# Patient Record
Sex: FEMALE | Race: WHITE | Employment: OTHER | URBAN - METROPOLITAN AREA
[De-identification: names, ages, dates, MRNs, and addresses within clinical notes are randomized per-mention and may not be internally consistent; named-entity substitution may affect disease eponyms.]

---

## 2021-02-01 ENCOUNTER — HOSPITAL ENCOUNTER (EMERGENCY)
Age: 71
Discharge: HOME | End: 2021-02-01
Attending: EMERGENCY MEDICINE
Payer: MEDICARE

## 2021-02-01 VITALS
RESPIRATION RATE: 17 BRPM | DIASTOLIC BLOOD PRESSURE: 82 MMHG | OXYGEN SATURATION: 98 % | HEIGHT: 60 IN | BODY MASS INDEX: 22.78 KG/M2 | SYSTOLIC BLOOD PRESSURE: 127 MMHG | HEART RATE: 97 BPM | WEIGHT: 116 LBS | TEMPERATURE: 95.3 F

## 2021-02-01 DIAGNOSIS — B96.89 BACTERIAL CONJUNCTIVITIS OF BOTH EYES: Primary | ICD-10-CM

## 2021-02-01 DIAGNOSIS — H10.9 BACTERIAL CONJUNCTIVITIS OF BOTH EYES: Primary | ICD-10-CM

## 2021-02-01 DIAGNOSIS — H61.22 IMPACTED CERUMEN OF LEFT EAR: ICD-10-CM

## 2021-02-01 PROCEDURE — 99284 EMERGENCY DEPT VISIT MOD MDM: CPT

## 2021-02-01 RX ORDER — ATORVASTATIN CALCIUM 20 MG/1
20 TABLET, FILM COATED ORAL DAILY
COMMUNITY
End: 2021-05-19 | Stop reason: SDUPTHER

## 2021-02-01 RX ORDER — LISINOPRIL 20 MG/1
20 TABLET ORAL DAILY
COMMUNITY
End: 2021-05-19 | Stop reason: SDUPTHER

## 2021-02-01 RX ORDER — DICYCLOMINE HYDROCHLORIDE 10 MG/1
10 CAPSULE ORAL
COMMUNITY
End: 2021-05-19 | Stop reason: SDUPTHER

## 2021-02-01 RX ORDER — ASPIRIN 81 MG/1
81 TABLET ORAL DAILY
COMMUNITY

## 2021-02-01 RX ORDER — ERYTHROMYCIN 5 MG/G
1 OINTMENT OPHTHALMIC EVERY 6 HOURS
Qty: 1 TUBE | Refills: 0 | Status: SHIPPED | OUTPATIENT
Start: 2021-02-01 | End: 2021-02-08

## 2021-02-01 RX ORDER — CHLORTHALIDONE 50 MG/1
50 TABLET ORAL DAILY
COMMUNITY
End: 2021-05-19 | Stop reason: SDUPTHER

## 2021-02-01 RX ORDER — PANTOPRAZOLE SODIUM 20 MG/1
40 TABLET, DELAYED RELEASE ORAL DAILY
COMMUNITY
End: 2021-05-19 | Stop reason: SDUPTHER

## 2021-02-01 NOTE — ED PROVIDER NOTES
Emergency Department Encounter    Patient: Abimael Headley  MRN: [id number]  : 1950  Date of Evaluation: 2021  ED Provider:  Kiran Bran    Triage Chief Complaint:   Conjunctivitis (TYSON EYES ) and Otalgia (left ear )      Yocha Dehe:  Abimael Headley is a 69 y.o. female that presents to the emergency department for evaluation of bilateral eye redness. Patient notes that she woke up this morning and her eyes were matted shut. Patient has noticed some yellow-green discharge over the past couple days. She does not wear contacts. Denies any foreign body sensation. Denies any eye trauma. No pain with extraocular movements. No change of vision, double vision, blurry vision. No flashes or floaters. Eyes have not been itchy. In addition, patient notes that her left ear feels like it exploded in water. She notes that she is having a hard time hearing from the left ear. She tried to put a Q-tip to clean the ear wax, has had pain since. No pus or purulent drainage. No tinnitus, buzzing, ringing in ears. No facial pain or symptoms. No rhinorrhea. Patient is diabetic, says her blood glucose has been controlled in the low 100s. No additional precipitating, modifying, alleviating features. ROS - see HPI, below listed is current ROS at time of my eval:  A complete 14 point review of systems was performed and is as dictated above, otherwise negative. Past Medical History:   Diagnosis Date    Diabetes mellitus ([address]     Hyperlipidemia     Hypertension        Past Surgical History:   Procedure Laterality Date    APPENDECTOMY      CHOLECYSTECTOMY      EYE SURGERY      FRACTURE SURGERY      HYSTERECTOMY      JOINT REPLACEMENT      TONSILLECTOMY         History reviewed. No pertinent family history.     Social History     Socioeconomic History    Marital status: Single     Spouse name: Not on file    Number of children: Not on file    Years of education: Not on file    Highest education level: Not on file   Occupational History    Not on file   Social Needs    Financial resource strain: Not on file    Food insecurity     Worry: Not on file     Inability: Not on file    Transportation needs     Medical: Not on file     Non-medical: Not on file   Tobacco Use    Smoking status: Former Smoker     Quit date: 2020     Years since quittin.9   Substance and Sexual Activity    Alcohol use: Not Currently    Drug use: Not Currently    Sexual activity: Not on file   Lifestyle    Physical activity     Days per week: Not on file     Minutes per session: Not on file    Stress: Not on file   Relationships    Social connections     Talks on phone: Not on file     Gets together: Not on file     Attends Mandaen service: Not on file     Active member of club or organization: Not on file     Attends meetings of clubs or organizations: Not on file     Relationship status: Not on file    Intimate partner violence     Fear of current or ex partner: Not on file     Emotionally abused: Not on file     Physically abused: Not on file     Forced sexual activity: Not on file   Other Topics Concern    Not on file   Social History Narrative    Not on file       No current facility-administered medications for this encounter. Current Outpatient Medications   Medication Sig Dispense Refill    METFORMIN HCL PO Take by mouth      aspirin 81 MG EC tablet Take 81 mg by mouth daily      erythromycin (ROMYCIN) 5 MG/GM ophthalmic ointment Place 1 cm into both eyes every 6 hours for 7 days Please apply 1cm ribbon of the ointment to both eyes.  1 Tube 0    atorvastatin (LIPITOR) 20 MG tablet Take 20 mg by mouth daily      lisinopril (PRINIVIL;ZESTRIL) 20 MG tablet Take 20 mg by mouth daily      chlorthalidone (HYGROTEN) 50 MG tablet Take 50 mg by mouth daily      dicyclomine (BENTYL) 10 MG capsule Take 10 mg by mouth      pantoprazole (PROTONIX) 20 MG tablet Take 40 mg by mouth daily         Allergies evaluated in the emergency department by myself. A thorough history and physical exam were performed, prior medical records were reviewed. Upon arrival to the emergency department, patient's vital signs were noted and patient is hemodynamically stable. Differential diagnoses and treatment plan were discussed. Symptoms are consistent with acute bacterial conjunctivitis bilateral eyes. No signs of periorbital, preseptal, orbital cellulitis. No pain with extraocular movements. No proptosis. No signs of herpes zoster or herpes ophthalmicus. We recommend erythromycin topical ointment. In addition, patient has left ear cerumen impaction. Disimpaction is performed by myself at bedside. Patient tolerates procedure well without any complications. Notes improvement in symptoms. She is instructed to follow-up with primary care provider. Instructed to return to the emergency room immediately with any new, worsening, concerning symptoms. Additional verbal printed discharge instructions were provided. Patient verbalizes understanding and is agreeable with discharge plan. Patient discharged in stable, non-toxic condition. Clinical Impressions:  1. Bacterial conjunctivitis of both eyes    2. Impacted cerumen of left ear        Disposition referral:  [place] [address]  [facility]  [phone]  Go to   Immediately with any new, worsening, concerning symptoms. [facility]  [address] [facility]  [phone]  In 3 days  Please follow-up with primary care physician for reevaluation and to establish care. Disposition medications:  New Prescriptions    ERYTHROMYCIN (ROMYCIN) 5 MG/GM OPHTHALMIC OINTMENT    Place 1 cm into both eyes every 6 hours for 7 days Please apply 1cm ribbon of the ointment to both eyes.        ED Provider Disposition:  DISPOSITION Decision To Discharge 02/01/2021 10:35:35 AM      Comment: Please note this report has been produced using speech recognition software and may contain errors related to that system including errors in grammar, punctuation, and spelling, as well as words and phrases that may be inappropriate. Efforts were made to edit the dictations.        Cornelia Altamirano,   02/01/21 1046

## 2021-02-01 NOTE — ED NOTES
Reports elliott eye drainage and discomfort states they were madded shut this morning   Pt also c/o left ear pain for couple days states she has been using some ear drops with no improvement      Erna Chang RN  02/01/21 0119

## 2021-02-01 NOTE — ED NOTES
Discharge instructions given with prescription verbalized understanding pt is ambulatory out       Elena York, RN  02/01/21 7862

## 2021-05-19 ENCOUNTER — OFFICE VISIT (OUTPATIENT)
Dept: FAMILY MEDICINE CLINIC | Age: 71
End: 2021-05-19
Payer: MEDICARE

## 2021-05-19 VITALS
SYSTOLIC BLOOD PRESSURE: 110 MMHG | HEIGHT: 60 IN | TEMPERATURE: 97.3 F | BODY MASS INDEX: 24.94 KG/M2 | WEIGHT: 127 LBS | OXYGEN SATURATION: 98 % | DIASTOLIC BLOOD PRESSURE: 78 MMHG | HEART RATE: 66 BPM

## 2021-05-19 DIAGNOSIS — K21.9 GASTROESOPHAGEAL REFLUX DISEASE, UNSPECIFIED WHETHER ESOPHAGITIS PRESENT: ICD-10-CM

## 2021-05-19 DIAGNOSIS — H10.32 ACUTE CONJUNCTIVITIS OF LEFT EYE, UNSPECIFIED ACUTE CONJUNCTIVITIS TYPE: ICD-10-CM

## 2021-05-19 DIAGNOSIS — Z87.898 HISTORY OF SOLITARY PULMONARY NODULE: ICD-10-CM

## 2021-05-19 DIAGNOSIS — I10 ESSENTIAL HYPERTENSION: ICD-10-CM

## 2021-05-19 DIAGNOSIS — G47.00 INSOMNIA, UNSPECIFIED TYPE: ICD-10-CM

## 2021-05-19 DIAGNOSIS — E11.9 TYPE 2 DIABETES MELLITUS WITHOUT COMPLICATION, WITHOUT LONG-TERM CURRENT USE OF INSULIN (HCC): Primary | ICD-10-CM

## 2021-05-19 DIAGNOSIS — E78.2 MIXED HYPERLIPIDEMIA: ICD-10-CM

## 2021-05-19 PROCEDURE — G8427 DOCREV CUR MEDS BY ELIG CLIN: HCPCS | Performed by: NURSE PRACTITIONER

## 2021-05-19 PROCEDURE — 99203 OFFICE O/P NEW LOW 30 MIN: CPT | Performed by: NURSE PRACTITIONER

## 2021-05-19 PROCEDURE — 1036F TOBACCO NON-USER: CPT | Performed by: NURSE PRACTITIONER

## 2021-05-19 PROCEDURE — 4040F PNEUMOC VAC/ADMIN/RCVD: CPT | Performed by: NURSE PRACTITIONER

## 2021-05-19 PROCEDURE — 3017F COLORECTAL CA SCREEN DOC REV: CPT | Performed by: NURSE PRACTITIONER

## 2021-05-19 PROCEDURE — G8420 CALC BMI NORM PARAMETERS: HCPCS | Performed by: NURSE PRACTITIONER

## 2021-05-19 PROCEDURE — 2022F DILAT RTA XM EVC RTNOPTHY: CPT | Performed by: NURSE PRACTITIONER

## 2021-05-19 PROCEDURE — 3046F HEMOGLOBIN A1C LEVEL >9.0%: CPT | Performed by: NURSE PRACTITIONER

## 2021-05-19 PROCEDURE — 1090F PRES/ABSN URINE INCON ASSESS: CPT | Performed by: NURSE PRACTITIONER

## 2021-05-19 PROCEDURE — 1123F ACP DISCUSS/DSCN MKR DOCD: CPT | Performed by: NURSE PRACTITIONER

## 2021-05-19 PROCEDURE — G8400 PT W/DXA NO RESULTS DOC: HCPCS | Performed by: NURSE PRACTITIONER

## 2021-05-19 RX ORDER — ATORVASTATIN CALCIUM 20 MG/1
20 TABLET, FILM COATED ORAL DAILY
Qty: 30 TABLET | Refills: 0 | Status: SHIPPED | OUTPATIENT
Start: 2021-05-19 | End: 2021-07-14 | Stop reason: SDUPTHER

## 2021-05-19 RX ORDER — BUSPIRONE HYDROCHLORIDE 10 MG/1
10 TABLET ORAL NIGHTLY
Qty: 30 TABLET | Refills: 0 | Status: SHIPPED | OUTPATIENT
Start: 2021-05-19 | End: 2021-06-18

## 2021-05-19 RX ORDER — DICYCLOMINE HYDROCHLORIDE 10 MG/1
10 CAPSULE ORAL 3 TIMES DAILY PRN
Qty: 120 CAPSULE | Refills: 0 | Status: SHIPPED | OUTPATIENT
Start: 2021-05-19 | End: 2021-08-18 | Stop reason: SDUPTHER

## 2021-05-19 RX ORDER — CHLORTHALIDONE 50 MG/1
50 TABLET ORAL DAILY
Qty: 30 TABLET | Refills: 0 | Status: ON HOLD | OUTPATIENT
Start: 2021-05-19 | End: 2021-07-01

## 2021-05-19 RX ORDER — PANTOPRAZOLE SODIUM 40 MG/1
40 TABLET, DELAYED RELEASE ORAL DAILY
Qty: 30 TABLET | Refills: 0 | Status: SHIPPED | OUTPATIENT
Start: 2021-05-19 | End: 2021-07-14 | Stop reason: SDUPTHER

## 2021-05-19 RX ORDER — LISINOPRIL 20 MG/1
20 TABLET ORAL DAILY
Qty: 30 TABLET | Refills: 0 | Status: ON HOLD | OUTPATIENT
Start: 2021-05-19 | End: 2021-07-02 | Stop reason: SDUPTHER

## 2021-05-19 RX ORDER — POLYMYXIN B SULFATE AND TRIMETHOPRIM 1; 10000 MG/ML; [USP'U]/ML
1 SOLUTION OPHTHALMIC 4 TIMES DAILY
Qty: 10 ML | Refills: 0 | Status: SHIPPED | OUTPATIENT
Start: 2021-05-19 | End: 2021-05-29

## 2021-05-19 ASSESSMENT — ENCOUNTER SYMPTOMS
RHINORRHEA: 0
SHORTNESS OF BREATH: 0
EYE REDNESS: 1
SINUS PRESSURE: 0
NAUSEA: 0
DIARRHEA: 0
COUGH: 0
WHEEZING: 0
SINUS PAIN: 0
SORE THROAT: 0
VOMITING: 0
CHEST TIGHTNESS: 0
EYE ITCHING: 1

## 2021-05-19 NOTE — PROGRESS NOTES
Abimael Headley   66 y.o.  female  [id number]      Chief Complaint   Patient presents with    Medication Refill     Pt states that she also needs to see pulmonologist, can't get intpo new PCP until July, was living in [place] nad they found a small nodule on one of her lungs years ago. Pt states she was a long time smoker, but quit back in Dec. 2020, sometimes is still SOB     Eye Pain     Pt states that she wore mascara for the first time in a long time 3-4 days ago, forgot to take it off, and since then eye has been red and painful, also has drainage. Subjective:  66 y. o.female is here for a follow up. She has the following chronic/acute medical problems: There is no problem list on file for this patient. Patient is here today for medication refills. Patient states she moved back from [place] in November 2020. Patient states she did see a PCP in [place] [address] once but wanted to establish care with a PCP in [facility]. Patient has an appointment with PCP but not till July 14. Patient states it has been a while since she has had any blood work. Patient states she was put on Buspar at bedtime for sleep. Patient states this medication is effective. Patient states she was told she had a lung nodule while leaving in [place]. Patient states she saw a pulmonologist in [place] who kept an eye on this. Patient would like to be referred to a pulmonologist in [facility]. Patient states she wore mascara for the first time in a long while. Patient states then her left eye has be itchy and red. Patient states she wakes up in the morning and her eye is matted shut. Patient states this has gone on for the last 3 to 4 days. Review of Systems   Constitutional: Negative for appetite change, chills, fatigue and fever. HENT: Negative for congestion, ear pain, postnasal drip, rhinorrhea, sinus pressure, sinus pain, sneezing and sore throat. Eyes: Positive for redness and itching.    Respiratory: Negative for cough, chest tightness, shortness of breath and wheezing. Cardiovascular: Negative for chest pain and palpitations. Gastrointestinal: Negative for diarrhea, nausea and vomiting. Endocrine: Negative for polydipsia, polyphagia and polyuria. Skin: Negative for rash. Neurological: Negative for dizziness, light-headedness and headaches. Current Outpatient Medications   Medication Sig Dispense Refill    metFORMIN (GLUCOPHAGE) 500 MG tablet Take 1 tablet by mouth 2 times daily (with meals) 60 tablet 0    lisinopril (PRINIVIL;ZESTRIL) 20 MG tablet Take 1 tablet by mouth daily 30 tablet 0    atorvastatin (LIPITOR) 20 MG tablet Take 1 tablet by mouth daily 30 tablet 0    dicyclomine (BENTYL) 10 MG capsule Take 1 capsule by mouth 3 times daily as needed (stomach) 120 capsule 0    chlorthalidone (HYGROTEN) 50 MG tablet Take 1 tablet by mouth daily 30 tablet 0    pantoprazole (PROTONIX) 40 MG tablet Take 1 tablet by mouth daily 30 tablet 0    busPIRone (BUSPAR) 10 MG tablet Take 1 tablet by mouth nightly 30 tablet 0    trimethoprim-polymyxin b (POLYTRIM) 35662-3.1 UNIT/ML-% ophthalmic solution Place 1 drop into the left eye 4 times daily for 10 days 10 mL 0    aspirin 81 MG EC tablet Take 81 mg by mouth daily       No current facility-administered medications for this visit. Past medical, family,surgical history reviewed today. Objective:  /78   Pulse 66   Temp 97.3 °F (36.3 °C)   Ht 5' (1.524 m)   Wt 127 lb (57.6 kg)   SpO2 98%   Breastfeeding No   BMI 24.80 kg/m²   BP Readings from Last 3 Encounters:   05/19/21 110/78   02/01/21 127/82     Wt Readings from Last 3 Encounters:   05/19/21 127 lb (57.6 kg)   02/01/21 116 lb (52.6 kg)         Physical Exam  Constitutional:       Appearance: Normal appearance. HENT:      Head: Normocephalic. Ears:      Comments: Sclera of left eye is red. Cardiovascular:      Rate and Rhythm: Normal rate and regular rhythm.       Heart sounds: Normal heart sounds. Pulmonary:      Effort: Pulmonary effort is normal.      Breath sounds: Normal breath sounds. Musculoskeletal:      Cervical back: Neck supple. Skin:     General: Skin is warm and dry. Neurological:      Mental Status: She is alert and oriented to person, place, and time. Psychiatric:         Mood and Affect: Mood normal.         Behavior: Behavior normal.         No results found for: WBC, HGB, HCT, MCV, PLT  No results found for: NA, K, CL, CO2, BUN, CREATININE, GLUCOSE, CALCIUM, PROT, LABALBU, BILITOT, ALKPHOS, AST, ALT, LABGLOM, GFRAA, AGRATIO, GLOB  No results found for: CHOL  No results found for: TRIG  No results found for: HDL  No results found for: LDLCALC, LDLCHOLESTEROL  No results found for: LABA1C  No results found for: TSHFT4, TSH, TSHHS      ASSESSMENT/PLAN:      1. Type 2 diabetes mellitus without complication, without long-term current use of insulin (HCC)  Refilled medication.   - metFORMIN (GLUCOPHAGE) 500 MG tablet; Take 1 tablet by mouth 2 times daily (with meals)  Dispense: 60 tablet; Refill: 0  - CBC WITH AUTO DIFFERENTIAL; Future  - COMPREHENSIVE METABOLIC PANEL; Future  - HEMOGLOBIN A1C; Future    2. Gastroesophageal reflux disease, unspecified whether esophagitis present  Refilled medication. - dicyclomine (BENTYL) 10 MG capsule; Take 1 capsule by mouth 3 times daily as needed (stomach)  Dispense: 120 capsule; Refill: 0  - pantoprazole (PROTONIX) 40 MG tablet; Take 1 tablet by mouth daily  Dispense: 30 tablet; Refill: 0    3. Insomnia, unspecified type  Refilled medication.   - busPIRone (BUSPAR) 10 MG tablet; Take 1 tablet by mouth nightly  Dispense: 30 tablet; Refill: 0    4. Essential hypertension  Refilled medications. - lisinopril (PRINIVIL;ZESTRIL) 20 MG tablet; Take 1 tablet by mouth daily  Dispense: 30 tablet; Refill: 0  - chlorthalidone (HYGROTEN) 50 MG tablet; Take 1 tablet by mouth daily  Dispense: 30 tablet;  Refill: 0  - CBC WITH AUTO

## 2021-06-15 ENCOUNTER — HOSPITAL ENCOUNTER (OUTPATIENT)
Age: 71
Discharge: HOME | End: 2021-06-15
Payer: MEDICARE

## 2021-06-15 ENCOUNTER — INITIAL CONSULT (OUTPATIENT)
Dept: PULMONOLOGY | Age: 71
End: 2021-06-15
Payer: MEDICARE

## 2021-06-15 ENCOUNTER — HOSPITAL ENCOUNTER (OUTPATIENT)
Dept: GENERAL RADIOLOGY | Age: 71
Discharge: HOME | End: 2021-06-15
Payer: MEDICARE

## 2021-06-15 VITALS
BODY MASS INDEX: 24.74 KG/M2 | DIASTOLIC BLOOD PRESSURE: 80 MMHG | SYSTOLIC BLOOD PRESSURE: 140 MMHG | WEIGHT: 126 LBS | HEIGHT: 60 IN | OXYGEN SATURATION: 98 % | HEART RATE: 76 BPM

## 2021-06-15 DIAGNOSIS — E78.2 MIXED HYPERLIPIDEMIA: ICD-10-CM

## 2021-06-15 DIAGNOSIS — R06.02 SHORTNESS OF BREATH: ICD-10-CM

## 2021-06-15 DIAGNOSIS — I10 ESSENTIAL HYPERTENSION: ICD-10-CM

## 2021-06-15 DIAGNOSIS — R91.1 SOLITARY LUNG NODULE: ICD-10-CM

## 2021-06-15 DIAGNOSIS — G47.19 EXCESSIVE DAYTIME SLEEPINESS: ICD-10-CM

## 2021-06-15 DIAGNOSIS — R06.83 SNORING: ICD-10-CM

## 2021-06-15 DIAGNOSIS — J44.9 COPD, MILD (HCC): Primary | ICD-10-CM

## 2021-06-15 DIAGNOSIS — J44.9 COPD, MILD (HCC): ICD-10-CM

## 2021-06-15 DIAGNOSIS — Z87.891 FORMER SMOKER: ICD-10-CM

## 2021-06-15 DIAGNOSIS — E11.9 TYPE 2 DIABETES MELLITUS WITHOUT COMPLICATION, WITHOUT LONG-TERM CURRENT USE OF INSULIN (HCC): ICD-10-CM

## 2021-06-15 LAB
ALBUMIN SERPL-MCNC: 4.3 GM/DL (ref 3.4–5)
ALP BLD-CCNC: 79 IU/L (ref 40–128)
ALT SERPL-CCNC: 13 U/L (ref 10–40)
ANION GAP SERPL CALCULATED.3IONS-SCNC: 10 MMOL/L (ref 4–16)
AST SERPL-CCNC: 12 IU/L (ref 15–37)
BASOPHILS ABSOLUTE: 0.1 K/CU MM
BASOPHILS RELATIVE PERCENT: 1 % (ref 0–1)
BILIRUB SERPL-MCNC: 0.2 MG/DL (ref 0–1)
BUN BLDV-MCNC: 17 MG/DL (ref 6–23)
CALCIUM SERPL-MCNC: 9.4 MG/DL (ref 8.3–10.6)
CHLORIDE BLD-SCNC: 105 MMOL/L (ref 99–110)
CHOLESTEROL: 143 MG/DL
CO2: 28 MMOL/L (ref 21–32)
CREAT SERPL-MCNC: 0.8 MG/DL (ref 0.6–1.1)
DIFFERENTIAL TYPE: ABNORMAL
EOSINOPHILS ABSOLUTE: 0.1 K/CU MM
EOSINOPHILS RELATIVE PERCENT: 1.2 % (ref 0–3)
ESTIMATED AVERAGE GLUCOSE: 120 MG/DL
EXPIRATORY TIME-POST: NORMAL
EXPIRATORY TIME: NORMAL
FEF 25-75% %CHNG: NORMAL
FEF 25-75% %PRED-POST: NORMAL
FEF 25-75% %PRED-PRE: NORMAL
FEF 25-75% PRED: NORMAL
FEF 25-75%-POST: NORMAL
FEF 25-75%-PRE: NORMAL
FEV1 %PRED-POST: 73.9 %
FEV1 %PRED-PRE: 71.6 %
FEV1 PRED: 1.84 L
FEV1-POST: 1.36 L
FEV1-PRE: 1.32 L
FEV1/FVC %PRED-POST: 88.6 %
FEV1/FVC %PRED-PRE: 100.1 %
FEV1/FVC PRED: 75.5 %
FEV1/FVC-POST: 66.9 %
FEV1/FVC-PRE: 75.6 %
FVC %PRED-POST: 82.9 L
FVC %PRED-PRE: 71 %
FVC PRED: 2.45 L
FVC-POST: 2.03 L
FVC-PRE: 1.74 L
GFR AFRICAN AMERICAN: >60 ML/MIN/1.73M2
GFR NON-AFRICAN AMERICAN: >60 ML/MIN/1.73M2
GLUCOSE BLD-MCNC: 102 MG/DL (ref 70–99)
HBA1C MFR BLD: 5.8 % (ref 4.2–6.3)
HCT VFR BLD CALC: 40.6 % (ref 37–47)
HDLC SERPL-MCNC: 67 MG/DL
HEMOGLOBIN: 12.1 GM/DL (ref 12.5–16)
IMMATURE NEUTROPHIL %: 0.6 % (ref 0–0.43)
LDL CHOLESTEROL DIRECT: 64 MG/DL
LYMPHOCYTES ABSOLUTE: 1.3 K/CU MM
LYMPHOCYTES RELATIVE PERCENT: 19 % (ref 24–44)
MCH RBC QN AUTO: 30 PG (ref 27–31)
MCHC RBC AUTO-ENTMCNC: 29.8 % (ref 32–36)
MCV RBC AUTO: 100.5 FL (ref 78–100)
MONOCYTES ABSOLUTE: 0.6 K/CU MM
MONOCYTES RELATIVE PERCENT: 8.5 % (ref 0–4)
NUCLEATED RBC %: 0 %
PDW BLD-RTO: 13.5 % (ref 11.7–14.9)
PEF %PRED-POST: NORMAL
PEF %PRED-PRE: NORMAL
PEF PRED: NORMAL
PEF%CHNG: NORMAL
PEF-POST: NORMAL
PEF-PRE: NORMAL
PLATELET # BLD: 355 K/CU MM (ref 140–440)
PMV BLD AUTO: 9.9 FL (ref 7.5–11.1)
POTASSIUM SERPL-SCNC: 4.2 MMOL/L (ref 3.5–5.1)
RBC # BLD: 4.04 M/CU MM (ref 4.2–5.4)
SEGMENTED NEUTROPHILS ABSOLUTE COUNT: 4.7 K/CU MM
SEGMENTED NEUTROPHILS RELATIVE PERCENT: 69.7 % (ref 36–66)
SODIUM BLD-SCNC: 143 MMOL/L (ref 135–145)
TOTAL IMMATURE NEUTOROPHIL: 0.04 K/CU MM
TOTAL NUCLEATED RBC: 0 K/CU MM
TOTAL PROTEIN: 7.1 GM/DL (ref 6.4–8.2)
TRIGL SERPL-MCNC: 79 MG/DL
WBC # BLD: 6.8 K/CU MM (ref 4–10.5)

## 2021-06-15 PROCEDURE — 99204 OFFICE O/P NEW MOD 45 MIN: CPT | Performed by: INTERNAL MEDICINE

## 2021-06-15 PROCEDURE — G8420 CALC BMI NORM PARAMETERS: HCPCS | Performed by: INTERNAL MEDICINE

## 2021-06-15 PROCEDURE — G8926 SPIRO NO PERF OR DOC: HCPCS | Performed by: INTERNAL MEDICINE

## 2021-06-15 PROCEDURE — G8400 PT W/DXA NO RESULTS DOC: HCPCS | Performed by: INTERNAL MEDICINE

## 2021-06-15 PROCEDURE — 1123F ACP DISCUSS/DSCN MKR DOCD: CPT | Performed by: INTERNAL MEDICINE

## 2021-06-15 PROCEDURE — 1036F TOBACCO NON-USER: CPT | Performed by: INTERNAL MEDICINE

## 2021-06-15 PROCEDURE — 4040F PNEUMOC VAC/ADMIN/RCVD: CPT | Performed by: INTERNAL MEDICINE

## 2021-06-15 PROCEDURE — 36415 COLL VENOUS BLD VENIPUNCTURE: CPT

## 2021-06-15 PROCEDURE — 3017F COLORECTAL CA SCREEN DOC REV: CPT | Performed by: INTERNAL MEDICINE

## 2021-06-15 PROCEDURE — 83036 HEMOGLOBIN GLYCOSYLATED A1C: CPT

## 2021-06-15 PROCEDURE — 83721 ASSAY OF BLOOD LIPOPROTEIN: CPT

## 2021-06-15 PROCEDURE — 3023F SPIROM DOC REV: CPT | Performed by: INTERNAL MEDICINE

## 2021-06-15 PROCEDURE — G8427 DOCREV CUR MEDS BY ELIG CLIN: HCPCS | Performed by: INTERNAL MEDICINE

## 2021-06-15 PROCEDURE — 80053 COMPREHEN METABOLIC PANEL: CPT

## 2021-06-15 PROCEDURE — 85025 COMPLETE CBC W/AUTO DIFF WBC: CPT

## 2021-06-15 PROCEDURE — 1090F PRES/ABSN URINE INCON ASSESS: CPT | Performed by: INTERNAL MEDICINE

## 2021-06-15 PROCEDURE — 71046 X-RAY EXAM CHEST 2 VIEWS: CPT

## 2021-06-15 PROCEDURE — 80061 LIPID PANEL: CPT

## 2021-06-15 ASSESSMENT — PULMONARY FUNCTION TESTS
FEV1_POST: 1.36
FVC_PERCENT_PREDICTED_PRE: 71.0
FVC_PREDICTED: 2.45
FVC_POST: 2.03
FEV1_PREDICTED: 1.84
FEV1/FVC_PRE: 75.6
FEV1_PERCENT_PREDICTED_PRE: 71.6
FEV1/FVC_PERCENT_PREDICTED_PRE: 100.1
FVC_PRE: 1.74
FEV1/FVC_PERCENT_PREDICTED_POST: 88.6
FEV1_PERCENT_PREDICTED_POST: 73.9
FVC_PERCENT_PREDICTED_POST: 82.9
FEV1/FVC_PREDICTED: 75.5
FEV1/FVC_POST: 66.9
FEV1_PRE: 1.32

## 2021-06-15 NOTE — PROGRESS NOTES
medications for this visit. Allergies   Allergen Reactions    Other Hives    Levaquin [Levofloxacin] Rash       Social History:    Social History     Socioeconomic History    Marital status: Single     Spouse name: None    Number of children: None    Years of education: None    Highest education level: None   Occupational History    None   Tobacco Use    Smoking status: Former Smoker     Quit date: 2020     Years since quittin.3    Smokeless tobacco: Never Used   Substance and Sexual Activity    Alcohol use: Not Currently    Drug use: Not Currently    Sexual activity: None   Other Topics Concern    None   Social History Narrative    None     Social Determinants of Health     Financial Resource Strain:     Difficulty of Paying Living Expenses:    Food Insecurity:     Worried About Running Out of Food in the Last Year:     Ran Out of Food in the Last Year:    Transportation Needs:     Lack of Transportation (Medical):  Lack of Transportation (Non-Medical):    Physical Activity:     Days of Exercise per Week:     Minutes of Exercise per Session:    Stress:     Feeling of Stress :    Social Connections:     Frequency of Communication with Friends and Family:     Frequency of Social Gatherings with Friends and Family:     Attends Latter day Services:     Active Member of Clubs or Organizations:     Attends Club or Organization Meetings:     Marital Status:    Intimate Partner Violence:     Fear of Current or Ex-Partner:     Emotionally Abused:     Physically Abused:     Sexually Abused:        Family History:    No family history on file. REVIEW OF SYSTEMS:    CONSTITUTIONAL:  negative for fevers, chills, diaphoresis,  appetite change, night sweats and unexpected weight change.    HEENT:  negative for hearing loss,  sinus pressure, nasal congestion, epistaxis   RESPIRATORY:  See HPI  CARDIOVASCULAR:  negative for chest pain, palpitations, exertional chest bronchodilator she had a significant response    Assessment:     1. Solitary lung nodule    2. Shortness of breath    3. Snoring    4. Excessive daytime sleepiness    5. Former smoker        Plan:   I will obtain a chest x-ray and will consider a CT lung screening evaluation in the near future. It would be helpful to obtain the results of her previous CT chest for comparison. I am going to start her on Anoro Ellipta 1 inhalation daily and allow her to use her albuterol MDI or albuterol solution per nebulizer as needed. Because of her history of snoring and excessive daytime sleepiness I will obtain apnea screening. I will continue to follow her    We have discussed the need to maintain yearly flu immunization, pneumococcal vaccination. We have discussed Coronavirus precaution including handwashing practice, wiping items touched in public such as gas pumps, door handles, shopping carts, etc. Self monitoring for infection - fever, chills, cough, SOB. Should they develop symptoms they should call office for further instructions. Return for Recheck for COPD, Recheck for Shortness of Breath, recheck for lung nodule. This dictation was performed with a verbal recognition program and it was checked for errors. It is possible that there are still dictated errors within this office note. Any errors should be brought immediately to my attention for correction. All efforts were made to ensure that this office note is accurate.

## 2021-06-16 DIAGNOSIS — R91.1 SOLITARY LUNG NODULE: ICD-10-CM

## 2021-06-16 DIAGNOSIS — J44.9 COPD, MILD (HCC): ICD-10-CM

## 2021-06-16 DIAGNOSIS — R06.02 SHORTNESS OF BREATH: ICD-10-CM

## 2021-06-16 DIAGNOSIS — Z87.891 FORMER SMOKER: ICD-10-CM

## 2021-06-17 NOTE — RESULT ENCOUNTER NOTE
I spoke to Mrs. Jones over the phone and told her that the chest x-ray does show several calcified pulmonary nodules and that I would probably will proceed with a CT lung screening when I see her back in July.

## 2021-07-01 ENCOUNTER — HOSPITAL ENCOUNTER (INPATIENT)
Age: 71
LOS: 1 days | Discharge: HOME | DRG: 313 | End: 2021-07-02
Attending: EMERGENCY MEDICINE | Admitting: INTERNAL MEDICINE
Payer: MEDICARE

## 2021-07-01 ENCOUNTER — APPOINTMENT (OUTPATIENT)
Dept: GENERAL RADIOLOGY | Age: 71
DRG: 313 | End: 2021-07-01
Payer: MEDICARE

## 2021-07-01 DIAGNOSIS — I10 ESSENTIAL HYPERTENSION: ICD-10-CM

## 2021-07-01 DIAGNOSIS — R07.9 CHEST PAIN, UNSPECIFIED TYPE: Primary | ICD-10-CM

## 2021-07-01 LAB
ALBUMIN SERPL-MCNC: 3.7 GM/DL (ref 3.4–5)
ALP BLD-CCNC: 88 IU/L (ref 40–129)
ALT SERPL-CCNC: 13 U/L (ref 10–40)
ANION GAP SERPL CALCULATED.3IONS-SCNC: 11 MMOL/L (ref 4–16)
AST SERPL-CCNC: 11 IU/L (ref 15–37)
BASOPHILS ABSOLUTE: 0 K/CU MM
BASOPHILS RELATIVE PERCENT: 0.6 % (ref 0–1)
BILIRUB SERPL-MCNC: 0.4 MG/DL (ref 0–1)
BUN BLDV-MCNC: 12 MG/DL (ref 6–23)
CALCIUM SERPL-MCNC: 9.1 MG/DL (ref 8.3–10.6)
CHLORIDE BLD-SCNC: 104 MMOL/L (ref 99–110)
CO2: 23 MMOL/L (ref 21–32)
CREAT SERPL-MCNC: 0.7 MG/DL (ref 0.6–1.1)
DIFFERENTIAL TYPE: ABNORMAL
EOSINOPHILS ABSOLUTE: 0.1 K/CU MM
EOSINOPHILS RELATIVE PERCENT: 1.7 % (ref 0–3)
GFR AFRICAN AMERICAN: >60 ML/MIN/1.73M2
GFR NON-AFRICAN AMERICAN: >60 ML/MIN/1.73M2
GLUCOSE BLD-MCNC: 207 MG/DL (ref 70–99)
GLUCOSE BLD-MCNC: 99 MG/DL (ref 70–99)
HCT VFR BLD CALC: 40.5 % (ref 37–47)
HEMOGLOBIN: 12.9 GM/DL (ref 12.5–16)
IMMATURE NEUTROPHIL %: 0.6 % (ref 0–0.43)
LIPASE: 23 IU/L (ref 13–60)
LYMPHOCYTES ABSOLUTE: 0.9 K/CU MM
LYMPHOCYTES RELATIVE PERCENT: 13 % (ref 24–44)
MCH RBC QN AUTO: 30 PG (ref 27–31)
MCHC RBC AUTO-ENTMCNC: 31.9 % (ref 32–36)
MCV RBC AUTO: 94.2 FL (ref 78–100)
MONOCYTES ABSOLUTE: 0.7 K/CU MM
MONOCYTES RELATIVE PERCENT: 9.8 % (ref 0–4)
PDW BLD-RTO: 12.8 % (ref 11.7–14.9)
PLATELET # BLD: 245 K/CU MM (ref 140–440)
PMV BLD AUTO: 10.1 FL (ref 7.5–11.1)
POTASSIUM SERPL-SCNC: 4.1 MMOL/L (ref 3.5–5.1)
PRO-BNP: 226.6 PG/ML
RBC # BLD: 4.3 M/CU MM (ref 4.2–5.4)
SEGMENTED NEUTROPHILS ABSOLUTE COUNT: 4.9 K/CU MM
SEGMENTED NEUTROPHILS RELATIVE PERCENT: 74.3 % (ref 36–66)
SODIUM BLD-SCNC: 138 MMOL/L (ref 135–145)
TOTAL IMMATURE NEUTOROPHIL: 0.04 K/CU MM
TOTAL PROTEIN: 6.7 GM/DL (ref 6.4–8.2)
TROPONIN T: <0.01 NG/ML
WBC # BLD: 6.6 K/CU MM (ref 4–10.5)

## 2021-07-01 PROCEDURE — 84484 ASSAY OF TROPONIN QUANT: CPT

## 2021-07-01 PROCEDURE — 2140000000 HC CCU INTERMEDIATE R&B

## 2021-07-01 PROCEDURE — 93005 ELECTROCARDIOGRAM TRACING: CPT | Performed by: EMERGENCY MEDICINE

## 2021-07-01 PROCEDURE — 2580000003 HC RX 258: Performed by: NURSE PRACTITIONER

## 2021-07-01 PROCEDURE — 85025 COMPLETE CBC W/AUTO DIFF WBC: CPT

## 2021-07-01 PROCEDURE — 36415 COLL VENOUS BLD VENIPUNCTURE: CPT

## 2021-07-01 PROCEDURE — 82962 GLUCOSE BLOOD TEST: CPT

## 2021-07-01 PROCEDURE — 71045 X-RAY EXAM CHEST 1 VIEW: CPT

## 2021-07-01 PROCEDURE — 83690 ASSAY OF LIPASE: CPT

## 2021-07-01 PROCEDURE — 94761 N-INVAS EAR/PLS OXIMETRY MLT: CPT

## 2021-07-01 PROCEDURE — 99284 EMERGENCY DEPT VISIT MOD MDM: CPT

## 2021-07-01 PROCEDURE — 80053 COMPREHEN METABOLIC PANEL: CPT

## 2021-07-01 PROCEDURE — 6370000000 HC RX 637 (ALT 250 FOR IP): Performed by: EMERGENCY MEDICINE

## 2021-07-01 PROCEDURE — 6370000000 HC RX 637 (ALT 250 FOR IP): Performed by: NURSE PRACTITIONER

## 2021-07-01 PROCEDURE — 83880 ASSAY OF NATRIURETIC PEPTIDE: CPT

## 2021-07-01 RX ORDER — SODIUM CHLORIDE 0.9 % (FLUSH) 0.9 %
5-40 SYRINGE (ML) INJECTION EVERY 12 HOURS SCHEDULED
Status: DISCONTINUED | OUTPATIENT
Start: 2021-07-01 | End: 2021-07-02 | Stop reason: HOSPADM

## 2021-07-01 RX ORDER — SODIUM CHLORIDE 0.9 % (FLUSH) 0.9 %
5-40 SYRINGE (ML) INJECTION PRN
Status: DISCONTINUED | OUTPATIENT
Start: 2021-07-01 | End: 2021-07-02 | Stop reason: HOSPADM

## 2021-07-01 RX ORDER — POLYETHYLENE GLYCOL 3350 17 G/17G
17 POWDER, FOR SOLUTION ORAL DAILY PRN
Status: DISCONTINUED | OUTPATIENT
Start: 2021-07-01 | End: 2021-07-02 | Stop reason: HOSPADM

## 2021-07-01 RX ORDER — ONDANSETRON 2 MG/ML
4 INJECTION INTRAMUSCULAR; INTRAVENOUS EVERY 6 HOURS PRN
Status: DISCONTINUED | OUTPATIENT
Start: 2021-07-01 | End: 2021-07-02 | Stop reason: HOSPADM

## 2021-07-01 RX ORDER — ACETAMINOPHEN 325 MG/1
650 TABLET ORAL EVERY 6 HOURS PRN
Status: DISCONTINUED | OUTPATIENT
Start: 2021-07-01 | End: 2021-07-02 | Stop reason: HOSPADM

## 2021-07-01 RX ORDER — ASPIRIN 81 MG/1
324 TABLET, CHEWABLE ORAL ONCE
Status: COMPLETED | OUTPATIENT
Start: 2021-07-01 | End: 2021-07-01

## 2021-07-01 RX ORDER — ASPIRIN 81 MG/1
81 TABLET ORAL DAILY
Status: DISCONTINUED | OUTPATIENT
Start: 2021-07-02 | End: 2021-07-02 | Stop reason: HOSPADM

## 2021-07-01 RX ORDER — ATORVASTATIN CALCIUM 20 MG/1
20 TABLET, FILM COATED ORAL DAILY
Status: DISCONTINUED | OUTPATIENT
Start: 2021-07-02 | End: 2021-07-02 | Stop reason: HOSPADM

## 2021-07-01 RX ORDER — SODIUM CHLORIDE 9 MG/ML
25 INJECTION, SOLUTION INTRAVENOUS PRN
Status: DISCONTINUED | OUTPATIENT
Start: 2021-07-01 | End: 2021-07-02 | Stop reason: HOSPADM

## 2021-07-01 RX ORDER — ONDANSETRON 4 MG/1
4 TABLET, ORALLY DISINTEGRATING ORAL EVERY 8 HOURS PRN
Status: DISCONTINUED | OUTPATIENT
Start: 2021-07-01 | End: 2021-07-02 | Stop reason: HOSPADM

## 2021-07-01 RX ORDER — DEXTROSE MONOHYDRATE 25 G/50ML
12.5 INJECTION, SOLUTION INTRAVENOUS PRN
Status: DISCONTINUED | OUTPATIENT
Start: 2021-07-01 | End: 2021-07-02 | Stop reason: HOSPADM

## 2021-07-01 RX ORDER — LISINOPRIL 20 MG/1
20 TABLET ORAL DAILY
Status: DISCONTINUED | OUTPATIENT
Start: 2021-07-02 | End: 2021-07-02

## 2021-07-01 RX ORDER — NICOTINE POLACRILEX 4 MG
15 LOZENGE BUCCAL PRN
Status: DISCONTINUED | OUTPATIENT
Start: 2021-07-01 | End: 2021-07-02 | Stop reason: HOSPADM

## 2021-07-01 RX ORDER — DEXTROSE MONOHYDRATE 50 MG/ML
100 INJECTION, SOLUTION INTRAVENOUS PRN
Status: DISCONTINUED | OUTPATIENT
Start: 2021-07-01 | End: 2021-07-02 | Stop reason: HOSPADM

## 2021-07-01 RX ORDER — DICYCLOMINE HYDROCHLORIDE 10 MG/1
10 CAPSULE ORAL 3 TIMES DAILY PRN
Status: DISCONTINUED | OUTPATIENT
Start: 2021-07-01 | End: 2021-07-02 | Stop reason: HOSPADM

## 2021-07-01 RX ORDER — ACETAMINOPHEN 650 MG/1
650 SUPPOSITORY RECTAL EVERY 6 HOURS PRN
Status: DISCONTINUED | OUTPATIENT
Start: 2021-07-01 | End: 2021-07-02 | Stop reason: HOSPADM

## 2021-07-01 RX ORDER — PANTOPRAZOLE SODIUM 40 MG/1
40 TABLET, DELAYED RELEASE ORAL DAILY
Status: DISCONTINUED | OUTPATIENT
Start: 2021-07-02 | End: 2021-07-02 | Stop reason: HOSPADM

## 2021-07-01 RX ADMIN — ASPIRIN 324 MG: 81 TABLET, CHEWABLE ORAL at 14:09

## 2021-07-01 RX ADMIN — METOPROLOL TARTRATE 25 MG: 25 TABLET, FILM COATED ORAL at 23:15

## 2021-07-01 RX ADMIN — Medication 10 ML: at 23:15

## 2021-07-01 ASSESSMENT — PAIN DESCRIPTION - DESCRIPTORS
DESCRIPTORS: HEAVINESS
DESCRIPTORS: ACHING

## 2021-07-01 ASSESSMENT — PAIN SCALES - GENERAL
PAINLEVEL_OUTOF10: 8
PAINLEVEL_OUTOF10: 5
PAINLEVEL_OUTOF10: 0
PAINLEVEL_OUTOF10: 0

## 2021-07-01 ASSESSMENT — PAIN DESCRIPTION - FREQUENCY: FREQUENCY: CONTINUOUS

## 2021-07-01 ASSESSMENT — PAIN DESCRIPTION - ORIENTATION: ORIENTATION: MID

## 2021-07-01 ASSESSMENT — PAIN DESCRIPTION - LOCATION
LOCATION: CHEST
LOCATION: STERNUM

## 2021-07-01 ASSESSMENT — PAIN DESCRIPTION - PAIN TYPE: TYPE: ACUTE PAIN

## 2021-07-01 NOTE — ED NOTES
Report was called to Sonja Bull at [address] [place] [address].                      Nathalie Ellington RN  07/01/21 1950

## 2021-07-01 NOTE — Clinical Note
Patient Class: Inpatient [101]   REQUIRED: Diagnosis: Chest pain [496701]   Estimated Length of Stay: Estimated stay of more than 2 midnights   Admitting Provider: Rigoberto Uriostegui [[id number]]   Preferred Department: [facility]

## 2021-07-01 NOTE — ED PROVIDER NOTES
History     Socioeconomic History    Marital status: Single     Spouse name: Not on file    Number of children: Not on file    Years of education: Not on file    Highest education level: Not on file   Occupational History    Not on file   Tobacco Use    Smoking status: Former Smoker     Quit date: 2020     Years since quittin.4    Smokeless tobacco: Never Used   Substance and Sexual Activity    Alcohol use: Not Currently    Drug use: Not Currently    Sexual activity: Not on file   Other Topics Concern    Not on file   Social History Narrative    Not on file     Social Determinants of Health     Financial Resource Strain:     Difficulty of Paying Living Expenses:    Food Insecurity:     Worried About [address] [employer] in the Last Year:     [address] [place] in the Last Year:    Transportation Needs:     Lack of Transportation (Medical):  Lack of Transportation (Non-Medical):    Physical Activity:     Days of Exercise per Week:     Minutes of Exercise per Session:    Stress:     Feeling of Stress :    Social Connections:     Frequency of Communication with Friends and Family:     Frequency of Social Gatherings with Friends and Family:     Attends Mandaen Services:     Active Member of Clubs or Organizations:     Attends Club or Organization Meetings:     Marital Status:    Intimate Partner Violence:     Fear of Current or Ex-Partner:     Emotionally Abused:     Physically Abused:     Sexually Abused:        SURGICAL HISTORY  Past Surgical History:   Procedure Laterality Date    APPENDECTOMY      CHOLECYSTECTOMY     [facility]  No current facility-administered medications on file prior to encounter.      Current Outpatient Medications on File Prior to Encounter   Medication Sig Dispense Refill    metFORMIN (GLUCOPHAGE) 500 MG tablet Take 1 tablet by mouth 2 times daily (with meals) 60 tablet 0    lisinopril (PRINIVIL;ZESTRIL) 20 MG tablet Take 1 tablet by mouth daily 30 tablet 0    atorvastatin (LIPITOR) 20 MG tablet Take 1 tablet by mouth daily 30 tablet 0    dicyclomine (BENTYL) 10 MG capsule Take 1 capsule by mouth 3 times daily as needed (stomach) 120 capsule 0    chlorthalidone (HYGROTEN) 50 MG tablet Take 1 tablet by mouth daily 30 tablet 0    pantoprazole (PROTONIX) 40 MG tablet Take 1 tablet by mouth daily 30 tablet 0    aspirin 81 MG EC tablet Take 81 mg by mouth daily           ALLERGIES  Allergies   Allergen Reactions    Other Hives    Levaquin [Levofloxacin] Rash       PHYSICAL EXAM  VITAL SIGNS: BP (!) 181/84   Pulse 76   Temp 98.2 °F (36.8 °C) (Oral)   Resp 20   Ht 5' (1.524 m)   Wt 130 lb (59 kg)   SpO2 100%   BMI 25.39 kg/m²   Constitutional: Well developed, Well nourished, resting in bed, elderly  HENT: Normocephalic, Atraumatic, Bilateral external ears normal, Oropharynx moist, No oral exudates, Nose normal.   Eyes: PERRL, EOMI, Conjunctiva normal, No discharge. Neck: Normal range of motion, Supple, No stridor. Cardiovascular: Normal heart rate, Normal rhythm, No murmurs, No rubs, No gallops. Thorax & Lungs: Normal breath sounds, No respiratory distress, No wheezing, No chest tenderness. Abdomen: Bowel sounds normal, Soft, No tenderness, no guarding, no rebound, No masses, No pulsatile masses. Skin: Warm, Dry, No erythema, No rash. Extremities: Intact distal pulses, No edema, No tenderness, No cyanosis, No clubbing. Musculoskeletal: Good gross range of motion in all major joints. No major deformities noted. Neurologic: Alert & oriented x 3, Normal gross motor function, Normal gross sensory function, No focal deficits noted.    Psychiatric: Affect normal    EKG  EKG Interpretation    Interpreted by emergency department physician from July 1 at 1318    Rhythm: normal sinus   Rate: normal  Axis: normal  Ectopy: none  Conduction: normal  ST Segments: no acute change  T Waves: no acute change  Q Waves: none    Clinical Impression: Normal sinus rhythm with a rate of 72 and a QTC of 418 with no STEMI or ectopy    Matthieu Adan MD      RADIOLOGY/PROCEDURES/LABS  Last Imaging results   XR CHEST PORTABLE   Final Result   1. No acute radiographic abnormality in the chest.             Imaging reviewed by myself    Labs Reviewed   CBC WITH AUTO DIFFERENTIAL - Abnormal; Notable for the following components:       Result Value    MCHC 31.9 (*)     Segs Relative 74.3 (*)     Lymphocytes % 13.0 (*)     Monocytes % 9.8 (*)     Immature Neutrophil % 0.6 (*)     All other components within normal limits   COMPREHENSIVE METABOLIC PANEL W/ REFLEX TO MG FOR LOW K - Abnormal; Notable for the following components:    AST 11 (*)     All other components within normal limits   TROPONIN   BRAIN NATRIURETIC PEPTIDE   LIPASE   TROPONIN       Medications   aspirin chewable tablet 324 mg (324 mg Oral Given 7/1/21 1409)       COURSE & MEDICAL DECISION MAKING  Pertinent Labs & Imaging studies reviewed. (See chart for details)    65-year-old female presents with chest pain. She has heart score moderate risk, based on her age and risk factors, coupled with the nature of her chest pain and lack of relief with GI interventions, I do believe she would benefit from hospitalization and observation. I have low clinical suspicion for pneumonia, aortic dissection, PE. Her laboratory study here is reassuring, there is no STEMI on EKG. She was discussed with Dr. Yakelin Dior who is agreeable to accept care at [facility]. She will be transferred to [facility] for evaluation and further monitoring. Patient is agreeable with plan of care. CRITICAL CARE NOTE:  There was a high probability of clinically significant life-threatening deterioration of the patient's condition requiring my urgent intervention due to chest pain.   Cardiac work-up was performed to address this. Total critical care time is 7 minutes. This includes vital sign monitoring, pulse oximetry monitoring, telemetry monitoring, clinical response to the IV medications, reviewing the nursing notes, consultation time, dictation/documentation time, and interpretation of the lab work. This time excludes time spent performing procedures and separately billable procedures and family discussion time. FINAL IMPRESSION  Problem List Items Addressed This Visit     Chest pain - Primary      1.    2.   3.    Patient gave me permission to discuss medical history, care, and plan with those present in the room.   Electronically signed by: Merly Morrissey MD, 7/1/2021  MD Merly Rowland MD  07/01/21 6137

## 2021-07-02 ENCOUNTER — APPOINTMENT (OUTPATIENT)
Dept: CT IMAGING | Age: 71
DRG: 313 | End: 2021-07-02
Payer: MEDICARE

## 2021-07-02 ENCOUNTER — APPOINTMENT (OUTPATIENT)
Dept: NUCLEAR MEDICINE | Age: 71
DRG: 313 | End: 2021-07-02
Payer: MEDICARE

## 2021-07-02 VITALS
OXYGEN SATURATION: 99 % | HEART RATE: 75 BPM | DIASTOLIC BLOOD PRESSURE: 77 MMHG | WEIGHT: 130 LBS | SYSTOLIC BLOOD PRESSURE: 144 MMHG | TEMPERATURE: 98.3 F | HEIGHT: 60 IN | BODY MASS INDEX: 25.52 KG/M2 | RESPIRATION RATE: 24 BRPM

## 2021-07-02 LAB
ANION GAP SERPL CALCULATED.3IONS-SCNC: 12 MMOL/L (ref 4–16)
BUN BLDV-MCNC: 18 MG/DL (ref 6–23)
CALCIUM SERPL-MCNC: 9 MG/DL (ref 8.3–10.6)
CHLORIDE BLD-SCNC: 104 MMOL/L (ref 99–110)
CO2: 24 MMOL/L (ref 21–32)
CREAT SERPL-MCNC: 0.8 MG/DL (ref 0.6–1.1)
EKG ATRIAL RATE: 72 BPM
EKG ATRIAL RATE: 73 BPM
EKG DIAGNOSIS: NORMAL
EKG DIAGNOSIS: NORMAL
EKG P AXIS: -1 DEGREES
EKG P AXIS: 65 DEGREES
EKG P-R INTERVAL: 168 MS
EKG P-R INTERVAL: 180 MS
EKG Q-T INTERVAL: 382 MS
EKG Q-T INTERVAL: 404 MS
EKG QRS DURATION: 70 MS
EKG QRS DURATION: 70 MS
EKG QTC CALCULATION (BAZETT): 418 MS
EKG QTC CALCULATION (BAZETT): 445 MS
EKG R AXIS: 41 DEGREES
EKG R AXIS: 74 DEGREES
EKG T AXIS: 43 DEGREES
EKG T AXIS: 72 DEGREES
EKG VENTRICULAR RATE: 72 BPM
EKG VENTRICULAR RATE: 73 BPM
GFR AFRICAN AMERICAN: >60 ML/MIN/1.73M2
GFR NON-AFRICAN AMERICAN: >60 ML/MIN/1.73M2
GLUCOSE BLD-MCNC: 127 MG/DL (ref 70–99)
GLUCOSE BLD-MCNC: 136 MG/DL (ref 70–99)
HCT VFR BLD CALC: 38.6 % (ref 37–47)
HEMOGLOBIN: 12.6 GM/DL (ref 12.5–16)
LV EF: 55 %
LV EF: 80 %
LVEF MODALITY: NORMAL
LVEF MODALITY: NORMAL
MCH RBC QN AUTO: 31.2 PG (ref 27–31)
MCHC RBC AUTO-ENTMCNC: 32.6 % (ref 32–36)
MCV RBC AUTO: 95.5 FL (ref 78–100)
PDW BLD-RTO: 12.5 % (ref 11.7–14.9)
PLATELET # BLD: 219 K/CU MM (ref 140–440)
PMV BLD AUTO: 9.8 FL (ref 7.5–11.1)
POTASSIUM SERPL-SCNC: 4.2 MMOL/L (ref 3.5–5.1)
RBC # BLD: 4.04 M/CU MM (ref 4.2–5.4)
SODIUM BLD-SCNC: 140 MMOL/L (ref 135–145)
TROPONIN T: <0.01 NG/ML
WBC # BLD: 6.3 K/CU MM (ref 4–10.5)

## 2021-07-02 PROCEDURE — 93005 ELECTROCARDIOGRAM TRACING: CPT | Performed by: NURSE PRACTITIONER

## 2021-07-02 PROCEDURE — 36415 COLL VENOUS BLD VENIPUNCTURE: CPT

## 2021-07-02 PROCEDURE — 6360000002 HC RX W HCPCS: Performed by: NURSE PRACTITIONER

## 2021-07-02 PROCEDURE — 82962 GLUCOSE BLOOD TEST: CPT

## 2021-07-02 PROCEDURE — 99222 1ST HOSP IP/OBS MODERATE 55: CPT | Performed by: INTERNAL MEDICINE

## 2021-07-02 PROCEDURE — 2580000003 HC RX 258: Performed by: NURSE PRACTITIONER

## 2021-07-02 PROCEDURE — 80048 BASIC METABOLIC PNL TOTAL CA: CPT

## 2021-07-02 PROCEDURE — 71260 CT THORAX DX C+: CPT

## 2021-07-02 PROCEDURE — 78452 HT MUSCLE IMAGE SPECT MULT: CPT

## 2021-07-02 PROCEDURE — 6360000002 HC RX W HCPCS: Performed by: INTERNAL MEDICINE

## 2021-07-02 PROCEDURE — 93017 CV STRESS TEST TRACING ONLY: CPT

## 2021-07-02 PROCEDURE — A9500 TC99M SESTAMIBI: HCPCS | Performed by: INTERNAL MEDICINE

## 2021-07-02 PROCEDURE — 6360000004 HC RX CONTRAST MEDICATION: Performed by: INTERNAL MEDICINE

## 2021-07-02 PROCEDURE — 84484 ASSAY OF TROPONIN QUANT: CPT

## 2021-07-02 PROCEDURE — 93010 ELECTROCARDIOGRAM REPORT: CPT | Performed by: INTERNAL MEDICINE

## 2021-07-02 PROCEDURE — 6370000000 HC RX 637 (ALT 250 FOR IP): Performed by: NURSE PRACTITIONER

## 2021-07-02 PROCEDURE — 3430000000 HC RX DIAGNOSTIC RADIOPHARMACEUTICAL: Performed by: INTERNAL MEDICINE

## 2021-07-02 PROCEDURE — 93306 TTE W/DOPPLER COMPLETE: CPT

## 2021-07-02 PROCEDURE — 85027 COMPLETE CBC AUTOMATED: CPT

## 2021-07-02 PROCEDURE — 94761 N-INVAS EAR/PLS OXIMETRY MLT: CPT

## 2021-07-02 PROCEDURE — 6370000000 HC RX 637 (ALT 250 FOR IP): Performed by: INTERNAL MEDICINE

## 2021-07-02 RX ORDER — LISINOPRIL 40 MG/1
40 TABLET ORAL DAILY
Qty: 30 TABLET | Refills: 0 | Status: SHIPPED | OUTPATIENT
Start: 2021-07-02 | End: 2021-09-22

## 2021-07-02 RX ORDER — LISINOPRIL 40 MG/1
40 TABLET ORAL DAILY
Status: DISCONTINUED | OUTPATIENT
Start: 2021-07-02 | End: 2021-07-02 | Stop reason: HOSPADM

## 2021-07-02 RX ORDER — AMINOPHYLLINE DIHYDRATE 25 MG/ML
75 INJECTION, SOLUTION INTRAVENOUS ONCE
Status: COMPLETED | OUTPATIENT
Start: 2021-07-02 | End: 2021-07-02

## 2021-07-02 RX ADMIN — LISINOPRIL 40 MG: 40 TABLET ORAL at 12:18

## 2021-07-02 RX ADMIN — Medication 10 ML: at 12:19

## 2021-07-02 RX ADMIN — KIT FOR THE PREPARATION OF TECHNETIUM TC99M SESTAMIBI 30 MILLICURIE: 1 INJECTION, POWDER, LYOPHILIZED, FOR SOLUTION PARENTERAL at 10:15

## 2021-07-02 RX ADMIN — AMINOPHYLLINE 75 MG: 25 INJECTION, SOLUTION INTRAVENOUS at 10:19

## 2021-07-02 RX ADMIN — PANTOPRAZOLE SODIUM 40 MG: 40 TABLET, DELAYED RELEASE ORAL at 05:52

## 2021-07-02 RX ADMIN — KIT FOR THE PREPARATION OF TECHNETIUM TC99M SESTAMIBI 10 MILLICURIE: 1 INJECTION, POWDER, LYOPHILIZED, FOR SOLUTION PARENTERAL at 09:05

## 2021-07-02 RX ADMIN — ATORVASTATIN CALCIUM 20 MG: 20 TABLET, FILM COATED ORAL at 12:18

## 2021-07-02 RX ADMIN — METOPROLOL TARTRATE 25 MG: 25 TABLET, FILM COATED ORAL at 12:18

## 2021-07-02 RX ADMIN — REGADENOSON 0.4 MG: 0.08 INJECTION, SOLUTION INTRAVENOUS at 10:14

## 2021-07-02 RX ADMIN — ENOXAPARIN SODIUM 40 MG: 40 INJECTION SUBCUTANEOUS at 12:19

## 2021-07-02 RX ADMIN — IOPAMIDOL 80 ML: 755 INJECTION, SOLUTION INTRAVENOUS at 08:26

## 2021-07-02 RX ADMIN — ASPIRIN 81 MG: 81 TABLET, COATED ORAL at 12:18

## 2021-07-02 ASSESSMENT — PAIN SCALES - GENERAL
PAINLEVEL_OUTOF10: 0
PAINLEVEL_OUTOF10: 0

## 2021-07-02 NOTE — DISCHARGE SUMMARY
Hospitalist  Discharge Summary     Patient ID:  Werner Whitney  [id number]  14 y.o.  1950    Admit date: 7/1/2021    Discharge date and time:  7/2/2021  3:16 PM    Discharge Diagnoses:   1. Atypical chest pain  2. Lung nodules  3. COPD  4. HTN  5. Former smoker      Consults:  IP CONSULT TO Mitzi Escobedo Course:   66year old woman with history of COPD, DM, HTN and former tobacco use. She presented to the ER with 1 day history of pleuritic retrosternal pain, that was reproducible. EKG was unremarkable, and serial troponins were also unremarkable. She was seen by cardiology and had unremarkable NM stress test and echo. She is stable and is discharged to follow up with PCP    CTA chest with no PE, but revealed pulmonary nodules. Repeat CT chest is recommended. She is to follow up with her Pulmonologist for further management. Blood pressure was uncontrolled, ranging from 181/84- 143/70. Lisinopril was increased to 40mg daily.        Discharge Medications     Medication List      CHANGE how you take these medications    lisinopril 40 MG tablet  Commonly known as: PRINIVIL;ZESTRIL  Take 1 tablet by mouth daily  What changed:   · medication strength  · how much to take        CONTINUE taking these medications    aspirin 81 MG EC tablet     atorvastatin 20 MG tablet  Commonly known as: LIPITOR  Take 1 tablet by mouth daily     dicyclomine 10 MG capsule  Commonly known as: BENTYL  Take 1 capsule by mouth 3 times daily as needed (stomach)     metFORMIN 500 MG tablet  Commonly known as: GLUCOPHAGE  Take 1 tablet by mouth 2 times daily (with meals)     pantoprazole 40 MG tablet  Commonly known as: PROTONIX  Take 1 tablet by mouth daily           Where to Get Your Medications      You can get these medications from any pharmacy    Bring a paper prescription for each of these medications  · lisinopril 40 MG tablet           Discharge Exam:  General Appearance: alert and oriented to person, place and time  Skin: warm and dry, no rash or erythema  Head: normocephalic and atraumatic  Eyes: pupils equal, round, and reactive to light,   Neck: neck supple and non tender   Pulmonary/Chest: clear to auscultation bilaterally  Cardiovascular: normal rate, normal S1 and S2,   Abdomen: soft, non-tender, non-distended, normal bowel sounds,   Extremities: no edema  Neurologic: Non focal     Vitals:    Vitals:    07/01/21 2315 07/02/21 0315 07/02/21 0359 07/02/21 1200   BP: (!) 157/66 (!) 152/79  (!) 144/77   Pulse: 83 66 63 75   Resp: 24 19  24   Temp: 98.2 °F (36.8 °C) 98.6 °F (37 °C)  98.3 °F (36.8 °C)   TempSrc: Oral Oral  Oral   SpO2: 97% 99%     Weight:       Height:           I/O last 3 completed shifts: In: 10 [I.V.:10]  Out: -   No intake/output data recorded. LABS:  Recent Labs     07/01/21  1450 07/02/21  0702    140   K 4.1 4.2    104   CO2 23 24   BUN 12 18   CREATININE 0.7 0.8   GLUCOSE 99 136*   CALCIUM 9.1 9.0       Recent Labs     07/01/21  1450 07/02/21  0702   WBC 6.6 6.3   RBC 4.30 4.04*   HGB 12.9 12.6   HCT 40.5 38.6   MCV 94.2 95.5   MCH 30.0 31.2*   MCHC 31.9* 32.6   RDW 12.8 12.5    219   MPV 10.1 9.8       Imaging:    NM MYOCARDIAL SPECT REST EXERCISE OR RX   Final Result      CT CHEST W CONTRAST   Final Result   Several tiny nodules as discussed, noncalcified nodules measuring 4 mm and   less. If the patient has had prior CT imaging of the chest.  The chest x-ray   from [facility] describes prior imaging in the [facility]. Please contact   our core team/RCC if there is a known prior chest CT, we will attempt to   obtain that outside CT for comparison. If that cannot be obtained a   follow-up chest CT in 12 months would be recommended given the patient's   history of smoking, reference below.       Fleischner Society guidelines for follow-up and management of incidentally   detected pulmonary nodules:      Multiple Solid Nodules:      Nodule size less than 6 mm   In a low-risk patient, no routine follow-up. In a high-risk patient, optional CT at 12 months. Nodule size equals 6-8 mm   In a low-risk patient, CT at 3-6 months, then consider CT at 18-24 months. In a high-risk patient, CT at 3-6 months, then CT at 18-24 months. Nodule size greater than 8 mm   In a low-risk patient, CT at 3-6 months, then consider CT at 18-24 months. In a high-risk patient, CT at 3-6 months, then CT at 18-24 months. - Low risk patients include individuals with minimal or absent history of   smoking and other known risk factors. - High risk patients include individuals with a history or smoking or known   risk factors. Radiology 2017 http://pubs. rsna.org/doi/full/10.1148/radiol. 9303075774         XR CHEST PORTABLE   Final Result   1. No acute radiographic abnormality in the chest.               Follow up:  David George MD  [facility], [address] [address]  [address] [place] [address] [phone]      Please call for appointment fo follow up on lung nodules seen on your CT chest    Misha Andre MD  [address] [facility] [address], APRN - [facility] [address]            Patient Instructions:    Activity level: as tolerated   Diet: regular diet        Dispo: home     Condition at discharge stable    35 minutes was spent in preparing discharge papers, discussing discharge with patient, medication review, etc.    Signed:  Electronically signed by Sherlie Cockayne, MD on 7/2/2021 at 3:16 PM

## 2021-07-02 NOTE — H&P
HISTORY AND PHYSICAL  (Hospitalist, Internal Medicine)  IDENTIFYING INFORMATION   PATIENT:  Becky Rose  MRN:  [id number]  ADMIT DATE: 7/1/2021  TIME OF EVALUATION: 7/1/2021 10:06 PM    CHIEF COMPLAINT   Chest pain that woke her up from sleep. HISTORY OF PRESENT ILLNESS   Becky Rose is a 66 y.o. female with a past medical history of COPD, DM 2, former smoker, hyperlipidemia, hypertension and pulmonary nodules on chest x-ray from 6/1 5. She presents as a direct admit from [facility] ER. The patient presented to the ER with complaints of chest pain that started between 3-4am waking her up from sleep. She reports the pain is located below her left to mid epigastric region. She denies radiation to arms or jaw. She described the pain as achy, worse with deep breathing with slight relief from lying on the left side. She rated it at 9/10 at onset but at time of this assessment she rates the pain at 5/10. The patient states that the pain did not improve with Pepcid and Tums that she took. The patient states \"I was not anxious\". She also denies any strenuous activity. She quit smoking in December 2020 after a 50-pack-year history. She completed her COVID-19 vaccine series. CBC and BMP were unremarkable. Initial troponin negative. EKG without ischemic changes. She received aspirin and transferred to [address] for further management. At time of this assessment, patient lying in bed, on room air maintaining her O2 sats. She endorses her chest pain at 5/10. She denies fever, chills, shortness of breath at this time, nausea, vomiting, diarrhea and constipation. She states that she is supposed to get a CT chest this month with her pulmonologist Dr. Gilda Blankenship. PMH listed below: Reviewed and included in HPI.     PAST MEDICAL, SURGICAL, FAMILY, and SOCIAL HISTORY     Past Medical History:   Diagnosis Date    COPD, mild ([address] 6/15/2021    Diabetes mellitus ([address]     Excessive daytime sleepiness 6/15/2021    Former smoker 6/15/2021    Hyperlipidemia     Hypertension     Shortness of breath 6/15/2021    Snoring 6/15/2021     Past Surgical History:   Procedure Laterality Date    APPENDECTOMY      CHOLECYSTECTOMY      EYE SURGERY      FRACTURE SURGERY      HYSTERECTOMY      JOINT REPLACEMENT      TONSILLECTOMY       No family history on file. Family Hx of HTN  Family Hx as reviewed above, otherwise non-contributory  Social History     Socioeconomic History    Marital status: Single     Spouse name: Not on file    Number of children: Not on file    Years of education: Not on file    Highest education level: Not on file   Occupational History    Not on file   Tobacco Use    Smoking status: Former Smoker     Quit date: 2020     Years since quittin.4    Smokeless tobacco: Never Used   Substance and Sexual Activity    Alcohol use: Not Currently    Drug use: Not Currently    Sexual activity: Not on file   Other Topics Concern    Not on file   Social History Narrative    Not on file     Social Determinants of Health     Financial Resource Strain:     Difficulty of Paying Living Expenses:    Food Insecurity:     Worried About [address] in the Last Year:     [address] in the Last Year:    Transportation Needs:     Lack of Transportation (Medical):      Lack of Transportation (Non-Medical):    Physical Activity:     Days of Exercise per Week:     Minutes of Exercise per Session:    Stress:     Feeling of Stress :    Social Connections:     Frequency of Communication with Friends and Family:     Frequency of Social Gatherings with Friends and Family:     Attends [employer] Services:     Active Member of Clubs or Organizations:     Attends Club or Organization Meetings:     Marital Status:    Intimate Partner Violence:     Fear of Current or Ex-Partner:     Emotionally Abused:     Physically Abused:     Sexually Abused:        MEDICATIONS   Medications Prior to Admission reviewed with the patient.   Medications Prior to Admission: metFORMIN (GLUCOPHAGE) 500 MG tablet, Take 1 tablet by mouth 2 times daily (with meals)  lisinopril (PRINIVIL;ZESTRIL) 20 MG tablet, Take 1 tablet by mouth daily  atorvastatin (LIPITOR) 20 MG tablet, Take 1 tablet by mouth daily  dicyclomine (BENTYL) 10 MG capsule, Take 1 capsule by mouth 3 times daily as needed (stomach)  pantoprazole (PROTONIX) 40 MG tablet, Take 1 tablet by mouth daily  aspirin 81 MG EC tablet, Take 81 mg by mouth daily  [DISCONTINUED] chlorthalidone (HYGROTEN) 50 MG tablet, Take 1 tablet by mouth daily    Current Medications  Current Facility-Administered Medications   Medication Dose Route Frequency Provider Last Rate Last Admin    [START ON 7/2/2021] aspirin EC tablet 81 mg  81 mg Oral Daily DICK Ricketts CNP        [START ON 7/2/2021] atorvastatin (LIPITOR) tablet 20 mg  20 mg Oral Daily DICK Ricketts CNP        dicyclomine (BENTYL) capsule 10 mg  10 mg Oral TID PRN DICK Concepcion CNP        [START ON 7/2/2021] lisinopril (PRINIVIL;ZESTRIL) tablet 20 mg  20 mg Oral Daily DICK Ricketts CNP        [START ON 7/2/2021] pantoprazole (PROTONIX) tablet 40 mg  40 mg Oral Daily DICK Ricketts CNP        [START ON 7/2/2021] insulin lispro (HUMALOG) injection vial 0-12 Units  0-12 Units Subcutaneous TID WC DICK Ricketts CNP        insulin lispro (HUMALOG) injection vial 0-6 Units  0-6 Units Subcutaneous Nightly DICK Ricketts CNP        sodium chloride flush 0.9 % injection 5-40 mL  5-40 mL Intravenous 2 times per day DICK Ricketts CNP        sodium chloride flush 0.9 % injection 5-40 mL  5-40 mL Intravenous PRN DICK Ricketts CNP        0.9 % sodium chloride infusion  25 mL Intravenous PRN DICK Ricketts CNP        ondansetron (ZOFRAN-ODT) disintegrating tablet 4 mg  4 mg Oral Q8H PRN Ramona Olmos APRN - CNP        Or    ondansetron (ZOFRAN) injection 4 mg  4 mg Intravenous Q6H PRN Ramona Dapilah, APRN - CNP        acetaminophen (TYLENOL) tablet 650 mg  650 mg Oral Q6H PRN Ramona Dapilah, APRN - CNP        Or    acetaminophen (TYLENOL) suppository 650 mg  650 mg Rectal Q6H PRN Ramona Dapilah, APRN - CNP        polyethylene glycol (GLYCOLAX) packet 17 g  17 g Oral Daily PRN Ramona Dapilah, APRN - CNP        metoprolol tartrate (LOPRESSOR) tablet 25 mg  25 mg Oral BID Ramona Dapilah, APRN - CNP        [START ON 7/2/2021] enoxaparin (LOVENOX) injection 40 mg  40 mg Subcutaneous Daily Ramona Dapilah, APRN - CNP        glucose (GLUTOSE) 40 % oral gel 15 g  15 g Oral PRN Ramona Dapilah, APRN - CNP        dextrose 50 % IV solution  12.5 g Intravenous PRN Ramona Dapilah, APRN - CNP        glucagon (rDNA) injection 1 mg  1 mg Intramuscular PRN Ramona Dapilah, APRN - CNP        dextrose 5 % solution  100 mL/hr Intravenous PRN Ramona Dapilah, APRN - CNP             Allergies  Allergies   Allergen Reactions    Other Hives    Levaquin [Levofloxacin] Rash       REVIEW OF SYSTEMS   Within above limitations. 14 point review of systems reviewed. Pertinent positive or negative as per HPI or otherwise negative per 14 point systems review. PHYSICAL EXAM     Wt Readings from Last 3 Encounters:   07/01/21 130 lb (59 kg)   06/15/21 126 lb (57.2 kg)   05/19/21 127 lb (57.6 kg)       Blood pressure (!) 157/71, pulse 74, temperature 98.7 °F (37.1 °C), temperature source Oral, resp. rate 23, height 5' (1.524 m), weight 130 lb (59 kg), SpO2 97 %, not currently breastfeeding. General - AAO x 3, able to give adequate history. Psych - Appropriate affect/speech. No agitation  Eyes - Eye lids intact. No scleral icterus  ENT - Lips wnl. External ear clear/dry/intact. No thyromegaly on inspection  Neuro - No gross peripheral or central neuro deficits on inspection  Heart - Sinus. RRR. S1 and S2 present. No added HS/murmurs appreciated.  No elevated JVD appreciated  Lung - Adequate air entry b/l, No crackles/wheezes appreciated  GI - Soft. No guarding/rigidity. No hepatosplenomegaly/ascites. BS+   - No CVA/suprapubic tenderness or palpable bladder distension  Skin - Intact. No rash/petechiae/ecchymosis. Warm extremities  MSK - Joints with normal ROM. No joint swellings      LABS AND IMAGING   CBC  [unfilled]    Last 3 Hemoglobin  Lab Results   Component Value Date    HGB 12.9 07/01/2021    HGB 12.1 06/15/2021     Last 3 WBC/ANC  Lab Results   Component Value Date    WBC 6.6 07/01/2021    WBC 6.8 06/15/2021     No components found for: GRNLOCTYABS  Last 3 Platelets  No results found for: PLATELET  Chemistry  [unfilled]  [unfilled]  No results found for: LDH  Coagulation Studies  No results found for: PTT, INR  Liver Function Studies  Lab Results   Component Value Date    ALT 13 07/01/2021    AST 11 07/01/2021    ALKPHOS 88 07/01/2021       Recent Imaging    EXAMINATION:   ONE XRAY VIEW OF THE CHEST       7/1/2021 1:37 pm       COMPARISON:   [facility], 2021       HISTORY:   ORDERING SYSTEM PROVIDED HISTORY: CP   TECHNOLOGIST PROVIDED HISTORY:   Reason for exam:->CP   Reason for Exam: CP   Acuity: Acute   Type of Exam: Initial   Additional signs and symptoms: chest pain since this AM   Relevant Medical/Surgical History: Hx COPD, HTN       FINDINGS:   The cardiomediastinal silhouette is within normal range. Lungs are clear. There is no focal pulmonary consolidation, pleural effusion, pneumothorax, or   evidence of airspace pulmonary edema.  There are calcified lymph nodes in the   right hilum.           Impression   1. No acute radiographic abnormality in the chest.             Relevant labs and imaging reviewed    ASSESSMENT AND PLAN    is a 76-year-old female with known history of pulmonary nodule, quit smoking 7 months ago who presents with the following;    Acute chest pain/ACS rule out. No known history of CAD. Former smoker.   Initial troponin negative. EKG without ischemic changes. CXR without acute process.  -Admit to cardiac stepdown with telemetry.  -Serial troponin  -Home aspirin and statin resumed. Add BB.  -Consult cardiology  -Keep n.p.o. post midnight for cardiology evaluation.  -Pending echocardiogram.    Type 2 diabetes without complications, noninsulin-dependent. A1c of 5.8 on 6/15/2021. Hold oral Metformin. Start SSI. Hypoglycemic protocol. Carb controlled diet when out of NPO. Hypertension-lisinopril  Hyperlipidemia-Lipitor  GERD-Protonix  Solitary pulmonary nodule-reports she is scheduled for CT chest on the 15th of this month. COPD-not in acute exacerbation. Follows up with Dr. Fredrick Angelucci. Does not use O2.      -DVT Prophylaxis: lovenox    -GI Prophylaxis: protonix    CODE STATUS: Discussed. Full code. Case d/w ED physician and Attending Dr. Dilia Osorio.     Richard Amos, CNP  7/1/2021 at 10:06 PM

## 2021-07-02 NOTE — PROGRESS NOTES
Patient  Discharge education complete. All questions answered. IV dc'ed with cannula intact. Patient discharged to home via wheelchair off of the unit. VSS. No complaints or concerns.

## 2021-07-02 NOTE — H&P
CARDIOLOGY CONSULT NOTE    Reason for consultation: Chest pain     Referring physician: Boo Monroe MD      Primary care physician: Wendy Springer, APRN - CNP        Dear Boo Monroe MD   Thanks for the consult.     History of present illness:Vanessa is a 66 y. o.year old who  presents with  chest pain for last few days, happening daily, intermittent for 15 to 20 mins and aggravated with activity substernal also,reproducible with palpation, radiated to shoulder, 6/10, tender to touch,associated with shortness of breath, + sweating, nausea, did not get NTG in ED. No fever, no chills, no nausea no vomiting. Blood pressure, cholesterol, blood glucose and weight are well controlled.     Chief Complaint   Patient presents with    Chest Pain     reports of waking up with chest pain between 3-4 this morning     Shortness of Breath       Past medical history:    has a past medical history of COPD, mild ([address] Diabetes mellitus ([address] Excessive daytime sleepiness, Former smoker, Hyperlipidemia, Hypertension, Shortness of breath, and Snoring. Past surgical history:   has a past surgical history that includes Cholecystectomy; Tonsillectomy; eye surgery; joint replacement; fracture surgery; Appendectomy; and Hysterectomy. Social History:   reports that she quit smoking about 16 months ago. She has never used smokeless tobacco. She reports previous alcohol use. She reports previous drug use.   Family history:   no family history of CAD, STROKE of DM    aspirin EC tablet 81 mg, Daily  atorvastatin (LIPITOR) tablet 20 mg, Daily  dicyclomine (BENTYL) capsule 10 mg, TID PRN  lisinopril (PRINIVIL;ZESTRIL) tablet 20 mg, Daily  pantoprazole (PROTONIX) tablet 40 mg, Daily  insulin lispro (HUMALOG) injection vial 0-12 Units, TID WC  insulin lispro (HUMALOG) injection vial 0-6 Units, Nightly  sodium chloride flush 0.9 % injection 5-40 mL, 2 times per day  sodium chloride flush 0.9 % injection 5-40 mL, PRN  0.9 % sodium chloride infusion, PRN  ondansetron (ZOFRAN-ODT) disintegrating tablet 4 mg, Q8H PRN   Or  ondansetron (ZOFRAN) injection 4 mg, Q6H PRN  acetaminophen (TYLENOL) tablet 650 mg, Q6H PRN   Or  acetaminophen (TYLENOL) suppository 650 mg, Q6H PRN  polyethylene glycol (GLYCOLAX) packet 17 g, Daily PRN  metoprolol tartrate (LOPRESSOR) tablet 25 mg, BID  enoxaparin (LOVENOX) injection 40 mg, Daily  glucose (GLUTOSE) 40 % oral gel 15 g, PRN  dextrose 50 % IV solution, PRN  glucagon (rDNA) injection 1 mg, PRN  dextrose 5 % solution, PRN      Current Facility-Administered Medications   Medication Dose Route Frequency Provider Last Rate Last Admin    aspirin EC tablet 81 mg  81 mg Oral Daily DICK Ricketts CNP        atorvastatin (LIPITOR) tablet 20 mg  20 mg Oral Daily DICK Ricketts CNP        dicyclomine (BENTYL) capsule 10 mg  10 mg Oral TID PRN DICK Ricketts CNP        lisinopril (PRINIVIL;ZESTRIL) tablet 20 mg  20 mg Oral Daily DICK Ricketts CNP        pantoprazole (PROTONIX) tablet 40 mg  40 mg Oral Daily DICK Ricketts - CNP   40 mg at 07/02/21 0552    insulin lispro (HUMALOG) injection vial 0-12 Units  0-12 Units Subcutaneous TID WC DICK Ricketts CNP        insulin lispro (HUMALOG) injection vial 0-6 Units  0-6 Units Subcutaneous Nightly DICK Ricketts CNP   2 Units at 07/01/21 2317    sodium chloride flush 0.9 % injection 5-40 mL  5-40 mL Intravenous 2 times per day DICK Ricketts - CNP   10 mL at 07/01/21 2315    sodium chloride flush 0.9 % injection 5-40 mL  5-40 mL Intravenous PRN DICK Ricketts CNP        0.9 % sodium chloride infusion  25 mL Intravenous PRN DICK Ricketts CNP        ondansetron (ZOFRAN-ODT) disintegrating tablet 4 mg  4 mg Oral Q8H PRN Ramona Dapilah, APRN - CNP        Or    ondansetron (ZOFRAN) injection 4 mg  4 mg Intravenous Q6H PRN Ramona Olmos APRN - CNP        acetaminophen (TYLENOL) tablet 650 mg  650 mg Oral Q6H PRN Ramona Dapilah, APRN - CNP        Or    acetaminophen (TYLENOL) suppository 650 mg  650 mg Rectal Q6H PRN Ramona Kristinalah, APRN - CNP        polyethylene glycol (GLYCOLAX) packet 17 g  17 g Oral Daily PRN Ramona Dapilah, APRN - CNP        metoprolol tartrate (LOPRESSOR) tablet 25 mg  25 mg Oral BID Ramona Kristinalah, APRN - CNP   25 mg at 07/01/21 2315    enoxaparin (LOVENOX) injection 40 mg  40 mg Subcutaneous Daily Ramona Tikih, APRN - CNP        glucose (GLUTOSE) 40 % oral gel 15 g  15 g Oral PRN Ramona Dapilah, APRN - CNP        dextrose 50 % IV solution  12.5 g Intravenous PRN Ramona Kristinalah, APRN - CNP        glucagon (rDNA) injection 1 mg  1 mg Intramuscular PRN Ramona Dapilah, APRN - CNP        dextrose 5 % solution  100 mL/hr Intravenous PRN Ramona Fairbanksh, APRN - CNP              Review of Systems:    · Constitutional: No Fever or Weight Loss    · Eyes: No Decreased Vision  · ENT: No Headaches, Hearing Loss or Vertigo  · Cardiovascular: + chest pain, dyspnea on exertion, palpitations or loss of consciousness  · Respiratory: No cough or wheezing    · Gastrointestinal: No abdominal pain, appetite loss, blood in stools, constipation, diarrhea or heartburn  · Genitourinary: No dysuria, trouble voiding, or hematuria  · Musculoskeletal: No gait disturbance, weakness or joint complaints  · Integumentary: No rash or pruritis  · Neurological: No TIA or stroke symptoms  · Psychiatric: No anxiety or depression  · Endocrine: No malaise, fatigue or temperature intolerance  · Hematologic/Lymphatic: No bleeding problems, blood clots or swollen lymph nodes  · Allergic/Immunologic: No nasal congestion or hives  All systems negative except as marked.      ·    ·    ·      Physical Examination:    Vitals:    07/02/21 0359   BP: 152/79   Pulse: 63   Resp: 19   Temp: 98.6   SpO2:       Wt Readings from Last 3 Encounters:   07/01/21 130 lb (59 kg)   06/15/21 126 lb (57.2 kg)   05/19/21 127 lb (57.6 kg)     Body mass index is 25.39 kg/m².        General Appearance: No distress, conversant     Constitutional: Well developed, Well nourished, No acute distress, Non-toxic appearance.    HENT:  Normocephalic, Atraumatic, Bilateral external ears normal, Oropharynx moist, No oral exudates, Nose normal. Neck- Normal range of motion, No tenderness, Supple, No stridor,no apical-carotid delay, no carotid bruit  Eyes: PERRL, EOMI, Conjunctiva normal, No discharge.    Respiratory:  Normal breath sounds, No respiratory distress, No wheezing, No chest tenderness. ,no use of accessory muscles, diaphragm movement is normal  Cardiovascular: (PMI) apex non displaced,no lifts no thrills, no s3,no s4, Normal heart rate, Normal rhythm, No murmurs, No rubs, No gallops. Carotid arteries pulse and amplitude are normal no bruit, no abdominal bruit noted ( normal abdominal aorta ausculation), femoral arteries pulse and amplitude are normal no bruit, pedal pulses are normal.  GI: Bowel sounds normal, Soft, No tenderness, No masses, No pulsatile masses, no hepatosplenomegally, no bruits  : External genitalia appear normal, No masses or lesions. No discharge. No CVA tenderness.    Musculoskeletal: Intact distal pulses, No edema, No tenderness, No cyanosis, No clubbing. Good range of motion in all major joints. No tenderness to palpation or major deformities noted. Back- No tenderness. Integument: Warm, Dry, No erythema, No rash.    Skin: no rash, no ulcers  Lymphatic: No lymphadenopathy noted.    Neurologic: Alert & oriented x 3, Normal motor function, Normal sensory function, No focal deficits noted.    Psychiatric: Affect normal, Judgment normal, Mood normal.   Lab Review        Recent Labs       09/28/16  0010    WBC  12.3*    HGB  14.4    HCT  43.8    PLT  316            Recent Labs       09/28/16  0010    NA  136    K  3.9    CL  95*    CO2  23    BUN  10    CREATININE  0.8           Recent Labs       09/28/16  0010    AST  12*    ALT  10    BILITOT  0.4    ALKPHOS  124       No results for input(s): TROPONINI in the last 72 hours. No results found for: BNP  No results found for: INR, PROTIME        EKG:nsr     Chest Xray:nad     ECHO:pending  Labs, echo, meds reviewed  Assessment:      Recommendations:     1. Chest pain:  stress test and echo ordered  2. DM: stable,continue insilin  3. X SMOKER AND COPD  4. HTN\" notstable, increase lisinopril to 40mg daily  5. Dyslipidemia: check lipids, continue statins  6.  Health maintenance: exerise and diet  All labs, medications and tests reviewed, continue all other medications of all above medical condition listed as is.          Aster Sharma MD,   Aster Sharma MD, 7/2/2021 8:24 AM

## 2021-07-03 ENCOUNTER — HOSPITAL ENCOUNTER (EMERGENCY)
Age: 71
Discharge: HOME | End: 2021-07-03
Attending: EMERGENCY MEDICINE
Payer: MEDICARE

## 2021-07-03 VITALS
TEMPERATURE: 98.2 F | SYSTOLIC BLOOD PRESSURE: 163 MMHG | RESPIRATION RATE: 18 BRPM | HEART RATE: 74 BPM | OXYGEN SATURATION: 95 % | DIASTOLIC BLOOD PRESSURE: 75 MMHG | WEIGHT: 130 LBS | BODY MASS INDEX: 25.39 KG/M2

## 2021-07-03 DIAGNOSIS — R21 RASH: Primary | ICD-10-CM

## 2021-07-03 PROCEDURE — 99282 EMERGENCY DEPT VISIT SF MDM: CPT

## 2021-07-04 NOTE — ED PROVIDER NOTES
CHIEF COMPLAINT    Chief Complaint   Patient presents with    Rash     HPI  Morris Stallworth is a 66 y.o. female who presents to the ED with complaints of rash to bilateral lower extremities. Patient was just discharged in the hospital yesterday for hospitalization for chest pain. She had cardiac work-up including stress test and echocardiogram which were reassuring. She also had CT of the chest was demonstrated bilateral pulmonary nodules. She had no medication changes and states that yesterday evening after returning home she felt very tired and had myalgias and arthralgias. She went to bed and states that she continued to have some aching in the joints and muscles today and while outside today her daughter noticed a rash to the bilateral lower legs and feet. Patient states the rash is nonpainful and nonpruritic. However given the new appearance of the rash she had reported to the emergency department for further evaluation. Symptoms rated as mild in severity without any alleviating or aggravating factors. Denies fevers, chills, nausea, vomiting, numbness, tingling, exposure to new lotions, detergents, clothing, or pets. REVIEW OF SYSTEMS  Constitutional: No fever, chills or recent illness. Eye: No visual changes  HENT: No earache or sore throat. Resp: No SOB or productive cough. Cardio: No chest pain or palpitations. GI: No abdominal pain, nausea, vomiting, constipation or diarrhea. No melena. : No dysuria, urgency or frequency. Endocrine: No heat intolerance, no cold intolerance, no polydipsia   Lymphatics: No adenopathy  Musculoskeletal: Complains of arthralgias and myalgias  Neuro: No headaches. Psych: No homicidal or suicidal thoughts  Skin: Complains of rash  ? ?   PAST MEDICAL HISTORY  Past Medical History:   Diagnosis Date    COPD, mild ([address] 6/15/2021    Diabetes mellitus ([address]     Excessive daytime sleepiness 6/15/2021    Former smoker 6/15/2021    Hyperlipidemia     Hypertension     Shortness of breath 6/15/2021    Snoring 6/15/2021     FAMILY HISTORY  History reviewed. No pertinent family history. SOCIAL HISTORY  Social History     Socioeconomic History    Marital status: Single     Spouse name: None    Number of children: None    Years of education: None    Highest education level: None   Occupational History    None   Tobacco Use    Smoking status: Former Smoker     Quit date: 2020     Years since quittin.4    Smokeless tobacco: Never Used   Substance and Sexual Activity    Alcohol use: Not Currently    Drug use: Not Currently    Sexual activity: None   Other Topics Concern    None   Social History Narrative    None     Social Determinants of Health     Financial Resource Strain:     Difficulty of Paying Living Expenses:    Food Insecurity:     Worried About Running Out of Food in the Last Year:     Ran Out of Food in the Last Year:    Transportation Needs:     Lack of Transportation (Medical):      Lack of Transportation (Non-Medical):    Physical Activity:     Days of Exercise per Week:     Minutes of Exercise per Session:    Stress:     Feeling of Stress :    Social Connections:     Frequency of Communication with Friends and Family:     Frequency of Social Gatherings with Friends and Family:     Attends Scientologist Services:     Active Member of Clubs or Organizations:     Attends Club or Organization Meetings:     Marital Status:    Intimate Partner Violence:     Fear of Current or Ex-Partner:     Emotionally Abused:     Physically Abused:     Sexually Abused:        SURGICAL HISTORY  Past Surgical History:   Procedure Laterality Date    APPENDECTOMY      CHOLECYSTECTOMY       [address]  Discharge Medication List as of 7/3/2021  8:53 PM      CONTINUE these medications which have NOT CHANGED    Details   lisinopril (PRINIVIL;ZESTRIL) 40 MG tablet Take 1 tablet by mouth daily, Disp-30 tablet, R-0Print      metFORMIN (GLUCOPHAGE) 500 MG tablet Take 1 tablet by mouth 2 times daily (with meals), Disp-60 tablet, R-0Normal      atorvastatin (LIPITOR) 20 MG tablet Take 1 tablet by mouth daily, Disp-30 tablet, R-0Normal      dicyclomine (BENTYL) 10 MG capsule Take 1 capsule by mouth 3 times daily as needed (stomach), Disp-120 capsule, R-0Normal      pantoprazole (PROTONIX) 40 MG tablet Take 1 tablet by mouth daily, Disp-30 tablet, R-0Normal      aspirin 81 MG EC tablet Take 81 mg by mouth dailyHistorical Med           ALLERGIES  Allergies   Allergen Reactions    Other Hives    Levaquin [Levofloxacin] Rash       Nursing notes reviewed by myself for past medical history, family history, social history, surgical history, current medications, and allergies. PHYSICAL EXAM  VITAL SIGNS: Triage VS:    ED Triage Vitals   Enc Vitals Group      BP       Pulse       Resp       Temp       Temp src       SpO2       Weight       Height       Head Circumference       Peak Flow       Pain Score       Pain Loc       Pain Edu? Excl. in [address]? Constitutional: Well developed, Well nourished, nontoxic appearing  HENT: Normocephalic, Atraumatic, Bilateral external ears normal, Oropharynx moist, No oral exudates, Nose normal.   Eyes: PERRL, EOMI, Conjunctiva normal, No discharge. No scleral icterus. Neck: Normal range of motion, No tenderness, Supple. Lymphatic: No lymphadenopathy noted. Cardiovascular: Normal heart rate, Normal rhythm, No murmurs, gallops or rubs. Thorax & Lungs: Normal breath sounds, No respiratory distress, No wheezing. Abdomen: Soft, No tenderness, No masses, No pulsatile masses, No distention, Normal bowel sounds  Skin: Warm, Dry  Back: No tenderness, No CVA tenderness.    Extremities: Symmetrical trace edema to bilateral lower extremities, petechial rash noted to the bilateral lower legs and dorsal aspect of the feet, Nikolsky sign negative, no tenderness, No cyanosis, Normal perfusion, No clubbing. Musculoskeletal: Good range of motion in all major joints as observed. No major deformities noted. Neurologic: Alert & oriented x 3, Normal motor function, Normal sensory function, CN II-XII grossly intact as tested, No focal deficits noted. Psychiatric: Affect normal, Judgment normal, Mood normal.       RADIOLOGY  Labs Reviewed - No data to display  I personally reviewed the images. The radiologist's interpretation reveals:  Last Imaging results   No orders to display       MEDS GIVEN IN ED:  Medications - No data to display  [facility]  14-year-old female presents emergency department complaints of rash to bilateral lower legs and feet that appeared today. Just discharged yesterday following hospitalization for chest pain with negative cardiac work-up. Initial vital signs are reassuring. On exam she does have petechial lesions to bilateral lower legs and dorsal aspect of the feet. She has no other rashes. She has no fever or shortness of breath/cough. She does have arthralgias myalgias that started yesterday. The rash is nonpruritic in nature and Nikolsky sign negative. She had CBC performed yesterday which was without any evidence of thrombocytopenia and her renal function was reassuring as well. At this time I suspect she may be experiencing early onset viral exanthem or may be having intolerance to contrast that she received with respect to the aching and rash. At this time given reassuring evaluation otherwise I feel patient is appropriate for discharge home. Instructed to follow-up with primary care provider as scheduled this month. Return precautions provided. Appropriate PPE utilized as indicated for entire patient encounter? Time of Disposition: See timeline  ? Discharge Medication List as of 7/3/2021  8:53 PM        FINAL IMPRESSION  1.  Rash        Electronically signed by: Chandana Adan DO, 7/3/2021         Chandana Adan,   07/03/21 2059       Chandana Adan DO  07/03/21 2123

## 2021-07-14 ENCOUNTER — OFFICE VISIT (OUTPATIENT)
Dept: FAMILY MEDICINE CLINIC | Age: 71
End: 2021-07-14
Payer: MEDICARE

## 2021-07-14 VITALS
HEIGHT: 60 IN | SYSTOLIC BLOOD PRESSURE: 124 MMHG | OXYGEN SATURATION: 91 % | BODY MASS INDEX: 26.11 KG/M2 | DIASTOLIC BLOOD PRESSURE: 78 MMHG | WEIGHT: 133 LBS | HEART RATE: 67 BPM | RESPIRATION RATE: 16 BRPM

## 2021-07-14 DIAGNOSIS — R91.1 SOLITARY LUNG NODULE: ICD-10-CM

## 2021-07-14 DIAGNOSIS — F41.9 ANXIETY: ICD-10-CM

## 2021-07-14 DIAGNOSIS — J44.9 COPD, MILD (HCC): ICD-10-CM

## 2021-07-14 DIAGNOSIS — Z78.0 POST-MENOPAUSAL: ICD-10-CM

## 2021-07-14 DIAGNOSIS — K21.9 GASTROESOPHAGEAL REFLUX DISEASE, UNSPECIFIED WHETHER ESOPHAGITIS PRESENT: ICD-10-CM

## 2021-07-14 DIAGNOSIS — Z12.31 ENCOUNTER FOR SCREENING MAMMOGRAM FOR BREAST CANCER: ICD-10-CM

## 2021-07-14 DIAGNOSIS — E78.2 MIXED HYPERLIPIDEMIA: ICD-10-CM

## 2021-07-14 DIAGNOSIS — Z76.89 ENCOUNTER TO ESTABLISH CARE: Primary | ICD-10-CM

## 2021-07-14 DIAGNOSIS — Z86.73 HISTORY OF CVA (CEREBROVASCULAR ACCIDENT): ICD-10-CM

## 2021-07-14 DIAGNOSIS — Z12.11 COLON CANCER SCREENING: ICD-10-CM

## 2021-07-14 PROCEDURE — 99214 OFFICE O/P EST MOD 30 MIN: CPT | Performed by: STUDENT IN AN ORGANIZED HEALTH CARE EDUCATION/TRAINING PROGRAM

## 2021-07-14 RX ORDER — PANTOPRAZOLE SODIUM 40 MG/1
40 TABLET, DELAYED RELEASE ORAL DAILY
Qty: 30 TABLET | Refills: 2 | Status: SHIPPED | OUTPATIENT
Start: 2021-07-14 | End: 2021-08-18 | Stop reason: SDUPTHER

## 2021-07-14 RX ORDER — ATORVASTATIN CALCIUM 20 MG/1
20 TABLET, FILM COATED ORAL DAILY
Qty: 30 TABLET | Refills: 5 | Status: SHIPPED | OUTPATIENT
Start: 2021-07-14 | End: 2022-03-01 | Stop reason: SDUPTHER

## 2021-07-14 RX ORDER — BUSPIRONE HYDROCHLORIDE 10 MG/1
10 TABLET ORAL NIGHTLY
Qty: 30 TABLET | Refills: 0 | Status: SHIPPED | OUTPATIENT
Start: 2021-07-14 | End: 2021-08-09

## 2021-07-14 SDOH — ECONOMIC STABILITY: FOOD INSECURITY: WITHIN THE PAST 12 MONTHS, YOU WORRIED THAT YOUR FOOD WOULD RUN OUT BEFORE YOU GOT MONEY TO BUY MORE.: NEVER TRUE

## 2021-07-14 SDOH — ECONOMIC STABILITY: TRANSPORTATION INSECURITY
IN THE PAST 12 MONTHS, HAS THE LACK OF TRANSPORTATION KEPT YOU FROM MEDICAL APPOINTMENTS OR FROM GETTING MEDICATIONS?: NO

## 2021-07-14 SDOH — ECONOMIC STABILITY: FOOD INSECURITY: WITHIN THE PAST 12 MONTHS, THE FOOD YOU BOUGHT JUST DIDN'T LAST AND YOU DIDN'T HAVE MONEY TO GET MORE.: NEVER TRUE

## 2021-07-14 SDOH — ECONOMIC STABILITY: TRANSPORTATION INSECURITY
IN THE PAST 12 MONTHS, HAS LACK OF TRANSPORTATION KEPT YOU FROM MEETINGS, WORK, OR FROM GETTING THINGS NEEDED FOR DAILY LIVING?: NO

## 2021-07-14 ASSESSMENT — PATIENT HEALTH QUESTIONNAIRE - PHQ9
2. FEELING DOWN, DEPRESSED OR HOPELESS: 0
SUM OF ALL RESPONSES TO PHQ QUESTIONS 1-9: 0
SUM OF ALL RESPONSES TO PHQ9 QUESTIONS 1 & 2: 0
SUM OF ALL RESPONSES TO PHQ QUESTIONS 1-9: 0
1. LITTLE INTEREST OR PLEASURE IN DOING THINGS: 0
SUM OF ALL RESPONSES TO PHQ QUESTIONS 1-9: 0

## 2021-07-14 ASSESSMENT — ENCOUNTER SYMPTOMS
SHORTNESS OF BREATH: 0
NAUSEA: 0
SORE THROAT: 0
ABDOMINAL PAIN: 0
WHEEZING: 0

## 2021-07-14 ASSESSMENT — SOCIAL DETERMINANTS OF HEALTH (SDOH): HOW HARD IS IT FOR YOU TO PAY FOR THE VERY BASICS LIKE FOOD, HOUSING, MEDICAL CARE, AND HEATING?: NOT HARD AT ALL

## 2021-07-14 NOTE — PROGRESS NOTES
7/18/2021    [place]    Chief Complaint   Patient presents with   [facility] New Patient     Presenting to establish PCP. Previous PCP Dr. Almita Sevilla, Previous pulmonary Dr. Pan Landin. HPI  History was obtained from patient. Rachel Hendricks is a 66 y.o. female with a PMHx DM2, COPD, HTN as listed below who presents today to establish care. From [place]. No acute complaints complaint with medications    DM managed with diet and Metformin 500mg BID. Pt does not check blood sugars at home- her glucometer has been broken. Last A1C in June 2021 5.8. Pt quit smoking December 2020 after falling and breaking her right hip. She lives with her daughter. Had one fall at home after returning home while still using her walker and holding her small dog. Pt uses a quad cane most of the time including in her home. Recent bone density over 15 years ago per patient    Pt follows with Dr. Chele Herman for COPD. Still gets SOB intermittently. CT showed nodules on lungs- monitoring. Has follow up on 7/27. Pt has nebulizer at home and takes Albuterol BID. Dr. Chele Herman started her on Anoro- just a sample, she states that when she was using it that she didn't need the Albuterol. Patient able to ambulate well   No falls since fall. Follows Dr. Gisella Chen with cardiology- pt went to urgent care in [place] with chest pain- had cardiac workup and was cleared. She has not been back for follow up. Pt would like to have a gastroenterologist here- she had one in [place] and has had regular colonoscopies- she believes that she is due for one and has seen Dr. Jana Briseno in the past and would like referral to see him again. She has had polyps removed in the past.      Pt was taking Buspar 10mg every night for sleep. Without it she has difficulty falling asleep and staying asleep. When taking she can sleep throughout the night. Prior labs reviewed excellent control of cholesterol    Prior PCP Dr. Almita Sevilla    1.  Encounter to establish None     Social Determinants of Health     Financial Resource Strain: Low Risk     Difficulty of Paying Living Expenses: Not hard at all   Food Insecurity: No Food Insecurity    Worried About Running Out of Food in the Last Year: Never true    Dima of Food in the Last Year: Never true   Transportation Needs: No Transportation Needs    Lack of Transportation (Medical): No    Lack of Transportation (Non-Medical):  No   Physical Activity:     Days of Exercise per Week:     Minutes of Exercise per Session:    Stress:     Feeling of Stress :    Social Connections:     Frequency of Communication with Friends and Family:     Frequency of Social Gatherings with Friends and Family:     Attends Zoroastrian Services:     Active Member of Clubs or Organizations:     Attends Club or Organization Meetings:     Marital Status:    Intimate Partner Violence:     Fear of Current or Ex-Partner:     Emotionally Abused:     Physically Abused:     Sexually Abused:         SURGICAL HISTORY  Past Surgical History:   Procedure Laterality Date    APPENDECTOMY      CHOLECYSTECTOMY      EYE SURGERY      FRACTURE SURGERY Left     Left hand    HYSTERECTOMY      JOINT REPLACEMENT Right     12/04/2020-Right Hip    TONSILLECTOMY                   CURRENT MEDICATIONS  Current Outpatient Medications   Medication Sig Dispense Refill    pantoprazole (PROTONIX) 40 MG tablet Take 1 tablet by mouth daily 30 tablet 2    atorvastatin (LIPITOR) 20 MG tablet Take 1 tablet by mouth daily 30 tablet 5    umeclidinium-vilanterol (ANORO ELLIPTA) 62.5-25 MCG/INH AEPB inhaler Inhale 1 puff into the lungs daily 30 puff 2    busPIRone (BUSPAR) 10 MG tablet Take 1 tablet by mouth nightly 30 tablet 0    lisinopril (PRINIVIL;ZESTRIL) 40 MG tablet Take 1 tablet by mouth daily 30 tablet 0    metFORMIN (GLUCOPHAGE) 500 MG tablet Take 1 tablet by mouth 2 times daily (with meals) 60 tablet 0    dicyclomine (BENTYL) 10 MG capsule Take 1 capsule by mouth 3 times daily as needed (stomach) 120 capsule 0    aspirin 81 MG EC tablet Take 81 mg by mouth daily       No current facility-administered medications for this visit. ALLERGIES  Allergies   Allergen Reactions    Other Hives    Levaquin [Levofloxacin] Rash       PHYSICAL EXAM    /78   Pulse 67   Resp 16   Ht 5' (1.524 m)   Wt 133 lb (60.3 kg)   SpO2 91%   BMI 25.97 kg/m²     Physical Exam  Constitutional:       Appearance: Normal appearance. HENT:      Head: Normocephalic and atraumatic. Mouth/Throat:      Mouth: Mucous membranes are moist.   Eyes:      Extraocular Movements: Extraocular movements intact. Pupils: Pupils are equal, round, and reactive to light. Neck:      Comments: No thyromegaly  Cardiovascular:      Rate and Rhythm: Normal rate and regular rhythm. Pulses: Normal pulses. Heart sounds: No murmur heard. No friction rub. No gallop. Pulmonary:      Effort: Pulmonary effort is normal.      Breath sounds: Normal breath sounds. Abdominal:      General: Abdomen is flat. Bowel sounds are normal. There is no distension. Palpations: Abdomen is soft. Tenderness: There is no abdominal tenderness. There is no right CVA tenderness or left CVA tenderness. Musculoskeletal:      Cervical back: Neck supple. Skin:     General: Skin is warm and dry. Neurological:      General: No focal deficit present. Mental Status: She is alert. Psychiatric:         Mood and Affect: Mood normal.         Behavior: Behavior normal.         ASSESSMENT & PLAN    1. Encounter to establish care  -need to obtain prior records PCP Dr. Garfield [facility]  -prior labs reviewed    2. Gastroesophageal reflux disease, unspecified whether esophagitis present  -stable  - pantoprazole (PROTONIX) 40 MG tablet; Take 1 tablet by mouth daily  Dispense: 30 tablet; Refill: 5    3.  Mixed hyperlipidemia  -well controlled on current regimen 6/15/21 labs reviewed  - atorvastatin (LIPITOR) 20 MG tablet; Take 1 tablet by mouth daily  Dispense: 30 tablet; Refill: 5    4. COPD, mild (HCC)  -stable    5. Solitary lung nodule  -following with Pulmonology Dr. Emilie Chakraborty  -as seen on CT 7/2/21. try to obtain prior imaging in [place] o/w will need to repeat in 12 months    6. History of CVA (cerebrovascular accident)  -continue 80asa no residual effects      Return in about 3 months (around 10/14/2021).          Electronically signed by Manual Read, DO on 7/18/2021

## 2021-07-18 PROBLEM — F41.9 ANXIETY: Status: ACTIVE | Noted: 2021-07-18

## 2021-07-27 ENCOUNTER — OFFICE VISIT (OUTPATIENT)
Dept: PULMONOLOGY | Age: 71
End: 2021-07-27
Payer: MEDICARE

## 2021-07-27 VITALS
HEART RATE: 91 BPM | DIASTOLIC BLOOD PRESSURE: 82 MMHG | BODY MASS INDEX: 26.7 KG/M2 | HEIGHT: 60 IN | WEIGHT: 136 LBS | OXYGEN SATURATION: 98 % | SYSTOLIC BLOOD PRESSURE: 136 MMHG

## 2021-07-27 DIAGNOSIS — J44.9 COPD, MILD (HCC): Primary | ICD-10-CM

## 2021-07-27 DIAGNOSIS — R91.8 MULTIPLE LUNG NODULES ON CT: ICD-10-CM

## 2021-07-27 DIAGNOSIS — R06.02 SHORTNESS OF BREATH: ICD-10-CM

## 2021-07-27 DIAGNOSIS — Z87.891 FORMER SMOKER: ICD-10-CM

## 2021-07-27 PROBLEM — R91.1 SOLITARY LUNG NODULE: Status: RESOLVED | Noted: 2021-06-15 | Resolved: 2021-07-27

## 2021-07-27 PROCEDURE — G8400 PT W/DXA NO RESULTS DOC: HCPCS | Performed by: INTERNAL MEDICINE

## 2021-07-27 PROCEDURE — G8926 SPIRO NO PERF OR DOC: HCPCS | Performed by: INTERNAL MEDICINE

## 2021-07-27 PROCEDURE — 1036F TOBACCO NON-USER: CPT | Performed by: INTERNAL MEDICINE

## 2021-07-27 PROCEDURE — 3017F COLORECTAL CA SCREEN DOC REV: CPT | Performed by: INTERNAL MEDICINE

## 2021-07-27 PROCEDURE — 1090F PRES/ABSN URINE INCON ASSESS: CPT | Performed by: INTERNAL MEDICINE

## 2021-07-27 PROCEDURE — 1123F ACP DISCUSS/DSCN MKR DOCD: CPT | Performed by: INTERNAL MEDICINE

## 2021-07-27 PROCEDURE — 99213 OFFICE O/P EST LOW 20 MIN: CPT | Performed by: INTERNAL MEDICINE

## 2021-07-27 PROCEDURE — 4040F PNEUMOC VAC/ADMIN/RCVD: CPT | Performed by: INTERNAL MEDICINE

## 2021-07-27 PROCEDURE — 1111F DSCHRG MED/CURRENT MED MERGE: CPT | Performed by: INTERNAL MEDICINE

## 2021-07-27 PROCEDURE — G8427 DOCREV CUR MEDS BY ELIG CLIN: HCPCS | Performed by: INTERNAL MEDICINE

## 2021-07-27 PROCEDURE — 3023F SPIROM DOC REV: CPT | Performed by: INTERNAL MEDICINE

## 2021-07-27 PROCEDURE — G8417 CALC BMI ABV UP PARAM F/U: HCPCS | Performed by: INTERNAL MEDICINE

## 2021-07-27 RX ORDER — ALBUTEROL SULFATE 2.5 MG/3ML
2.5 SOLUTION RESPIRATORY (INHALATION) EVERY 6 HOURS PRN
COMMUNITY
End: 2022-05-03 | Stop reason: SDUPTHER

## 2021-07-27 NOTE — PROGRESS NOTES
SUBJECTIVE:  Chief Complaint: Mild COPD, dyspnea on exertion, mild obstructive sleep apnea, multiple lung nodules on CT, former smoker  Boo Borrego states that she has had no major asthmatic or bronchitic exacerbation and has not required antibiotics or oral steroids recently. However she did go to the emergency room with chest pain earlier this month and had a CT chest.  She continues on Anoro Ellipta 1 inhalation daily and has an albuterol inhaler which she typically uses 2-3 times a day when she has dyspnea on exertion. She does expectorate a moderate amount of sputum daily and sometimes it slightly yellow discolored but she denies any hemoptysis and has had no markedly purulent sputum expectoration. She states that she quit smoking slightly less than a year ago but does have a 50-pack-year history. She is not aware of a family history of lung cancer. Apnea screening demonstrated mild JESSE with an AHI of 7.2 events per hour. However she is not complaining of excessive daytime sleepiness and I decided not to proceed to formal sleep testing or CPAP therapy at this time. She states that she was never diagnosed with asthma as a child, adolescent or adult. ROS:  Constitution:  HEENT: Negative for ear, throat pain  Cardiovascular: Negative for chest pain, syncope, edema  Pulmonary: See HPI  Musculoskeletal: Negative for DVT, myalgias, arthralgias    OBJECTIVE:  /82   Pulse 91   Ht 5' (1.524 m)   Wt 136 lb (61.7 kg)   SpO2 98%   BMI 26.56 kg/m²      Physical Exam:  Constitutional:  She appears well developed and well-nourished. Not in any respiratory distress at rest  Neck:  Supple, No palpable lymphadenopathy, No JVD  Cardiovascular:  S1, S2 Normal, Regular rhythm, no murmurs or gallops, No pericardial  rubs. Pulmonary: Breath sounds are fairly clear although minimally diminished bilaterally.   No wheezing or rhonchi are noted  Abdomen: Not examined  Extremities: no edema, No DVT  Neurologic: Oriented x3, No focal deficits    Radiology: CT chest with contrast 7/2/2021 showed several tiny lung nodules measuring up to 4 mm which are noncalcified along with a 7 and 6 mm calcified granuloma. Emphysema was noted but no pneumonia or pulmonary edema and no pneumothorax  PFT: Office spirometry on 6/15/2021 demonstrated a minimal obstructive defect with a significant response to bronchodilators      Echocardiogram: 7/2/2021  Left ventricular systolic function is normal.   Ejection fraction is visually estimated at 55%. Mild left ventricular hypertrophy. Grade I diastolic dysfunction. No evidence of any pericardial effusion. ASSESSMENT:    1. COPD, mild ([address]    2. Multiple lung nodules on CT    3. Shortness of breath    4. Former smoker          PLAN:   I will make no change in her current bronchodilator therapy. If she is requiring her rescue inhaler more than 3-4 times a day I may consider switching her to a LABA/ICS but for the time being I am going to continue Anoro Ellipta 1 inhalation daily. I may consider repeating her apnea screening next year. I would recommend repeating her CT chest in the next 6 to 12 months to follow-up on the multiple noncalcified tiny pulmonary nodules. I will continue to follow her    We have discussed the need to maintain yearly flu immunization, pneumococcal vaccination. We have discussed Coronavirus precaution including handwashing practice, wiping items touched in public such as gas pumps, door handles, shopping carts, etc. Self monitoring for infection - fever, chills, cough, SOB. Should they develop symptoms they should call office for further instructions. Return in about 6 months (around 1/27/2022) for Recheck for COPD, Recheck for Shortness of Breath, recheck for lung nodule. This dictation was performed with a verbal recognition program and it was checked for errors. It is possible that there are still dictated errors within this office note.   Any errors should be brought immediately to my attention for correction. All efforts were made to ensure that this office note is accurate.

## 2021-07-29 NOTE — PROGRESS NOTES
Physician Progress Note      Bette Bhat  [facility] #:                  [id number]  :                       1950  ADMIT DATE:       2021 1:08 PM  Edel Car DATE:        2021 4:55 PM  RESPONDING  PROVIDER #:        Pierre Monzon          QUERY TEXT:    Pt admitted with chest pain. Pt noted to have pulmonary nodules and GERD. If   possible, please document in progress notes and discharge summary if you are   evaluating and/or treating any of the following: The medical record reflects the following:  Risk Factors: HTN, HLD, DM, COPD, former smoker  Clinical Indicators:  Stress Test: Negative for ischemia.  CHEST CT:   Several tiny nodules as discussed, noncalcified nodules measuring 4 mm and   less.  Discharge Summary: \". ..pleuritic retrosternal pain, that was   reproducible. \" H&P: GERD diagnosis.  H&P: Respiratory assessment: lungs   CTA, no cough, no crackles, no wheezing. Treatment: CT, Cardiac Stress Test, ECHO, follow up outpatient with   pulmonology. Protonix PO for GERD on her home med list.    Thank you,  Paul Zelaya, RN  [phone]  Options provided:  -- Chest pain due to GERD  -- Chest pain due to pleurisy  -- Chest pain due to lung nodules  -- Other - I will add my own diagnosis  -- Disagree - Not applicable / Not valid  -- Disagree - Clinically unable to determine / Unknown  -- Refer to Clinical Documentation Reviewer    PROVIDER RESPONSE TEXT:    Provider is clinically unable to determine a response to this query.     Query created by: Priscilla Irwin on 2021 11:57 AM      Electronically signed by:  Pierre Monzon 2021 4:44 PM

## 2021-08-07 DIAGNOSIS — F41.9 ANXIETY: ICD-10-CM

## 2021-08-09 RX ORDER — BUSPIRONE HYDROCHLORIDE 10 MG/1
TABLET ORAL
Qty: 30 TABLET | Refills: 0 | Status: SHIPPED | OUTPATIENT
Start: 2021-08-09 | End: 2021-10-12

## 2021-08-13 ENCOUNTER — TELEPHONE (OUTPATIENT)
Dept: FAMILY MEDICINE CLINIC | Age: 71
End: 2021-08-13

## 2021-08-18 ENCOUNTER — OFFICE VISIT (OUTPATIENT)
Dept: GASTROENTEROLOGY | Age: 71
End: 2021-08-18
Payer: MEDICARE

## 2021-08-18 VITALS
WEIGHT: 136.8 LBS | TEMPERATURE: 97.7 F | HEART RATE: 88 BPM | OXYGEN SATURATION: 94 % | SYSTOLIC BLOOD PRESSURE: 108 MMHG | BODY MASS INDEX: 26.86 KG/M2 | DIASTOLIC BLOOD PRESSURE: 54 MMHG | HEIGHT: 60 IN

## 2021-08-18 DIAGNOSIS — Z87.19 HISTORY OF BARRETT'S ESOPHAGUS: ICD-10-CM

## 2021-08-18 DIAGNOSIS — Z01.818 PREOP TESTING: ICD-10-CM

## 2021-08-18 DIAGNOSIS — Z86.010 HISTORY OF COLON POLYPS: Primary | ICD-10-CM

## 2021-08-18 DIAGNOSIS — K21.9 GASTROESOPHAGEAL REFLUX DISEASE, UNSPECIFIED WHETHER ESOPHAGITIS PRESENT: ICD-10-CM

## 2021-08-18 DIAGNOSIS — Z83.71 FAMILY HISTORY OF COLONIC POLYPS: ICD-10-CM

## 2021-08-18 PROCEDURE — 1090F PRES/ABSN URINE INCON ASSESS: CPT | Performed by: NURSE PRACTITIONER

## 2021-08-18 PROCEDURE — 3017F COLORECTAL CA SCREEN DOC REV: CPT | Performed by: NURSE PRACTITIONER

## 2021-08-18 PROCEDURE — G8400 PT W/DXA NO RESULTS DOC: HCPCS | Performed by: NURSE PRACTITIONER

## 2021-08-18 PROCEDURE — 1123F ACP DISCUSS/DSCN MKR DOCD: CPT | Performed by: NURSE PRACTITIONER

## 2021-08-18 PROCEDURE — G8427 DOCREV CUR MEDS BY ELIG CLIN: HCPCS | Performed by: NURSE PRACTITIONER

## 2021-08-18 PROCEDURE — 4040F PNEUMOC VAC/ADMIN/RCVD: CPT | Performed by: NURSE PRACTITIONER

## 2021-08-18 PROCEDURE — 99204 OFFICE O/P NEW MOD 45 MIN: CPT | Performed by: NURSE PRACTITIONER

## 2021-08-18 PROCEDURE — 1036F TOBACCO NON-USER: CPT | Performed by: NURSE PRACTITIONER

## 2021-08-18 PROCEDURE — G8417 CALC BMI ABV UP PARAM F/U: HCPCS | Performed by: NURSE PRACTITIONER

## 2021-08-18 RX ORDER — PANTOPRAZOLE SODIUM 40 MG/1
40 TABLET, DELAYED RELEASE ORAL DAILY
Qty: 30 TABLET | Refills: 3 | Status: SHIPPED | OUTPATIENT
Start: 2021-08-18 | End: 2022-02-17 | Stop reason: SDUPTHER

## 2021-08-18 RX ORDER — SIMETHICONE 80 MG
80 TABLET,CHEWABLE ORAL ONCE
Qty: 3 TABLET | Refills: 0 | Status: SHIPPED | OUTPATIENT
Start: 2021-08-18 | End: 2022-05-12

## 2021-08-18 RX ORDER — POLYETHYLENE GLYCOL 3350, SODIUM SULFATE, SODIUM CHLORIDE, POTASSIUM CHLORIDE, ASCORBIC ACID, SODIUM ASCORBATE 140-9-5.2G
1 KIT ORAL ONCE
Qty: 1 EACH | Refills: 0 | Status: SHIPPED | OUTPATIENT
Start: 2021-08-18 | End: 2021-08-18

## 2021-08-18 RX ORDER — DICYCLOMINE HYDROCHLORIDE 10 MG/1
10 CAPSULE ORAL 3 TIMES DAILY PRN
Qty: 120 CAPSULE | Refills: 0 | Status: SHIPPED | OUTPATIENT
Start: 2021-08-18 | End: 2021-10-12 | Stop reason: SDUPTHER

## 2021-08-18 ASSESSMENT — ENCOUNTER SYMPTOMS
COLOR CHANGE: 0
EYE PAIN: 0
PHOTOPHOBIA: 0
BLOOD IN STOOL: 0
WHEEZING: 0
CONSTIPATION: 0
BACK PAIN: 0
DIARRHEA: 0
SHORTNESS OF BREATH: 1
COUGH: 1
VOMITING: 0
ABDOMINAL PAIN: 0
NAUSEA: 0

## 2021-08-18 NOTE — PATIENT INSTRUCTIONS
Patient Education        Colonoscopy: Before Your Procedure  What is a colonoscopy? A colonoscopy is a test that lets a doctor look inside your colon. The doctor uses a thin, lighted tube called a colonoscope to look for problems. These include small growths called polyps, cancer, or bleeding. During the test, the doctor can take samples of tissue that can be checked for cancer or other problems. This is called a biopsy. The doctor can also take out polyps. Before the test, you will need to stop eating solid foods. You also will be given instructions on how to clean out your colon. This helps your doctor be able to see inside your colon during the test.  How do you prepare for the procedure? Procedures can be stressful. This information will help you understand what you can expect. And it will help you safely prepare for your procedure. Preparing for the procedure    · Be sure you have someone to take you home. Anesthesia and pain medicine will make it unsafe for you to drive or get home on your own. · Understand exactly what procedure is planned, along with the risks, benefits, and other options.     · Tell your doctor ALL the medicines, vitamins, supplements, and herbal remedies you take. Some may increase the risk of problems during your procedure. Your doctor will tell you if you should stop taking any of them before the procedure and how soon to do it.     · If you take aspirin or some other blood thinner, ask your doctor if you should stop taking it before your procedure. Make sure that you understand exactly what your doctor wants you to do. These medicines increase the risk of bleeding.     · Make sure your doctor and the hospital have a copy of your advance directive. If you don't have one, you may want to prepare one. It lets others know your health care wishes. It's a good thing to have before any type of surgery or procedure.    Before the procedure    · Follow your doctor's directions about when to stop eating solid foods and drink only clear liquids. You can drink water, clear juices, clear broths, flavored ice pops, and gelatin (such as Jell-O). Do not eat or drink anything red or purple. This includes grape juice and grape-flavored ice pops. It also includes fruit punch and cherry gelatin.     · Drink the \"colon prep\" liquid as your doctor tells you. You will want to stay home, because the liquid will make you go to the bathroom a lot. Your stools will be loose and watery. It's very important to drink all of the liquid. If you have problems drinking it, call your doctor.     · Do not eat any solid foods after you drink the colon prep.     · Stop drinking clear liquids for a few hours before the test. Your doctor will tell you how many hours this will be. What happens on the day of the procedure? · Follow the instructions exactly about when to stop eating and drinking. If you don't, your procedure may be canceled. If your doctor told you to take your medicines on the day of the procedure, take them with only a sip of water.     · Take a bath or shower before you come in for your procedure. Do not apply lotions, perfumes, deodorants, or nail polish.     · Take off all jewelry and piercings. And take out contact lenses, if you wear them. At the doctor's office or hospital   · Bring a picture ID.     · You will be kept comfortable and safe by your anesthesia provider. The anesthesia may make you sleep.     · You will lie on your back or your side with your knees drawn up toward your belly. The doctor will gently put a gloved finger into your anus. Then the doctor puts the scope in and moves it into your colon. The scope goes in easily because it is lubricated.     · The doctor may also use small tools to take tissue samples for a biopsy or to remove polyps. This does not hurt.     · The test usually takes 30 to 45 minutes. But it may take longer. It depends on what is found and what is done. When should you call your doctor? · You have questions or concerns.     · You don't understand how to prepare for your procedure.     · You are having trouble with the bowel prep.     · You become ill before the procedure (such as fever, flu, or a cold).     · You need to reschedule or have changed your mind about having the procedure. Where can you learn more? Go to [email or web address] and sign in to your [employer] account. Enter C315 in the [facility] box to learn more about \"Colonoscopy: Before Your Procedure. \"     If you do not have an account, please click on the \"Sign Up Now\" link. Current as of: December 17, 2020               Content Version: 12.9  © 3918-6044 Healthwise, Incorporated. Care instructions adapted under license by [facility] ([facility]). If you have questions about a medical condition or this instruction, always ask your healthcare professional. [place] [address] any warranty or liability for your use of this information.

## 2021-08-18 NOTE — PROGRESS NOTES
Gabriela Wright 66 y.o. female was seen by GA Oropeza on 08/18/21     Wt Readings from Last 3 Encounters:   08/18/21 136 lb 12.8 oz (62.1 kg)   07/27/21 136 lb (61.7 kg)   07/14/21 133 lb (60.3 kg)       HPI  Gabriela Wright is a pleasant 66 y.o.  female who presents today for acid reflux and history of colon polyps. She has a past medical history of Whiting's esophagus, COPD, mild, diabetes mellitus, functional dyspepsia, GERD, excessive daytime sleepiness, former smoker, hyperlipidemia, hypertension, multiple lung nodules on CT, shortness of breath, and snoring. Her last E[place] was done in [facility] [place]. Marcelene Dills on 7- showing stomach biopsies mild chronic gastritis with focal intestinal metaplasia that was negative for H. Pylori infection and dysplasia. Her esophageal biopsies showed chronic inflammation that was negative for Whiting's esophagus. Her last colonoscopy was done in [place] [facility] [place]. Marcelene Dills on 11-1-2016 showed non-bleeding internal hemorrhoids, diverticulosis in sigmoid/descending colon and two 10 mm rectal polyps that were removed. Her appetite is good with early satiety. Her weight is stable. Her gastric emptying study done on 5-2-2018 showed mild delay in gastric emptying. No nausea or vomiting. No abdominal pain, bloating or distention. Her heartburn and acid reflux has been ongoing for over fifteen years. Her heartburn and acid reflux has worsened in the last week. Her acid reflux is fairly controlled with Protonix. She has been taking additional Tums and it has been worse in the evening. Nocturnal awakenings with acid reflux a few times a week. She did endorse having some problem with swallowing her pills. No dysphagia or pain with swallowing. She has excess belching and flatulence. She denies changes in her bowel pattern. Her typical bowel pattern is daily with soft brown formed stools. No diarrhea or constipation. No blood in her stools or melena.   Her mother (with meals) 60 tablet 0    dicyclomine (BENTYL) 10 MG capsule Take 1 capsule by mouth 3 times daily as needed (stomach) 120 capsule 0    aspirin 81 MG EC tablet Take 81 mg by mouth daily       No current facility-administered medications for this visit. Past medical history:   She has a past medical history of COPD, mild ([address] Diabetes mellitus ([address] Excessive daytime sleepiness, Former smoker, Hyperlipidemia, Hypertension, Multiple lung nodules on CT, Shortness of breath, and Snoring. Past surgical history:  She has a past surgical history that includes Cholecystectomy; Tonsillectomy; eye surgery; joint replacement (Right); fracture surgery (Left); Appendectomy; and Hysterectomy. Social History:  She reports that she quit smoking about 18 months ago. She has a 45.00 pack-year smoking history. She has never used smokeless tobacco. She reports previous alcohol use. She reports previous drug use. Family history:  Her family history includes Colon Cancer in her mother; Heart Attack in her father. Objective    Vitals:    08/18/21 1027   BP: (!) 108/54   Pulse: 88   Temp: 97.7 °F (36.5 °C)   SpO2: 94%        Physical exam    Physical Exam  Vitals reviewed. Constitutional:       General: She is not in acute distress. Appearance: Normal appearance. She is well-developed. She is not ill-appearing, toxic-appearing or diaphoretic. HENT:      Head: Normocephalic and atraumatic. Nose: Nose normal.      Mouth/Throat:      Mouth: Mucous membranes are moist.   Eyes:      Conjunctiva/sclera: Conjunctivae normal.      Pupils: Pupils are equal, round, and reactive to light. Neck:      Thyroid: No thyromegaly. Vascular: No JVD. Trachea: No tracheal deviation. Cardiovascular:      Rate and Rhythm: Normal rate and regular rhythm. Pulses: Normal pulses. Heart sounds: Normal heart sounds. No murmur heard. No friction rub. No gallop.     Pulmonary:      Effort: Pulmonary effort is normal. No respiratory distress. Breath sounds: No stridor. No wheezing, rhonchi or rales. Comments: Clear to diminished in bases  Chest:      Chest wall: No tenderness. Abdominal:      General: Bowel sounds are normal. There is no distension. Palpations: Abdomen is soft. There is no mass. Tenderness: There is no abdominal tenderness. There is no guarding or rebound. Hernia: No hernia is present. Musculoskeletal:         General: Normal range of motion. Cervical back: Normal range of motion and neck supple. Lymphadenopathy:      Cervical: No cervical adenopathy. Skin:     General: Skin is warm and dry. Neurological:      Mental Status: She is alert and oriented to person, place, and time.    Psychiatric:         Mood and Affect: Mood normal.         Admission on 07/01/2021, Discharged on 07/02/2021   Component Date Value Ref Range Status    WBC 07/01/2021 6.6  4.0 - 10.5 K/CU MM Final    RBC 07/01/2021 4.30  4.2 - 5.4 M/CU MM Final    Hemoglobin 07/01/2021 12.9  12.5 - 16.0 GM/DL Final    Hematocrit 07/01/2021 40.5  37 - 47 % Final    MCV 07/01/2021 94.2  78 - 100 FL Final    MCH 07/01/2021 30.0  27 - 31 PG Final    MCHC 07/01/2021 31.9* 32.0 - 36.0 % Final    RDW 07/01/2021 12.8  11.7 - 14.9 % Final    Platelets 51/63/8161 245  140 - 440 K/CU MM Final    MPV 07/01/2021 10.1  7.5 - 11.1 FL Final    Differential Type 07/01/2021 AUTOMATED DIFFERENTIAL   Final    Segs Relative 07/01/2021 74.3* 36 - 66 % Final    Lymphocytes % 07/01/2021 13.0* 24 - 44 % Final    Monocytes % 07/01/2021 9.8* 0 - 4 % Final    Eosinophils % 07/01/2021 1.7  0 - 3 % Final    Basophils % 07/01/2021 0.6  0 - 1 % Final    Segs Absolute 07/01/2021 4.9  K/CU MM Final    Lymphocytes Absolute 07/01/2021 0.9  K/CU MM Final    Monocytes Absolute 07/01/2021 0.7  K/CU MM Final    Eosinophils Absolute 07/01/2021 0.1  K/CU MM Final    Basophils Absolute 07/01/2021 0.0  K/CU MM Final    Immature Neutrophil % 07/01/2021 0.6* 0 - 0.43 % Final    Total Immature Neutrophil 07/01/2021 0.04  K/CU MM Final    Sodium 07/01/2021 138  135 - 145 MMOL/L Final    Potassium 07/01/2021 4.1  3.5 - 5.1 MMOL/L Final    Chloride 07/01/2021 104  99 - 110 mMol/L Final    CO2 07/01/2021 23  21 - 32 MMOL/L Final    BUN 07/01/2021 12  6 - 23 MG/DL Final    CREATININE 07/01/2021 0.7  0.6 - 1.1 MG/DL Final    Glucose 07/01/2021 99  70 - 99 MG/DL Final    Calcium 07/01/2021 9.1  8.3 - 10.6 MG/DL Final    Albumin 07/01/2021 3.7  3.4 - 5.0 GM/DL Final    Total Protein 07/01/2021 6.7  6.4 - 8.2 GM/DL Final    Total Bilirubin 07/01/2021 0.4  0.0 - 1.0 MG/DL Final    ALT 07/01/2021 13  10 - 40 U/L Final    AST 07/01/2021 11* 15 - 37 IU/L Final    Alkaline Phosphatase 07/01/2021 88  40 - 129 IU/L Final    GFR Non- 07/01/2021 >60  >60 mL/min/1.73m2 Final    GFR  07/01/2021 >60  >60 mL/min/1.73m2 Final    Anion Gap 07/01/2021 11  4 - 16 Final    Troponin T 07/01/2021 <0.010  <0.01 NG/ML Final    Comment:         Patients with high levels of Biotin oral intake  (ie >5 mg/day) may have falsely decreased Troponin  levels. Samples collected within 8 hours of Biotin  intake may require addtional information for diagnosis.  Pro-BNP 07/01/2021 226.6  <300 PG/ML Final    Comment:         WE HAVE CONVERTED FROM BNP TO NT-proBNP          Our reference range to \"RULE OUT\" acute CHF is <300pg/ml. To \"RULE IN\" acute CHF please use the following reference ranges derived from the PRIDE   study.   <50yrs       >450pg/ml  50-75yrs     >900pg/ml  >75yrs       >1800pg/ml      Lipase 07/01/2021 23  13 - 60 IU/L Final    Ventricular Rate 07/01/2021 72  BPM Final    Atrial Rate 07/01/2021 72  BPM Final    P-R Interval 07/01/2021 168  ms Final    QRS Duration 07/01/2021 70  ms Final    Q-T Interval 07/01/2021 382  ms Final    QTc Calculation (Bazett) 07/01/2021 418  ms Final    P Axis 07/01/2021 65  degrees Final    R Axis 07/01/2021 41  degrees Final    T Axis 07/01/2021 43  degrees Final    Diagnosis 07/01/2021    Final                    Value:Normal sinus rhythm  Normal ECG  No previous ECGs available  Confirmed by Margaret Dowell MD, [facility] [place] ([id number]) on 7/2/2021 6:05:47 PM      Troponin T 07/01/2021 <0.010  <0.01 NG/ML Final    Comment:         Patients with high levels of Biotin oral intake  (ie >5 mg/day) may have falsely decreased Troponin  levels. Samples collected within 8 hours of Biotin  intake may require addtional information for diagnosis.  Troponin T 07/01/2021 <0.010  <0.01 NG/ML Final    Comment:         Patients with high levels of Biotin oral intake  (ie >5 mg/day) may have falsely decreased Troponin  levels. Samples collected within 8 hours of Biotin  intake may require addtional information for diagnosis.  Troponin T 07/02/2021 <0.010  <0.01 NG/ML Final    Comment:         Patients with high levels of Biotin oral intake  (ie >5 mg/day) may have falsely decreased Troponin  levels. Samples collected within 8 hours of Biotin  intake may require addtional information for diagnosis.       WBC 07/02/2021 6.3  4.0 - 10.5 K/CU MM Final    RBC 07/02/2021 4.04* 4.2 - 5.4 M/CU MM Final    Hemoglobin 07/02/2021 12.6  12.5 - 16.0 GM/DL Final    Hematocrit 07/02/2021 38.6  37 - 47 % Final    MCV 07/02/2021 95.5  78 - 100 FL Final    MCH 07/02/2021 31.2* 27 - 31 PG Final    MCHC 07/02/2021 32.6  32.0 - 36.0 % Final    RDW 07/02/2021 12.5  11.7 - 14.9 % Final    Platelets 30/44/5910 219  140 - 440 K/CU MM Final    MPV 07/02/2021 9.8  7.5 - 11.1 FL Final    Sodium 07/02/2021 140  135 - 145 MMOL/L Final    Potassium 07/02/2021 4.2  3.5 - 5.1 MMOL/L Final    Chloride 07/02/2021 104  99 - 110 mMol/L Final    CO2 07/02/2021 24  21 - 32 MMOL/L Final    Anion Gap 07/02/2021 12  4 - 16 Final    BUN 07/02/2021 18  6 - 23 MG/DL Final    CREATININE 07/02/2021 0.8  0.6 - 1.1 MG/DL Final    Glucose 07/02/2021 136* 70 - 99 MG/DL Final    Calcium 07/02/2021 9.0  8.3 - 10.6 MG/DL Final    GFR Non- 07/02/2021 >60  >60 mL/min/1.73m2 Final    GFR  07/02/2021 >60  >60 mL/min/1.73m2 Final    Left Ventricular Ejection Fraction 07/02/2021 55   Final-Edited    LVEF MODALITY 07/02/2021 ECHO   Final-Edited    POC Glucose 07/01/2021 207* 70 - 99 MG/DL Final    Ventricular Rate 07/02/2021 73  BPM Final    Atrial Rate 07/02/2021 73  BPM Final    P-R Interval 07/02/2021 180  ms Final    QRS Duration 07/02/2021 70  ms Final    Q-T Interval 07/02/2021 404  ms Final    QTc Calculation (Bazett) 07/02/2021 445  ms Final    P Axis 07/02/2021 -1  degrees Final    R Axis 07/02/2021 74  degrees Final    T Axis 07/02/2021 72  degrees Final    Diagnosis 07/02/2021    Final                    Value:Normal sinus rhythm  Low voltage QRS  Borderline ECG  When compared with ECG of 01-JUL-2021 13:18,  No significant change was found  Confirmed by Koreen Sandifer MD, [facility] ([id number]) on 7/2/2021 6:09:50 PM      POC Glucose 07/02/2021 127* 70 - 99 MG/DL Final    Left Ventricular Ejection Fraction 07/02/2021 80   Final-Edited    LVEF MODALITY 07/02/2021 Nuclear   Final-Edited       Assessment and Plan:  1. Will plan for an EGD/colonoscopy with MAC anesthesia. The patient was informed of the risks and benefits of the procedures. 2.  History of Whiting's esophagus per chart review from [place]; her last EGD showed no evidence of Whiting's esophagus. Recommend taking Protonix daily and will order EGD for Whiting's surveillance. 3.  GERD that is long term/worsening without dysphagia or odynophagia. Will order EGD to rule out Whiting's esophagus. The patient was encouraged to continue taking Protonix daily. The patient was encouraged to continue with anti-reflux measures and avoid foods that worsen(cut back on caffiene and avoid eating late at night).     4.  History of colon polyps, history of colitis and family history of colon polyps will order colonoscopy for colorectal cancer surveillance. She denies changes in her bowel pattern or blood in her stool. 5.   Further recommendations for follow-up will be determined after the EGD/colonoscopy have been completed.

## 2021-08-25 DIAGNOSIS — I10 ESSENTIAL HYPERTENSION: ICD-10-CM

## 2021-08-25 RX ORDER — CHLORTHALIDONE 50 MG/1
TABLET ORAL
Qty: 30 TABLET | Refills: 0 | Status: SHIPPED | OUTPATIENT
Start: 2021-08-25 | End: 2022-01-03

## 2021-08-30 DIAGNOSIS — E11.9 TYPE 2 DIABETES MELLITUS WITHOUT COMPLICATION, WITHOUT LONG-TERM CURRENT USE OF INSULIN (HCC): ICD-10-CM

## 2021-09-08 ENCOUNTER — HOSPITAL ENCOUNTER (OUTPATIENT)
Age: 71
Setting detail: SPECIMEN
Discharge: HOME | End: 2021-09-08
Payer: MEDICARE

## 2021-09-08 ENCOUNTER — NURSE ONLY (OUTPATIENT)
Dept: SURGERY | Age: 71
End: 2021-09-08

## 2021-09-08 DIAGNOSIS — Z01.818 PRE-OP TESTING: Primary | ICD-10-CM

## 2021-09-08 PROCEDURE — U0003 INFECTIOUS AGENT DETECTION BY NUCLEIC ACID (DNA OR RNA); SEVERE ACUTE RESPIRATORY SYNDROME CORONAVIRUS 2 (SARS-COV-2) (CORONAVIRUS DISEASE [COVID-19]), AMPLIFIED PROBE TECHNIQUE, MAKING USE OF HIGH THROUGHPUT TECHNOLOGIES AS DESCRIBED BY CMS-2020-01-R: HCPCS

## 2021-09-08 PROCEDURE — U0005 INFEC AGEN DETEC AMPLI PROBE: HCPCS

## 2021-09-10 LAB
SARS-COV-2: NOT DETECTED
SOURCE: NORMAL

## 2021-09-10 NOTE — PROGRESS NOTES
.Surgery    you will be called      with times               1. Do not eat or drink anything after midnight - unless instructed by your doctor prior to surgery. This includes                   no water, chewing gum or mints. 2. Follow your directions as prescribed by the doctor for your procedure and medications. 3. Check with your Doctor regarding stopping Plavix, Coumadin, Lovenox,Effient,Pradaxa,Xarelto, Fragmin or other blood thinners and                   follow their instructions. 4. Do not smoke, and do not drink any alcoholic beverages 24 hours prior to surgery. This includes NA Beer. 5. You may brush your teeth and gargle the morning of surgery. DO NOT SWALLOW WATER   6. You MUST make arrangements for a responsible adult to take you home after your surgery and be able to check on you every couple                   hours for the day. You will not be allowed to leave alone or drive yourself home. It is strongly suggested someone stay with you the first 24                   hrs. Your surgery will be cancelled if you do not have a ride home. 7. Please wear simple, loose fitting clothing to the hospital.  [place] not bring valuables (money, credit cards, checkbooks, etc.) Do not wear any                   makeup (including no eye makeup) or nail polish on your fingers or toes. 8. DO NOT wear any jewelry or piercings on day of surgery. All body piercing jewelry must be removed. 9. If you have dentures, they will be removed before going to the OR; we will provide you a container. If you wear contact lenses or glasses,                  they will be removed; please bring a case for them. 10. If you  have a Living Will and Durable Power of  for Healthcare, please bring in a copy. 11. Please bring picture ID,  insurance card, paperwork from the doctors office    (H & P, Consent, & card for implantable devices).            12. Take a shower the night before

## 2021-09-13 ENCOUNTER — ANESTHESIA EVENT (OUTPATIENT)
Dept: ENDOSCOPY | Age: 71
End: 2021-09-13
Payer: MEDICARE

## 2021-09-13 NOTE — PROGRESS NOTES
Surgery 09/15/21 @ 1030 arrive @ 0830               1. Do not eat or drink anything after midnight - unless instructed by your doctor prior to surgery. This includes                   no water, chewing gum or mints. 2. Follow your directions as prescribed by the doctor for your procedure and medications. Take Metformin, and Protonix morning of with a sip. 3. Check with your Doctor regarding stopping Plavix, Coumadin, Lovenox,Effient,Pradaxa,Xarelto, Fragmin or other blood thinners and                   follow their instructions. 4. Do not smoke, and do not drink any alcoholic beverages 24 hours prior to surgery. This includes NA Beer. 5. You may brush your teeth and gargle the morning of surgery. DO NOT SWALLOW WATER   6. You MUST make arrangements for a responsible adult to take you home after your surgery and be able to check on you every couple                   hours for the day. You will not be allowed to leave alone or drive yourself home. It is strongly suggested someone stay with you the first 24                   hrs. Your surgery will be cancelled if you do not have a ride home. 7. Please wear simple, loose fitting clothing to the hospital.  [place]  not bring valuables (money, credit cards, checkbooks, etc.) Do not wear any                   makeup (including no eye makeup) or nail polish on your fingers or toes. 8. DO NOT wear any jewelry or piercings on day of surgery. All body piercing jewelry must be removed. 9. If you have dentures, they will be removed before going to the OR; we will provide you a container. If you wear contact lenses or glasses,                  they will be removed; please bring a case for them. 10. If you  have a Living Will and Durable Power of  for Healthcare, please bring in a copy. 11. Please bring picture ID,  insurance card, paperwork from the doctors office    (H & P, Consent, & card for implantable devices). 12. Take a shower the night before or morning of your procedure, do not apply any lotion, oil or powder. 13. Wear a mask covering your nose & mouth when entering the hospital. Have your covid-19 test performed within 2-7 days of your                  Surgery. Covid test done 09/08/21 - Negative. Quarantine yourself after the test until after your surgery.

## 2021-09-13 NOTE — ANESTHESIA PRE PROCEDURE
Department of Anesthesiology  Preprocedure Note       Name:  Ced Razo   Age:  66 y.o.  :  1950                                          MRN:  [id number]         Date:  2021      Surgeon: Kelly Salvador):  Rene Mcnally MD    Procedure: Procedure(s):  COLONOSCOPY DIAGNOSTIC  EGD ESOPHAGOGASTRODUODENOSCOPY    Medications prior to admission:   Prior to Admission medications    Medication Sig Start Date End Date Taking? Authorizing Provider   metFORMIN (GLUCOPHAGE) 500 MG tablet Take 1 tablet by mouth 2 times daily (with meals) 21  Yes Yinka Diaz,    chlorthalidone (HYGROTEN) 50 MG tablet TAKE 1 TABLET BY MOUTH EVERY DAY 21  Yes Yinka Diaz DO   pantoprazole (PROTONIX) 40 MG tablet Take 1 tablet by mouth daily 21 Yes DICK Benavides CNP   dicyclomine (BENTYL) 10 MG capsule Take 1 capsule by mouth 3 times daily as needed (stomach) 21  Yes DICK Benavides CNP   albuterol (PROVENTIL) (2.5 MG/3ML) 0.083% nebulizer solution Take 2.5 mg by nebulization every 6 hours as needed for Wheezing   Yes Historical Provider, MD   atorvastatin (LIPITOR) 20 MG tablet Take 1 tablet by mouth daily 21  Yes Yinka Diaz DO   lisinopril (PRINIVIL;ZESTRIL) 40 MG tablet Take 1 tablet by mouth daily 21  Yes Yeni Burr MD   aspirin 81 MG EC tablet Take 81 mg by mouth daily   Yes Historical Provider, MD   simethicone (MYLICON) 80 MG chewable tablet Take 1 tablet by mouth once for 1 dose 21  DICK Benavides CNP   busPIRone (BUSPAR) 10 MG tablet TAKE 1 TABLET BY MOUTH EVERY DAY AT NIGHT 21   Yinka Diaz DO       Current medications:    No current facility-administered medications for this encounter.      Current Outpatient Medications   Medication Sig Dispense Refill    metFORMIN (GLUCOPHAGE) 500 MG tablet Take 1 tablet by mouth 2 times daily (with meals) 60 tablet 1    chlorthalidone (HYGROTEN) 50 MG tablet TAKE 1 TABLET BY MOUTH EVERY DAY 30 tablet 0    pantoprazole (PROTONIX) 40 MG tablet Take 1 tablet by mouth daily 30 tablet 3    dicyclomine (BENTYL) 10 MG capsule Take 1 capsule by mouth 3 times daily as needed (stomach) 120 capsule 0    albuterol (PROVENTIL) (2.5 MG/3ML) 0.083% nebulizer solution Take 2.5 mg by nebulization every 6 hours as needed for Wheezing      atorvastatin (LIPITOR) 20 MG tablet Take 1 tablet by mouth daily 30 tablet 5    lisinopril (PRINIVIL;ZESTRIL) 40 MG tablet Take 1 tablet by mouth daily 30 tablet 0    aspirin 81 MG EC tablet Take 81 mg by mouth daily      simethicone (MYLICON) 80 MG chewable tablet Take 1 tablet by mouth once for 1 dose 3 tablet 0    busPIRone (BUSPAR) 10 MG tablet TAKE 1 TABLET BY MOUTH EVERY DAY AT NIGHT 30 tablet 0       Allergies:     Allergies   Allergen Reactions    Other Hives    Levaquin [Levofloxacin] Rash       Problem List:    Patient Active Problem List   Diagnosis Code    Shortness of breath R06.02    Snoring R06.83    Excessive daytime sleepiness G47.19    Former smoker Z87.891    COPD, mild ([address] J44.9    Chest pain R07.9    History of CVA (cerebrovascular accident) Z80.78    Mixed hyperlipidemia E78.2    Gastroesophageal reflux disease K21.9    Anxiety F41.9    Multiple lung nodules on CT R91.8       Past Medical History:        Diagnosis Date    Cerebral artery occlusion with cerebral infarction ([address]     COPD, mild ([address] 6/15/2021    Diabetes mellitus ([address]     Excessive daytime sleepiness 6/15/2021    Former smoker 6/15/2021    Hyperlipidemia     Hypertension     Multiple lung nodules on CT 7/27/2021    Shortness of breath 6/15/2021    Snoring 6/15/2021       Past Surgical History:        Procedure Laterality Date    APPENDECTOMY      CHOLECYSTECTOMY      EYE SURGERY      FRACTURE SURGERY Left     Left hand    HYSTERECTOMY      JOINT REPLACEMENT Right     12/04/2020-Right Hip    TONSILLECTOMY         Social History: Social History     Tobacco Use    Smoking status: Former Smoker     Packs/day: 1.00     Years: 45.00     Pack years: 45.00     Quit date: 2020     Years since quittin.6    Smokeless tobacco: Never Used   Substance Use Topics    Alcohol use: Not Currently                                Counseling given: Not Answered      Vital Signs (Current):   Vitals:    21 1104   Weight: 130 lb (59 kg)   Height: 5' (1.524 m)                                              BP Readings from Last 3 Encounters:   21 (!) 108/54   21 136/82   21 124/78       NPO Status:                                                                                 BMI:   Wt Readings from Last 3 Encounters:   21 136 lb 12.8 oz (62.1 kg)   21 136 lb (61.7 kg)   21 133 lb (60.3 kg)     Body mass index is 25.39 kg/m². CBC:   Lab Results   Component Value Date    WBC 6.3 2021    RBC 4.04 2021    HGB 12.6 2021    HCT 38.6 2021    MCV 95.5 2021    RDW 12.5 2021     2021       CMP:   Lab Results   Component Value Date     2021    K 4.2 2021     2021    CO2 24 2021    BUN 18 2021    CREATININE 0.8 2021    GFRAA >60 2021    LABGLOM >60 2021    GLUCOSE 136 2021    PROT 6.7 2021    CALCIUM 9.0 2021    BILITOT 0.4 2021    ALKPHOS 88 2021    AST 11 2021    ALT 13 2021       POC Tests: No results for input(s): POCGLU, POCNA, POCK, POCCL, POCBUN, POCHEMO, POCHCT in the last 72 hours.     Coags: No results found for: PROTIME, INR, APTT    HCG (If Applicable): No results found for: PREGTESTUR, PREGSERUM, HCG, HCGQUANT     ABGs: No results found for: PHART, PO2ART, LVZ1TDR, WRZ4IKM, BEART, P5HDZYZF     Type & Screen (If Applicable):  No results found for: LABABO, LABRH    Drug/Infectious Status (If Applicable):  No results found for: HIV, HEPCAB    COVID-19 Screening (If Applicable):   Lab Results   Component Value Date    COVID19 NOT DETECTED 2021           Anesthesia Evaluation  Patient summary reviewed  Airway: Mallampati: II  TM distance: >3 FB   Neck ROM: full  Mouth opening: > = 3 FB Dental:    (+) edentulous      Pulmonary:normal exam    (+) COPD:                            ROS comment: Former Smoker - 39 pack years  Quit Smokin20    Multiple lung nodules on CT   Cardiovascular:    (+) hypertension:, hyperlipidemia         Beta Blocker:  Not on Beta Blocker      ROS comment: Echo 2021:  Summary   Left ventricular systolic function is normal.   Ejection fraction is visually estimated at 55%. Mild left ventricular hypertrophy. Grade I diastolic dysfunction. No evidence of any pericardial effusion. Stress test 2021:  Summary   Normal LV function. NORMAL EDV   There is normal isotope uptake following exercise and at rest. There is no   evidence of exercise induced ischemia.   This is a normal study.      Recommendation   Recommendation: Routine follow-up. Neuro/Psych:   (+) CVA:,             GI/Hepatic/Renal:   (+) GERD:,           Endo/Other:    (+) DiabetesType II DM, , .                 Abdominal:             Vascular: negative vascular ROS. Other Findings:           Anesthesia Plan      MAC     ASA 3       Induction: intravenous. MIPS: Postoperative opioids intended. Anesthetic plan and risks discussed with patient. Plan discussed with CRNA. Attending anesthesiologist reviewed and agrees with Pre Eval content            DICK Chang - CRNA   2021     Pre Anesthesia Assessment complete.  Chart reviewed on 2021

## 2021-09-14 NOTE — H&P
Original H &P in soft chart. I have examined the patient immediately before the procedure and there is no change in the previous history and physical exam, which has been reviewed. There is no history of sleep apnea, snoring, or stridor. There has been no  previous adverse experience with sedation/anesthesia. There is no increased risk for aspiration of gastric contents. The patient has been instructed that all resuscitative measures (during the operative and immediate perioperative period) will be instituted in the unlikely event that they will be needed. The patient has no pertinent past surgical or family history other than listed in the original H&P. The patient was counseled about the risks of sanket Covid-19 during their perioperative period and any recovery window from their procedure. The patient was made aware that sanket Covid-19  may worsen their prognosis for recovering from their procedure  and lend to a higher morbidity and/or mortality risk. All material risks, benefits, and reasonable alternatives including postponing the procedure were discussed. The patient does wish to proceed with the procedure at this time.     ASA Class: 3  AIRWAY Class: 1

## 2021-09-15 ENCOUNTER — HOSPITAL ENCOUNTER (OUTPATIENT)
Age: 71
Setting detail: OUTPATIENT SURGERY
Discharge: HOME | End: 2021-09-15
Attending: SPECIALIST | Admitting: SPECIALIST
Payer: MEDICARE

## 2021-09-15 ENCOUNTER — ANESTHESIA (OUTPATIENT)
Dept: ENDOSCOPY | Age: 71
End: 2021-09-15
Payer: MEDICARE

## 2021-09-15 VITALS
HEIGHT: 60 IN | BODY MASS INDEX: 25.52 KG/M2 | RESPIRATION RATE: 16 BRPM | OXYGEN SATURATION: 100 % | SYSTOLIC BLOOD PRESSURE: 127 MMHG | HEART RATE: 81 BPM | WEIGHT: 130 LBS | DIASTOLIC BLOOD PRESSURE: 91 MMHG | TEMPERATURE: 96.8 F

## 2021-09-15 VITALS
SYSTOLIC BLOOD PRESSURE: 89 MMHG | RESPIRATION RATE: 15 BRPM | TEMPERATURE: 98.6 F | DIASTOLIC BLOOD PRESSURE: 50 MMHG | OXYGEN SATURATION: 94 %

## 2021-09-15 DIAGNOSIS — Z86.010 HX OF COLONIC POLYPS: ICD-10-CM

## 2021-09-15 DIAGNOSIS — K21.9 GASTROESOPHAGEAL REFLUX DISEASE, UNSPECIFIED WHETHER ESOPHAGITIS PRESENT: ICD-10-CM

## 2021-09-15 LAB
ANION GAP SERPL CALCULATED.3IONS-SCNC: 15 MMOL/L (ref 4–16)
BASOPHILS ABSOLUTE: 0.1 K/CU MM
BASOPHILS RELATIVE PERCENT: 1 % (ref 0–1)
BUN BLDV-MCNC: 14 MG/DL (ref 6–23)
CALCIUM SERPL-MCNC: 9.5 MG/DL (ref 8.3–10.6)
CHLORIDE BLD-SCNC: 98 MMOL/L (ref 99–110)
CO2: 21 MMOL/L (ref 21–32)
CREAT SERPL-MCNC: 0.7 MG/DL (ref 0.6–1.1)
DIFFERENTIAL TYPE: ABNORMAL
EKG ATRIAL RATE: 86 BPM
EKG DIAGNOSIS: NORMAL
EKG P AXIS: 2 DEGREES
EKG P-R INTERVAL: 174 MS
EKG Q-T INTERVAL: 352 MS
EKG QRS DURATION: 78 MS
EKG QTC CALCULATION (BAZETT): 421 MS
EKG R AXIS: 43 DEGREES
EKG T AXIS: 60 DEGREES
EKG VENTRICULAR RATE: 86 BPM
EOSINOPHILS ABSOLUTE: 0.1 K/CU MM
EOSINOPHILS RELATIVE PERCENT: 1.9 % (ref 0–3)
GFR AFRICAN AMERICAN: >60 ML/MIN/1.73M2
GFR NON-AFRICAN AMERICAN: >60 ML/MIN/1.73M2
GLUCOSE BLD-MCNC: 123 MG/DL (ref 70–99)
GLUCOSE BLD-MCNC: 128 MG/DL (ref 70–99)
HCT VFR BLD CALC: 37.7 % (ref 37–47)
HEMOGLOBIN: 11.9 GM/DL (ref 12.5–16)
IMMATURE NEUTROPHIL %: 0.5 % (ref 0–0.43)
LYMPHOCYTES ABSOLUTE: 0.4 K/CU MM
LYMPHOCYTES RELATIVE PERCENT: 7.1 % (ref 24–44)
MCH RBC QN AUTO: 28.5 PG (ref 27–31)
MCHC RBC AUTO-ENTMCNC: 31.6 % (ref 32–36)
MCV RBC AUTO: 90.2 FL (ref 78–100)
MONOCYTES ABSOLUTE: 0.5 K/CU MM
MONOCYTES RELATIVE PERCENT: 8.8 % (ref 0–4)
NUCLEATED RBC %: 0 %
PDW BLD-RTO: 13.5 % (ref 11.7–14.9)
PLATELET # BLD: 359 K/CU MM (ref 140–440)
PMV BLD AUTO: 8.9 FL (ref 7.5–11.1)
POTASSIUM SERPL-SCNC: 4.6 MMOL/L (ref 3.5–5.1)
RBC # BLD: 4.18 M/CU MM (ref 4.2–5.4)
SEGMENTED NEUTROPHILS ABSOLUTE COUNT: 4.7 K/CU MM
SEGMENTED NEUTROPHILS RELATIVE PERCENT: 80.7 % (ref 36–66)
SODIUM BLD-SCNC: 134 MMOL/L (ref 135–145)
TOTAL IMMATURE NEUTOROPHIL: 0.03 K/CU MM
TOTAL NUCLEATED RBC: 0 K/CU MM
WBC # BLD: 5.8 K/CU MM (ref 4–10.5)

## 2021-09-15 PROCEDURE — 80048 BASIC METABOLIC PNL TOTAL CA: CPT

## 2021-09-15 PROCEDURE — 3609010600 HC COLONOSCOPY POLYPECTOMY SNARE/COLD BIOPSY: Performed by: SPECIALIST

## 2021-09-15 PROCEDURE — 93005 ELECTROCARDIOGRAM TRACING: CPT | Performed by: ANESTHESIOLOGY

## 2021-09-15 PROCEDURE — 7100000010 HC PHASE II RECOVERY - FIRST 15 MIN: Performed by: SPECIALIST

## 2021-09-15 PROCEDURE — 2500000003 HC RX 250 WO HCPCS: Performed by: NURSE ANESTHETIST, CERTIFIED REGISTERED

## 2021-09-15 PROCEDURE — 93010 ELECTROCARDIOGRAM REPORT: CPT | Performed by: INTERNAL MEDICINE

## 2021-09-15 PROCEDURE — 2580000003 HC RX 258: Performed by: NURSE PRACTITIONER

## 2021-09-15 PROCEDURE — 2709999900 HC NON-CHARGEABLE SUPPLY: Performed by: SPECIALIST

## 2021-09-15 PROCEDURE — 3700000000 HC ANESTHESIA ATTENDED CARE: Performed by: SPECIALIST

## 2021-09-15 PROCEDURE — 43235 EGD DIAGNOSTIC BRUSH WASH: CPT | Performed by: SPECIALIST

## 2021-09-15 PROCEDURE — 3609017100 HC EGD: Performed by: SPECIALIST

## 2021-09-15 PROCEDURE — 3700000001 HC ADD 15 MINUTES (ANESTHESIA): Performed by: SPECIALIST

## 2021-09-15 PROCEDURE — 85025 COMPLETE CBC W/AUTO DIFF WBC: CPT

## 2021-09-15 PROCEDURE — 45385 COLONOSCOPY W/LESION REMOVAL: CPT | Performed by: SPECIALIST

## 2021-09-15 PROCEDURE — 6360000002 HC RX W HCPCS: Performed by: NURSE ANESTHETIST, CERTIFIED REGISTERED

## 2021-09-15 PROCEDURE — 82962 GLUCOSE BLOOD TEST: CPT

## 2021-09-15 PROCEDURE — 88305 TISSUE EXAM BY PATHOLOGIST: CPT

## 2021-09-15 PROCEDURE — 7100000011 HC PHASE II RECOVERY - ADDTL 15 MIN: Performed by: SPECIALIST

## 2021-09-15 RX ORDER — SODIUM CHLORIDE 9 MG/ML
25 INJECTION, SOLUTION INTRAVENOUS PRN
Status: DISCONTINUED | OUTPATIENT
Start: 2021-09-15 | End: 2021-09-15 | Stop reason: HOSPADM

## 2021-09-15 RX ORDER — SODIUM CHLORIDE 0.9 % (FLUSH) 0.9 %
5-40 SYRINGE (ML) INJECTION EVERY 12 HOURS SCHEDULED
Status: DISCONTINUED | OUTPATIENT
Start: 2021-09-15 | End: 2021-09-15 | Stop reason: HOSPADM

## 2021-09-15 RX ORDER — SODIUM CHLORIDE 0.9 % (FLUSH) 0.9 %
5-40 SYRINGE (ML) INJECTION PRN
Status: DISCONTINUED | OUTPATIENT
Start: 2021-09-15 | End: 2021-09-15 | Stop reason: HOSPADM

## 2021-09-15 RX ORDER — SODIUM CHLORIDE, SODIUM LACTATE, POTASSIUM CHLORIDE, CALCIUM CHLORIDE 600; 310; 30; 20 MG/100ML; MG/100ML; MG/100ML; MG/100ML
INJECTION, SOLUTION INTRAVENOUS CONTINUOUS
Status: DISCONTINUED | OUTPATIENT
Start: 2021-09-15 | End: 2021-09-15 | Stop reason: HOSPADM

## 2021-09-15 RX ORDER — PROPOFOL 10 MG/ML
INJECTION, EMULSION INTRAVENOUS PRN
Status: DISCONTINUED | OUTPATIENT
Start: 2021-09-15 | End: 2021-09-15 | Stop reason: SDUPTHER

## 2021-09-15 RX ORDER — LIDOCAINE HYDROCHLORIDE 20 MG/ML
INJECTION, SOLUTION EPIDURAL; INFILTRATION; INTRACAUDAL; PERINEURAL PRN
Status: DISCONTINUED | OUTPATIENT
Start: 2021-09-15 | End: 2021-09-15 | Stop reason: SDUPTHER

## 2021-09-15 RX ADMIN — SODIUM CHLORIDE, POTASSIUM CHLORIDE, SODIUM LACTATE AND CALCIUM CHLORIDE: 600; 310; 30; 20 INJECTION, SOLUTION INTRAVENOUS at 09:24

## 2021-09-15 RX ADMIN — LIDOCAINE HYDROCHLORIDE 100 MG: 20 INJECTION, SOLUTION EPIDURAL; INFILTRATION; INTRACAUDAL; PERINEURAL at 11:35

## 2021-09-15 RX ADMIN — PROPOFOL 60 MG: 10 INJECTION, EMULSION INTRAVENOUS at 11:35

## 2021-09-15 RX ADMIN — PROPOFOL 80 MG: 10 INJECTION, EMULSION INTRAVENOUS at 12:03

## 2021-09-15 RX ADMIN — PROPOFOL 240 MG: 10 INJECTION, EMULSION INTRAVENOUS at 11:38

## 2021-09-15 ASSESSMENT — PAIN SCALES - GENERAL
PAINLEVEL_OUTOF10: 0
PAINLEVEL_OUTOF10: 0

## 2021-09-15 ASSESSMENT — PAIN - FUNCTIONAL ASSESSMENT: PAIN_FUNCTIONAL_ASSESSMENT: 0-10

## 2021-09-15 NOTE — PROGRESS NOTES
[facility]. Cousin at bedside, call light in reach, no pain or needs voiced. 1300-IV and monitor removed from patient. Patient dressed. Dr. Autumn Fishman at bedside to talk to patient. Discharge instructions reviewed with patient and cousin. 1315-Patient discharged home with cousin via car. Patient stable at time of discharge.

## 2021-09-15 NOTE — PROGRESS NOTES
Patient is awake, alert and oriented. Preop questions reviewed and verified. H&P and consent completed. Handoff report received from Krista CALDERÓN (TOBI).

## 2021-09-15 NOTE — ANESTHESIA POSTPROCEDURE EVALUATION
Department of Anesthesiology  Postprocedure Note    Patient: Morris Stallworth  MRN: [id number]  YOB: 1950  Date of evaluation: 9/15/2021  Time:  12:23 PM     Procedure Summary     Date: 09/15/21 Room / Location: [address] [place] [address]    Anesthesia Start: 1127 Anesthesia Stop: 1166    Procedures:       COLONOSCOPY POLYPECTOMY SNARE/COLD BIOPSY (N/A )      EGD DIAGNOSTIC ONLY (N/A ) Diagnosis:       Gastroesophageal reflux disease, unspecified whether esophagitis present      Hx of colonic polyps      (Gastroesophageal reflux disease, unspecified whether esophagitis present [K21.9], Hx of colonic polyps [Z86.010])    Surgeons: Jean-Pierre Stewart MD Responsible Provider: Dennie Stains, MD    Anesthesia Type: MAC ASA Status: 3          Anesthesia Type: MAC    Florecita Phase I:  9    Florecita Phase II:  10    Last vitals: Reviewed and per EMR flowsheets.        Anesthesia Post Evaluation    Patient location during evaluation: bedside  Patient participation: complete - patient participated  Level of consciousness: awake and alert  Pain score: 0  Airway patency: patent  Nausea & Vomiting: no nausea and no vomiting  Complications: no  Cardiovascular status: hemodynamically stable  Respiratory status: room air and spontaneous ventilation  Hydration status: stable

## 2021-09-15 NOTE — BRIEF OP NOTE
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

## 2021-09-21 DIAGNOSIS — I10 ESSENTIAL HYPERTENSION: ICD-10-CM

## 2021-09-22 ENCOUNTER — TELEPHONE (OUTPATIENT)
Dept: GASTROENTEROLOGY | Age: 71
End: 2021-09-22

## 2021-09-22 RX ORDER — LISINOPRIL 40 MG/1
TABLET ORAL
Qty: 30 TABLET | Refills: 0 | Status: SHIPPED | OUTPATIENT
Start: 2021-09-22 | End: 2022-08-03 | Stop reason: SDUPTHER

## 2021-10-05 ENCOUNTER — HOSPITAL ENCOUNTER (OUTPATIENT)
Dept: WOMENS IMAGING | Age: 71
Discharge: HOME | End: 2021-10-05
Payer: MEDICARE

## 2021-10-05 DIAGNOSIS — Z78.0 POST-MENOPAUSAL: ICD-10-CM

## 2021-10-05 DIAGNOSIS — Z12.31 ENCOUNTER FOR SCREENING MAMMOGRAM FOR BREAST CANCER: ICD-10-CM

## 2021-10-05 PROCEDURE — 77080 DXA BONE DENSITY AXIAL: CPT

## 2021-10-05 PROCEDURE — 77067 SCR MAMMO BI INCL CAD: CPT

## 2021-10-12 DIAGNOSIS — K21.9 GASTROESOPHAGEAL REFLUX DISEASE, UNSPECIFIED WHETHER ESOPHAGITIS PRESENT: ICD-10-CM

## 2021-10-12 DIAGNOSIS — F41.9 ANXIETY: ICD-10-CM

## 2021-10-12 RX ORDER — DICYCLOMINE HYDROCHLORIDE 10 MG/1
10 CAPSULE ORAL 3 TIMES DAILY PRN
Qty: 120 CAPSULE | Refills: 0 | Status: SHIPPED | OUTPATIENT
Start: 2021-10-12 | End: 2022-01-11 | Stop reason: SDUPTHER

## 2021-10-12 RX ORDER — BUSPIRONE HYDROCHLORIDE 10 MG/1
TABLET ORAL
Qty: 30 TABLET | Refills: 0 | Status: SHIPPED | OUTPATIENT
Start: 2021-10-12 | End: 2021-10-14 | Stop reason: SDUPTHER

## 2021-10-14 ENCOUNTER — OFFICE VISIT (OUTPATIENT)
Dept: FAMILY MEDICINE CLINIC | Age: 71
End: 2021-10-14
Payer: MEDICARE

## 2021-10-14 VITALS
SYSTOLIC BLOOD PRESSURE: 119 MMHG | RESPIRATION RATE: 18 BRPM | BODY MASS INDEX: 26.31 KG/M2 | DIASTOLIC BLOOD PRESSURE: 79 MMHG | HEIGHT: 60 IN | OXYGEN SATURATION: 96 % | WEIGHT: 134 LBS | HEART RATE: 72 BPM

## 2021-10-14 DIAGNOSIS — M81.6 LOCALIZED OSTEOPOROSIS, UNSPECIFIED PATHOLOGICAL FRACTURE PRESENCE: Primary | ICD-10-CM

## 2021-10-14 DIAGNOSIS — Z23 NEED FOR PROPHYLACTIC VACCINATION AGAINST STREPTOCOCCUS PNEUMONIAE (PNEUMOCOCCUS) AND INFLUENZA: ICD-10-CM

## 2021-10-14 DIAGNOSIS — F41.9 ANXIETY: ICD-10-CM

## 2021-10-14 DIAGNOSIS — E78.2 MIXED HYPERLIPIDEMIA: ICD-10-CM

## 2021-10-14 DIAGNOSIS — Z91.81 AT HIGH RISK FOR FALLS: ICD-10-CM

## 2021-10-14 DIAGNOSIS — K21.9 GASTROESOPHAGEAL REFLUX DISEASE, UNSPECIFIED WHETHER ESOPHAGITIS PRESENT: ICD-10-CM

## 2021-10-14 DIAGNOSIS — J44.9 COPD, MILD (HCC): ICD-10-CM

## 2021-10-14 DIAGNOSIS — G47.19 EXCESSIVE DAYTIME SLEEPINESS: ICD-10-CM

## 2021-10-14 PROCEDURE — 99214 OFFICE O/P EST MOD 30 MIN: CPT | Performed by: STUDENT IN AN ORGANIZED HEALTH CARE EDUCATION/TRAINING PROGRAM

## 2021-10-14 PROCEDURE — G0009 ADMIN PNEUMOCOCCAL VACCINE: HCPCS | Performed by: STUDENT IN AN ORGANIZED HEALTH CARE EDUCATION/TRAINING PROGRAM

## 2021-10-14 PROCEDURE — 90732 PPSV23 VACC 2 YRS+ SUBQ/IM: CPT | Performed by: STUDENT IN AN ORGANIZED HEALTH CARE EDUCATION/TRAINING PROGRAM

## 2021-10-14 RX ORDER — ALENDRONATE SODIUM 70 MG/1
70 TABLET ORAL
Qty: 4 TABLET | Refills: 3 | Status: SHIPPED | OUTPATIENT
Start: 2021-10-14 | End: 2022-03-01 | Stop reason: SDUPTHER

## 2021-10-14 RX ORDER — BUSPIRONE HYDROCHLORIDE 10 MG/1
TABLET ORAL
Qty: 30 TABLET | Refills: 0 | Status: SHIPPED | OUTPATIENT
Start: 2021-10-14 | End: 2021-11-12 | Stop reason: SDUPTHER

## 2021-10-14 ASSESSMENT — ENCOUNTER SYMPTOMS
NAUSEA: 0
ABDOMINAL PAIN: 0
SORE THROAT: 0
WHEEZING: 0
SHORTNESS OF BREATH: 0

## 2021-10-14 NOTE — PROGRESS NOTES
10/26/2021    [facility]    Chief Complaint   Patient presents with    3 Month Follow-Up     Presenting for three month follow up visit.  Other     Not ready for flu shot.  Immunizations     Pneumo 23 given left arm       HPI  History was obtained from patient. Carolyn Cook is a 66 y.o. female with a PMHx as listed below who presents today for 3-month follow-up. No acute complaints. Compliant with medications. Recent colonoscopy sessile polyps plan to repeat in 5 years    COPD fair control on anoro Rx. By Pulmonology. Follows with Dr. Eli Uriostegui. Recall patient s/p right hip repair s/p fall no falls since last appt. Osteoporosis seen on recent DEXA. Patient has not been on medication for this in the past    Blood pressure excellent today    Reflux stable  Anxiety stable    1. Localized osteoporosis, unspecified pathological fracture presence    2. Anxiety    3. Mixed hyperlipidemia    4. Gastroesophageal reflux disease, unspecified whether esophagitis present    5. Excessive daytime sleepiness    6. COPD, mild ([address]    7. Need for prophylactic vaccination against Streptococcus pneumoniae (pneumococcus) and influenza    8. At high risk for falls             REVIEW OF SYMPTOMS    Review of Systems   Constitutional: Negative for chills and fatigue. HENT: Negative for congestion and sore throat. Respiratory: Negative for shortness of breath and wheezing. Cardiovascular: Negative for chest pain and palpitations. Gastrointestinal: Negative for abdominal pain and nausea. Genitourinary: Negative for frequency and urgency. Neurological: Negative for light-headedness.        PAST MEDICAL HISTORY  Past Medical History:   Diagnosis Date    Cerebral artery occlusion with cerebral infarction ([address]     COPD, mild ([address] 6/15/2021    Diabetes mellitus ([address]     Excessive daytime sleepiness 6/15/2021    Former smoker 6/15/2021    Hyperlipidemia     Hypertension     Multiple lung nodules on CT 2021    Shortness of breath 6/15/2021    Snoring 6/15/2021       FAMILY HISTORY  Family History   Problem Relation Age of Onset    Colon Cancer Mother     Heart Attack Father     Ovarian Cancer Daughter        SOCIAL HISTORY  Social History     Socioeconomic History    Marital status: Single     Spouse name: None    Number of children: None    Years of education: None    Highest education level: None   Occupational History    None   Tobacco Use    Smoking status: Former Smoker     Packs/day: 1.00     Years: 45.00     Pack years: 45.00     Quit date: 2020     Years since quittin.7    Smokeless tobacco: Never Used   Vaping Use    Vaping Use: Former    Substances: Flavoring    Devices: Disposable, Pre-filled pod   Substance and Sexual Activity    Alcohol use: Not Currently    Drug use: Not Currently    Sexual activity: None   Other Topics Concern    None   Social History Narrative    None     Social Determinants of Health     Financial Resource Strain: Low Risk     Difficulty of Paying Living Expenses: Not hard at all   Food Insecurity: No Food Insecurity    Worried About [address] in the Last Year: Never true    [facility] [email or web address] in the Last Year: Never true   Transportation Needs: No Transportation Needs    Lack of Transportation (Medical): No    Lack of Transportation (Non-Medical):  No   Physical Activity:     Days of Exercise per Week:     Minutes of Exercise per Session:    Stress:     Feeling of Stress :    Social Connections:     Frequency of Communication with Friends and Family:     Frequency of Social Gatherings with Friends and Family:     Attends [employer] Services:     Active Member of Clubs or Organizations:     Attends Club or Organization Meetings:     Marital Status:    Intimate Partner Violence:     Fear of Current or Ex-Partner:     Emotionally Abused:     Physically Abused:     Sexually Abused:         SURGICAL HISTORY  Past Surgical History:   Procedure Laterality Date    APPENDECTOMY      CHOLECYSTECTOMY      COLONOSCOPY N/A 9/15/2021    COLONOSCOPY POLYPECTOMY SNARE/COLD BIOPSY performed by Gisele Carlin MD at [address] Left     Left hand    HYSTERECTOMY      JOINT REPLACEMENT Right     12/04/2020-Right Hip    OVARY REMOVAL      age 34    TONSILLECTOMY      UPPER GASTROINTESTINAL ENDOSCOPY N/A 9/15/2021    EGD DIAGNOSTIC ONLY performed by [facility] at [facility]  Current Outpatient Medications   Medication Sig Dispense Refill    busPIRone (BUSPAR) 10 MG tablet TAKE 1 TABLET BY MOUTH EVERY DAY AT NIGHT 30 tablet 0    alendronate (FOSAMAX) 70 MG tablet Take 1 tablet by mouth every 7 days 4 tablet 3    umeclidinium-vilanterol (ANORO ELLIPTA) 62.5-25 MCG/INH AEPB inhaler Inhale 1 puff into the lungs daily 30 each 2    dicyclomine (BENTYL) 10 MG capsule Take 1 capsule by mouth 3 times daily as needed (stomach) 120 capsule 0    lisinopril (PRINIVIL;ZESTRIL) 40 MG tablet TAKE 1 TABLET BY MOUTH EVERY DAY 30 tablet 0    metFORMIN (GLUCOPHAGE) 500 MG tablet Take 1 tablet by mouth 2 times daily (with meals) 60 tablet 1    chlorthalidone (HYGROTEN) 50 MG tablet TAKE 1 TABLET BY MOUTH EVERY DAY 30 tablet 0    pantoprazole (PROTONIX) 40 MG tablet Take 1 tablet by mouth daily 30 tablet 3    simethicone (MYLICON) 80 MG chewable tablet Take 1 tablet by mouth once for 1 dose 3 tablet 0    albuterol (PROVENTIL) (2.5 MG/3ML) 0.083% nebulizer solution Take 2.5 mg by nebulization every 6 hours as needed for Wheezing      atorvastatin (LIPITOR) 20 MG tablet Take 1 tablet by mouth daily 30 tablet 5    aspirin 81 MG EC tablet Take 81 mg by mouth daily       No current facility-administered medications for this visit.        ALLERGIES  Allergies   Allergen Reactions    Other Hives    Levaquin [Levofloxacin] Rash       PHYSICAL EXAM    /79   Pulse 72   Resp 18   Ht 5' (1.524 m)   Wt 134 lb (60.8 kg)   SpO2 96%   BMI 26.17 kg/m²     Physical Exam  Constitutional:       Appearance: Normal appearance. HENT:      Head: Normocephalic and atraumatic. Eyes:      Extraocular Movements: Extraocular movements intact. Pupils: Pupils are equal, round, and reactive to light. Cardiovascular:      Rate and Rhythm: Normal rate and regular rhythm. Pulses: Normal pulses. Heart sounds: No murmur heard. No friction rub. No gallop. Pulmonary:      Effort: Pulmonary effort is normal.      Breath sounds: Normal breath sounds. Musculoskeletal:      Cervical back: Neck supple. Skin:     General: Skin is warm and dry. Neurological:      General: No focal deficit present. Mental Status: She is alert. Psychiatric:         Mood and Affect: Mood normal.         Behavior: Behavior normal.         ASSESSMENT & PLAN    1. Localized osteoporosis, unspecified pathological fracture presence  -Osteoporosis seen on recent DEXA adverse effects discussed including acid reflux we will start a trial of Fosamax patient has not been on medications for this in the past  - alendronate (FOSAMAX) 70 MG tablet; Take 1 tablet by mouth every 7 days  Dispense: 4 tablet; Refill: 3    2. Anxiety  -Anxiety stable on current regimen  - busPIRone (BUSPAR) 10 MG tablet; TAKE 1 TABLET BY MOUTH EVERY DAY AT NIGHT  Dispense: 30 tablet; Refill: 0    3. Mixed hyperlipidemia  -6/15/2021 lab panel reviewed excellent control    4. Gastroesophageal reflux disease, unspecified whether esophagitis present  -stable    5. Excessive daytime sleepiness  -continue to monitor    6. COPD, mild ([address]  -Stable on current regimen  -following with Dr. Una Le  - umeclidinium-vilanterol (ANORO ELLIPTA) 62.5-25 MCG/INH AEPB inhaler; Inhale 1 puff into the lungs daily  Dispense: 30 each; Refill: 2    7.  Need for prophylactic vaccination against Streptococcus pneumoniae (pneumococcus) and influenza    - Pneumococcal polysaccharide vaccine 23-valent greater than or equal to 1yo subcutaneous/IM    8. At high risk for falls    On the basis of positive falls risk screening, assessment and plan is as follows: in-office gait and balance testing performed using The Timed Up and Go Test was negative for increased falls risk- no further intervention is currently indicated. Return in about 4 months (around 2/14/2022).          Electronically signed by Maria C Marina DO on 10/26/2021

## 2021-11-12 DIAGNOSIS — E11.9 TYPE 2 DIABETES MELLITUS WITHOUT COMPLICATION, WITHOUT LONG-TERM CURRENT USE OF INSULIN (HCC): ICD-10-CM

## 2021-11-12 DIAGNOSIS — F41.9 ANXIETY: ICD-10-CM

## 2021-11-12 RX ORDER — BUSPIRONE HYDROCHLORIDE 10 MG/1
TABLET ORAL
Qty: 30 TABLET | Refills: 0 | Status: SHIPPED | OUTPATIENT
Start: 2021-11-12 | End: 2022-01-10

## 2021-11-16 ENCOUNTER — TELEPHONE (OUTPATIENT)
Dept: FAMILY MEDICINE CLINIC | Age: 71
End: 2021-11-16

## 2021-11-16 NOTE — TELEPHONE ENCOUNTER
----- Message from Cinthya Carson sent at 11/15/2021 11:51 AM EST -----  Subject: Message to Provider    QUESTIONS  Information for Provider? Maritza from [facility] is calling to   check on a fax they send over 11/9/21. She wants to know if anyone has   received it. They need it signed and filled out and include chart notes of   the patient she states. They would like it faxed back to [phone]. Please call [facility] if you have received fax.   ---------------------------------------------------------------------------  --------------  [address]  What is the best way for the office to contact you? Do not leave any   message, patient will call back for answer  Preferred Call Back Phone Number? [phone]  ---------------------------------------------------------------------------  --------------  SCRIPT ANSWERS  Relationship to Patient? Third Party  Representative Name?  Davide

## 2021-12-15 ENCOUNTER — OFFICE VISIT (OUTPATIENT)
Dept: PULMONOLOGY | Age: 71
End: 2021-12-15
Payer: MEDICARE

## 2021-12-15 VITALS
WEIGHT: 143 LBS | HEIGHT: 60 IN | SYSTOLIC BLOOD PRESSURE: 132 MMHG | OXYGEN SATURATION: 97 % | BODY MASS INDEX: 28.07 KG/M2 | HEART RATE: 83 BPM | DIASTOLIC BLOOD PRESSURE: 82 MMHG

## 2021-12-15 DIAGNOSIS — J44.9 COPD, MILD (HCC): Primary | ICD-10-CM

## 2021-12-15 DIAGNOSIS — Z87.891 FORMER SMOKER: ICD-10-CM

## 2021-12-15 DIAGNOSIS — R91.8 MULTIPLE LUNG NODULES ON CT: ICD-10-CM

## 2021-12-15 DIAGNOSIS — J44.1 ACUTE EXACERBATION OF CHRONIC OBSTRUCTIVE PULMONARY DISEASE (COPD) (HCC): ICD-10-CM

## 2021-12-15 DIAGNOSIS — R06.02 SHORTNESS OF BREATH: ICD-10-CM

## 2021-12-15 DIAGNOSIS — G47.33 MILD OBSTRUCTIVE SLEEP APNEA: ICD-10-CM

## 2021-12-15 PROCEDURE — 3023F SPIROM DOC REV: CPT | Performed by: INTERNAL MEDICINE

## 2021-12-15 PROCEDURE — G8417 CALC BMI ABV UP PARAM F/U: HCPCS | Performed by: INTERNAL MEDICINE

## 2021-12-15 PROCEDURE — G8926 SPIRO NO PERF OR DOC: HCPCS | Performed by: INTERNAL MEDICINE

## 2021-12-15 PROCEDURE — 3017F COLORECTAL CA SCREEN DOC REV: CPT | Performed by: INTERNAL MEDICINE

## 2021-12-15 PROCEDURE — 4040F PNEUMOC VAC/ADMIN/RCVD: CPT | Performed by: INTERNAL MEDICINE

## 2021-12-15 PROCEDURE — G8399 PT W/DXA RESULTS DOCUMENT: HCPCS | Performed by: INTERNAL MEDICINE

## 2021-12-15 PROCEDURE — 1036F TOBACCO NON-USER: CPT | Performed by: INTERNAL MEDICINE

## 2021-12-15 PROCEDURE — G8427 DOCREV CUR MEDS BY ELIG CLIN: HCPCS | Performed by: INTERNAL MEDICINE

## 2021-12-15 PROCEDURE — G8484 FLU IMMUNIZE NO ADMIN: HCPCS | Performed by: INTERNAL MEDICINE

## 2021-12-15 PROCEDURE — 1123F ACP DISCUSS/DSCN MKR DOCD: CPT | Performed by: INTERNAL MEDICINE

## 2021-12-15 PROCEDURE — 99213 OFFICE O/P EST LOW 20 MIN: CPT | Performed by: INTERNAL MEDICINE

## 2021-12-15 PROCEDURE — 1090F PRES/ABSN URINE INCON ASSESS: CPT | Performed by: INTERNAL MEDICINE

## 2021-12-15 RX ORDER — ALBUTEROL SULFATE 90 UG/1
2 AEROSOL, METERED RESPIRATORY (INHALATION) EVERY 4 HOURS PRN
Qty: 1 EACH | Refills: 11 | Status: SHIPPED | OUTPATIENT
Start: 2021-12-15 | End: 2022-10-17 | Stop reason: SDUPTHER

## 2021-12-15 RX ORDER — UMECLIDINIUM BROMIDE AND VILANTEROL TRIFENATATE 62.5; 25 UG/1; UG/1
1 POWDER RESPIRATORY (INHALATION) DAILY
Qty: 1 EACH | Refills: 11 | Status: SHIPPED | OUTPATIENT
Start: 2021-12-15 | End: 2022-08-16 | Stop reason: SDUPTHER

## 2021-12-15 RX ORDER — MONTELUKAST SODIUM 10 MG/1
10 TABLET ORAL
COMMUNITY
End: 2022-05-03 | Stop reason: CLARIF

## 2021-12-15 RX ORDER — DOXYCYCLINE HYCLATE 100 MG
100 TABLET ORAL 2 TIMES DAILY
Qty: 14 TABLET | Refills: 0 | Status: SHIPPED | OUTPATIENT
Start: 2021-12-15 | End: 2021-12-22

## 2021-12-15 RX ORDER — PREDNISONE 1 MG/1
TABLET ORAL
Qty: 38 TABLET | Refills: 0 | Status: SHIPPED | OUTPATIENT
Start: 2021-12-15 | End: 2022-03-01

## 2021-12-15 NOTE — PROGRESS NOTES
SUBJECTIVE:  Chief Complaint: Acute exacerbation COPD, mild COPD, dyspnea exertion, mild obstructive sleep apnea untreated, multiple lung nodules on CT, former smoker  Anu Martin states that over the past week she has noted increasing chest congestion with cough and mild sputum expectoration. She feels a tightness in her chest with wheezing. She states that she ran out of Anoro several days ago and has been using albuterol per nebulizer. She also mentions that she no longer has an albuterol MDI to use. She denies fever or chills and has had no hemoptysis or chest pain. She has had all 3 Pfizer COVID-19 vaccinations including her booster vaccination and has received her influenza vaccine. It is been exactly 1 year since she quit smoking. She does carry history of mild JESSE but because she was asymptomatic it was decided not to proceed to formal sleep testing or CPAP therapy      ROS:  Constitution:  HEENT: Negative for ear, throat pain  Cardiovascular: Negative for chest pain, syncope, edema  Pulmonary: See HPI  Musculoskeletal: Negative for DVT, myalgias, arthralgias    OBJECTIVE:  /82   Pulse 83   Ht 5' (1.524 m)   Wt 143 lb (64.9 kg)   SpO2 97%   BMI 27.93 kg/m²      Physical Exam:  Constitutional:  She appears well developed and well-nourished. Neck:  Supple, No palpable lymphadenopathy, No JVD  Cardiovascular:  S1, S2 Normal, Regular rhythm, no murmurs or gallops, No pericardial  rubs. Pulmonary: Breath sounds are mildly diminished bilateral with a few scattered wheezes and basilar rhonchi  Abdomen: Not examined  Extremities: no edema, No DVT  Neurologic: Oriented x3, No focal deficits    Radiology: CT chest with contrast 7/2/2021 showed several tiny nodules measuring up to 4 mm old granulomatous disease and no evidence of pneumonia or pulmonary edema.   Pulmonary emphysema is noted  PFT: Office spirometry on 6/15/2021 demonstrated a minimal obstructive defect with a significant response to bronchodilators          ASSESSMENT:    1. COPD, mild ([address]    2. Acute exacerbation of chronic obstructive pulmonary disease (COPD) ([address]    3. Multiple lung nodules on CT    4. Shortness of breath    5. Mild obstructive sleep apnea    6. Former smoker          PLAN:   I am going to start her on doxycycline 100 mg twice a day for 7 days and a oral prednisone taper for her mild bronchitic exacerbation of COPD. I did give her a sample of Anoro and renewed both her Anoro Ellipta and her albuterol MDI. I have requested we repeat her CT chest to follow-up on the multiple pulmonary nodules. I will continue to follow her    We have discussed the need to maintain yearly flu immunization, pneumococcal vaccination. We have discussed Coronavirus precaution including handwashing practice, wiping items touched in public such as gas pumps, door handles, shopping carts, etc. Self monitoring for infection - fever, chills, cough, SOB. Should they develop symptoms they should call office for further instructions. Return in about 3 months (around 3/15/2022) for Recheck for COPD, Recheck for Shortness of Breath, recheck for lung nodule. This dictation was performed with a verbal recognition program and it was checked for errors. It is possible that there are still dictated errors within this office note. Any errors should be brought immediately to my attention for correction. All efforts were made to ensure that this office note is accurate.

## 2021-12-30 ENCOUNTER — TELEPHONE (OUTPATIENT)
Dept: FAMILY MEDICINE CLINIC | Age: 71
End: 2021-12-30

## 2021-12-30 NOTE — TELEPHONE ENCOUNTER
Patient has not requested. We are receiving multiple requests that are for all kinds of supplies. I believe this is fraud unless patient says otherwise.

## 2021-12-30 NOTE — TELEPHONE ENCOUNTER
Staff from [facility] called to check the status of the order faxed to this office for patient to get a Free Style [place]

## 2021-12-31 ENCOUNTER — HOSPITAL ENCOUNTER (OUTPATIENT)
Dept: CT IMAGING | Age: 71
Discharge: HOME | End: 2021-12-31
Payer: MEDICARE

## 2021-12-31 DIAGNOSIS — Z87.891 FORMER SMOKER: ICD-10-CM

## 2021-12-31 DIAGNOSIS — R91.8 MULTIPLE LUNG NODULES ON CT: ICD-10-CM

## 2021-12-31 DIAGNOSIS — R06.02 SHORTNESS OF BREATH: ICD-10-CM

## 2021-12-31 PROCEDURE — 71250 CT THORAX DX C-: CPT

## 2022-01-03 NOTE — TELEPHONE ENCOUNTER
I called patient at home and she does want the Free Style [place] thru [facility]

## 2022-01-03 NOTE — RESULT ENCOUNTER NOTE
I spoke to Mrs. Jones over the phone and let her know that her CT chest shows no change in the multiple small nodules none of which are greater than 3 mm.   We will continue to watch this by CT

## 2022-01-05 ENCOUNTER — TELEPHONE (OUTPATIENT)
Dept: FAMILY MEDICINE CLINIC | Age: 72
End: 2022-01-05

## 2022-01-05 NOTE — TELEPHONE ENCOUNTER
----- Message from Elijah Manrique sent at 1/5/2022  3:09 PM EST -----  Subject: Message to Provider    QUESTIONS  Information for Provider? Francisco Allen from [facility]. med supply wanted to know if   received pt prescription, Freestyle ara glucose monitor system. Francisco Allen   can be reached @ [phone]. Francisco Dayoconrado wants to know if got the correct fax   number # [phone].  ---------------------------------------------------------------------------  --------------  Hansa PAREDES  What is the best way for the office to contact you? OK to leave message on   voicemail  Preferred Call Back Phone Number? [phone]  ---------------------------------------------------------------------------  --------------  SCRIPT ANSWERS  Relationship to Patient? Third Party  Representative Name?  Julián/med supply

## 2022-01-06 ENCOUNTER — VIRTUAL VISIT (OUTPATIENT)
Dept: FAMILY MEDICINE CLINIC | Age: 72
End: 2022-01-06
Payer: MEDICARE

## 2022-01-06 DIAGNOSIS — Z00.00 ROUTINE GENERAL MEDICAL EXAMINATION AT A HEALTH CARE FACILITY: Primary | ICD-10-CM

## 2022-01-06 PROCEDURE — 3017F COLORECTAL CA SCREEN DOC REV: CPT | Performed by: STUDENT IN AN ORGANIZED HEALTH CARE EDUCATION/TRAINING PROGRAM

## 2022-01-06 PROCEDURE — 4040F PNEUMOC VAC/ADMIN/RCVD: CPT | Performed by: STUDENT IN AN ORGANIZED HEALTH CARE EDUCATION/TRAINING PROGRAM

## 2022-01-06 PROCEDURE — 1123F ACP DISCUSS/DSCN MKR DOCD: CPT | Performed by: STUDENT IN AN ORGANIZED HEALTH CARE EDUCATION/TRAINING PROGRAM

## 2022-01-06 PROCEDURE — G0438 PPPS, INITIAL VISIT: HCPCS | Performed by: STUDENT IN AN ORGANIZED HEALTH CARE EDUCATION/TRAINING PROGRAM

## 2022-01-06 ASSESSMENT — LIFESTYLE VARIABLES: HOW OFTEN DO YOU HAVE A DRINK CONTAINING ALCOHOL: 0

## 2022-01-06 ASSESSMENT — PATIENT HEALTH QUESTIONNAIRE - PHQ9
1. LITTLE INTEREST OR PLEASURE IN DOING THINGS: 0
2. FEELING DOWN, DEPRESSED OR HOPELESS: 0
SUM OF ALL RESPONSES TO PHQ9 QUESTIONS 1 & 2: 0
SUM OF ALL RESPONSES TO PHQ QUESTIONS 1-9: 0

## 2022-01-06 NOTE — PROGRESS NOTES
Medicare Annual Wellness Visit  Name: Tiffanie Richmond Date: 2022   MRN: [id number] Sex: Female   Age: 66 y.o. Ethnicity: Unavailable / Unknown   : 1950 Race: White (non-)      Angelica Antonio is here for Medicare AWV    Screenings for behavioral, psychosocial and functional/safety risks, and cognitive dysfunction are all negative except as indicated below. These results, as well as other patient data from the [address] form, are documented in Flowsheets linked to this Encounter. Allergies   Allergen Reactions    Other Hives    Levaquin [Levofloxacin] Rash       Prior to Visit Medications    Medication Sig Taking?  Authorizing Provider   chlorthalidone (HYGROTEN) 50 MG tablet Take 1 tablet by mouth daily Yes Yinka Diaz DO   albuterol sulfate  (90 Base) MCG/ACT inhaler Inhale 2 puffs into the lungs every 4 hours as needed for Wheezing Yes Sherrill Mcarthur MD   predniSONE (DELTASONE) 5 MG tablet Take 6 PO now, 5 PO qam for 2 days, 3 PO qam for 3 days, 2 PO qam for 4 days, 1 PO qam for 5 days, then STOP Yes Sherrill Mcarthur MD   umeclidinium-vilanterol (ANORO ELLIPTA) 62.5-25 MCG/INH AEPB inhaler Inhale 1 puff into the lungs daily Yes Sherrill Mcarthur MD   metFORMIN (GLUCOPHAGE) 500 MG tablet Take 1 tablet by mouth 2 times daily (with meals) Yes Yinka Diaz DO   busPIRone (BUSPAR) 10 MG tablet TAKE 1 TABLET BY MOUTH EVERY DAY AT NIGHT Yes Yinka Diaz DO   dicyclomine (BENTYL) 10 MG capsule Take 1 capsule by mouth 3 times daily as needed (stomach) Yes DICK Robles CNP   lisinopril (PRINIVIL;ZESTRIL) 40 MG tablet TAKE 1 TABLET BY MOUTH EVERY DAY Yes Yinka Diaz DO   albuterol (PROVENTIL) (2.5 MG/3ML) 0.083% nebulizer solution Take 2.5 mg by nebulization every 6 hours as needed for Wheezing Yes Historical Provider, MD   atorvastatin (LIPITOR) 20 MG tablet Take 1 tablet by mouth daily Yes Yinka Diaz DO   aspirin 81 MG EC tablet Take 81 mg by mouth daily Yes Historical Provider, MD   montelukast (SINGULAIR) 10 MG tablet Take 10 mg by mouth  Patient not taking: Reported on 12/15/2021  Historical Provider, MD   alendronate (FOSAMAX) 70 MG tablet Take 1 tablet by mouth every 7 days  Patient not taking: Reported on 1/6/2022  Yinka Diaz DO   umeclidinium-vilanterol (ANORO ELLIPTA) 62.5-25 MCG/INH AEPB inhaler Inhale 1 puff into the lungs daily  Patient not taking: Reported on 12/15/2021  Yinka Diaz DO   pantoprazole (PROTONIX) 40 MG tablet Take 1 tablet by mouth daily  DICK Lucia CNP   simethicone (MYLICON) 80 MG chewable tablet Take 1 tablet by mouth once for 1 dose  DICK Lucia CNP       Past Medical History:   Diagnosis Date    Acute exacerbation of chronic obstructive pulmonary disease (COPD) ([facility] 12/15/2021    Cerebral artery occlusion with cerebral infarction ([facility]     COPD, mild ([address] 6/15/2021    Diabetes mellitus ([address]     Excessive daytime sleepiness 6/15/2021    Former smoker 6/15/2021    Hyperlipidemia     Hypertension     Mild obstructive sleep apnea 12/15/2021    Multiple lung nodules on CT 7/27/2021    Shortness of breath 6/15/2021    Snoring 6/15/2021       Past Surgical History:   Procedure Laterality Date    APPENDECTOMY      CHOLECYSTECTOMY      COLONOSCOPY N/A 9/15/2021    COLONOSCOPY POLYPECTOMY SNARE/COLD BIOPSY performed by Shireen Shaffer MD at [address] Left     Left hand    HYSTERECTOMY      JOINT REPLACEMENT Right     12/04/2020-Right Hip    OVARY REMOVAL      age 34    TONSILLECTOMY      UPPER GASTROINTESTINAL ENDOSCOPY N/A 9/15/2021    EGD DIAGNOSTIC ONLY performed by Shireen Shaffer MD at [facility] ENDOSCOPY       Family History   Problem Relation Age of Onset    Colon Cancer Mother     Heart Attack Father     Other Father         suicide    Ovarian Cancer Daughter     Other Sister         suicide    Heart Attack Brother     Pacemaker Brother     No Known Problems Brother     No Known Problems Brother     No Known Problems Brother        CareTeam (Including outside providers/suppliers regularly involved in providing care):   Patient Care Team:  Kadie Suarez DO as PCP - General (Family Medicine)  Kad[facility] Su[facility]    Wt Readings from Last 3 Encounters:   12/15/21 143 lb (64.9 kg)   10/14/21 134 lb (60.8 kg)   09/15/21 130 lb (59 kg)     There were no vitals filed for this visit. There is no height or weight on file to calculate BMI. Based upon direct observation of the patient, evaluation of cognition reveals recent and remote memory intact. Patient's complete Health Risk Assessment and screening values have been reviewed and are found in Flowsheets. The following problems were reviewed today and where indicated follow up appointments were made and/or referrals ordered. Positive Risk Factor Screenings with Interventions:          General Health and ACP:  General  In general, how would you say your health is?: Good  In the past 7 days, have you experienced any of the following?  New or Increased Pain, New or Increased Fatigue, Loneliness, Social Isolation, Stress or Anger?: None of These  Do you get the social and emotional support that you need?: Yes  Do you have a Living Will?: (!) No  Advance Directives     Power of [address] Will ACP-Advance Directive ACP-Power of     Not on File Not on File Not on File Not on File      General Health Risk Interventions:  · No Living Will: Advance Care Planning addressed with patient today    Health Habits/Nutrition:  Health Habits/Nutrition  Do you exercise for at least 20 minutes 2-3 times per week?: (!) No  Have you lost any weight without trying in the past 3 months?: No  Do you eat only one meal per day?: No  Have you seen the dentist within the past year?: (!) No     Health Habits/Nutrition Interventions:  · Inadequate physical activity:  patient is not ready to increase his/her physical activity level at this time  · Dental exam overdue:  patient encouraged to make appointment with his/her dentist    Hearing/Vision:  No exam data present  Hearing/Vision  Do you or your family notice any trouble with your hearing that hasn't been managed with hearing aids?: No  Do you have difficulty driving, watching TV, or doing any of your daily activities because of your eyesight?: No  Have you had an eye exam within the past year?: (!) No  Hearing/Vision Interventions:  · Vision concerns:  patient encouraged to make appointment with his/her eye specialist      Personalized Preventive Plan   Current Health Maintenance Status  Immunization History   Administered Date(s) Administered    COVID-19, Ochoa Peter, PF, 30mcg/0.3mL 02/05/2021, 03/01/2021, 10/06/2021    Pneumococcal Polysaccharide ([id number]) 10/14/2021        Health Maintenance   Topic Date Due    Hepatitis C screen  Never done    DTaP/Tdap/Td vaccine (1 - Tdap) Never done    Shingles Vaccine (1 of 2) Never done   [employer] Visit (AWV)  Never done    A1C test (Diabetic or Prediabetic)  06/15/2022    Lipid screen  06/15/2022    Depression Screen  07/14/2022    Potassium monitoring  09/15/2022    Creatinine monitoring  09/15/2022    Low dose CT lung screening  12/31/2022    Breast cancer screen  10/05/2023    Colon cancer screen colonoscopy  09/15/2031    DEXA (modify frequency per FRAX score)  Completed    Flu vaccine  Completed    Pneumococcal 65+ years Vaccine  Completed    COVID-19 Vaccine  Completed    Hepatitis A vaccine  Aged Out    Hib vaccine  Aged Out    Meningococcal (ACWY) vaccine  Aged Out     Recommendations for [employer] Due: see orders and patient instructions/AVS. Patient declines getting vaccinations that are due. Unable to obtain 3 vital signs due to patient not having equipment to take blood pressure/temperature.     Recommended screening schedule for the next 5-10 years is provided to the patient in written form: see Patient Instructions/AVS.    IMolina LPN, 0/4/6318, performed the documented evaluation under the direct supervision of the attending physician. Khadra Quarles, was evaluated through a synchronous (real-time) audio encounter. The patient (or guardian if applicable) is aware that this is a billable service. Verbal consent to proceed has been obtained within the past 12 months. The visit was conducted pursuant to the emergency declaration under the [address] [place] [address], [address] [place] [address] authority and the [employer] Act. Patient identification was verified, and a caregiver was present when appropriate. The patient was located in the state of [place], where the provider was credentialed to provide care. Total time spent for this encounter: Not billed by time    --Molina Dowell LPN on 1/3/7556 at 1:77 PM    An electronic signature was used to authenticate this note. This encounter was performed under Morales ortiz DOs, direct supervision, 1/6/2022.

## 2022-01-06 NOTE — PATIENT INSTRUCTIONS
Personalized Preventive Plan for Reym[facility] Payment - 1/6/2022  Medicare offers a range of preventive health benefits. Some of the tests and screenings are paid in full while other may be subject to a deductible, co-insurance, and/or copay. Some of these benefits include a comprehensive review of your medical history including lifestyle, illnesses that may run in your family, and various assessments and screenings as appropriate. After reviewing your medical record and screening and assessments performed today your provider may have ordered immunizations, labs, imaging, and/or referrals for you. A list of these orders (if applicable) as well as your Preventive Care list are included within your After Visit Summary for your review. Other Preventive Recommendations:    · A preventive eye exam performed by an eye specialist is recommended every 1-2 years to screen for glaucoma; cataracts, macular degeneration, and other eye disorders. · A preventive dental visit is recommended every 6 months. · Try to get at least 150 minutes of exercise per week or 10,000 steps per day on a pedometer . · Order or download the FREE \"Exercise & Physical Activity: Your Everyday Guide\" from [employer]. Call 7-639.233.1982 or search [employer] online. · You need 7732-8913 mg of calcium and 8803-3618 IU of vitamin D per day. It is possible to meet your calcium requirement with diet alone, but a vitamin D supplement is usually necessary to meet this goal.  · When exposed to the sun, use a sunscreen that protects against both UVA and UVB radiation with an SPF of 30 or greater. Reapply every 2 to 3 hours or after sweating, drying off with a towel, or swimming. · Always wear a seat belt when traveling in a car. Always wear a helmet when riding a bicycle or motorcycle.

## 2022-01-08 DIAGNOSIS — F41.9 ANXIETY: ICD-10-CM

## 2022-01-10 RX ORDER — BUSPIRONE HYDROCHLORIDE 10 MG/1
TABLET ORAL
Qty: 30 TABLET | Refills: 0 | Status: SHIPPED | OUTPATIENT
Start: 2022-01-10 | End: 2022-02-17 | Stop reason: SDUPTHER

## 2022-01-11 DIAGNOSIS — K21.9 GASTROESOPHAGEAL REFLUX DISEASE, UNSPECIFIED WHETHER ESOPHAGITIS PRESENT: ICD-10-CM

## 2022-01-11 RX ORDER — DICYCLOMINE HYDROCHLORIDE 10 MG/1
10 CAPSULE ORAL 3 TIMES DAILY PRN
Qty: 120 CAPSULE | Refills: 0 | Status: SHIPPED | OUTPATIENT
Start: 2022-01-11 | End: 2022-03-07 | Stop reason: SDUPTHER

## 2022-01-12 ENCOUNTER — TELEPHONE (OUTPATIENT)
Dept: GASTROENTEROLOGY | Age: 72
End: 2022-01-12

## 2022-01-19 NOTE — TELEPHONE ENCOUNTER
Called patient and left voicemail. Bladimir not covered by insurance unless on insulin. If patient would like to pay out of pocket we can send order if she likes. Requested she call back if she is still interested.

## 2022-01-20 ENCOUNTER — TELEPHONE (OUTPATIENT)
Dept: FAMILY MEDICINE CLINIC | Age: 72
End: 2022-01-20

## 2022-01-20 NOTE — TELEPHONE ENCOUNTER
Called patient voicemail is full. I need to know how many times she tests her glucose ( blood sugar ) daily. Attempting to fill out form for Joon. When patient calls back if she wants to send order she needs to know insurance does not pay for Dupree unless patient is insulin dependent. She is not currently on insulin. However we can send RX but she will be paying out of pocket.

## 2022-01-24 NOTE — TELEPHONE ENCOUNTER
[facility] [address] [place] [address] [address], [facility]    1/20/22 2:22 PM  Note  Called patient voicemail is full. I need to know how many times she tests her glucose ( blood sugar ) daily. Attempting to fill out form for Dupree. When patient calls back if she wants to send order she needs to know insurance does not pay for Dupree unless patient is insulin dependent. She is not currently on insulin. However we can send RX but she will be paying out of pocket.

## 2022-01-28 NOTE — TELEPHONE ENCOUNTER
Called patient at home informed her of all the information from [facility]. Patient stated not insulin dependent. So do not order.   I asked patient how many times a day does she test her blood sugar and patient stated none, because she has no meter or supplies

## 2022-02-17 DIAGNOSIS — K21.9 GASTROESOPHAGEAL REFLUX DISEASE, UNSPECIFIED WHETHER ESOPHAGITIS PRESENT: ICD-10-CM

## 2022-02-17 DIAGNOSIS — F41.9 ANXIETY: ICD-10-CM

## 2022-02-17 RX ORDER — PANTOPRAZOLE SODIUM 40 MG/1
40 TABLET, DELAYED RELEASE ORAL DAILY
Qty: 30 TABLET | Refills: 5 | Status: SHIPPED | OUTPATIENT
Start: 2022-02-17 | End: 2022-05-13 | Stop reason: SDUPTHER

## 2022-02-17 RX ORDER — BUSPIRONE HYDROCHLORIDE 10 MG/1
TABLET ORAL
Qty: 30 TABLET | Refills: 5 | Status: SHIPPED | OUTPATIENT
Start: 2022-02-17 | End: 2022-05-03 | Stop reason: CLARIF

## 2022-02-17 NOTE — TELEPHONE ENCOUNTER
----- Message from Lisandra Bustamante sent at 2/17/2022  9:23 AM EST -----  Subject: Refill Request    QUESTIONS  Name of Medication? pantoprazole (PROTONIX) 40 MG tablet  Patient-reported dosage and instructions? 1 x a day  How many days do you have left? 3  Preferred Pharmacy? [id number]  Pharmacy phone number (if available)? [phone]  ---------------------------------------------------------------------------  --------------,  Name of Medication? busPIRone (BUSPAR) 10 MG tablet  Patient-reported dosage and instructions? 1 x a day  How many days do you have left? 0  Preferred Pharmacy? [id number]  Pharmacy phone number (if available)? [phone]  ---------------------------------------------------------------------------  --------------  CALL BACK INFO  What is the best way for the office to contact you? OK to leave message on   voicemail  Preferred Call Back Phone Number?  [phone]

## 2022-02-28 RX ORDER — PREDNISONE 1 MG/1
TABLET ORAL
Qty: 38 TABLET | Refills: 0 | OUTPATIENT
Start: 2022-02-28

## 2022-03-01 ENCOUNTER — OFFICE VISIT (OUTPATIENT)
Dept: FAMILY MEDICINE CLINIC | Age: 72
End: 2022-03-01
Payer: MEDICARE

## 2022-03-01 VITALS
OXYGEN SATURATION: 98 % | BODY MASS INDEX: 28.07 KG/M2 | SYSTOLIC BLOOD PRESSURE: 134 MMHG | WEIGHT: 143 LBS | HEART RATE: 67 BPM | HEIGHT: 60 IN | DIASTOLIC BLOOD PRESSURE: 69 MMHG | RESPIRATION RATE: 18 BRPM

## 2022-03-01 DIAGNOSIS — E78.2 MIXED HYPERLIPIDEMIA: ICD-10-CM

## 2022-03-01 DIAGNOSIS — F43.21 GRIEF: Primary | ICD-10-CM

## 2022-03-01 DIAGNOSIS — J44.9 COPD, MILD (HCC): ICD-10-CM

## 2022-03-01 DIAGNOSIS — E11.9 TYPE 2 DIABETES MELLITUS WITHOUT COMPLICATION, WITHOUT LONG-TERM CURRENT USE OF INSULIN (HCC): ICD-10-CM

## 2022-03-01 DIAGNOSIS — M81.6 LOCALIZED OSTEOPOROSIS, UNSPECIFIED PATHOLOGICAL FRACTURE PRESENCE: ICD-10-CM

## 2022-03-01 PROCEDURE — 1036F TOBACCO NON-USER: CPT | Performed by: STUDENT IN AN ORGANIZED HEALTH CARE EDUCATION/TRAINING PROGRAM

## 2022-03-01 PROCEDURE — 99214 OFFICE O/P EST MOD 30 MIN: CPT | Performed by: STUDENT IN AN ORGANIZED HEALTH CARE EDUCATION/TRAINING PROGRAM

## 2022-03-01 PROCEDURE — 3023F SPIROM DOC REV: CPT | Performed by: STUDENT IN AN ORGANIZED HEALTH CARE EDUCATION/TRAINING PROGRAM

## 2022-03-01 PROCEDURE — 2022F DILAT RTA XM EVC RTNOPTHY: CPT | Performed by: STUDENT IN AN ORGANIZED HEALTH CARE EDUCATION/TRAINING PROGRAM

## 2022-03-01 PROCEDURE — G8484 FLU IMMUNIZE NO ADMIN: HCPCS | Performed by: STUDENT IN AN ORGANIZED HEALTH CARE EDUCATION/TRAINING PROGRAM

## 2022-03-01 PROCEDURE — 4040F PNEUMOC VAC/ADMIN/RCVD: CPT | Performed by: STUDENT IN AN ORGANIZED HEALTH CARE EDUCATION/TRAINING PROGRAM

## 2022-03-01 PROCEDURE — 3046F HEMOGLOBIN A1C LEVEL >9.0%: CPT | Performed by: STUDENT IN AN ORGANIZED HEALTH CARE EDUCATION/TRAINING PROGRAM

## 2022-03-01 PROCEDURE — 1123F ACP DISCUSS/DSCN MKR DOCD: CPT | Performed by: STUDENT IN AN ORGANIZED HEALTH CARE EDUCATION/TRAINING PROGRAM

## 2022-03-01 PROCEDURE — 1090F PRES/ABSN URINE INCON ASSESS: CPT | Performed by: STUDENT IN AN ORGANIZED HEALTH CARE EDUCATION/TRAINING PROGRAM

## 2022-03-01 PROCEDURE — G8417 CALC BMI ABV UP PARAM F/U: HCPCS | Performed by: STUDENT IN AN ORGANIZED HEALTH CARE EDUCATION/TRAINING PROGRAM

## 2022-03-01 PROCEDURE — 3017F COLORECTAL CA SCREEN DOC REV: CPT | Performed by: STUDENT IN AN ORGANIZED HEALTH CARE EDUCATION/TRAINING PROGRAM

## 2022-03-01 PROCEDURE — G8399 PT W/DXA RESULTS DOCUMENT: HCPCS | Performed by: STUDENT IN AN ORGANIZED HEALTH CARE EDUCATION/TRAINING PROGRAM

## 2022-03-01 PROCEDURE — G8427 DOCREV CUR MEDS BY ELIG CLIN: HCPCS | Performed by: STUDENT IN AN ORGANIZED HEALTH CARE EDUCATION/TRAINING PROGRAM

## 2022-03-01 RX ORDER — ATORVASTATIN CALCIUM 20 MG/1
20 TABLET, FILM COATED ORAL DAILY
Qty: 90 TABLET | Refills: 1 | Status: SHIPPED | OUTPATIENT
Start: 2022-03-01 | End: 2022-10-17 | Stop reason: SDUPTHER

## 2022-03-01 RX ORDER — ALENDRONATE SODIUM 70 MG/1
70 TABLET ORAL
Qty: 12 TABLET | Refills: 1 | Status: SHIPPED | OUTPATIENT
Start: 2022-03-01 | End: 2022-10-17 | Stop reason: SDUPTHER

## 2022-03-01 ASSESSMENT — ENCOUNTER SYMPTOMS
ABDOMINAL PAIN: 0
SORE THROAT: 0
NAUSEA: 0
SHORTNESS OF BREATH: 0
WHEEZING: 0

## 2022-03-01 NOTE — PROGRESS NOTES
3/1/2022    Gunjan Knight    Chief Complaint   Patient presents with    Discuss Medications     She has been having trouble sleeping due to loss of fiance       HPI  History was obtained from patient. Ramakrishna Grande is a 66 y.o. female with a PMHx as listed below who presents today for follow up on chronic condtions. No acute compliants. Compliant with medications. Notes sarah berry passed away. support group daughter in town. Not too many close friends. She feels she is dealing with the loss ok with help of daughter. Anxiety stable  COPD stable    Recent CT showing nodules are stable, lower risk. Following with Pulmonology Dr. Merly Maddox    1. Grief    2. Type 2 diabetes mellitus without complication, without long-term current use of insulin ([address]    3. Localized osteoporosis, unspecified pathological fracture presence    4. Mixed hyperlipidemia    5. Acute exacerbation of chronic obstructive pulmonary disease (COPD) ([address]             REVIEW OF SYMPTOMS    Review of Systems   Constitutional: Negative for chills and fatigue. HENT: Negative for congestion and sore throat. Respiratory: Negative for shortness of breath and wheezing. Cardiovascular: Negative for chest pain and palpitations. Gastrointestinal: Negative for abdominal pain and nausea. Genitourinary: Negative for frequency and urgency. Neurological: Negative for light-headedness.        PAST MEDICAL HISTORY  Past Medical History:   Diagnosis Date    Acute exacerbation of chronic obstructive pulmonary disease (COPD) ([address] 12/15/2021    Cerebral artery occlusion with cerebral infarction ([address]     COPD, mild ([address] 6/15/2021    Diabetes mellitus ([address]     Excessive daytime sleepiness 6/15/2021    Former smoker 6/15/2021    Hyperlipidemia     Hypertension     Mild obstructive sleep apnea 12/15/2021    Multiple lung nodules on CT 7/27/2021    Shortness of breath 6/15/2021    Snoring 6/15/2021       FAMILY HISTORY  Family History Problem Relation Age of Onset    Colon Cancer Mother     Heart Attack Father     Other Father         suicide    Ovarian Cancer Daughter     Other Sister         suicide    Heart Attack Brother     Pacemaker Brother     No Known Problems Brother     No Known Problems Brother     No Known Problems Brother        SOCIAL HISTORY  Social History     Socioeconomic History    Marital status: Single     Spouse name: None    Number of children: None    Years of education: None    Highest education level: None   Occupational History    None   Tobacco Use    Smoking status: Former Smoker     Packs/day: 1.00     Years: 51.00     Pack years: 51.00     Start date:      Quit date: 2020     Years since quittin.2    Smokeless tobacco: Never Used   Vaping Use    Vaping Use: Former    Substances: Flavoring    Devices: Disposable, Pre-filled pod   Substance and Sexual Activity    Alcohol use: Not Currently    Drug use: Not Currently    Sexual activity: None   Other Topics Concern    None   Social History Narrative    None     Social Determinants of Health     Financial Resource Strain: Low Risk     Difficulty of Paying Living Expenses: Not hard at all   Food Insecurity: No Food Insecurity    Worried About [address] [employer] in the Last Year: Never true    [address] [facility] [address] in the Last Year: Never true   Transportation Needs: No Transportation Needs    Lack of Transportation (Medical): No    Lack of Transportation (Non-Medical):  No   Physical Activity:     Days of Exercise per Week: Not on file    Minutes of Exercise per Session: Not on file   Stress:     Feeling of Stress : Not on file   Social Connections:     Frequency of Communication with Friends and Family: Not on file    Frequency of Social Gatherings with Friends and Family: Not on file    Attends Spiritism Services: Not on file    Active Member of Clubs or Organizations: Not on file    Attends Club or Organization Meetings: Not on file    Marital Status: Not on file   Intimate Partner Violence:     Fear of Current or Ex-Partner: Not on file    Emotionally Abused: Not on file    Physically Abused: Not on file    Sexually Abused: Not on file   Housing Stability:     Unable to Pay for Housing in the Last Year: Not on file    Number of [place] in the Last Year: Not on file    Unstable Housing in the Last Year: Not on file        SURGICAL HISTORY  Past Surgical History:   Procedure Laterality Date    APPENDECTOMY      CHOLECYSTECTOMY      COLONOSCOPY N/A 9/15/2021    COLONOSCOPY POLYPECTOMY SNARE/COLD BIOPSY performed by Ml Valentin MD at [address] [place] [address] Left     Left hand    HYSTERECTOMY      JOINT REPLACEMENT Right     12/04/2020-Right Hip    OVARY REMOVAL      age 34   [facility] ENDOSCOPY N/A 9/15/2021    EGD DIAGNOSTIC ONLY performed by Ml [facility] at [facility]  Current Outpatient Medications   Medication Sig Dispense Refill    metFORMIN (GLUCOPHAGE) 500 MG tablet Take 1 tablet by mouth 2 times daily (with meals) 180 tablet 1    alendronate (FOSAMAX) 70 MG tablet Take 1 tablet by mouth every 7 days 12 tablet 1    atorvastatin (LIPITOR) 20 MG tablet Take 1 tablet by mouth daily 90 tablet 1    busPIRone (BUSPAR) 10 MG tablet TAKE 1 TABLET BY MOUTH EVERY DAY AT NIGHT 30 tablet 5    pantoprazole (PROTONIX) 40 MG tablet Take 1 tablet by mouth daily 30 tablet 5    dicyclomine (BENTYL) 10 MG capsule Take 1 capsule by mouth 3 times daily as needed (stomach) 120 capsule 0    chlorthalidone (HYGROTEN) 50 MG tablet Take 1 tablet by mouth daily 90 tablet 1    albuterol sulfate  (90 Base) MCG/ACT inhaler Inhale 2 puffs into the lungs every 4 hours as needed for Wheezing 1 each 11    umeclidinium-vilanterol (ANORO ELLIPTA) 62.5-25 MCG/INH AEPB inhaler Inhale 1 puff into the lungs daily 1 each 11    lisinopril (PRINIVIL;ZESTRIL) 40 MG tablet TAKE 1 TABLET BY MOUTH EVERY DAY 30 tablet 0    simethicone (MYLICON) 80 MG chewable tablet Take 1 tablet by mouth once for 1 dose 3 tablet 0    albuterol (PROVENTIL) (2.5 MG/3ML) 0.083% nebulizer solution Take 2.5 mg by nebulization every 6 hours as needed for Wheezing      aspirin 81 MG EC tablet Take 81 mg by mouth daily      montelukast (SINGULAIR) 10 MG tablet Take 10 mg by mouth (Patient not taking: Reported on 12/15/2021)       No current facility-administered medications for this visit. ALLERGIES  Allergies   Allergen Reactions    Other Hives    Levaquin [Levofloxacin] Rash       PHYSICAL EXAM    /69   Pulse 67   Resp 18   Ht 5' (1.524 m)   Wt 143 lb (64.9 kg)   SpO2 98%   BMI 27.93 kg/m²     Physical Exam  Constitutional:       Appearance: Normal appearance. HENT:      Head: Normocephalic and atraumatic. Eyes:      Extraocular Movements: Extraocular movements intact. Pupils: Pupils are equal, round, and reactive to light. Cardiovascular:      Rate and Rhythm: Normal rate and regular rhythm. Pulses: Normal pulses. Heart sounds: No murmur heard. No friction rub. No gallop. Pulmonary:      Effort: Pulmonary effort is normal.      Breath sounds: Normal breath sounds. Musculoskeletal:      Cervical back: Neck supple. Comments: Gait assissted with cane   Skin:     General: Skin is warm and dry. Neurological:      General: No focal deficit present. Mental Status: She is alert. Psychiatric:         Mood and Affect: Mood normal.         Behavior: Behavior normal.           ASSESSMENT & PLAN    1. Grief  -overall handling well,   Encouraged involvement in community/Rast[employer]  -support with daughter    2. Type 2 diabetes mellitus without complication, without long-term current use of insulin (HCC)  -stable discussed to obtain labs  - metFORMIN (GLUCOPHAGE) 500 MG tablet;  Take 1 tablet by mouth 2 times daily (with meals)  Dispense: 180 tablet; Refill: 1  - CBC with Auto Differential; Future  - Hemoglobin A1C; Future  - Lipid, Fasting; Future    3. Localized osteoporosis, unspecified pathological fracture presence  -compliant with fosamax started 10/2021  - alendronate (FOSAMAX) 70 MG tablet; Take 1 tablet by mouth every 7 days  Dispense: 12 tablet; Refill: 1    4. Mixed hyperlipidemia  -continue current regimen f/u lab work  - atorvastatin (LIPITOR) 20 MG tablet; Take 1 tablet by mouth daily  Dispense: 90 tablet; Refill: 1  - Comprehensive Metabolic Panel; Future    5. MILD COPD  -stable following with Dr. Ruben Madrigal    Overall doing well with grief      Return in about 4 months (around 7/1/2022).          Electronically signed by Jaden Boudreaux DO on 3/1/2022

## 2022-03-04 ENCOUNTER — TELEPHONE (OUTPATIENT)
Dept: GASTROENTEROLOGY | Age: 72
End: 2022-03-04

## 2022-03-04 DIAGNOSIS — K21.9 GASTROESOPHAGEAL REFLUX DISEASE, UNSPECIFIED WHETHER ESOPHAGITIS PRESENT: ICD-10-CM

## 2022-03-07 RX ORDER — DICYCLOMINE HYDROCHLORIDE 10 MG/1
10 CAPSULE ORAL 3 TIMES DAILY PRN
Qty: 120 CAPSULE | Refills: 2 | Status: SHIPPED | OUTPATIENT
Start: 2022-03-07 | End: 2022-04-12 | Stop reason: SDUPTHER

## 2022-03-17 ENCOUNTER — OFFICE VISIT (OUTPATIENT)
Dept: PULMONOLOGY | Age: 72
End: 2022-03-17
Payer: MEDICARE

## 2022-03-17 VITALS
BODY MASS INDEX: 28.07 KG/M2 | DIASTOLIC BLOOD PRESSURE: 68 MMHG | HEART RATE: 106 BPM | OXYGEN SATURATION: 97 % | WEIGHT: 143 LBS | SYSTOLIC BLOOD PRESSURE: 120 MMHG | HEIGHT: 60 IN

## 2022-03-17 DIAGNOSIS — R06.02 SHORTNESS OF BREATH: ICD-10-CM

## 2022-03-17 DIAGNOSIS — Z87.891 FORMER SMOKER: ICD-10-CM

## 2022-03-17 DIAGNOSIS — G47.33 MILD OBSTRUCTIVE SLEEP APNEA: ICD-10-CM

## 2022-03-17 DIAGNOSIS — R91.8 MULTIPLE LUNG NODULES ON CT: ICD-10-CM

## 2022-03-17 DIAGNOSIS — J44.9 COPD, MILD (HCC): Primary | ICD-10-CM

## 2022-03-17 PROCEDURE — 4040F PNEUMOC VAC/ADMIN/RCVD: CPT | Performed by: INTERNAL MEDICINE

## 2022-03-17 PROCEDURE — 1036F TOBACCO NON-USER: CPT | Performed by: INTERNAL MEDICINE

## 2022-03-17 PROCEDURE — G8399 PT W/DXA RESULTS DOCUMENT: HCPCS | Performed by: INTERNAL MEDICINE

## 2022-03-17 PROCEDURE — 3017F COLORECTAL CA SCREEN DOC REV: CPT | Performed by: INTERNAL MEDICINE

## 2022-03-17 PROCEDURE — 3023F SPIROM DOC REV: CPT | Performed by: INTERNAL MEDICINE

## 2022-03-17 PROCEDURE — G8484 FLU IMMUNIZE NO ADMIN: HCPCS | Performed by: INTERNAL MEDICINE

## 2022-03-17 PROCEDURE — 1123F ACP DISCUSS/DSCN MKR DOCD: CPT | Performed by: INTERNAL MEDICINE

## 2022-03-17 PROCEDURE — 99213 OFFICE O/P EST LOW 20 MIN: CPT | Performed by: INTERNAL MEDICINE

## 2022-03-17 PROCEDURE — 1090F PRES/ABSN URINE INCON ASSESS: CPT | Performed by: INTERNAL MEDICINE

## 2022-03-17 PROCEDURE — G8417 CALC BMI ABV UP PARAM F/U: HCPCS | Performed by: INTERNAL MEDICINE

## 2022-03-17 PROCEDURE — G8427 DOCREV CUR MEDS BY ELIG CLIN: HCPCS | Performed by: INTERNAL MEDICINE

## 2022-03-17 NOTE — PROGRESS NOTES
SUBJECTIVE:  Chief Complaint: Mild COPD, former smoker, dyspnea on exertion, multiple lung nodules on CT  Since I last saw Dhara Whipple she did have a COVID-19 viral infection diagnosed in January but did not require hospitalization or any major intervention. She states that her symptoms were more cold-like and did not have any worsening shortness of breath or chest discomfort. She does continue on Anoro Ellipta 1 inhalation daily and her albuterol rescue inhaler as needed. Prior to her COVID she did have all 3 Pfizer COVID-19 vaccinations. She states he has been over a year since she quit smoking. She does carry history of mild obstructive sleep apnea but has not required CPAP therapy. ROS:  Constitution:  HEENT: Negative for ear, throat pain  Cardiovascular: Negative for chest pain, syncope, edema  Pulmonary: See HPI  Musculoskeletal: Negative for DVT, myalgias, arthralgias    OBJECTIVE:  /68   Pulse 106   Ht 5' (1.524 m)   Wt 143 lb (64.9 kg)   SpO2 97%   BMI 27.93 kg/m²      Physical Exam:  Constitutional:  She appears well developed and well-nourished. In no respiratory distress at rest  Neck:  Supple, No palpable lymphadenopathy, No JVD  Cardiovascular:  S1, S2 Normal, Regular rhythm, no murmurs or gallops, No pericardial  rubs. Pulmonary: Breath sounds are mildly diminished but otherwise clear throughout all areas without wheezing or rhonchi  Abdomen: Not examined  Extremities: no edema, No DVT  Neurologic: Oriented x3, No focal deficits    Radiology: CT chest 12/31/2021 showed no evidence of acute cardiopulmonary disease with mild centrilobular emphysema.   Several tiny noncalcified pulmonary nodules with no significant change since prior CT on 7/2/2021  PFT: Office spirometry on 6/15/2021 demonstrated a minimal obstructive defect with a significant and asthmatic-like response to bronchodilators      Echocardiogram: 7/2/2021  Left ventricular systolic function is normal.   Ejection fraction is visually estimated at 55%. Mild left ventricular hypertrophy. Grade I diastolic dysfunction. No evidence of any pericardial effusion  No mention of any evidence of pulmonary hypertension  ASSESSMENT:    1. COPD, mild ([address]    2. Mild obstructive sleep apnea    3. Multiple lung nodules on CT    4. Shortness of breath    5. Former smoker          PLAN:   I am planning on repeating her pulmonary function tests this summer. I will make no change in her current bronchodilator therapy. I would recommend repeating her CT chest in 1 year to follow the multiple pulmonary nodules because of her long smoking history. I will continue to follow her    We have discussed the need to maintain yearly flu immunization, pneumococcal vaccination. We have discussed Coronavirus precaution including handwashing practice, wiping items touched in public such as gas pumps, door handles, shopping carts, etc. Self monitoring for infection - fever, chills, cough, SOB. Should they develop symptoms they should call office for further instructions. Return in about 6 months (around 9/17/2022) for Recheck for COPD, recheck for lung nodule. This dictation was performed with a verbal recognition program and it was checked for errors. It is possible that there are still dictated errors within this office note. Any errors should be brought immediately to my attention for correction. All efforts were made to ensure that this office note is accurate.

## 2022-04-12 DIAGNOSIS — K21.9 GASTROESOPHAGEAL REFLUX DISEASE, UNSPECIFIED WHETHER ESOPHAGITIS PRESENT: ICD-10-CM

## 2022-04-12 RX ORDER — DICYCLOMINE HYDROCHLORIDE 10 MG/1
10 CAPSULE ORAL 3 TIMES DAILY PRN
Qty: 120 CAPSULE | Refills: 2 | Status: SHIPPED | OUTPATIENT
Start: 2022-04-12

## 2022-04-22 ENCOUNTER — HOSPITAL ENCOUNTER (OUTPATIENT)
Age: 72
Discharge: HOME | End: 2022-04-22
Payer: MEDICARE

## 2022-04-22 DIAGNOSIS — E78.2 MIXED HYPERLIPIDEMIA: ICD-10-CM

## 2022-04-22 DIAGNOSIS — E11.9 TYPE 2 DIABETES MELLITUS WITHOUT COMPLICATION, WITHOUT LONG-TERM CURRENT USE OF INSULIN (HCC): ICD-10-CM

## 2022-04-22 LAB
ALBUMIN SERPL-MCNC: 4.6 GM/DL (ref 3.4–5)
ALP BLD-CCNC: 64 IU/L (ref 40–129)
ALT SERPL-CCNC: 60 U/L (ref 10–40)
ANION GAP SERPL CALCULATED.3IONS-SCNC: 15 MMOL/L (ref 4–16)
AST SERPL-CCNC: 47 IU/L (ref 15–37)
BASOPHILS ABSOLUTE: 0.1 K/CU MM
BASOPHILS RELATIVE PERCENT: 0.8 % (ref 0–1)
BILIRUB SERPL-MCNC: 0.4 MG/DL (ref 0–1)
BUN BLDV-MCNC: 30 MG/DL (ref 6–23)
CALCIUM SERPL-MCNC: 9.3 MG/DL (ref 8.3–10.6)
CHLORIDE BLD-SCNC: 89 MMOL/L (ref 99–110)
CO2: 25 MMOL/L (ref 21–32)
CREAT SERPL-MCNC: 1.2 MG/DL (ref 0.6–1.1)
DIFFERENTIAL TYPE: ABNORMAL
EOSINOPHILS ABSOLUTE: 0.1 K/CU MM
EOSINOPHILS RELATIVE PERCENT: 1.3 % (ref 0–3)
GFR AFRICAN AMERICAN: 53 ML/MIN/1.73M2
GFR NON-AFRICAN AMERICAN: 44 ML/MIN/1.73M2
GLUCOSE BLD-MCNC: 107 MG/DL (ref 70–99)
HCT VFR BLD CALC: 38.9 % (ref 37–47)
HEMOGLOBIN: 12.3 GM/DL (ref 12.5–16)
IMMATURE NEUTROPHIL %: 0.9 % (ref 0–0.43)
LYMPHOCYTES ABSOLUTE: 1.4 K/CU MM
LYMPHOCYTES RELATIVE PERCENT: 15.7 % (ref 24–44)
MCH RBC QN AUTO: 30 PG (ref 27–31)
MCHC RBC AUTO-ENTMCNC: 31.6 % (ref 32–36)
MCV RBC AUTO: 94.9 FL (ref 78–100)
MONOCYTES ABSOLUTE: 0.7 K/CU MM
MONOCYTES RELATIVE PERCENT: 8.5 % (ref 0–4)
NUCLEATED RBC %: 0 %
PDW BLD-RTO: 14.3 % (ref 11.7–14.9)
PLATELET # BLD: 338 K/CU MM (ref 140–440)
PMV BLD AUTO: 9.6 FL (ref 7.5–11.1)
POTASSIUM SERPL-SCNC: 4.3 MMOL/L (ref 3.5–5.1)
RBC # BLD: 4.1 M/CU MM (ref 4.2–5.4)
SEGMENTED NEUTROPHILS ABSOLUTE COUNT: 6.3 K/CU MM
SEGMENTED NEUTROPHILS RELATIVE PERCENT: 72.8 % (ref 36–66)
SODIUM BLD-SCNC: 129 MMOL/L (ref 135–145)
TOTAL IMMATURE NEUTOROPHIL: 0.08 K/CU MM
TOTAL NUCLEATED RBC: 0 K/CU MM
TOTAL PROTEIN: 7.3 GM/DL (ref 6.4–8.2)
WBC # BLD: 8.7 K/CU MM (ref 4–10.5)

## 2022-04-22 PROCEDURE — 80053 COMPREHEN METABOLIC PANEL: CPT

## 2022-04-22 PROCEDURE — 80061 LIPID PANEL: CPT

## 2022-04-22 PROCEDURE — 85025 COMPLETE CBC W/AUTO DIFF WBC: CPT

## 2022-04-22 PROCEDURE — 36415 COLL VENOUS BLD VENIPUNCTURE: CPT

## 2022-04-22 PROCEDURE — 83036 HEMOGLOBIN GLYCOSYLATED A1C: CPT

## 2022-04-23 LAB
CHOLESTEROL, FASTING: 178 MG/DL
ESTIMATED AVERAGE GLUCOSE: 148 MG/DL
HBA1C MFR BLD: 6.8 % (ref 4.2–6.3)
HDLC SERPL-MCNC: 51 MG/DL
LDL CHOLESTEROL CALCULATED: 101 MG/DL
TRIGLYCERIDE, FASTING: 128 MG/DL

## 2022-04-24 ENCOUNTER — APPOINTMENT (OUTPATIENT)
Dept: GENERAL RADIOLOGY | Age: 72
End: 2022-04-24
Payer: MEDICARE

## 2022-04-24 ENCOUNTER — HOSPITAL ENCOUNTER (EMERGENCY)
Age: 72
Discharge: HOME | End: 2022-04-24
Attending: STUDENT IN AN ORGANIZED HEALTH CARE EDUCATION/TRAINING PROGRAM
Payer: MEDICARE

## 2022-04-24 VITALS
HEIGHT: 60 IN | SYSTOLIC BLOOD PRESSURE: 146 MMHG | HEART RATE: 83 BPM | OXYGEN SATURATION: 98 % | WEIGHT: 143 LBS | TEMPERATURE: 98 F | BODY MASS INDEX: 28.07 KG/M2 | RESPIRATION RATE: 14 BRPM | DIASTOLIC BLOOD PRESSURE: 80 MMHG

## 2022-04-24 DIAGNOSIS — R07.81 RIB PAIN ON LEFT SIDE: Primary | ICD-10-CM

## 2022-04-24 PROCEDURE — 99283 EMERGENCY DEPT VISIT LOW MDM: CPT

## 2022-04-24 PROCEDURE — 6370000000 HC RX 637 (ALT 250 FOR IP): Performed by: STUDENT IN AN ORGANIZED HEALTH CARE EDUCATION/TRAINING PROGRAM

## 2022-04-24 PROCEDURE — 71101 X-RAY EXAM UNILAT RIBS/CHEST: CPT

## 2022-04-24 RX ORDER — LIDOCAINE 4 G/G
1 PATCH TOPICAL DAILY
Qty: 15 PATCH | Refills: 0 | Status: SHIPPED | OUTPATIENT
Start: 2022-04-24 | End: 2022-05-24

## 2022-04-24 RX ORDER — IBUPROFEN 400 MG/1
600 TABLET ORAL ONCE
Status: COMPLETED | OUTPATIENT
Start: 2022-04-24 | End: 2022-04-24

## 2022-04-24 RX ORDER — OXYCODONE HYDROCHLORIDE 5 MG/1
5 TABLET ORAL ONCE
Status: COMPLETED | OUTPATIENT
Start: 2022-04-24 | End: 2022-04-24

## 2022-04-24 RX ORDER — OXYCODONE HYDROCHLORIDE 5 MG/1
5 TABLET ORAL EVERY 6 HOURS PRN
Qty: 9 TABLET | Refills: 0 | Status: SHIPPED | OUTPATIENT
Start: 2022-04-24 | End: 2022-04-27

## 2022-04-24 RX ADMIN — OXYCODONE 5 MG: 5 TABLET ORAL at 09:40

## 2022-04-24 RX ADMIN — IBUPROFEN 600 MG: 400 TABLET ORAL at 09:40

## 2022-04-24 ASSESSMENT — PAIN SCALES - GENERAL: PAINLEVEL_OUTOF10: 10

## 2022-04-24 ASSESSMENT — PAIN - FUNCTIONAL ASSESSMENT: PAIN_FUNCTIONAL_ASSESSMENT: 0-10

## 2022-04-24 ASSESSMENT — PAIN DESCRIPTION - ORIENTATION: ORIENTATION: LEFT

## 2022-04-24 ASSESSMENT — PAIN DESCRIPTION - LOCATION: LOCATION: RIB CAGE

## 2022-04-24 NOTE — ED PROVIDER NOTES
[facility] ENCOUNTER      Pt Name: Dangelo Diamond  MRN: [id number]  [place] 1950  Date of evaluation: 4/24/2022  Provider: Cristin Rangel MD    CHIEF COMPLAINT       Chief Complaint   Patient presents with    Rib Pain       HISTORY OF PRESENT ILLNESS    Dangelo Diamond is a 67 y.o. female Presenting for left-sided chest wall pain. Patient states that yesterday she was walking down a stair from a kitchen to the living room when she had a misstep. She did not fall. Did not hit her head. She states that immediately after that she began having a sharp pain on the left side of her chest just inferior to the breast that radiates to her back. Did not have this pain before this event. Pain is worse with certain movements. Has not taken anything for the pain. Did not hit her ribs on the wall or anything else as far she knows. Feels like something is \"moving on top of her ribs\". Nursing Notes were reviewed. REVIEW OF SYSTEMS     Review of Systems  A 10 point review of system was performed and is otherwise negative apart from what is noted in HPI.     PAST MEDICAL HISTORY     Past Medical History:   Diagnosis Date    Acute exacerbation of chronic obstructive pulmonary disease (COPD) ([address] 12/15/2021    Cerebral artery occlusion with cerebral infarction ([address]     COPD, mild ([address] 6/15/2021    Diabetes mellitus ([address]     Excessive daytime sleepiness 6/15/2021    Former smoker 6/15/2021    Hyperlipidemia     Hypertension     Mild obstructive sleep apnea 12/15/2021    Multiple lung nodules on CT 7/27/2021    Shortness of breath 6/15/2021    Snoring 6/15/2021       SURGICAL HISTORY       Past Surgical History:   Procedure Laterality Date    APPENDECTOMY      CHOLECYSTECTOMY      COLONOSCOPY N/A 9/15/2021    COLONOSCOPY POLYPECTOMY SNARE/COLD BIOPSY performed by Cara Riggs MD at [facility] Left hand    HYSTERECTOMY      JOINT REPLACEMENT Right     12/04/2020-Right Hip    OVARY REMOVAL      age 34    TONSILLECTOMY      UPPER GASTROINTESTINAL ENDOSCOPY N/A 9/15/2021    EGD DIAGNOSTIC ONLY performed by Gail Taylor MD at [address] [place] [address]       Previous Medications    ALBUTEROL (PROVENTIL) (2.5 MG/3ML) 0.083% NEBULIZER SOLUTION    Take 2.5 mg by nebulization every 6 hours as needed for Wheezing    ALBUTEROL SULFATE  (90 BASE) MCG/ACT INHALER    Inhale 2 puffs into the lungs every 4 hours as needed for Wheezing    ALENDRONATE (FOSAMAX) 70 MG TABLET    Take 1 tablet by mouth every 7 days    ASPIRIN 81 MG EC TABLET    Take 81 mg by mouth daily    ATORVASTATIN (LIPITOR) 20 MG TABLET    Take 1 tablet by mouth daily    BUSPIRONE (BUSPAR) 10 MG TABLET    TAKE 1 TABLET BY MOUTH EVERY DAY AT NIGHT    CHLORTHALIDONE (HYGROTEN) 50 MG TABLET    Take 1 tablet by mouth daily    DICYCLOMINE (BENTYL) 10 MG CAPSULE    Take 1 capsule by mouth 3 times daily as needed (stomach)    LISINOPRIL (PRINIVIL;ZESTRIL) 40 MG TABLET    TAKE 1 TABLET BY MOUTH EVERY DAY    METFORMIN (GLUCOPHAGE) 500 MG TABLET    Take 1 tablet by mouth 2 times daily (with meals)    MONTELUKAST (SINGULAIR) 10 MG TABLET    Take 10 mg by mouth    PANTOPRAZOLE (PROTONIX) 40 MG TABLET    Take 1 tablet by mouth daily    SIMETHICONE (MYLICON) 80 MG CHEWABLE TABLET    Take 1 tablet by mouth once for 1 dose    UMECLIDINIUM-VILANTEROL (ANORO ELLIPTA) 62.5-25 MCG/INH AEPB INHALER    Inhale 1 puff into the lungs daily       ALLERGIES     Other and Levaquin [levofloxacin]    FAMILY HISTORY       Family History   Problem Relation Age of Onset    Colon Cancer Mother     Heart Attack Father     Other Father         suicide    Ovarian Cancer Daughter     Other Sister         suicide    Heart Attack Brother     Pacemaker Brother     No Known Problems Brother     No Known Problems Brother     No Known Problems Brother SOCIAL HISTORY       Social History     Socioeconomic History    Marital status: Single     Spouse name: None    Number of children: None    Years of education: None    Highest education level: None   Occupational History    None   Tobacco Use    Smoking status: Former Smoker     Packs/day: 1.00     Years: 51.00     Pack years: 51.00     Start date:      Quit date: 2020     Years since quittin.3    Smokeless tobacco: Never Used   Vaping Use    Vaping Use: Former    Substances: Flavoring    Devices: Disposable, Pre-filled pod   Substance and Sexual Activity    Alcohol use: Not Currently    Drug use: Not Currently    Sexual activity: None   Other Topics Concern    None   Social History Narrative    None     Social Determinants of Health     Financial Resource Strain: Low Risk     Difficulty of Paying Living Expenses: Not hard at all   Food Insecurity: No Food Insecurity    Worried About [address] [employer] in the Last Year: Never true    [facility] [employer] in the Last Year: Never true   Transportation Needs: No Transportation Needs    Lack of Transportation (Medical): No    Lack of Transportation (Non-Medical):  No   Physical Activity:     Days of Exercise per Week: Not on file    Minutes of Exercise per Session: Not on file   Stress:     Feeling of Stress : Not on file   Social Connections:     Frequency of Communication with Friends and Family: Not on file    Frequency of Social Gatherings with Friends and Family: Not on file    Attends Advent Services: Not on file    Active Member of Clubs or Organizations: Not on file    Attends Club or Organization Meetings: Not on file    Marital Status: Not on file   Intimate Partner Violence:     Fear of Current or Ex-Partner: Not on file    Emotionally Abused: Not on file    Physically Abused: Not on file    Sexually Abused: Not on file   Housing Stability:     Unable to Pay for Housing in the Last Year: Not on file    Number of Places Lived in the Last Year: Not on file    Unstable Housing in the Last Year: Not on file       PHYSICAL EXAM       ED Triage Vitals [04/24/22 0922]   BP Temp Temp Source Pulse Resp SpO2 Height Weight   (!) 146/80 98 °F (36.7 °C) Temporal 83 14 98 % 5' (1.524 m) 143 lb (64.9 kg)         Physical Exam  Vitals and nursing note reviewed. Constitutional:       Appearance: She is not toxic-appearing. HENT:      Head: Normocephalic. Eyes:      Extraocular Movements: Extraocular movements intact. Conjunctiva/sclera: Conjunctivae normal.      Pupils: Pupils are equal, round, and reactive to light. Cardiovascular:      Rate and Rhythm: Normal rate. Pulmonary:      Effort: Pulmonary effort is normal. No respiratory distress. Abdominal:      General: Abdomen is flat. Musculoskeletal:         General: Normal range of motion. Arms:       Cervical back: Normal range of motion. Skin:     General: Skin is warm and dry. Neurological:      General: No focal deficit present. Mental Status: She is alert. Gait: Gait normal.   Psychiatric:         Mood and Affect: Mood normal.         Behavior: Behavior normal.         DIAGNOSTIC RESULTS     EKG: All EKG's are interpreted by me in the absence of a cardiologist.      RADIOLOGY:   Interpretation per the Radiologist below, if available at the time of this note:    XR RIBS LEFT INCLUDE CHEST (MIN 3 VIEWS)   Final Result   No acute abnormalities seen in the chest or left ribs      Scarring seen in the lingula, similar to previous CT done 12/31/2021 but   appears more nodular today. A chest CT is suggested for further evaluation. LABS:  Labs Reviewed - No data to display    All other labs were within normal range or not returned as of this dictation.     EMERGENCY DEPARTMENT COURSE        DIFFERENTIAL DIAGNOSIS/MDM:   Vitals:    Vitals:    04/24/22 0922   BP: (!) 146/80   Pulse: 83   Resp: 14   Temp: 98 °F (36.7 °C)   TempSrc: Temporal SpO2: 98%   Weight: 143 lb (64.9 kg)   Height: 5' (1.524 m)       MDM  Number of Diagnoses or Management Options  Rib pain on left side  Diagnosis management comments: 67-year-old female presenting for left-sided chest pain after stepping wrong on a stair. Vitals within normal limits. Physical exam shows tenderness palpation to the left ribs. Since pain occurred immediately after stepping wrong on a stair, and the pain is positional and reproducible with palpation, I feel it is very low likelihood that the pain is secondary to a pulmonary or cardiac source. X-ray is without pleural effusion, rib fracture, pneumothorax. Patient symptoms are likely secondary to musculoskeletal strain. We will provide her with lidocaine patches and a few oxycodone to help her in the evenings. Of note, the chest x-ray showed a more nodular appearance of known scarring to the lingula. I discussed this with the patient and recommended outpatient CT scan. Patient is followed by pulmonology and has a PCP appointment upcoming. CONSULTS:  None    PROCEDURES:  Unless otherwise noted below, none. Procedures      FINAL IMPRESSION      1. Rib pain on left side          PATIENT REFERRED TO:  Heidy Freeman DO  [address] [facility]    Schedule an appointment as soon as possible for a visit         DISCHARGE MEDICATIONS:  New Prescriptions    LIDOCAINE 4 % EXTERNAL PATCH    Place 1 patch onto the skin daily    OXYCODONE (ROXICODONE) 5 MG IMMEDIATE RELEASE TABLET    Take 1 tablet by mouth every 6 hours as needed for Pain for up to 3 days. Intended supply: 3 days. Take lowest dose possible to manage pain     Controlled Substances Monitoring:     No flowsheet data found.     (Please note that portions of this note were completed with a voice recognition program.  Efforts were made to edit the dictations but occasionally words are mis-transcribed.)    Melinda Gacría MD (electronically signed)  Attending Emergency Physician           Alma Lucia MD  04/24/22 [id number]

## 2022-04-24 NOTE — ED NOTES
Discharge instructions and prescription information was given to the patient who expressed understanding of information and follow up care.      Ruth Mcmahon RN  04/24/22 1899

## 2022-04-24 NOTE — ED TRIAGE NOTES
Pt arrives with complaints of left side rib pain that started yesterday after she missed a step and was able to catch herself before she fell to the ground. She is having pain with any deep breath or certain movement.

## 2022-04-27 ENCOUNTER — TELEPHONE (OUTPATIENT)
Dept: FAMILY MEDICINE CLINIC | Age: 72
End: 2022-04-27

## 2022-04-27 DIAGNOSIS — J44.9 COPD, MILD (HCC): ICD-10-CM

## 2022-04-27 DIAGNOSIS — Z87.898 HISTORY OF SOLITARY PULMONARY NODULE: Primary | ICD-10-CM

## 2022-04-27 DIAGNOSIS — J44.1 ACUTE EXACERBATION OF CHRONIC OBSTRUCTIVE PULMONARY DISEASE (COPD) (HCC): ICD-10-CM

## 2022-04-27 NOTE — TELEPHONE ENCOUNTER
----- Message from Tyra Stoner sent at 4/26/2022  1:49 PM EDT -----  Subject: Referral Request    QUESTIONS   Reason for referral request? Patient Gabino James MD is   retiring ,and patient is requesting a referral with another Pulmonologist   name? Dr. Juan Lock   Has the physician seen you for this condition before? No   Preferred Specialist (if applicable)? Do you already have an appointment scheduled? No  Additional Information for Provider?   ---------------------------------------------------------------------------  --------------  CALL BACK INFO  What is the best way for the office to contact you? OK to leave message on   voicemail  Preferred Call Back Phone Number? [phone]  ---------------------------------------------------------------------------  --------------  SCRIPT ANSWERS  Relationship to Patient?  Self

## 2022-05-03 PROBLEM — E11.9 TYPE 2 DIABETES MELLITUS (HCC): Status: ACTIVE | Noted: 2022-05-03

## 2022-05-03 PROBLEM — N18.30 CHRONIC RENAL DISEASE, STAGE III (HCC): Status: ACTIVE | Noted: 2022-05-03

## 2022-05-06 ENCOUNTER — HOSPITAL ENCOUNTER (OUTPATIENT)
Dept: CT IMAGING | Age: 72
Discharge: HOME | End: 2022-05-06
Payer: MEDICARE

## 2022-05-06 DIAGNOSIS — R91.1 LUNG NODULE: ICD-10-CM

## 2022-05-06 PROCEDURE — 71250 CT THORAX DX C-: CPT

## 2022-05-10 ENCOUNTER — TELEPHONE (OUTPATIENT)
Dept: FAMILY MEDICINE CLINIC | Age: 72
End: 2022-05-10

## 2022-05-10 NOTE — TELEPHONE ENCOUNTER
Patients call was transferred here to nurse triage. Patient is experiencing swelling under her left breast to her neck and abdominal pain x 3 weeks. She is having weakness to her arms and SOB. Blood pressure reading 224/90s. Patient was seen in ED Friday and CT was normal.     Due to symptoms patients daughter advised to take patient directly to the ED. If unable to drive patient call [phone]. Patient will take patient and is agreement with no further questions for the office. .    I advised daughter that the call was transferred to the incorrect office, but that I would send message high priority to patients PCP for an FYI.

## 2022-05-10 NOTE — TELEPHONE ENCOUNTER
----- Message from Prabhjot Collier sent at 5/10/2022 11:16 AM EDT -----  Subject: Appointment Request    Reason for Call: Routine (Patient Request) No Script    QUESTIONS  Type of Appointment? Established Patient  Reason for appointment request? No appointments available during search  Additional Information for Provider? Pt has pain under her armpit and over   to under her breast. Please give her a call back. ---------------------------------------------------------------------------  --------------  [facility] INFO  What is the best way for the office to contact you? OK to leave message on   voicemail  Preferred Call Back Phone Number? [phone]  ---------------------------------------------------------------------------  --------------  SCRIPT ANSWERS  Relationship to Patient? Self  (Is the patient requesting to see the provider for a procedure?)? No  (Is the patient requesting to see the provider urgently  today or   tomorrow. )? No  Have you been diagnosed with, awaiting test results for, or told that you   are suspected of having COVID-19 (Coronavirus)? (If patient has tested   negative or was tested as a requirement for work, school, or travel and   not based on symptoms, answer no)? No  Within the past 10 days have you developed any of the following symptoms   (answer no if symptoms have been present longer than 10 days or began   more than 10 days ago)? Fever or Chills, Cough, Shortness of breath or   difficulty breathing, Loss of taste or smell, Sore throat, Nasal   congestion, Sneezing or runny nose, Fatigue or generalized body aches   (answer no if pain is specific to a body part e.g. back pain), Diarrhea,   Headache? No  Have you had close contact with someone with COVID-19 in the last 7 days? No  (Service Expert  click yes below to proceed with [facility] As [facility]ual   Scheduling)?  Yes

## 2022-05-10 NOTE — TELEPHONE ENCOUNTER
Called Carley back and let her know Dr. Derick Eden also advised to go to ER. Carter Loving stated that Truekristina Kaylan will be going to ER. Carter Loving is on [employer].

## 2022-05-12 ENCOUNTER — OFFICE VISIT (OUTPATIENT)
Dept: FAMILY MEDICINE CLINIC | Age: 72
End: 2022-05-12
Payer: MEDICARE

## 2022-05-12 ENCOUNTER — APPOINTMENT (OUTPATIENT)
Dept: CT IMAGING | Age: 72
End: 2022-05-12
Payer: MEDICARE

## 2022-05-12 ENCOUNTER — HOSPITAL ENCOUNTER (EMERGENCY)
Age: 72
Discharge: HOME | End: 2022-05-12
Attending: STUDENT IN AN ORGANIZED HEALTH CARE EDUCATION/TRAINING PROGRAM
Payer: MEDICARE

## 2022-05-12 ENCOUNTER — APPOINTMENT (OUTPATIENT)
Dept: GENERAL RADIOLOGY | Age: 72
End: 2022-05-12
Payer: MEDICARE

## 2022-05-12 VITALS
OXYGEN SATURATION: 98 % | HEART RATE: 70 BPM | SYSTOLIC BLOOD PRESSURE: 194 MMHG | DIASTOLIC BLOOD PRESSURE: 92 MMHG | WEIGHT: 143 LBS | HEIGHT: 60 IN | BODY MASS INDEX: 28.07 KG/M2

## 2022-05-12 VITALS
DIASTOLIC BLOOD PRESSURE: 77 MMHG | TEMPERATURE: 98.1 F | SYSTOLIC BLOOD PRESSURE: 185 MMHG | BODY MASS INDEX: 27 KG/M2 | HEART RATE: 65 BPM | OXYGEN SATURATION: 98 % | HEIGHT: 61 IN | WEIGHT: 143 LBS | RESPIRATION RATE: 18 BRPM

## 2022-05-12 DIAGNOSIS — R10.9 LEFT FLANK PAIN: ICD-10-CM

## 2022-05-12 DIAGNOSIS — I16.1 HYPERTENSIVE EMERGENCY: Primary | ICD-10-CM

## 2022-05-12 DIAGNOSIS — R07.89 LEFT-SIDED CHEST WALL PAIN: ICD-10-CM

## 2022-05-12 DIAGNOSIS — I10 ESSENTIAL HYPERTENSION: Primary | ICD-10-CM

## 2022-05-12 LAB
ALBUMIN SERPL-MCNC: 4.1 GM/DL (ref 3.4–5)
ALP BLD-CCNC: 68 IU/L (ref 40–129)
ALT SERPL-CCNC: 42 U/L (ref 10–40)
ANION GAP SERPL CALCULATED.3IONS-SCNC: 11 MMOL/L (ref 4–16)
AST SERPL-CCNC: 27 IU/L (ref 15–37)
BASE EXCESS MIXED: 0.6 (ref 0–2.3)
BASOPHILS ABSOLUTE: 0.1 K/CU MM
BASOPHILS RELATIVE PERCENT: 0.9 % (ref 0–1)
BILIRUB SERPL-MCNC: 0.2 MG/DL (ref 0–1)
BUN BLDV-MCNC: 18 MG/DL (ref 6–23)
CALCIUM SERPL-MCNC: 8.5 MG/DL (ref 8.3–10.6)
CHLORIDE BLD-SCNC: 107 MMOL/L (ref 99–110)
CO2: 21 MMOL/L (ref 21–32)
COMMENT: ABNORMAL
CREAT SERPL-MCNC: 1 MG/DL (ref 0.6–1.1)
DIFFERENTIAL TYPE: ABNORMAL
EOSINOPHILS ABSOLUTE: 0.1 K/CU MM
EOSINOPHILS RELATIVE PERCENT: 1.7 % (ref 0–3)
GFR AFRICAN AMERICAN: >60 ML/MIN/1.73M2
GFR NON-AFRICAN AMERICAN: 54 ML/MIN/1.73M2
GLUCOSE BLD-MCNC: 113 MG/DL (ref 70–99)
HCO3 VENOUS: 24.5 MMOL/L (ref 19–25)
HCT VFR BLD CALC: 34.8 % (ref 37–47)
HEMOGLOBIN: 11.2 GM/DL (ref 12.5–16)
IMMATURE NEUTROPHIL %: 1.7 % (ref 0–0.43)
LYMPHOCYTES ABSOLUTE: 1.1 K/CU MM
LYMPHOCYTES RELATIVE PERCENT: 17.2 % (ref 24–44)
MCH RBC QN AUTO: 30.4 PG (ref 27–31)
MCHC RBC AUTO-ENTMCNC: 32.2 % (ref 32–36)
MCV RBC AUTO: 94.3 FL (ref 78–100)
MONOCYTES ABSOLUTE: 0.6 K/CU MM
MONOCYTES RELATIVE PERCENT: 9.2 % (ref 0–4)
NUCLEATED RBC %: 0 %
O2 SAT, VEN: 81.3 % (ref 50–70)
PCO2, VEN: 36 MMHG (ref 38–52)
PDW BLD-RTO: 14.1 % (ref 11.7–14.9)
PH VENOUS: 7.44 (ref 7.32–7.42)
PLATELET # BLD: 337 K/CU MM (ref 140–440)
PMV BLD AUTO: 9 FL (ref 7.5–11.1)
PO2, VEN: 45 MMHG (ref 28–48)
POTASSIUM SERPL-SCNC: 4 MMOL/L (ref 3.5–5.1)
PRO-BNP: 744.2 PG/ML
RBC # BLD: 3.69 M/CU MM (ref 4.2–5.4)
SEGMENTED NEUTROPHILS ABSOLUTE COUNT: 4.6 K/CU MM
SEGMENTED NEUTROPHILS RELATIVE PERCENT: 69.3 % (ref 36–66)
SODIUM BLD-SCNC: 139 MMOL/L (ref 135–145)
TOTAL IMMATURE NEUTOROPHIL: 0.11 K/CU MM
TOTAL NUCLEATED RBC: 0 K/CU MM
TOTAL PROTEIN: 6.9 GM/DL (ref 6.4–8.2)
TROPONIN T: <0.01 NG/ML
TROPONIN T: <0.01 NG/ML
WBC # BLD: 6.6 K/CU MM (ref 4–10.5)

## 2022-05-12 PROCEDURE — 74177 CT ABD & PELVIS W/CONTRAST: CPT

## 2022-05-12 PROCEDURE — 1090F PRES/ABSN URINE INCON ASSESS: CPT | Performed by: PHYSICIAN ASSISTANT

## 2022-05-12 PROCEDURE — 71275 CT ANGIOGRAPHY CHEST: CPT

## 2022-05-12 PROCEDURE — G8427 DOCREV CUR MEDS BY ELIG CLIN: HCPCS | Performed by: PHYSICIAN ASSISTANT

## 2022-05-12 PROCEDURE — 6360000002 HC RX W HCPCS: Performed by: STUDENT IN AN ORGANIZED HEALTH CARE EDUCATION/TRAINING PROGRAM

## 2022-05-12 PROCEDURE — 4040F PNEUMOC VAC/ADMIN/RCVD: CPT | Performed by: PHYSICIAN ASSISTANT

## 2022-05-12 PROCEDURE — 99214 OFFICE O/P EST MOD 30 MIN: CPT | Performed by: PHYSICIAN ASSISTANT

## 2022-05-12 PROCEDURE — G8417 CALC BMI ABV UP PARAM F/U: HCPCS | Performed by: PHYSICIAN ASSISTANT

## 2022-05-12 PROCEDURE — 99285 EMERGENCY DEPT VISIT HI MDM: CPT

## 2022-05-12 PROCEDURE — 82805 BLOOD GASES W/O2 SATURATION: CPT

## 2022-05-12 PROCEDURE — 1036F TOBACCO NON-USER: CPT | Performed by: PHYSICIAN ASSISTANT

## 2022-05-12 PROCEDURE — 3017F COLORECTAL CA SCREEN DOC REV: CPT | Performed by: PHYSICIAN ASSISTANT

## 2022-05-12 PROCEDURE — 93005 ELECTROCARDIOGRAM TRACING: CPT | Performed by: STUDENT IN AN ORGANIZED HEALTH CARE EDUCATION/TRAINING PROGRAM

## 2022-05-12 PROCEDURE — 85025 COMPLETE CBC W/AUTO DIFF WBC: CPT

## 2022-05-12 PROCEDURE — 96375 TX/PRO/DX INJ NEW DRUG ADDON: CPT

## 2022-05-12 PROCEDURE — 84484 ASSAY OF TROPONIN QUANT: CPT

## 2022-05-12 PROCEDURE — 1123F ACP DISCUSS/DSCN MKR DOCD: CPT | Performed by: PHYSICIAN ASSISTANT

## 2022-05-12 PROCEDURE — 71046 X-RAY EXAM CHEST 2 VIEWS: CPT

## 2022-05-12 PROCEDURE — 6360000004 HC RX CONTRAST MEDICATION: Performed by: STUDENT IN AN ORGANIZED HEALTH CARE EDUCATION/TRAINING PROGRAM

## 2022-05-12 PROCEDURE — G8399 PT W/DXA RESULTS DOCUMENT: HCPCS | Performed by: PHYSICIAN ASSISTANT

## 2022-05-12 PROCEDURE — 80053 COMPREHEN METABOLIC PANEL: CPT

## 2022-05-12 PROCEDURE — 96374 THER/PROPH/DIAG INJ IV PUSH: CPT

## 2022-05-12 PROCEDURE — 83880 ASSAY OF NATRIURETIC PEPTIDE: CPT

## 2022-05-12 RX ORDER — HYDRALAZINE HYDROCHLORIDE 20 MG/ML
5 INJECTION INTRAMUSCULAR; INTRAVENOUS ONCE
Status: COMPLETED | OUTPATIENT
Start: 2022-05-12 | End: 2022-05-12

## 2022-05-12 RX ORDER — AMLODIPINE BESYLATE 5 MG/1
5 TABLET ORAL DAILY
Qty: 5 TABLET | Refills: 0 | Status: SHIPPED | OUTPATIENT
Start: 2022-05-12 | End: 2022-10-17

## 2022-05-12 RX ORDER — MORPHINE SULFATE 4 MG/ML
4 INJECTION, SOLUTION INTRAMUSCULAR; INTRAVENOUS ONCE
Status: COMPLETED | OUTPATIENT
Start: 2022-05-12 | End: 2022-05-12

## 2022-05-12 RX ADMIN — HYDRALAZINE HYDROCHLORIDE 5 MG: 20 INJECTION INTRAMUSCULAR; INTRAVENOUS at 18:14

## 2022-05-12 RX ADMIN — MORPHINE SULFATE 4 MG: 4 INJECTION, SOLUTION INTRAMUSCULAR; INTRAVENOUS at 15:57

## 2022-05-12 RX ADMIN — IOPAMIDOL 75 ML: 755 INJECTION, SOLUTION INTRAVENOUS at 17:07

## 2022-05-12 ASSESSMENT — PAIN SCALES - GENERAL: PAINLEVEL_OUTOF10: 8

## 2022-05-12 NOTE — DISCHARGE INSTR - COC
Continuity of Care Form    Patient Name: Devon Winston   :  1950  MRN:  [id number]    Admit date:  2022  Discharge date:  ***    Code Status Order: Prior   Advance Directives:      Admitting Physician:  No admitting provider for patient encounter. PCP: Hernesto Garcia DO    Discharging Nurse: [facility] Unit/Room#: ED30/ED-30  Discharging Unit Phone Number: ***    Emergency Contact:   Extended Emergency Contact Information  Primary Emergency Contact: Jovanna Phone: [phone]  Work Phone: [phone]  Mobile Phone: [phone]  Relation: Child   needed?  No    Past Surgical History:  Past Surgical History:   Procedure Laterality Date    APPENDECTOMY      CHOLECYSTECTOMY      COLONOSCOPY N/A 9/15/2021    COLONOSCOPY POLYPECTOMY SNARE/COLD BIOPSY performed by Chencho Lobo MD at [facility] [address] Left     Left hand    HYSTERECTOMY      JOINT REPLACEMENT Right     2020-Right Hip    OVARY REMOVAL      age 34    TONSILLECTOMY      UPPER GASTROINTESTINAL ENDOSCOPY N/A 9/15/2021    EGD DIAGNOSTIC ONLY performed by Chencho Lobo MD at [facility] ENDOSCOPY       Immunization History:   Immunization History   Administered Date(s) Administered    COVID-19, [employer] top, DO NOT Dilute, David-Sucrose, 12+ yrs, PF, 30 mcg/0.3 mL dose 2022    COVID-19, Pfizer Purple top, DILUTE for use, 12+ yrs, 30mcg/0.3mL dose 2021, 2021, 10/06/2021    Pneumococcal Polysaccharide ([id number]) 10/14/2021       Active Problems:  Patient Active Problem List   Diagnosis Code    Shortness of breath R06.02    Snoring R06.83    Excessive daytime sleepiness G47.19    Former smoker Z87.891    COPD, mild ([address] J44.9    Chest pain R07.9    History of CVA (cerebrovascular accident) Z86.73    Mixed hyperlipidemia E78.2    Gastroesophageal reflux disease K21.9    Anxiety F41.9    Multiple lung nodules on CT R91.8    Hx of colonic polyps Z86.010 Benign neoplasm of sigmoid colon D12.5    Acute exacerbation of chronic obstructive pulmonary disease (COPD) (HCC) J44.1    Mild obstructive sleep apnea G47.33    Type 2 diabetes mellitus E11.9    Chronic renal disease, stage III [facility]) [296449] N18.30       Isolation/Infection:   Isolation            No Isolation          Patient Infection Status       Infection Onset Added Last Indicated Last Indicated By Review Planned Expiration Resolved Resolved By    None active    Resolved    COVID-19 (Rule Out) 09/08/21 09/08/21 09/08/21 Covid-19 Ambulatory (Ordered)   09/10/21 Rule-Out Test Resulted            Nurse Assessment:  Last Vital Signs: BP (!) 185/77   Pulse 65   Temp 98.1 °F (36.7 °C)   Resp 18   Ht 5' 1\" (1.549 m)   Wt 143 lb (64.9 kg)   SpO2 98%   BMI 27.02 kg/m²     Last documented pain score (0-10 scale): Pain Level: 8  Last Weight:   Wt Readings from Last 1 Encounters:   05/12/22 143 lb (64.9 kg)     Mental Status:  {IP PT MENTAL STATUS:20030}    IV Access:  {[facility] IV ACCESS:179062296}    Nursing Mobility/ADLs:  Walking   {P DME GHFY:053561327}  Transfer  {[facility] DME EFPV:904704378}  Bathing  {P DME DDAF:571323195}  Dressing  {P DME POSI:333336377}  Toileting  {P DME JHZZ:634235107}  Feeding  {[facility] DME LVNL:122332173}  Med Admin  {[facility] DME ETOY:469359508}  Med Delivery   {[facility] MED Delivery:572951265}    Wound Care Documentation and Therapy:        Elimination:  Continence: Bowel: {YES / NN:54372}  Bladder: {YES / WO:92614}  Urinary Catheter: {Urinary Catheter:970936864}   Colostomy/Ileostomy/Ileal Conduit: {YES / UY:23029}       Date of Last BM: ***  No intake or output data in the 24 hours ending 05/12/22 1945  No intake/output data recorded.     Safety Concerns:     [address] [employer] Safety Concerns:170645265}    Impairments/Disabilities:      [address] [employer] Impairments/Disabilities:826899587}    Nutrition Therapy:  Current Nutrition Therapy:   [address] [facility] Diet List:954146019}    Routes of Feeding: {[facility] Other Feedings:661258397}  Liquids: {Slp liquid thickness:64065}  Daily Fluid Restriction: {CHP DME Yes amt example:762127575}  Last Modified Barium Swallow with Video (Video Swallowing Test): {Done Not Done [facility]:585079087}    Treatments at the Time of Hospital Discharge:   Respiratory Treatments: ***  Oxygen Therapy:  {Therapy; copd oxygen:18342}  Ventilator:    {[facility] Vent FWIR:132514172}    Rehab Therapies: {THERAPEUTIC INTERVENTION:3658507842}  Weight Bearing Status/Restrictions: {[facility] Weight Bearin}  Other Medical Equipment (for information only, NOT a DME order):  {EQUIPMENT:746622349}  Other Treatments: ***    Patient's personal belongings (please select all that are sent with patient):  {P DME Belongings:281944673}    RN SIGNATURE:  {Esignature:079354016}    CASE MANAGEMENT/SOCIAL WORK SECTION    Inpatient Status Date: ***    Readmission Risk Assessment Score:  Readmission Risk              Risk of Unplanned Readmission:  0           Discharging to Facility/ Agency   Name:   Address:  Phone:  Fax:    Dialysis Facility (if applicable)   Name:  Address:  Dialysis Schedule:  Phone:  Fax:    / signature: {Esignature:405895578}    PHYSICIAN SECTION    Prognosis: {Prognosis:6959138390}    Condition at Discharge: [facility] Patient Condition:718221989}    Rehab Potential (if transferring to Rehab): {Prognosis:3681850735}    Recommended Labs or Other Treatments After Discharge: ***    Physician Certification: I certify the above information and transfer of Marlena Bautista  is necessary for the continuing treatment of the diagnosis listed and that she requires {Admit to Appropriate Level of Care:81324} for {GREATER/LESS:616288953} 30 days.      Update Admission H&P: {CHP DME Changes in ICEVV:675269658}    PHYSICIAN SIGNATURE:  {Esignature:895474691}

## 2022-05-12 NOTE — ED PROVIDER NOTES
12/15/2021    Multiple lung nodules on CT 2021    Shortness of breath 6/15/2021    Snoring 6/15/2021     Past Surgical History:   Procedure Laterality Date    APPENDECTOMY      CHOLECYSTECTOMY      COLONOSCOPY N/A 9/15/2021    COLONOSCOPY POLYPECTOMY SNARE/COLD BIOPSY performed by Renata Decker MD at [address] Left     Left hand    HYSTERECTOMY      JOINT REPLACEMENT Right     2020-Right Hip    OVARY REMOVAL      age 34    TONSILLECTOMY      UPPER GASTROINTESTINAL ENDOSCOPY N/A 9/15/2021    EGD DIAGNOSTIC ONLY performed by Renata Decker MD at [facility] ENDOSCOPY     Family History   Problem Relation Age of Onset    Colon Cancer Mother     Heart Attack Father     Other Father         suicide    Ovarian Cancer Daughter     Other Sister         suicide    Heart Attack Brother     Pacemaker Brother     No Known Problems Brother     No Known Problems Brother     No Known Problems Brother      Social History     Socioeconomic History    Marital status: Single     Spouse name: Not on file    Number of children: Not on file    Years of education: Not on file    Highest education level: Not on file   Occupational History    Not on file   Tobacco Use    Smoking status: Former Smoker     Packs/day: 1.00     Years: 51.00     Pack years: 51.00     Start date:      Quit date: 2020     Years since quittin.4    Smokeless tobacco: Never Used   Vaping Use    Vaping Use: Former    Substances: Flavoring    Devices: Disposable, Pre-filled pod   Substance and Sexual Activity    Alcohol use: Not Currently    Drug use: Not Currently    Sexual activity: Not on file   Other Topics Concern    Not on file   Social History Narrative    Not on file     Social Determinants of Health     Financial Resource Strain: Low Risk     Difficulty of Paying Living Expenses: Not hard at all   Food Insecurity: No Food Insecurity    Worried About Running Out of Food in the Last Year: Never true    Ran Out of Food in the Last Year: Never true   Transportation Needs: No Transportation Needs    Lack of Transportation (Medical): No    Lack of Transportation (Non-Medical): No   Physical Activity:     Days of Exercise per Week: Not on file    Minutes of Exercise per Session: Not on file   Stress:     Feeling of Stress : Not on file   Social Connections:     Frequency of Communication with Friends and Family: Not on file    Frequency of Social Gatherings with Friends and Family: Not on file    Attends Taoism Services: Not on file    Active Member of [address] [place] [address] [employer] or Organizations: Not on file    Attends Club or Organization Meetings: Not on file    Marital Status: Not on file   Intimate Partner Violence:     Fear of Current or Ex-Partner: Not on file    Emotionally Abused: Not on file    Physically Abused: Not on file    Sexually Abused: Not on file   Housing Stability:     Unable to Pay for Housing in the Last Year: Not on file    Number of [place] in the Last Year: Not on file    Unstable Housing in the Last Year: Not on file     No current facility-administered medications for this encounter.      Current Outpatient Medications   Medication Sig Dispense Refill    albuterol (PROVENTIL) (2.5 MG/3ML) 0.083% nebulizer solution Take 3 mLs by nebulization every 6 hours as needed for Wheezing 360 each 2    lidocaine 4 % external patch Place 1 patch onto the skin daily 15 patch 0    dicyclomine (BENTYL) 10 MG capsule Take 1 capsule by mouth 3 times daily as needed (stomach) 120 capsule 2    metFORMIN (GLUCOPHAGE) 500 MG tablet Take 1 tablet by mouth 2 times daily (with meals) 180 tablet 1    alendronate (FOSAMAX) 70 MG tablet Take 1 tablet by mouth every 7 days 12 tablet 1    atorvastatin (LIPITOR) 20 MG tablet Take 1 tablet by mouth daily 90 tablet 1    pantoprazole (PROTONIX) 40 MG tablet Take 1 tablet by mouth daily 30 tablet 5    albuterol sulfate  (90 Base) MCG/ACT inhaler Inhale 2 puffs into the lungs every 4 hours as needed for Wheezing 1 each 11    umeclidinium-vilanterol (ANORO ELLIPTA) 62.5-25 MCG/INH AEPB inhaler Inhale 1 puff into the lungs daily 1 each 11    lisinopril (PRINIVIL;ZESTRIL) 40 MG tablet TAKE 1 TABLET BY MOUTH EVERY DAY 30 tablet 0    aspirin 81 MG EC tablet Take 81 mg by mouth daily       Allergies   Allergen Reactions    Other Hives    Levaquin [Levofloxacin] Rash       Nursing Notes Reviewed    Physical Exam:  Triage VS:    ED Triage Vitals [05/12/22 1509]   Enc Vitals Group      BP (!) 216/82      Pulse 78      Resp 18      Temp 97.8 °F (36.6 °C)      Temp src       SpO2 97 %      Weight 143 lb (64.9 kg)      Height 5' 1\" (1.549 m)      Head Circumference       Peak Flow       Pain Score       Pain Loc       Pain Edu? Excl. in [address]? My pulse ox interpretation is - normal    General appearance:  No acute distress. Skin:  Warm. Dry. Eye:  Extraocular movements intact. Ears, nose, mouth and throat:  Oral mucosa moist   Neck:  Trachea midline. Extremity:  No swelling. Normal ROM     Heart:  Regular rate and rhythm, normal S1 & S2, no extra heart sounds. Perfusion:  intact  Respiratory:  Lungs clear to auscultation bilaterally. Respirations nonlabored. Discomfort over the left lateral chest wall and upper left flank, no CVA tenderness, no central spinal tenderness  Abdominal:  Normal bowel sounds. Soft. Mild discomfort without rebound or guarding in the left upper flank without CVA tenderness, no central spinal tenderness  Back:  No CVA tenderness to palpation no tenderness palpation along CT or L-spine  Neurological:  Alert and oriented times 3. No focal neuro deficits.   No facial asymmetry, normal sensation light touch over the face, extraocular eye movements are intact, no pronator drift of the bilateral upper and lower extremity, normal sensation light touch of the bilateral upper and lower extremity, normal finger-nose-finger and heel shin, no ataxia.           Psychiatric:  Appropriate    I have reviewed and interpreted all of the currently available lab results from this visit (if applicable):  Results for orders placed or performed during the hospital encounter of 05/12/22   CBC with Auto Differential   Result Value Ref Range    WBC 6.6 4.0 - 10.5 K/CU MM    RBC 3.69 (L) 4.2 - 5.4 M/CU MM    Hemoglobin 11.2 (L) 12.5 - 16.0 GM/DL    Hematocrit 34.8 (L) 37 - 47 %    MCV 94.3 78 - 100 FL    MCH 30.4 27 - 31 PG    MCHC 32.2 32.0 - 36.0 %    RDW 14.1 11.7 - 14.9 %    Platelets 362 745 - 365 K/CU MM    MPV 9.0 7.5 - 11.1 FL    Differential Type AUTOMATED DIFFERENTIAL     Segs Relative 69.3 (H) 36 - 66 %    Lymphocytes % 17.2 (L) 24 - 44 %    Monocytes % 9.2 (H) 0 - 4 %    Eosinophils % 1.7 0 - 3 %    Basophils % 0.9 0 - 1 %    Segs Absolute 4.6 K/CU MM    Lymphocytes Absolute 1.1 K/CU MM    Monocytes Absolute 0.6 K/CU MM    Eosinophils Absolute 0.1 K/CU MM    Basophils Absolute 0.1 K/CU MM    Nucleated RBC % 0.0 %    Total Nucleated RBC 0.0 K/CU MM    Total Immature Neutrophil 0.11 K/CU MM    Immature Neutrophil % 1.7 (H) 0 - 0.43 %   Comprehensive Metabolic Panel   Result Value Ref Range    Sodium 139 135 - 145 MMOL/L    Potassium 4.0 3.5 - 5.1 MMOL/L    Chloride 107 99 - 110 mMol/L    CO2 21 21 - 32 MMOL/L    BUN 18 6 - 23 MG/DL    CREATININE 1.0 0.6 - 1.1 MG/DL    Glucose 113 (H) 70 - 99 MG/DL    Calcium 8.5 8.3 - 10.6 MG/DL    Albumin 4.1 3.4 - 5.0 GM/DL    Total Protein 6.9 6.4 - 8.2 GM/DL    Total Bilirubin 0.2 0.0 - 1.0 MG/DL    ALT 42 (H) 10 - 40 U/L    AST 27 15 - 37 IU/L    Alkaline Phosphatase 68 40 - 129 IU/L    GFR Non- 54 (L) >60 mL/min/1.73m2    GFR African American >60 >60 mL/min/1.73m2    Anion Gap 11 4 - 16   Troponin   Result Value Ref Range    Troponin T <0.010 <0.01 NG/ML   Blood Gas, Venous   Result Value Ref Range    pH, Joseph 7.44 (H) 7.32 - 7.42    pCO2, Joseph 36 (L) 38 - 52 mmHG    pO2, Joseph 45 28 - 48 mmHG    Base Exc, Mixed . 6 0 - 2.3    HCO3, Venous 24.5 19 - 25 MMOL/L    O2 Sat, Joseph 81.3 (H) 50 - 70 %    Comment VBG    Brain Natriuretic Peptide   Result Value Ref Range    Pro-.2 (H) <300 PG/ML   EKG 12 Lead   Result Value Ref Range    Ventricular Rate 66 BPM    Atrial Rate 66 BPM    P-R Interval 174 ms    QRS Duration 74 ms    Q-T Interval 412 ms    QTc Calculation (Bazett) 431 ms    P Axis 43 degrees    R Axis 54 degrees    T Axis 74 degrees    Diagnosis       Normal sinus rhythm  Normal ECG  When compared with ECG of 15-SEP-2021 09:01,  No significant change was found        Radiographs (if obtained):  Radiologist's Report Reviewed:  No results found. EKG (if obtained): (All EKG's are interpreted by myself in the absence of a cardiologist)  Normal sinus rhythm, ventricular rate 66, MO interval 134, QRS duration 74, QTc 431, no significant ST elevation or depression, overall reassuring EKG    MDM:    71-year-old female presenting with complaints of hypertension. Patient states over the past few months she is also have left lateral chest wall pain and flank pain that has been constant as well as over the past year intermittent shortness of breath worse with exertion that is improved with albuterol at home. States she does have a history of COPD. Denies headache, blurred vision, focal neurodeficits or motor or sensory changes. States that shortness of breath is not worse than lateral chest wall pain and flank pain has been similar to prior. States she takes her blood pressure daily and took it this morning and systolics were in the 810H. Called her primary doctor had an appointment but came into the emergency department. EKG is nonacute. CBC, CMP are reassuring lipase is not elevated. Trope and delta Wallene Passer is within normal limits. BNP is not significantly elevated for age. Chest x-ray is nonacute.   Due to pain over the left chest wall and flank over the past month CTA pulm as well as abdomen pelvis obtained showed no acute pulmonary embolism with no acute pulmonary process evident. There is centrilobular emphysema with no acute intra-abdominal findings. No obstructive uropathy or hydronephrosis identified. There is colonic diverticulosis without CT evidence of diverticulitis. There is a 1.2 cm indeterminate left adrenal nodule recommend further evaluation with nonemergent adrenal protocol CT or MRI which was discussed with patient states she will follow-up outpatient. There is severe atherosclerotic disease. Patient was given hydralazine in the ED. On my reevaluation patient's blood pressure is 148/67. Is been downtrending appropriately. I discussed laboratory evaluation as well as imaging. Discussed close follow-up with primary care physician. Patient feels safe for discharge. Patient states her blood pressure recently has been running in the 306 systolic. Will give short prescription for Norvasc with close follow-up with primary care physician to discuss further evaluation and treatment. I discussed strict return precautions patient agreeable this plan and discharged home. Clinical Impression:  1. Essential hypertension    2. Left flank pain    3. Left-sided chest wall pain          Comment: Please note this report has been produced using speech recognition software and may contain errors related to that system including errors in grammar, punctuation, and spelling, as well as words and phrases that may be inappropriate. Efforts were made to edit the dictations.         Deedee Elizondo MD  05/15/22 4300

## 2022-05-12 NOTE — ED NOTES
Per Dr. Prasad [facility] to hydralazine 5mg IV push instead IV piggyback.       Angela , RN  05/12/22 4156

## 2022-05-13 ENCOUNTER — HOSPITAL ENCOUNTER (EMERGENCY)
Age: 72
Discharge: HOME | End: 2022-05-14
Payer: MEDICARE

## 2022-05-13 ENCOUNTER — OFFICE VISIT (OUTPATIENT)
Dept: FAMILY MEDICINE CLINIC | Age: 72
End: 2022-05-13
Payer: MEDICARE

## 2022-05-13 ENCOUNTER — APPOINTMENT (OUTPATIENT)
Dept: GENERAL RADIOLOGY | Age: 72
End: 2022-05-13
Payer: MEDICARE

## 2022-05-13 VITALS
DIASTOLIC BLOOD PRESSURE: 84 MMHG | HEIGHT: 61 IN | HEART RATE: 64 BPM | OXYGEN SATURATION: 96 % | BODY MASS INDEX: 26.96 KG/M2 | WEIGHT: 142.8 LBS | SYSTOLIC BLOOD PRESSURE: 162 MMHG

## 2022-05-13 DIAGNOSIS — K21.9 GASTROESOPHAGEAL REFLUX DISEASE, UNSPECIFIED WHETHER ESOPHAGITIS PRESENT: ICD-10-CM

## 2022-05-13 DIAGNOSIS — E27.8 ADRENAL NODULE (HCC): ICD-10-CM

## 2022-05-13 DIAGNOSIS — R07.81 RIB PAIN: ICD-10-CM

## 2022-05-13 DIAGNOSIS — I10 PRIMARY HYPERTENSION: ICD-10-CM

## 2022-05-13 DIAGNOSIS — Z09 ENCOUNTER FOR EXAMINATION FOLLOWING TREATMENT AT HOSPITAL: Primary | ICD-10-CM

## 2022-05-13 DIAGNOSIS — I15.9 SECONDARY HYPERTENSION: Primary | ICD-10-CM

## 2022-05-13 LAB
ALBUMIN SERPL-MCNC: 4 GM/DL (ref 3.4–5)
ALP BLD-CCNC: 63 IU/L (ref 40–129)
ALT SERPL-CCNC: 37 U/L (ref 10–40)
ANION GAP SERPL CALCULATED.3IONS-SCNC: 12 MMOL/L (ref 4–16)
AST SERPL-CCNC: 23 IU/L (ref 15–37)
BASOPHILS ABSOLUTE: 0.1 K/CU MM
BASOPHILS RELATIVE PERCENT: 1.1 % (ref 0–1)
BILIRUB SERPL-MCNC: 0.3 MG/DL (ref 0–1)
BUN BLDV-MCNC: 21 MG/DL (ref 6–23)
CALCIUM SERPL-MCNC: 8.9 MG/DL (ref 8.3–10.6)
CHLORIDE BLD-SCNC: 103 MMOL/L (ref 99–110)
CO2: 24 MMOL/L (ref 21–32)
CREAT SERPL-MCNC: 1 MG/DL (ref 0.6–1.1)
DIFFERENTIAL TYPE: ABNORMAL
EKG ATRIAL RATE: 66 BPM
EKG DIAGNOSIS: NORMAL
EKG P AXIS: 43 DEGREES
EKG P-R INTERVAL: 174 MS
EKG Q-T INTERVAL: 412 MS
EKG QRS DURATION: 74 MS
EKG QTC CALCULATION (BAZETT): 431 MS
EKG R AXIS: 54 DEGREES
EKG T AXIS: 74 DEGREES
EKG VENTRICULAR RATE: 66 BPM
EOSINOPHILS ABSOLUTE: 0.2 K/CU MM
EOSINOPHILS RELATIVE PERCENT: 3 % (ref 0–3)
GFR AFRICAN AMERICAN: >60 ML/MIN/1.73M2
GFR NON-AFRICAN AMERICAN: 54 ML/MIN/1.73M2
GLUCOSE BLD-MCNC: 124 MG/DL (ref 70–99)
HCT VFR BLD CALC: 33.1 % (ref 37–47)
HEMOGLOBIN: 10.6 GM/DL (ref 12.5–16)
IMMATURE NEUTROPHIL %: 1.1 % (ref 0–0.43)
LIPASE: 39 IU/L (ref 13–60)
LYMPHOCYTES ABSOLUTE: 1.4 K/CU MM
LYMPHOCYTES RELATIVE PERCENT: 21.8 % (ref 24–44)
MCH RBC QN AUTO: 29.9 PG (ref 27–31)
MCHC RBC AUTO-ENTMCNC: 32 % (ref 32–36)
MCV RBC AUTO: 93.5 FL (ref 78–100)
MONOCYTES ABSOLUTE: 0.6 K/CU MM
MONOCYTES RELATIVE PERCENT: 9.3 % (ref 0–4)
NUCLEATED RBC %: 0 %
PDW BLD-RTO: 14 % (ref 11.7–14.9)
PLATELET # BLD: 352 K/CU MM (ref 140–440)
PMV BLD AUTO: 9.1 FL (ref 7.5–11.1)
POTASSIUM SERPL-SCNC: 3.3 MMOL/L (ref 3.5–5.1)
PRO-BNP: 417.7 PG/ML
RBC # BLD: 3.54 M/CU MM (ref 4.2–5.4)
SEGMENTED NEUTROPHILS ABSOLUTE COUNT: 4 K/CU MM
SEGMENTED NEUTROPHILS RELATIVE PERCENT: 63.7 % (ref 36–66)
SODIUM BLD-SCNC: 139 MMOL/L (ref 135–145)
TOTAL IMMATURE NEUTOROPHIL: 0.07 K/CU MM
TOTAL NUCLEATED RBC: 0 K/CU MM
TOTAL PROTEIN: 6.6 GM/DL (ref 6.4–8.2)
TROPONIN T: <0.01 NG/ML
WBC # BLD: 6.3 K/CU MM (ref 4–10.5)

## 2022-05-13 PROCEDURE — 1090F PRES/ABSN URINE INCON ASSESS: CPT | Performed by: PHYSICIAN ASSISTANT

## 2022-05-13 PROCEDURE — 84484 ASSAY OF TROPONIN QUANT: CPT

## 2022-05-13 PROCEDURE — 85025 COMPLETE CBC W/AUTO DIFF WBC: CPT

## 2022-05-13 PROCEDURE — 3017F COLORECTAL CA SCREEN DOC REV: CPT | Performed by: PHYSICIAN ASSISTANT

## 2022-05-13 PROCEDURE — 93005 ELECTROCARDIOGRAM TRACING: CPT | Performed by: PHYSICIAN ASSISTANT

## 2022-05-13 PROCEDURE — 99284 EMERGENCY DEPT VISIT MOD MDM: CPT

## 2022-05-13 PROCEDURE — G8399 PT W/DXA RESULTS DOCUMENT: HCPCS | Performed by: PHYSICIAN ASSISTANT

## 2022-05-13 PROCEDURE — 80053 COMPREHEN METABOLIC PANEL: CPT

## 2022-05-13 PROCEDURE — 99214 OFFICE O/P EST MOD 30 MIN: CPT | Performed by: PHYSICIAN ASSISTANT

## 2022-05-13 PROCEDURE — 4040F PNEUMOC VAC/ADMIN/RCVD: CPT | Performed by: PHYSICIAN ASSISTANT

## 2022-05-13 PROCEDURE — 83690 ASSAY OF LIPASE: CPT

## 2022-05-13 PROCEDURE — 1123F ACP DISCUSS/DSCN MKR DOCD: CPT | Performed by: PHYSICIAN ASSISTANT

## 2022-05-13 PROCEDURE — 93010 ELECTROCARDIOGRAM REPORT: CPT | Performed by: INTERNAL MEDICINE

## 2022-05-13 PROCEDURE — 1036F TOBACCO NON-USER: CPT | Performed by: PHYSICIAN ASSISTANT

## 2022-05-13 PROCEDURE — 96374 THER/PROPH/DIAG INJ IV PUSH: CPT

## 2022-05-13 PROCEDURE — 6370000000 HC RX 637 (ALT 250 FOR IP): Performed by: PHYSICIAN ASSISTANT

## 2022-05-13 PROCEDURE — G8427 DOCREV CUR MEDS BY ELIG CLIN: HCPCS | Performed by: PHYSICIAN ASSISTANT

## 2022-05-13 PROCEDURE — 83880 ASSAY OF NATRIURETIC PEPTIDE: CPT

## 2022-05-13 PROCEDURE — G8417 CALC BMI ABV UP PARAM F/U: HCPCS | Performed by: PHYSICIAN ASSISTANT

## 2022-05-13 RX ORDER — CLONIDINE HYDROCHLORIDE 0.1 MG/1
0.1 TABLET ORAL ONCE
Status: COMPLETED | OUTPATIENT
Start: 2022-05-13 | End: 2022-05-13

## 2022-05-13 RX ORDER — PANTOPRAZOLE SODIUM 40 MG/1
40 TABLET, DELAYED RELEASE ORAL DAILY
Qty: 30 TABLET | Refills: 5 | Status: SHIPPED | OUTPATIENT
Start: 2022-05-13 | End: 2022-10-17 | Stop reason: SDUPTHER

## 2022-05-13 RX ORDER — CHLORTHALIDONE 50 MG/1
50 TABLET ORAL DAILY
Qty: 30 TABLET | Refills: 3 | Status: SHIPPED | OUTPATIENT
Start: 2022-05-13 | End: 2022-09-19

## 2022-05-13 RX ADMIN — CLONIDINE HYDROCHLORIDE 0.1 MG: 0.1 TABLET ORAL at 23:45

## 2022-05-13 ASSESSMENT — PAIN - FUNCTIONAL ASSESSMENT: PAIN_FUNCTIONAL_ASSESSMENT: 0-10

## 2022-05-13 ASSESSMENT — PAIN SCALES - GENERAL: PAINLEVEL_OUTOF10: 8

## 2022-05-13 NOTE — PROGRESS NOTES
5/13/2022    Erika Goff    Chief Complaint   Patient presents with    Follow-Up from Hospital       HPI  History was obtained from pt. Sylvester Ji is a 67 y.o. female with a PMHx as listed below who presents today for ER follow up. She was sent there yesterday from this office for hypertensive emergency. Her heart workup there was not concerning for cardiac cause. She was given a script for amlodipine which she hasn't been able to fill yet. Still having the rib pain. There was an adrenal mass on the CT scan. 1. Encounter for examination following treatment at hospital    2. Primary hypertension    3. Adrenal nodule (HCC)    4. Rib pain    5.  Gastroesophageal reflux disease, unspecified whether esophagitis present         REVIEW OF SYMPTOMS    Review of Systems    PAST MEDICAL HISTORY  Past Medical History:   Diagnosis Date    Acute exacerbation of chronic obstructive pulmonary disease (COPD) ([address] 12/15/2021    Cerebral artery occlusion with cerebral infarction ([address]     COPD, mild ([address] 6/15/2021    Diabetes mellitus ([address]     Excessive daytime sleepiness 6/15/2021    Former smoker 6/15/2021    Hyperlipidemia     Hypertension     Mild obstructive sleep apnea 12/15/2021    Multiple lung nodules on CT 7/27/2021    Shortness of breath 6/15/2021    Snoring 6/15/2021       FAMILY HISTORY  Family History   Problem Relation Age of Onset    Colon Cancer Mother     Heart Attack Father     Other Father         suicide    Ovarian Cancer Daughter     Other Sister         suicide    Heart Attack Brother     Pacemaker Brother     No Known Problems Brother     No Known Problems Brother     No Known Problems Brother        SOCIAL HISTORY  Social History     Socioeconomic History    Marital status: Single     Spouse name: Not on file    Number of children: Not on file    Years of education: Not on file    Highest education level: Not on file   Occupational History    Not on file   Tobacco Use  Smoking status: Former Smoker     Packs/day: 1.00     Years: 51.00     Pack years: 51.00     Start date:      Quit date: 2020     Years since quittin.4    Smokeless tobacco: Never Used   Vaping Use    Vaping Use: Former    Substances: Flavoring    Devices: Disposable, Pre-filled pod   Substance and Sexual Activity    Alcohol use: Not Currently    Drug use: Not Currently    Sexual activity: Not on file   Other Topics Concern    Not on file   Social History Narrative    Not on file     Social Determinants of Health     Financial Resource Strain: Low Risk     Difficulty of Paying Living Expenses: Not hard at all   Food Insecurity: No Food Insecurity    Worried About [address] in the Last Year: Never true    [employer] in the Last Year: Never true   Transportation Needs: No Transportation Needs    Lack of Transportation (Medical): No    Lack of Transportation (Non-Medical):  No   Physical Activity:     Days of Exercise per Week: Not on file    Minutes of Exercise per Session: Not on file   Stress:     Feeling of Stress : Not on file   Social Connections:     Frequency of Communication with Friends and Family: Not on file    Frequency of Social Gatherings with Friends and Family: Not on file    Attends [employer] Services: Not on file    Active Member of [address] [place] [address] [employer] or Organizations: Not on file    Attends Club or Organization Meetings: Not on file    Marital Status: Not on file   Intimate Partner Violence:     Fear of Current or Ex-Partner: Not on file    Emotionally Abused: Not on file    Physically Abused: Not on file    Sexually Abused: Not on file   Housing Stability:     Unable to Pay for Housing in the Last Year: Not on file    Number of [place] in the Last Year: Not on file    Unstable Housing in the Last Year: Not on file        SURGICAL HISTORY  Past Surgical History:   Procedure Laterality Date    APPENDECTOMY      CHOLECYSTECTOMY      COLONOSCOPY N/A 9/15/2021    COLONOSCOPY POLYPECTOMY SNARE/COLD BIOPSY performed by Cinthya Viveros MD at [address] Left     Left hand    HYSTERECTOMY      JOINT REPLACEMENT Right     12/04/2020-Right Hip    OVARY REMOVAL      age 34    TONSILLECTOMY      UPPER GASTROINTESTINAL ENDOSCOPY N/A 9/15/2021    EGD DIAGNOSTIC ONLY performed by Cinthya [facility]os MD at [facility]  Current Outpatient Medications   Medication Sig Dispense Refill    diclofenac sodium (VOLTAREN) 1 % GEL Apply 2 g topically 4 times daily 150 g 0    pantoprazole (PROTONIX) 40 MG tablet Take 1 tablet by mouth daily 30 tablet 5    chlorthalidone (HYGROTEN) 50 MG tablet Take 1 tablet by mouth daily 30 tablet 3    amLODIPine (NORVASC) 5 MG tablet Take 1 tablet by mouth daily for 5 days 5 tablet 0    albuterol (PROVENTIL) (2.5 MG/3ML) 0.083% nebulizer solution Take 3 mLs by nebulization every 6 hours as needed for Wheezing 360 each 2    lidocaine 4 % external patch Place 1 patch onto the skin daily 15 patch 0    dicyclomine (BENTYL) 10 MG capsule Take 1 capsule by mouth 3 times daily as needed (stomach) 120 capsule 2    metFORMIN (GLUCOPHAGE) 500 MG tablet Take 1 tablet by mouth 2 times daily (with meals) 180 tablet 1    alendronate (FOSAMAX) 70 MG tablet Take 1 tablet by mouth every 7 days 12 tablet 1    atorvastatin (LIPITOR) 20 MG tablet Take 1 tablet by mouth daily 90 tablet 1    albuterol sulfate  (90 Base) MCG/ACT inhaler Inhale 2 puffs into the lungs every 4 hours as needed for Wheezing 1 each 11    umeclidinium-vilanterol (ANORO ELLIPTA) 62.5-25 MCG/INH AEPB inhaler Inhale 1 puff into the lungs daily 1 each 11    lisinopril (PRINIVIL;ZESTRIL) 40 MG tablet TAKE 1 TABLET BY MOUTH EVERY DAY 30 tablet 0    aspirin 81 MG EC tablet Take 81 mg by mouth daily       No current facility-administered medications for this visit.        ALLERGIES  Allergies Allergen Reactions    Other Hives    Levaquin [Levofloxacin] Rash       PHYSICAL EXAM    BP (!) 162/84 (Site: Left Upper Arm, Position: Sitting, Cuff Size: Medium Adult)   Pulse 64   Ht 5' 1\" (1.549 m)   Wt 142 lb 12.8 oz (64.8 kg)   SpO2 96%   BMI 26.98 kg/m²     Physical Exam  Constitutional:       Appearance: Normal appearance. She is obese. HENT:      Head: Normocephalic and atraumatic. Right Ear: Tympanic membrane and external ear normal.      Left Ear: Tympanic membrane and external ear normal.      Nose: No rhinorrhea. Mouth/Throat:      Mouth: Mucous membranes are moist.      Pharynx: Oropharynx is clear. No oropharyngeal exudate or posterior oropharyngeal erythema. Eyes:      General: No scleral icterus. Extraocular Movements: Extraocular movements intact. Conjunctiva/sclera: Conjunctivae normal.      Pupils: Pupils are equal, round, and reactive to light. Cardiovascular:      Rate and Rhythm: Normal rate and regular rhythm. Pulses: Normal pulses. Heart sounds: Normal heart sounds. No murmur heard. No friction rub. No gallop. Pulmonary:      Effort: Pulmonary effort is normal.      Breath sounds: Normal breath sounds. No wheezing, rhonchi or rales. Abdominal:      General: Bowel sounds are normal. There is no distension. Palpations: Abdomen is soft. There is no mass. Tenderness: There is no abdominal tenderness. There is no right CVA tenderness, left CVA tenderness, guarding or rebound. Musculoskeletal:         General: Tenderness present. No deformity. Normal range of motion. Cervical back: Normal range of motion and neck supple. No rigidity. No muscular tenderness. Right lower leg: No edema. Left lower leg: No edema. Skin:     General: Skin is warm and dry. Capillary Refill: Capillary refill takes less than 2 seconds. Findings: No bruising, erythema or rash. Neurological:      General: No focal deficit present. Mental Status: She is alert and oriented to person, place, and time. Coordination: Coordination normal.      Gait: Gait normal.   Psychiatric:         Mood and Affect: Mood normal.         Behavior: Behavior normal.         ASSESSMENT & PLAN    1. Encounter for examination following treatment at hospital  Discussed medication changes    2. Primary hypertension  Patient to continue with amlodipine, lisinopril and chlorthalidone, his lower than yesterday but still uncontrolled, patient has not started amlodipine  - chlorthalidone (HYGROTEN) 50 MG tablet; Take 1 tablet by mouth daily  Dispense: 30 tablet; Refill: 3    3. Adrenal nodule ([address]  Further evaluation needed  - CT ADRENALS W CONTRAST; Future    4. Rib pain  Likely costochondritis or pulled muscle, recommend ice and topicals including lidocaine  - diclofenac sodium (VOLTAREN) 1 % GEL; Apply 2 g topically 4 times daily  Dispense: 150 g; Refill: 0    5. Gastroesophageal reflux disease, unspecified whether esophagitis present    - pantoprazole (PROTONIX) 40 MG tablet; Take 1 tablet by mouth daily  Dispense: 30 tablet; Refill: 5      Return in about 4 weeks (around 6/10/2022) for bp follow up. Electronically signed by Verónica Monroe on 5/13/2022      Comment: Please note this report has been produced using speech recognition software and may contain errors related to that system including errors in grammar, punctuation, and spelling, as well as words and phrases that may be inappropriate. If there are any questions or concerns please feel free to contact the dictating provider for clarification.

## 2022-05-14 VITALS
TEMPERATURE: 98.7 F | RESPIRATION RATE: 16 BRPM | SYSTOLIC BLOOD PRESSURE: 144 MMHG | BODY MASS INDEX: 28.07 KG/M2 | HEART RATE: 71 BPM | WEIGHT: 143 LBS | HEIGHT: 60 IN | DIASTOLIC BLOOD PRESSURE: 87 MMHG | OXYGEN SATURATION: 96 %

## 2022-05-14 PROCEDURE — 6360000002 HC RX W HCPCS: Performed by: PHYSICIAN ASSISTANT

## 2022-05-14 PROCEDURE — 96374 THER/PROPH/DIAG INJ IV PUSH: CPT

## 2022-05-14 RX ORDER — HYDROCODONE BITARTRATE AND ACETAMINOPHEN 5; 325 MG/1; MG/1
TABLET ORAL
Status: DISCONTINUED
Start: 2022-05-14 | End: 2022-05-14 | Stop reason: HOSPADM

## 2022-05-14 RX ORDER — CLONIDINE HYDROCHLORIDE 0.1 MG/1
0.1 TABLET ORAL 2 TIMES DAILY PRN
Qty: 30 TABLET | Refills: 3 | Status: SHIPPED | OUTPATIENT
Start: 2022-05-14 | End: 2022-08-16 | Stop reason: ALTCHOICE

## 2022-05-14 RX ORDER — MORPHINE SULFATE 4 MG/ML
4 INJECTION, SOLUTION INTRAMUSCULAR; INTRAVENOUS EVERY 30 MIN PRN
Status: DISCONTINUED | OUTPATIENT
Start: 2022-05-14 | End: 2022-05-14 | Stop reason: HOSPADM

## 2022-05-14 RX ORDER — HYDROCODONE BITARTRATE AND ACETAMINOPHEN 5; 325 MG/1; MG/1
1 TABLET ORAL ONCE
Status: DISCONTINUED | OUTPATIENT
Start: 2022-05-14 | End: 2022-05-14 | Stop reason: HOSPADM

## 2022-05-14 RX ADMIN — MORPHINE SULFATE 4 MG: 4 INJECTION INTRAVENOUS at 00:28

## 2022-05-14 ASSESSMENT — PAIN SCALES - GENERAL: PAINLEVEL_OUTOF10: 10

## 2022-05-14 NOTE — ED PROVIDER NOTES
Triage Chief Complaint:   Hypertension (systolic staying in the 455'O ), Other (head fullness), and Abdominal Pain (left upper side x 1 month)    [place]:  Today in the ED I had the pleasure of caring for Anastasiya Lee who is a 67 y.o. female that since today to the emergency department. For evaluation for hypertension. Patient has had left upper quadrant abdominal pain/midepigastric abdominal pain, hypertension uncontrolled release over the last 1 month has seen PCP multitude of times for this. Is on multiple antihypertensive medications. Saw PCP yesterday. Her blood pressure was elevated she came here was evaluated by us. Had an completely exhaustive and negative work-up including CBC metabolic panel troponin BNP CT angiogram of the lungs CT abdomen pelvis with contrast.  Completely unremarkable work-up. Patient then follow-up with PCP today. Was evaluated PCP told patient to continue taking her medications as directed. However she/family members were not satisfied so they came here for evaluation for this abdominal pain and hypertension once again. She denies any changes in vision no abdominal or chest pain. No flank pain.      ROS:  REVIEW OF SYSTEMS    At least 10 systems reviewed      All other review of systems are negative  See HPI and nursing notes for additional information       Past Medical History:   Diagnosis Date    Acute exacerbation of chronic obstructive pulmonary disease (COPD) ([address] 12/15/2021    Cerebral artery occlusion with cerebral infarction ([address]     COPD, mild ([address] 6/15/2021    Diabetes mellitus ([address]     Excessive daytime sleepiness 6/15/2021    Former smoker 6/15/2021    Hyperlipidemia     Hypertension     Mild obstructive sleep apnea 12/15/2021    Multiple lung nodules on CT 7/27/2021    Shortness of breath 6/15/2021    Snoring 6/15/2021     Past Surgical History:   Procedure Laterality Date    APPENDECTOMY      CHOLECYSTECTOMY      COLONOSCOPY N/A 9/15/2021 COLONOSCOPY POLYPECTOMY SNARE/COLD BIOPSY performed by Fariba Galdamez MD at [address] Left     Left hand    HYSTERECTOMY      JOINT REPLACEMENT Right     2020-Right Hip    OVARY REMOVAL      age 34    TONSILLECTOMY      UPPER GASTROINTESTINAL ENDOSCOPY N/A 9/15/2021    EGD DIAGNOSTIC ONLY performed by Fariba Galdamez MD at [facility] ENDOSCOPY     Family History   Problem Relation Age of Onset    Colon Cancer Mother     Heart Attack Father     Other Father         suicide    Ovarian Cancer Daughter     Other Sister         suicide    Heart Attack Brother     Pacemaker Brother     No Known Problems Brother     No Known Problems Brother     No Known Problems Brother      Social History     Socioeconomic History    Marital status: Single     Spouse name: Not on file    Number of children: Not on file    Years of education: Not on file    Highest education level: Not on file   Occupational History    Not on file   Tobacco Use    Smoking status: Former Smoker     Packs/day: 1.00     Years: 51.00     Pack years: 51.00     Start date:      Quit date: 2020     Years since quittin.4    Smokeless tobacco: Never Used   Vaping Use    Vaping Use: Former    Substances: Flavoring    Devices: Disposable, Pre-filled pod   Substance and Sexual Activity    Alcohol use: Not Currently    Drug use: Not Currently    Sexual activity: Not on file   Other Topics Concern    Not on file   Social History Narrative    Not on file     Social Determinants of Health     Financial Resource Strain: Low Risk     Difficulty of Paying Living Expenses: Not hard at all   Food Insecurity: No Food Insecurity    Worried About [address] in the Last Year: Never true    [facility] in the Last Year: Never true   Transportation Needs: No Transportation Needs    Lack of Transportation (Medical): No    Lack of Transportation (Non-Medical):  No   Physical Activity:     Days of Exercise per Week: Not on file    Minutes of Exercise per Session: Not on file   Stress:     Feeling of Stress : Not on file   Social Connections:     Frequency of Communication with Friends and Family: Not on file    Frequency of Social Gatherings with Friends and Family: Not on file    Attends Yazidism Services: Not on file    Active Member of [address] [place] [address] or Organizations: Not on file    Attends Club or Organization Meetings: Not on file    Marital Status: Not on file   Intimate Partner Violence:     Fear of Current or Ex-Partner: Not on file    Emotionally Abused: Not on file    Physically Abused: Not on file    Sexually Abused: Not on file   Housing Stability:     Unable to Pay for Housing in the Last Year: Not on file    Number of [place] in the Last Year: Not on file    Unstable Housing in the Last Year: Not on file     Current Facility-Administered Medications   Medication Dose Route Frequency Provider Last Rate Last Admin    morphine sulfate (PF) injection 4 mg  4 mg IntraVENous Q30 Min PRN [facility], PA-C   4 mg at 05/14/22 0028     Current Outpatient Medications   Medication Sig Dispense Refill    diclofenac sodium (VOLTAREN) 1 % GEL Apply 2 g topically 4 times daily 150 g 0    pantoprazole (PROTONIX) 40 MG tablet Take 1 tablet by mouth daily 30 tablet 5    chlorthalidone (HYGROTEN) 50 MG tablet Take 1 tablet by mouth daily 30 tablet 3    amLODIPine (NORVASC) 5 MG tablet Take 1 tablet by mouth daily for 5 days 5 tablet 0    albuterol (PROVENTIL) (2.5 MG/3ML) 0.083% nebulizer solution Take 3 mLs by nebulization every 6 hours as needed for Wheezing 360 each 2    lidocaine 4 % external patch Place 1 patch onto the skin daily 15 patch 0    dicyclomine (BENTYL) 10 MG capsule Take 1 capsule by mouth 3 times daily as needed (stomach) 120 capsule 2    metFORMIN (GLUCOPHAGE) 500 MG tablet Take 1 tablet by mouth 2 times daily (with meals) 180 tablet 1    alendronate (FOSAMAX) 70 MG tablet Take 1 tablet by mouth every 7 days 12 tablet 1    atorvastatin (LIPITOR) 20 MG tablet Take 1 tablet by mouth daily 90 tablet 1    albuterol sulfate  (90 Base) MCG/ACT inhaler Inhale 2 puffs into the lungs every 4 hours as needed for Wheezing 1 each 11    umeclidinium-vilanterol (ANORO ELLIPTA) 62.5-25 MCG/INH AEPB inhaler Inhale 1 puff into the lungs daily 1 each 11    lisinopril (PRINIVIL;ZESTRIL) 40 MG tablet TAKE 1 TABLET BY MOUTH EVERY DAY 30 tablet 0    aspirin 81 MG EC tablet Take 81 mg by mouth daily       Allergies   Allergen Reactions    Other Hives    Levaquin [Levofloxacin] Rash       Nursing Notes Reviewed    Physical Exam:  ED Triage Vitals [05/13/22 2137]   Enc Vitals Group      BP (!) 206/79      Pulse 66      Resp 16      Temp 98.7 °F (37.1 °C)      Temp Source Oral      SpO2 97 %      Weight 143 lb (64.9 kg)      Height 5' (1.524 m)      Head Circumference       Peak Flow       Pain Score       Pain Loc       Pain Edu? Excl. in [address]? General :Patient is awake alert oriented person place and time no acute distress nontoxic appearing  HEENT: Pupils are equally round and reactive to light extraocular motors are intact conjunctivae clear sclerae white there is no injection no icterus. Nose without any rhinorrhea or epistaxis. Oral mucosa is moist no exudate buccal mucosa shows no ulcerations. Uvula is midline    Neck: Neck is supple full range of motion trachea midline thyroid nonpalpable  Cardiac: Heart regular rate rhythm no murmurs rubs clicks or gallops  Lungs: Lungs are clear to auscultation there is no wheezing rhonchi or rales. There is no use of accessory muscles no nasal flaring identified. Chest wall: There is no tenderness to palpation over the chest wall or over ribs  Abdomen: Abdomen is soft nontender nondistended.  There is no firm or pulsatile masses no rebound rigidity or guarding negative De Oliveira's negative McBurney, no peritoneal signs  Suprapubic:  there is no tenderness to palpation over the external bladder   Musculoskeletal: 5 out of 5 strength in all 4 extremities full flexion extension abduction and adduction supination pronation of all extremities and all digits. No obvious muscle atrophy is noted. No focal muscle deficits are appreciated  Dermatology: Skin is warm and dry there is no obvious abscesses lacerations or lesions noted  Psych: Mentation is grossly normal cognition is grossly normal. Affect is appropriate  Neuro: Motor intact sensory intact cranial nerves II through XII are intact level of consciousness is normal cerebellar function is normal reflexes are grossly normal. No evidence of incontinence or loss of bowel or bladder no saddle anesthesia noted Lymphatic: There is no submandibular or cervical adenopathy appreciated.         I have reviewed and interpreted all of the currently available lab results from this visit (if applicable):  Results for orders placed or performed during the hospital encounter of 05/13/22   CBC with Auto Differential   Result Value Ref Range    WBC 6.3 4.0 - 10.5 K/CU MM    RBC 3.54 (L) 4.2 - 5.4 M/CU MM    Hemoglobin 10.6 (L) 12.5 - 16.0 GM/DL    Hematocrit 33.1 (L) 37 - 47 %    MCV 93.5 78 - 100 FL    MCH 29.9 27 - 31 PG    MCHC 32.0 32.0 - 36.0 %    RDW 14.0 11.7 - 14.9 %    Platelets 842 564 - 379 K/CU MM    MPV 9.1 7.5 - 11.1 FL    Differential Type AUTOMATED DIFFERENTIAL     Segs Relative 63.7 36 - 66 %    Lymphocytes % 21.8 (L) 24 - 44 %    Monocytes % 9.3 (H) 0 - 4 %    Eosinophils % 3.0 0 - 3 %    Basophils % 1.1 (H) 0 - 1 %    Segs Absolute 4.0 K/CU MM    Lymphocytes Absolute 1.4 K/CU MM    Monocytes Absolute 0.6 K/CU MM    Eosinophils Absolute 0.2 K/CU MM    Basophils Absolute 0.1 K/CU MM    Nucleated RBC % 0.0 %    Total Nucleated RBC 0.0 K/CU MM    Total Immature Neutrophil 0.07 K/CU MM    Immature Neutrophil % 1.1 (H) 0 - 0.43 %   Comprehensive Metabolic Panel   Result Value Ref Range    Sodium 139 135 - 145 MMOL/L    Potassium 3.3 (L) 3.5 - 5.1 MMOL/L    Chloride 103 99 - 110 mMol/L    CO2 24 21 - 32 MMOL/L    BUN 21 6 - 23 MG/DL    CREATININE 1.0 0.6 - 1.1 MG/DL    Glucose 124 (H) 70 - 99 MG/DL    Calcium 8.9 8.3 - 10.6 MG/DL    Albumin 4.0 3.4 - 5.0 GM/DL    Total Protein 6.6 6.4 - 8.2 GM/DL    Total Bilirubin 0.3 0.0 - 1.0 MG/DL    ALT 37 10 - 40 U/L    AST 23 15 - 37 IU/L    Alkaline Phosphatase 63 40 - 129 IU/L    GFR Non- 54 (L) >60 mL/min/1.73m2    GFR African American >60 >60 mL/min/1.73m2    Anion Gap 12 4 - 16   Troponin   Result Value Ref Range    Troponin T <0.010 <0.01 NG/ML   Lipase   Result Value Ref Range    Lipase 39 13 - 60 IU/L   Brain Natriuretic Peptide   Result Value Ref Range    Pro-.7 (H) <300 PG/ML      Radiographs (if obtained):  [] The following radiograph was interpreted by myself in the absence of a radiologist:   [] Radiologist's Report Reviewed:  No orders to display       EKG (if obtained):   Please See Note of attending physician for EKG interpretation. Chart review shows recent radiograph(s):  XR CHEST (2 VW)    Result Date: 5/12/2022  EXAMINATION: TWO XRAY VIEWS OF THE CHEST 5/12/2022 4:01 pm COMPARISON: Chest x-ray dated 24 April 2022 HISTORY: ORDERING SYSTEM PROVIDED HISTORY: cp TECHNOLOGIST PROVIDED HISTORY: Reason for exam:->cp Reason for Exam: left lateral chest pain x 1 month FINDINGS: No acute airspace infiltrate. No pneumothorax or pleural effusion. Stable cardiomediastinal silhouette.      No acute cardiopulmonary findings     XR RIBS LEFT INCLUDE CHEST (MIN 3 VIEWS)    Result Date: 4/24/2022  EXAMINATION: 7 XRAY VIEWS OF THE LEFT RIBS WITH FRONTAL XRAY VIEW OF THE CHEST 4/24/2022 9:45 am COMPARISON: 07/01/2021 HISTORY: ORDERING SYSTEM PROVIDED HISTORY: rib pain near L lateral breast TECHNOLOGIST PROVIDED HISTORY: Reason for exam:->rib pain near L lateral breast Reason for Exam: rib pain near L lateral breast Additional signs and symptoms: pt complains of left side rib pain that started yesterday after she missed a step and was able to catch herself before she fell Relevant Medical/Surgical History: Hx COPD, HTN FINDINGS: Chest: Cardiomediastinal silhouette stable. Scarring again seen in the lingula. This has a more nodular appearance on today's exam.  No pleural effusion. No pneumothorax. Left ribs: No fracture or destructive bony abnormality. No acute abnormalities seen in the chest or left ribs Scarring seen in the lingula, similar to previous CT done 12/31/2021 but appears more nodular today. A chest CT is suggested for further evaluation. CT CHEST WO CONTRAST    Result Date: 5/9/2022  EXAMINATION: CT OF THE CHEST WITHOUT CONTRAST 5/6/2022 9:54 am TECHNIQUE: CT of the chest was performed without the administration of intravenous contrast. Multiplanar reformatted images are provided for review. Dose modulation, iterative reconstruction, and/or weight based adjustment of the mA/kV was utilized to reduce the radiation dose to as low as reasonably achievable. COMPARISON: 12/31/2021 HISTORY: ORDERING SYSTEM PROVIDED HISTORY: Lung nodule TECHNOLOGIST PROVIDED HISTORY: Reason for Exam: Recheck Lung Nodule, asthma, sob, cough, COPD, Emphysema, Former smoker FINDINGS: Mediastinum: The thyroid is within normal limits. No thyroid nodules requiring follow-up. Aortic and coronary atherosclerosis. No pericardial effusion. The esophagus is within normal limits. No mediastinal adenopathy. Lungs/pleura: Mild emphysematous changes. Multiple tiny pulmonary nodules are present such as a 3 mm nodule at the left apex on series 3, image 23, a left upper lobe nodule on series 3, image 35 measuring 2 mm, and a right apical nodule measuring 2 mm on series 3, image 27.  4 mm left upper lobe nodule on series 3, image 50. Central airways are patent. No consolidations. No pleural effusion or pneumothorax.  Upper Abdomen: Status post cholecystectomy. Hepatic steatosis. Soft Tissues/Bones: No acute osseous abnormality. Small bilateral pulmonary nodules measuring up to 4 mm are stable from prior exams. Hepatic steatosis. CT ABDOMEN PELVIS W IV CONTRAST Additional Contrast? None    Result Date: 5/12/2022  EXAMINATION: CT OF THE ABDOMEN AND PELVIS WITH CONTRAST; CTA OF THE CHEST 5/12/2022 5:07 pm TECHNIQUE: CT of the abdomen and pelvis was performed with the administration of intravenous contrast. Multiplanar reformatted images are provided for review. Automated exposure control, iterative reconstruction, and/or weight based adjustment of the mA/kV was utilized to reduce the radiation dose to as low as reasonably achievable.; CTA of the chest was performed after the administration of intravenous contrast.  Multiplanar reformatted images are provided for review. MIP images are provided for review. Automated exposure control, iterative reconstruction, and/or weight based adjustment of the mA/kV was utilized to reduce the radiation dose to as low as reasonably achievable. COMPARISON: None CT chest May 6, 2022 HISTORY: ORDERING SYSTEM PROVIDED HISTORY: left flank pain TECHNOLOGIST PROVIDED HISTORY: Reason for exam:->left flank pain Additional Contrast?->None Decision Support Exception - unselect if not a suspected or confirmed emergency medical condition->Emergency Medical Condition (MA) Reason for Exam: left flank pain; ORDERING SYSTEM PROVIDED HISTORY: left sided chest wall pain TECHNOLOGIST PROVIDED HISTORY: Reason for exam:->left sided chest wall pain Decision Support Exception - unselect if not a suspected or confirmed emergency medical condition->Emergency Medical Condition (MA) Reason for Exam: left sided chest wall pain FINDINGS: CTA chest: Pulmonary Arteries: Pulmonary arteries are adequately opacified for evaluation. No evidence of intraluminal filling defect to suggest pulmonary embolism.   Main pulmonary artery is normal in caliber. Mediastinum: No evidence of mediastinal lymphadenopathy. The heart and pericardium demonstrate no acute abnormality. There is no acute abnormality of the thoracic aorta. Mild coronary artery atherosclerotic disease. Moderate to severe calcified atherosclerotic plaque throughout the aorta. Lungs/pleura: Centrilobular emphysema predominant involving the lung apices. Atelectasis versus scarring at the lung bases. No focal consolidation or pleural effusion. Soft Tissues/Bones: No acute bone or soft tissue abnormality. CT abdomen/Pelvis: Organs: The liver demonstrates no acute abnormality. The gallbladder is surgically absent. Mild intrahepatic and extrahepatic biliary dilatation is likely postoperative. The spleen, pancreas, and adrenal glands demonstrate no acute abnormality. 1.2 cm indeterminate left adrenal nodule. Right adrenal gland appears unremarkable. The kidneys enhance symmetrically. Subcentimeter hypodensities of the kidneys bilaterally are too small to accurately characterize and most statistically compatible with small cyst. No hydronephrosis. No obstructive uropathy. GI/Bowel: No bowel obstruction. Colonic diverticulosis without CT evidence of diverticulitis. The appendix is not visualized. No secondary signs of appendicitis. The small bowel is normal in caliber. Pelvis: The bladder appears unremarkable. The uterus is surgically absent. Peritoneum/Retroperitoneum: The abdominal aorta is normal in caliber with severe calcified atherosclerotic plaque throughout. No retroperitoneal adenopathy. No free fluid or free air identified. Bones/Soft Tissues: No acute osseous or soft tissue findings. Postoperative changes of the right hip with intramedullary nail and femoral neck screw in place. Chronically displaced lesser trochanteric fragment on the right. Avascular necrosis of the left femoral head with no evidence for subchondral collapse. Osseous mineralization is decreased.   No acute soft tissue abnormality is identified. 1. No pulmonary embolism identified. 2. No acute pulmonary process evident. 3. Centrilobular emphysema. 4. No acute intraabdominal findings. 5. No obstructive uropathy or or hydronephrosis identified. 6. Colonic diverticulosis without CT evidence of diverticulitis. 7. 1.2 cm indeterminate left adrenal nodule. Recommend further evaluation with dedicated nonemergent adrenal protocol MRI or CT for complete characterization. 8. Severe atherosclerotic disease. CTA PULMONARY W CONTRAST    Result Date: 5/12/2022  EXAMINATION: CT OF THE ABDOMEN AND PELVIS WITH CONTRAST; CTA OF THE CHEST 5/12/2022 5:07 pm TECHNIQUE: CT of the abdomen and pelvis was performed with the administration of intravenous contrast. Multiplanar reformatted images are provided for review. Automated exposure control, iterative reconstruction, and/or weight based adjustment of the mA/kV was utilized to reduce the radiation dose to as low as reasonably achievable.; CTA of the chest was performed after the administration of intravenous contrast.  Multiplanar reformatted images are provided for review. MIP images are provided for review. Automated exposure control, iterative reconstruction, and/or weight based adjustment of the mA/kV was utilized to reduce the radiation dose to as low as reasonably achievable.  COMPARISON: None CT chest May 6, 2022 HISTORY: ORDERING SYSTEM PROVIDED HISTORY: left flank pain TECHNOLOGIST PROVIDED HISTORY: Reason for exam:->left flank pain Additional Contrast?->None Decision Support Exception - unselect if not a suspected or confirmed emergency medical condition->Emergency Medical Condition (MA) Reason for Exam: left flank pain; ORDERING SYSTEM PROVIDED HISTORY: left sided chest wall pain TECHNOLOGIST PROVIDED HISTORY: Reason for exam:->left sided chest wall pain Decision Support Exception - unselect if not a suspected or confirmed emergency medical condition->Emergency Medical Condition (MA) Reason for Exam: left sided chest wall pain FINDINGS: CTA chest: Pulmonary Arteries: Pulmonary arteries are adequately opacified for evaluation. No evidence of intraluminal filling defect to suggest pulmonary embolism. Main pulmonary artery is normal in caliber. Mediastinum: No evidence of mediastinal lymphadenopathy. The heart and pericardium demonstrate no acute abnormality. There is no acute abnormality of the thoracic aorta. Mild coronary artery atherosclerotic disease. Moderate to severe calcified atherosclerotic plaque throughout the aorta. Lungs/pleura: Centrilobular emphysema predominant involving the lung apices. Atelectasis versus scarring at the lung bases. No focal consolidation or pleural effusion. Soft Tissues/Bones: No acute bone or soft tissue abnormality. CT abdomen/Pelvis: Organs: The liver demonstrates no acute abnormality. The gallbladder is surgically absent. Mild intrahepatic and extrahepatic biliary dilatation is likely postoperative. The spleen, pancreas, and adrenal glands demonstrate no acute abnormality. 1.2 cm indeterminate left adrenal nodule. Right adrenal gland appears unremarkable. The kidneys enhance symmetrically. Subcentimeter hypodensities of the kidneys bilaterally are too small to accurately characterize and most statistically compatible with small cyst. No hydronephrosis. No obstructive uropathy. GI/Bowel: No bowel obstruction. Colonic diverticulosis without CT evidence of diverticulitis. The appendix is not visualized. No secondary signs of appendicitis. The small bowel is normal in caliber. Pelvis: The bladder appears unremarkable. The uterus is surgically absent. Peritoneum/Retroperitoneum: The abdominal aorta is normal in caliber with severe calcified atherosclerotic plaque throughout. No retroperitoneal adenopathy. No free fluid or free air identified. Bones/Soft Tissues: No acute osseous or soft tissue findings.   Postoperative changes of the right hip with intramedullary nail and femoral neck screw in place. Chronically displaced lesser trochanteric fragment on the right. Avascular necrosis of the left femoral head with no evidence for subchondral collapse. Osseous mineralization is decreased. No acute soft tissue abnormality is identified. 1. No pulmonary embolism identified. 2. No acute pulmonary process evident. 3. Centrilobular emphysema. 4. No acute intraabdominal findings. 5. No obstructive uropathy or or hydronephrosis identified. 6. Colonic diverticulosis without CT evidence of diverticulitis. 7. 1.2 cm indeterminate left adrenal nodule. Recommend further evaluation with dedicated nonemergent adrenal protocol MRI or CT for complete characterization. 8. Severe atherosclerotic disease. MDM:     Interventions given this visit:   Orders Placed This Encounter   Medications    cloNIDine (CATAPRES) tablet 0.1 mg    morphine sulfate (PF) injection 4 mg     Patient presents today to the emergency department with left-sided abdominal pain seen here yesterday has been ongoing x1 month. Not new. As well as hypertension. Blood pressure slightly elevated today in the emergency department no evidence of hypertensive emergency no evidence of endorgan damage. Normal troponin CBC metabolic panel unremarkable no endorgan renal dysfunction. No changes in vision no stroke symptoms patient shows no neurologic deficits. Patient will be discharged. A depressed is provided. Patient is educated to take Catapres for emergencies. Prescription provided     I independently managed patient today in the ED.    BP (!) 179/76   Pulse 74   Temp 98.7 °F (37.1 °C) (Oral)   Resp 16   Ht 5' (1.524 m)   Wt 143 lb (64.9 kg)   SpO2 96%   BMI 27.93 kg/m²       Clinical Impression:  1.  Secondary hypertension        Disposition referral (if applicable):  Tyson Raines DO  [address] [address] [place]  [phone]    In 2

## 2022-05-14 NOTE — ED NOTES
Pt arrived with c/o hypertension. States she was just here yesterday and was started on amlodipine but her systolic has been staying in the 190's. Pt also states her head feels full and she has abdominal pain on her left side that is not subsiding.       Blas Preston, STEPHANE  05/13/22 9959

## 2022-05-16 LAB
EKG ATRIAL RATE: 70 BPM
EKG DIAGNOSIS: NORMAL
EKG P AXIS: 12 DEGREES
EKG P-R INTERVAL: 182 MS
EKG Q-T INTERVAL: 372 MS
EKG QRS DURATION: 64 MS
EKG QTC CALCULATION (BAZETT): 401 MS
EKG R AXIS: 59 DEGREES
EKG T AXIS: 78 DEGREES
EKG VENTRICULAR RATE: 70 BPM

## 2022-05-16 PROCEDURE — 93010 ELECTROCARDIOGRAM REPORT: CPT | Performed by: INTERNAL MEDICINE

## 2022-05-22 ENCOUNTER — APPOINTMENT (OUTPATIENT)
Dept: CT IMAGING | Age: 72
End: 2022-05-22
Payer: MEDICARE

## 2022-05-22 ENCOUNTER — HOSPITAL ENCOUNTER (EMERGENCY)
Age: 72
Discharge: HOME | End: 2022-05-22
Attending: EMERGENCY MEDICINE
Payer: MEDICARE

## 2022-05-22 VITALS
HEART RATE: 84 BPM | SYSTOLIC BLOOD PRESSURE: 117 MMHG | RESPIRATION RATE: 16 BRPM | HEIGHT: 60 IN | BODY MASS INDEX: 27.01 KG/M2 | DIASTOLIC BLOOD PRESSURE: 67 MMHG | OXYGEN SATURATION: 95 % | TEMPERATURE: 97.3 F | WEIGHT: 137.6 LBS

## 2022-05-22 DIAGNOSIS — E86.1 HYPOTENSION DUE TO HYPOVOLEMIA: ICD-10-CM

## 2022-05-22 DIAGNOSIS — I95.89 HYPOTENSION DUE TO HYPOVOLEMIA: ICD-10-CM

## 2022-05-22 DIAGNOSIS — K52.9 GASTROENTERITIS: Primary | ICD-10-CM

## 2022-05-22 DIAGNOSIS — E86.0 DEHYDRATION: ICD-10-CM

## 2022-05-22 LAB
ALBUMIN SERPL-MCNC: 3.3 GM/DL (ref 3.4–5)
ALBUMIN SERPL-MCNC: 4.1 GM/DL (ref 3.4–5)
ALP BLD-CCNC: 59 IU/L (ref 40–129)
ALP BLD-CCNC: 76 IU/L (ref 40–129)
ALT SERPL-CCNC: 55 U/L (ref 10–40)
ALT SERPL-CCNC: 68 U/L (ref 10–40)
ANION GAP SERPL CALCULATED.3IONS-SCNC: 13 MMOL/L (ref 4–16)
ANION GAP SERPL CALCULATED.3IONS-SCNC: 18 MMOL/L (ref 4–16)
AST SERPL-CCNC: 64 IU/L (ref 15–37)
AST SERPL-CCNC: 83 IU/L (ref 15–37)
BACTERIA: ABNORMAL /HPF
BASOPHILS ABSOLUTE: 0 K/CU MM
BASOPHILS RELATIVE PERCENT: 0.2 % (ref 0–1)
BILIRUB SERPL-MCNC: 0.5 MG/DL (ref 0–1)
BILIRUB SERPL-MCNC: 0.9 MG/DL (ref 0–1)
BILIRUBIN URINE: NEGATIVE MG/DL
BLOOD, URINE: NEGATIVE
BUN BLDV-MCNC: 33 MG/DL (ref 6–23)
BUN BLDV-MCNC: 36 MG/DL (ref 6–23)
CALCIUM SERPL-MCNC: 7.3 MG/DL (ref 8.3–10.6)
CALCIUM SERPL-MCNC: 8.6 MG/DL (ref 8.3–10.6)
CAST TYPE: ABNORMAL /HPF
CHLORIDE BLD-SCNC: 103 MMOL/L (ref 99–110)
CHLORIDE BLD-SCNC: 96 MMOL/L (ref 99–110)
CLARITY: ABNORMAL
CO2: 19 MMOL/L (ref 21–32)
CO2: 21 MMOL/L (ref 21–32)
COLOR: YELLOW
COMMENT UA: ABNORMAL
CREAT SERPL-MCNC: 1.2 MG/DL (ref 0.6–1.1)
CREAT SERPL-MCNC: 1.4 MG/DL (ref 0.6–1.1)
CRYSTAL TYPE: NEGATIVE /HPF
DIFFERENTIAL TYPE: ABNORMAL
EKG ATRIAL RATE: 83 BPM
EKG ATRIAL RATE: 93 BPM
EKG DIAGNOSIS: NORMAL
EKG DIAGNOSIS: NORMAL
EKG P AXIS: -17 DEGREES
EKG P AXIS: -23 DEGREES
EKG P-R INTERVAL: 166 MS
EKG P-R INTERVAL: 186 MS
EKG Q-T INTERVAL: 380 MS
EKG Q-T INTERVAL: 404 MS
EKG QRS DURATION: 62 MS
EKG QRS DURATION: 64 MS
EKG QTC CALCULATION (BAZETT): 472 MS
EKG QTC CALCULATION (BAZETT): 474 MS
EKG R AXIS: 35 DEGREES
EKG R AXIS: 37 DEGREES
EKG T AXIS: 87 DEGREES
EKG T AXIS: 97 DEGREES
EKG VENTRICULAR RATE: 83 BPM
EKG VENTRICULAR RATE: 93 BPM
EOSINOPHILS ABSOLUTE: 0 K/CU MM
EOSINOPHILS RELATIVE PERCENT: 0.3 % (ref 0–3)
EPITHELIAL CELLS, UA: 5 /HPF
GFR AFRICAN AMERICAN: 45 ML/MIN/1.73M2
GFR AFRICAN AMERICAN: 53 ML/MIN/1.73M2
GFR NON-AFRICAN AMERICAN: 37 ML/MIN/1.73M2
GFR NON-AFRICAN AMERICAN: 44 ML/MIN/1.73M2
GLUCOSE BLD-MCNC: 143 MG/DL (ref 70–99)
GLUCOSE BLD-MCNC: 183 MG/DL (ref 70–99)
GLUCOSE, URINE: NEGATIVE MG/DL
HCT VFR BLD CALC: 44.7 % (ref 37–47)
HEMOGLOBIN: 14 GM/DL (ref 12.5–16)
IMMATURE NEUTROPHIL %: 0.4 % (ref 0–0.43)
KETONES, URINE: NEGATIVE MG/DL
LACTATE: 2.4 MMOL/L (ref 0.4–2)
LACTIC ACID, SEPSIS: 1.5 MMOL/L (ref 0.5–1.9)
LEUKOCYTE ESTERASE, URINE: ABNORMAL
LIPASE: 26 IU/L (ref 13–60)
LYMPHOCYTES ABSOLUTE: 0.5 K/CU MM
LYMPHOCYTES RELATIVE PERCENT: 4.5 % (ref 24–44)
MCH RBC QN AUTO: 29.3 PG (ref 27–31)
MCHC RBC AUTO-ENTMCNC: 31.3 % (ref 32–36)
MCV RBC AUTO: 93.5 FL (ref 78–100)
MONOCYTES ABSOLUTE: 0.5 K/CU MM
MONOCYTES RELATIVE PERCENT: 5.3 % (ref 0–4)
NITRITE URINE, QUANTITATIVE: NEGATIVE
PDW BLD-RTO: 13.5 % (ref 11.7–14.9)
PH, URINE: 5.5 (ref 5–8)
PLATELET # BLD: 375 K/CU MM (ref 140–440)
PMV BLD AUTO: 9.7 FL (ref 7.5–11.1)
POTASSIUM SERPL-SCNC: 4 MMOL/L (ref 3.5–5.1)
POTASSIUM SERPL-SCNC: 4.1 MMOL/L (ref 3.5–5.1)
PROTEIN UA: NEGATIVE MG/DL
RBC # BLD: 4.78 M/CU MM (ref 4.2–5.4)
RBC URINE: 10 /HPF (ref 0–6)
SEGMENTED NEUTROPHILS ABSOLUTE COUNT: 8.9 K/CU MM
SEGMENTED NEUTROPHILS RELATIVE PERCENT: 89.3 % (ref 36–66)
SODIUM BLD-SCNC: 133 MMOL/L (ref 135–145)
SODIUM BLD-SCNC: 137 MMOL/L (ref 135–145)
SPECIFIC GRAVITY UA: 1.02 (ref 1–1.03)
TOTAL IMMATURE NEUTOROPHIL: 0.04 K/CU MM
TOTAL PROTEIN: 5.9 GM/DL (ref 6.4–8.2)
TOTAL PROTEIN: 7.3 GM/DL (ref 6.4–8.2)
TROPONIN T: <0.01 NG/ML
UROBILINOGEN, URINE: 0.2 MG/DL (ref 0.2–1)
WBC # BLD: 10 K/CU MM (ref 4–10.5)
WBC UA: 7 /HPF (ref 0–5)

## 2022-05-22 PROCEDURE — 85025 COMPLETE CBC W/AUTO DIFF WBC: CPT

## 2022-05-22 PROCEDURE — 70450 CT HEAD/BRAIN W/O DYE: CPT

## 2022-05-22 PROCEDURE — 93010 ELECTROCARDIOGRAM REPORT: CPT | Performed by: INTERNAL MEDICINE

## 2022-05-22 PROCEDURE — 83690 ASSAY OF LIPASE: CPT

## 2022-05-22 PROCEDURE — 99284 EMERGENCY DEPT VISIT MOD MDM: CPT

## 2022-05-22 PROCEDURE — 80053 COMPREHEN METABOLIC PANEL: CPT

## 2022-05-22 PROCEDURE — 93005 ELECTROCARDIOGRAM TRACING: CPT | Performed by: EMERGENCY MEDICINE

## 2022-05-22 PROCEDURE — 81001 URINALYSIS AUTO W/SCOPE: CPT

## 2022-05-22 PROCEDURE — 96376 TX/PRO/DX INJ SAME DRUG ADON: CPT

## 2022-05-22 PROCEDURE — 6360000002 HC RX W HCPCS: Performed by: EMERGENCY MEDICINE

## 2022-05-22 PROCEDURE — 96374 THER/PROPH/DIAG INJ IV PUSH: CPT

## 2022-05-22 PROCEDURE — 74176 CT ABD & PELVIS W/O CONTRAST: CPT

## 2022-05-22 PROCEDURE — 2580000003 HC RX 258: Performed by: EMERGENCY MEDICINE

## 2022-05-22 PROCEDURE — 83605 ASSAY OF LACTIC ACID: CPT

## 2022-05-22 PROCEDURE — 84484 ASSAY OF TROPONIN QUANT: CPT

## 2022-05-22 PROCEDURE — 96360 HYDRATION IV INFUSION INIT: CPT

## 2022-05-22 PROCEDURE — 96375 TX/PRO/DX INJ NEW DRUG ADDON: CPT

## 2022-05-22 PROCEDURE — 96361 HYDRATE IV INFUSION ADD-ON: CPT

## 2022-05-22 RX ORDER — MORPHINE SULFATE 2 MG/ML
2 INJECTION, SOLUTION INTRAMUSCULAR; INTRAVENOUS ONCE
Status: COMPLETED | OUTPATIENT
Start: 2022-05-22 | End: 2022-05-22

## 2022-05-22 RX ORDER — 0.9 % SODIUM CHLORIDE 0.9 %
1000 INTRAVENOUS SOLUTION INTRAVENOUS ONCE
Status: COMPLETED | OUTPATIENT
Start: 2022-05-22 | End: 2022-05-22

## 2022-05-22 RX ORDER — ONDANSETRON 2 MG/ML
4 INJECTION INTRAMUSCULAR; INTRAVENOUS ONCE
Status: COMPLETED | OUTPATIENT
Start: 2022-05-22 | End: 2022-05-22

## 2022-05-22 RX ORDER — FENTANYL CITRATE 50 UG/ML
25 INJECTION, SOLUTION INTRAMUSCULAR; INTRAVENOUS ONCE
Status: COMPLETED | OUTPATIENT
Start: 2022-05-22 | End: 2022-05-22

## 2022-05-22 RX ORDER — ONDANSETRON 4 MG/1
4 TABLET, ORALLY DISINTEGRATING ORAL 3 TIMES DAILY PRN
Qty: 30 TABLET | Refills: 0 | Status: SHIPPED | OUTPATIENT
Start: 2022-05-22 | End: 2022-10-17

## 2022-05-22 RX ADMIN — MORPHINE SULFATE 2 MG: 2 INJECTION, SOLUTION INTRAMUSCULAR; INTRAVENOUS at 12:21

## 2022-05-22 RX ADMIN — ONDANSETRON 4 MG: 2 INJECTION INTRAMUSCULAR; INTRAVENOUS at 11:54

## 2022-05-22 RX ADMIN — SODIUM CHLORIDE 1000 ML: 9 INJECTION, SOLUTION INTRAVENOUS at 11:54

## 2022-05-22 RX ADMIN — SODIUM CHLORIDE 1000 ML: 9 INJECTION, SOLUTION INTRAVENOUS at 09:51

## 2022-05-22 RX ADMIN — FENTANYL CITRATE 25 MCG: 50 INJECTION, SOLUTION INTRAMUSCULAR; INTRAVENOUS at 11:55

## 2022-05-22 RX ADMIN — ONDANSETRON 4 MG: 2 INJECTION INTRAMUSCULAR; INTRAVENOUS at 10:06

## 2022-05-22 ASSESSMENT — PAIN DESCRIPTION - LOCATION
LOCATION: ABDOMEN
LOCATION: ABDOMEN

## 2022-05-22 ASSESSMENT — PAIN - FUNCTIONAL ASSESSMENT: PAIN_FUNCTIONAL_ASSESSMENT: 0-10

## 2022-05-22 ASSESSMENT — PAIN SCALES - GENERAL: PAINLEVEL_OUTOF10: 6

## 2022-05-22 NOTE — ED PROVIDER NOTES
EMERGENCY DEPARTMENT ENCOUNTER    Patient: Briseida Martinez  MRN: [id number]  : 1950  Date of Evaluation: 2022  ED Provider:  Jyoti Johnson MD    CHIEF COMPLAINT  Chief Complaint   Patient presents with    Emesis    Diarrhea       HPI  Briseida Martinez is a 67 y.o. female who presents with complaints of of mostly constant, moderate severity epigastric abdominal pain with nausea and nonbloody nonbilious emesis and nonbloody diarrhea for the last 2 days. States she went to the bathroom this morning feeling like she was going to throw up when she leaned over the toilet and lost her balance and fell and landed on her bottom. She denies hitting her head or any loss consciousness. Denies any pain resulting from this fall. She denies any known modifying factors for symptoms. Denies any other associated symptoms or complaints or concerns. Of note, the patient's daughter, who lives with her, has had the same symptoms the last several days.     REVIEW OF SYSTEMS    I have reviewed the nursing notes    Constitutional: negative for fever, chills, weight change, change in appetite  Neurological: negative for HA, lightheadedness, numbness, weakness  Ophthalmic: negative for vision change  ENT: negative for congestion, runny nose, sore throat  Cardiovascular: negative for chest pain, palpitations  Respiratory: negative for SOB, cough  GI: negative for constipation, hematemesis, hematochezia, melena   : negative for dysuria, hematuria, vaginal bleeding or discharge  Musculoskeletal: negative for myalgias, decreased ROM, joint swelling  Dermatological: negative for rash, pruritis  Endocrine: negative for temperature intolerance, diaphoresis  Hematological: negative for anemia, bleeding      PAST MEDICAL HISTORY  Past Medical History:   Diagnosis Date    Acute exacerb[facility] of chronic obstructive pulmonary disease [facility]COPD) [facility] 12/15/2021    Cerebral artery occlusion with cerebral infarction ([facility]     COPD, mild [facility] 6/15/2021    Diabetes mellitus ([facility]     Excessive daytime sleepiness 6/15/2021    Former smoker 6/15/2021    Hyperlipidemia     Hypertension     Mild obstructive sleep apnea 12/15/2021    Multiple lung nodules on CT 2021    Shortness of breath 6/15/2021    Snoring 6/15/2021       CURRENT MEDICATIONS  [unfilled]    ALLERGIES  Allergies   Allergen Reactions    Other Hives    Levaquin [Levofloxacin] Rash       SURGICAL HISTORY  Past Surgical History:   Procedure Laterality Date    APPENDECTOMY      CHOLECYSTECTOMY      COLONOSCOPY N/A 9/15/2021    COLONOSCOPY POLYPECTOMY SNARE/COLD BIOPSY performed by Bertrand Meigs, MD at [address] [place] [address] Left     Left hand    HYSTERECTOMY      JOINT REPLACEMENT Right     2020-Right Hip    OVARY REMOVAL      age 34    TONSILLECTOMY      UPPER GASTROINTESTINAL ENDOSCOPY N/A 9/15/2021    EGD DIAGNOSTIC ONLY performed by Bertrand Meigs, MD at [facility] ENDOSCOPY       FAMILY HISTORY  Family History   Problem Relation Age of Onset    Colon Cancer Mother     Heart Attack Father     Other Father         suicide    Ovarian Cancer Daughter     Other Sister         suicide    Heart Attack Brother     Pacemaker Brother     No Known Problems Brother     No Known Problems Brother     No Known Problems Brother        SOCIAL HISTORY  Social History     Socioeconomic History    Marital status: Single     Spouse name: None    Number of children: None    Years of education: None    Highest education level: None   Occupational History    None   Tobacco Use    Smoking status: Former Smoker     Packs/day: 1.00     Years: 51.00     Pack years: 51.00     Start date:      Quit date: 2020     Years since quittin.4    Smokeless tobacco: Never Used   Vaping Use    Vaping Use: Former    Substances: Flavoring    Devices: Disposable, Pre-filled pod   Substance and Sexual Activity    Alcohol use: Not Currently    Drug use: Not Currently    Sexual activity: None   Other Topics Concern    None   Social History Narrative    None     Social Determinants of Health     Financial Resource Strain: Low Risk     Difficulty of Paying Living Expenses: Not hard at all   Food Insecurity: No Food Insecurity    Worried About Running Out of Food in the Last Year: Never true    Dima of Food in the Last Year: Never true   Transportation Needs: No Transportation Needs    Lack of Transportation (Medical): No    Lack of Transportation (Non-Medical): No   Physical Activity:     Days of Exercise per Week: Not on file    Minutes of Exercise per Session: Not on file   Stress:     Feeling of Stress : Not on file   Social Connections:     Frequency of Communication with Friends and Family: Not on file    Frequency of Social Gatherings with Friends and Family: Not on file    Attends Roman [employer] Services: Not on file    Active Member of [address] [place] [address] [employer] or Organizations: Not on file    Attends Club or Organization Meetings: Not on file    Marital Status: Not on file   Intimate Partner Violence:     Fear of Current or Ex-Partner: Not on file    Emotionally Abused: Not on file    Physically Abused: Not on file    Sexually Abused: Not on file   Housing Stability:     Unable to Pay for Housing in the Last Year: Not on file    Number of [place] in the Last Year: Not on file    Unstable Housing in the Last Year: Not on file           **Past medical, family and social histories reviewed and verified by me**      PHYSICAL EXAM  VITAL SIGNS:   ED Triage Vitals [05/22/22 0943]   Enc Vitals Group      BP (!) 78/53      Pulse 94      Resp 16      Temp 97.3 °F (36.3 °C)      Temp Source Temporal      SpO2 95 %      Weight 137 lb 9.6 oz (62.4 kg)      Height 5' (1.524 m)      Head Circumference       Peak Flow       Pain Score       Pain Loc       Pain Edu? Excl. in [address]?         Vitals during ED course were reviewed and are as charted. Constitutional: Minimal distress, Non-toxic appearance    Eyes: Conjunctiva normal, No discharge    HENT: Normocephalic, Atraumatic, oropharynx moist    Neck: Supple, no midline cervical spinal tenderness palpation or palpable deformities or step-offs, no stridor, no grossly visible or palpable masses    Cardiovascular: Regular rate and rhythm, No murmurs, No rubs, No gallops    Pulmonary/Chest:  Normal breath sounds, No respiratory distress or accessory muscle use, No wheezing, crackles or rhonchi. Abdomen: Epigastric abdominal tenderness to palpation without peritoneal signs, otherwise abdomen is soft, nondistended and nonrigid, No tenderness or peritoneal signs elsewhere, No masses, normal bowel sounds    Back:  No midline point tenderness, No paraspinous muscle tenderness.   No CVA tenderness    Extremities:  No gross deformities, no edema, no tenderness    Neurologic:  Normal motor function, Normal sensory function, No focal deficits    Skin:  Warm, Dry, No erythema, No rash, No cyanosis, No mottling    Lymphatic:  No inguinal lymphadenopathy      EKG Interpretation    Interpreted by emergency department physician    Rhythm: normal sinus   Rate: normal  Axis: normal  Ectopy: none  Conduction: normal  ST Segments: normal  T Waves: non specific changes  Q Waves: none    Clinical Impression: Normal sinus rhythm with nonspecific T wave abnormality        RADIOLOGY/PROCEDURES/LABS/MEDICATIONS ADMINISTERED:    I have reviewed and interpreted all of the currently available lab results from this visit (if applicable):  Results for orders placed or performed during the hospital encounter of 05/22/22   CBC with Auto Differential   Result Value Ref Range    WBC 10.0 4.0 - 10.5 K/CU MM    RBC 4.78 4.2 - 5.4 M/CU MM    Hemoglobin 14.0 12.5 - 16.0 GM/DL    Hematocrit 44.7 37 - 47 %    MCV 93.5 78 - 100 FL    MCH 29.3 27 - 31 PG    MCHC 31.3 (L) 32.0 - 36.0 %    RDW 13.5 11.7 - 14.9 %    Platelets 872 751 - 516 K/CU MM    MPV 9.7 7.5 - 11.1 FL    Differential Type AUTOMATED DIFFERENTIAL     Segs Relative 89.3 (H) 36 - 66 %    Lymphocytes % 4.5 (L) 24 - 44 %    Monocytes % 5.3 (H) 0 - 4 %    Eosinophils % 0.3 0 - 3 %    Basophils % 0.2 0 - 1 %    Segs Absolute 8.9 K/CU MM    Lymphocytes Absolute 0.5 K/CU MM    Monocytes Absolute 0.5 K/CU MM    Eosinophils Absolute 0.0 K/CU MM    Basophils Absolute 0.0 K/CU MM    Immature Neutrophil % 0.4 0 - 0.43 %    Total Immature Neutrophil 0.04 K/CU MM   Comprehensive Metabolic Panel w/ Reflex to MG   Result Value Ref Range    Sodium 133 (L) 135 - 145 MMOL/L    Potassium 4.1 3.5 - 5.1 MMOL/L    Chloride 96 (L) 99 - 110 mMol/L    CO2 19 (L) 21 - 32 MMOL/L    BUN 36 (H) 6 - 23 MG/DL    CREATININE 1.4 (H) 0.6 - 1.1 MG/DL    Glucose 183 (H) 70 - 99 MG/DL    Calcium 8.6 8.3 - 10.6 MG/DL    Albumin 4.1 3.4 - 5.0 GM/DL    Total Protein 7.3 6.4 - 8.2 GM/DL    Total Bilirubin 0.9 0.0 - 1.0 MG/DL    ALT 68 (H) 10 - 40 U/L    AST 83 (H) 15 - 37 IU/L    Alkaline Phosphatase 76 40 - 129 IU/L    GFR Non- 37 (L) >60 mL/min/1.73m2    GFR  45 (L) >60 mL/min/1.73m2    Anion Gap 18 (H) 4 - 16   Troponin   Result Value Ref Range    Troponin T <0.010 <0.01 NG/ML   Lipase   Result Value Ref Range    Lipase 26 13 - 60 IU/L   Lactic Acid   Result Value Ref Range    Lactate 2.4 (HH) 0.4 - 2.0 mMOL/L   Urinalysis with Microscopic   Result Value Ref Range    Color, UA YELLOW YELLOW    Clarity, UA SLIGHTLY CLOUDY (A) CLEAR    Glucose, Urine NEGATIVE NEGATIVE MG/DL    Bilirubin Urine NEGATIVE NEGATIVE MG/DL    Ketones, Urine NEGATIVE NEGATIVE MG/DL    Specific Gravity, UA 1.020 1.001 - 1.035    Blood, Urine NEGATIVE NEGATIVE    pH, Urine 5.5 5.0 - 8.0    Protein, UA NEGATIVE NEGATIVE MG/DL    Urobilinogen, Urine 0.2 0.2 - 1.0 MG/DL    Nitrite Urine, Quantitative NEGATIVE NEGATIVE    Leukocyte Esterase, Urine TRACE (A) NEGATIVE    RBC, UA 10 (H) 0 - 6 /HPF    WBC, UA 7 (H) 0 - 5 /HPF Epithelial Cells, UA 5 /HPF    Cast Type NO CAST FORMS SEEN NO CAST FORMS SEEN /HPF    Bacteria, UA MANY (A) NEGATIVE /HPF    Crystal Type NEGATIVE NEGATIVE /HPF    Urinalysis Comments MODERATE MUCAS OBSERVED    Lactate, Sepsis   Result Value Ref Range    Lactic Acid, Sepsis 1.5 0.5 - 1.9 mMOL/L   Comprehensive Metabolic Panel   Result Value Ref Range    Sodium 137 135 - 145 MMOL/L    Potassium 4.0 3.5 - 5.1 MMOL/L    Chloride 103 99 - 110 mMol/L    CO2 21 21 - 32 MMOL/L    BUN 33 (H) 6 - 23 MG/DL    CREATININE 1.2 (H) 0.6 - 1.1 MG/DL    Glucose 143 (H) 70 - 99 MG/DL    Calcium 7.3 (L) 8.3 - 10.6 MG/DL    Albumin 3.3 (L) 3.4 - 5.0 GM/DL    Total Protein 5.9 (L) 6.4 - 8.2 GM/DL    Total Bilirubin 0.5 0.0 - 1.0 MG/DL    ALT 55 (H) 10 - 40 U/L    AST 64 (H) 15 - 37 IU/L    Alkaline Phosphatase 59 40 - 129 IU/L    GFR Non- 44 (L) >60 mL/min/1.73m2    GFR  53 (L) >60 mL/min/1.73m2    Anion Gap 13 4 - 16   EKG 12 Lead   Result Value Ref Range    Ventricular Rate 93 BPM    Atrial Rate 93 BPM    P-R Interval 166 ms    QRS Duration 62 ms    Q-T Interval 380 ms    QTc Calculation (Bazett) 472 ms    P Axis -23 degrees    R Axis 37 degrees    T Axis 87 degrees    Diagnosis       Normal sinus rhythm  Normal ECG  When compared with ECG of 13-MAY-2022 21:46,  Criteria for Septal infarct are no longer present  QT has lengthened     EKG 12 Lead   Result Value Ref Range    Ventricular Rate 83 BPM    Atrial Rate 83 BPM    P-R Interval 186 ms    QRS Duration 64 ms    Q-T Interval 404 ms    QTc Calculation (Bazett) 474 ms    P Axis -17 degrees    R Axis 35 degrees    T Axis 97 degrees    Diagnosis       Normal sinus rhythm  Abnormal QRS-T angle, consider primary T wave abnormality  Abnormal ECG  When compared with ECG of 22-MAY-2022 09:51,  No significant change was found            ABNORMAL LABS:  Labs Reviewed   CBC WITH AUTO DIFFERENTIAL - Abnormal; Notable for the following components:       Result Value    MCHC 31.3 (*)     Segs Relative 89.3 (*)     Lymphocytes % 4.5 (*)     Monocytes % 5.3 (*)     All other components within normal limits   COMPREHENSIVE METABOLIC PANEL W/ REFLEX TO MG FOR LOW K - Abnormal; Notable for the following components:    Sodium 133 (*)     Chloride 96 (*)     CO2 19 (*)     BUN 36 (*)     CREATININE 1.4 (*)     Glucose 183 (*)     ALT 68 (*)     AST 83 (*)     GFR Non- 37 (*)     GFR  45 (*)     Anion Gap 18 (*)     All other components within normal limits   LACTIC ACID - Abnormal; Notable for the following components:    Lactate 2.4 (*)     All other components within normal limits   URINALYSIS WITH MICROSCOPIC - Abnormal; Notable for the following components:    Clarity, UA SLIGHTLY CLOUDY (*)     Leukocyte Esterase, Urine TRACE (*)     RBC, UA 10 (*)     WBC, UA 7 (*)     Bacteria, UA MANY (*)     All other components within normal limits   COMPREHENSIVE METABOLIC PANEL - Abnormal; Notable for the following components:    BUN 33 (*)     CREATININE 1.2 (*)     Glucose 143 (*)     Calcium 7.3 (*)     Albumin 3.3 (*)     Total Protein 5.9 (*)     ALT 55 (*)     AST 64 (*)     GFR Non- 44 (*)     GFR  53 (*)     All other components within normal limits   TROPONIN   LIPASE   LACTATE, SEPSIS         IMAGING STUDIES ORDERED:  CT HEAD WO CONTRAST  CT ABDOMEN PELVIS WO CONTRAST    I have personally viewed the imaging studies. The radiologist interpretation is:   CT ABDOMEN PELVIS WO CONTRAST Additional Contrast? None   Preliminary Result   1. No acute intra-abdominal/pelvic process. 2. Bilateral multiple adrenal nodules stable compared to prior CT chest   06/20/2021 consistent with adenomas, no follow-up imaging is recommended. 3. Subacute healing left anterolateral 6th and 7th rib fractures. 4. There is a stable metallic density along the anterior wall of the gastric   cardia (present July 2021).   This may relate to prior gastric biopsy. CT HEAD WO CONTRAST   Final Result   No acute intracranial abnormality. MEDICATIONS ADMINISTERED:  Medications   0.9 % sodium chloride bolus (0 mLs IntraVENous Stopped 5/22/22 1143)   ondansetron (ZOFRAN) injection 4 mg (4 mg IntraVENous Given 5/22/22 1006)   fentaNYL (SUBLIMAZE) injection 25 mcg (25 mcg IntraVENous Given 5/22/22 1155)   ondansetron (ZOFRAN) injection 4 mg (4 mg IntraVENous Given 5/22/22 1154)   0.9 % sodium chloride bolus (0 mLs IntraVENous Stopped 5/22/22 1356)   morphine (PF) injection 2 mg (2 mg IntraVENous Given 5/22/22 1221)         COURSE & MEDICAL DECISION MAKING  Last vitals: /67   Pulse 84   Temp 97.3 °F (36.3 °C) (Temporal)   Resp 16   Ht 5' (1.524 m)   Wt 137 lb 9.6 oz (62.4 kg)   SpO2 95%   BMI 26.87 kg/m²     67year-old female with abdominal cramping with nausea, vomiting and diarrhea which likely secondary to gastroenteritis. Differential diagnoses considered included, but were not limited to, acute appendicitis, acute cholecystitis/cholangitis, acute hepatitis, acute pancreatitis, acute coronary syndrome, acute intra-abdominal catastrophe, acute vascular emergency, bowel obstruction, perforated bowel, incarcerated or strangulated hernia, colitis, diverticulitis, ischemic bowel, cystitis, pyelonephritis, kidney stone. She was noted to have some dehydration with a little elevation in her creatinine and mild elevation of lactic acid as well as hypotension on presentation. Has been given 2 L IV fluids here in the emergency department with significant improvement in all of these and her blood pressure has been much improved and stable on multiple readings. Patient was given the above medications with improvement in symptoms. On repeat examination the abdomen was non-surgical without peritoneal signs. The patient was able to tolerate oral intake.  Patient was advised of the changing nature of intra-abdominal pathology and specific signs and symptoms to watch which may signal serious infectious, metabolic or surgical conditions for which immediate return to the ED would be necessary for further diagnosis and treatment. Additional workup and treatment in the ED as documented above. Patient reassured and will be discharged home. I have explained to the patient in appropriate terminology our work-up in the ED and their diagnosis. I have also given anticipatory guidance and expectant management of their condition as an outpatient as per my custom. The patient was given clear discharge and follow-up instructions including return to the ER immediately for worsening concerns. The patient has been advised to follow-up with their primary care physician and/or referred physician in the next two to three days or sooner if worsening and to return to the ER immediately as above with any concerns. I provided the patient counseling with regard to my customary list of strict return precautions as well as return precautions specific to the cause for today's emergency department visit. The patient will return under these provided conditions, but should also return for new concerns or further worsening. Pt and/or family understand and agree with plan. Clinical Impression:  1. Gastroenteritis    2. Dehydration    3.  Hypotension due to hypovolemia        Disposition referral (if applicable):  Heidy Freeman DO  [address]  [phone]    Schedule an appointment as soon as possible for a visit       [address]  [phone]    If symptoms worsen      Disposition medications (if applicable):  New Prescriptions    ONDANSETRON (ZOFRAN-ODT) 4 MG DISINTEGRATING TABLET    Take 1 tablet by mouth 3 times daily as needed for Nausea or Vomiting       ED Provider Disposition Time  DISPOSITION Decision To Discharge 05/22/2022 02:00:25 PM        Electronically signed by: Robert Terry M.D., 5/22/2022  2:01 PM    This dictation was created with voice recognition software. While attempts have been made to review the dictation as it is transcribed, on occasion the spoken word can be misinterpreted by the technology leading to omissions or inappropriate words, phrases or sentences.         Kristofer Mckeon MD  05/22/22 7372

## 2022-05-22 NOTE — ED TRIAGE NOTES
Patient C/O emesis and diarrhea for the past 2 days. States that she was in the restroom and leaned over the toilet, causing her to fall. Denies LOC.

## 2022-05-25 PROBLEM — J30.2 SEASONAL ALLERGIES: Status: ACTIVE | Noted: 2021-03-23

## 2022-05-25 PROBLEM — G89.29 CHRONIC PAIN OF RIGHT HIP: Status: ACTIVE | Noted: 2021-03-23

## 2022-05-25 PROBLEM — E11.9 TYPE 2 DIABETES MELLITUS WITHOUT COMPLICATION, WITHOUT LONG-TERM CURRENT USE OF INSULIN (HCC): Status: ACTIVE | Noted: 2021-03-23

## 2022-05-25 PROBLEM — F51.01 PRIMARY INSOMNIA: Status: ACTIVE | Noted: 2021-03-23

## 2022-05-25 PROBLEM — K21.9 GASTROESOPHAGEAL REFLUX DISEASE WITHOUT ESOPHAGITIS: Status: ACTIVE | Noted: 2021-03-23

## 2022-05-25 PROBLEM — E78.2 MIXED HYPERLIPIDEMIA: Status: ACTIVE | Noted: 2021-03-23

## 2022-05-25 PROBLEM — S72.141A CLOSED INTERTROCHANTERIC FRACTURE OF HIP, RIGHT, INITIAL ENCOUNTER (HCC): Status: ACTIVE | Noted: 2020-12-04

## 2022-05-25 PROBLEM — M25.551 CHRONIC PAIN OF RIGHT HIP: Status: ACTIVE | Noted: 2021-03-23

## 2022-05-25 PROBLEM — I10 ESSENTIAL HYPERTENSION: Status: ACTIVE | Noted: 2021-03-23

## 2022-05-26 ENCOUNTER — CARE COORDINATION (OUTPATIENT)
Dept: CARE COORDINATION | Age: 72
End: 2022-05-26

## 2022-05-26 NOTE — CARE COORDINATION
Call to pt for acm outreach. Introduced acm/explained role and pt agreeable to outreach. Pt was in ER for gastroenteritis 5/22. Reports she [place] constipated but has now had 2 bowel movements and stool is black. Felt weak this morning, had sob this am but used nebulizer and that helped. Denies sob at this time. Stlll with some stomach discomfort. Still with some nausea. Declines apptmt today with walk in clinic. Call to pcp office to attempt to schedule ov. Advised to f/u with [facility] for apptmt as none available at pcp office [facility]. Pt voices understanding and intent to f/u at [facility] and denies further needs at this time. ACM will continue to follow to complete assessment and support/educate pt.

## 2022-06-07 ENCOUNTER — CARE COORDINATION (OUTPATIENT)
Dept: CARE COORDINATION | Age: 72
End: 2022-06-07

## 2022-06-07 NOTE — CARE COORDINATION
Ambulatory Care Coordination Note  CM Risk Score: 3  Charlson 10 Year Mortality Risk Score: 100%     ACC: Cherry Grant RN    Summary Note: Call to pt for acm fu.opd dm. Black stool has resolved. Checks BP every day. Reports she needs refill on anoro advised refill available at [facility] understanding. Last a1c 6.8. Pt reports she is doing well and denies needs at this tme. Pt advised to call with any needs. Plan Continue to educate on copd//dm zm and set goal with pt. Ambulatory Care Coordination Assessment    Care Coordination Protocol  Referral from Primary Care Provider: No  Week 1 - Initial Assessment     Do you have all of your prescriptions and are they filled?: Yes  Barriers to medication adherence: None  Are you able to afford your medications?: Yes  How often do you have trouble taking your medications the way you have been told to take them?: I always take them as prescribed. Do you have Home O2 Therapy?: No      Ability to seek help/take action for Emergent Urgent situations i.e. fire, crime, inclement weather or health crisis. : Independent  Ability to ambulate to restroom: Independent  Ability handle personal hygeine needs (bathing/dressing/grooming): Independent  Ability to manage Medications: Independent  Ability to prepare Food Preparation: Independent  Ability to maintain home (clean home, laundry): Independent  Ability to drive and/or has transportation: Independent  Ability to do shopping: Independent  Ability to manage finances:  Independent  Is patient able to live independently?: Yes     Current Housing: Private Residence        Per the Fall Risk Screening, did the patient have 2 or more falls or 1 fall with injury in the past year?: No     Frequent urination at night?: No  Do you use rails/bars?: No  Do you have a non-slip tub mat?: Yes     Are you experiencing loss of meaning?: No  Are you experiencing loss of hope and peace?: No     Thinking about your patient's physical health needs, are there any symptoms or problems (risk indicators) you are unsure about that require further investigation?: Mild vague physical symptoms or problems; but do not impact on daily life or are not of concern to patient   Are the patients physical health problems impacting on their mental well-being?: No identified areas of concern   Are there any problems with your patients lifestyle behaviors (alcohol, drugs, diet, exercise) that are impacting on physical or mental well-being?: No identified areas of concern   Do you have any other concerns about your patients mental well-being? How would you rate their severity and impact on the patient?: No identified areas of concern   How would you rate their home environment in terms of safety and stability (including domestic violence, insecure housing, neighbor harassment)?: Consistently safe, supportive, stable, no identified problems   How do daily activities impact on the patient's well-being? (include current or anticipated unemployment, work, caregiving, access to transportation or other): No identified problems or perceived positive benefits   How would you rate their social network (family, work, friends)?: Adequate participation with social networks   How would you rate their financial resources (including ability to afford all required medical care)?: Financially secure, resources adequate, no identified problems   How wells does the patient now understand their health and well-being (symptoms, signs or risk factors) and what they need to do to manage their health?: Reasonable to good understanding and already engages in managing health or is willing to undertake better management   How well do you think your patient can engage in healthcare discussions?  (Barriers include language, deafness, aphasia, alcohol or drug problems, learning difficulties, concentration): Clear and open communication, no identified barriers   Do other services need to be involved to help this patient?: Other care/services not required at this time   Are current services involved with this patient well-coordinated? (Include coordination with other services you are now recommendation): All required care/services in place and well-coordinated   Suggested Interventions and Community Resources                  Prior to Admission medications    Medication Sig Start Date End Date Taking?  Authorizing Provider   ondansetron (ZOFRAN-ODT) 4 MG disintegrating tablet Take 1 tablet by mouth 3 times daily as needed for Nausea or Vomiting 5/22/22   Melchor Jeffery MD   cloNIDine (CATAPRES) 0.1 MG tablet Take 1 tablet by mouth 2 times daily as needed for High Blood Pressure (htn >170) 5/14/22   Prosper Nettles [facility] DerekFRANKY   diclofenac sodium (VOLTAREN) 1 % GEL Apply 2 g topically 4 times daily 5/13/22   GONZÁLEZ Weeks   pantoprazole (PROTONIX) 40 MG tablet Take 1 tablet by mouth daily 5/13/22 8/11/22  GONZÁLEZ Weeks   chlorthalidone (HYGROTEN) 50 MG tablet Take 1 tablet by mouth daily 5/13/22   GONZÁLEZ Weeks   amLODIPine (NORVASC) 5 MG tablet Take 1 tablet by mouth daily for 5 days 5/12/22 5/17/22  Amilcar Coppola MD   albuterol (PROVENTIL) (2.5 MG/3ML) 0.083% nebulizer solution Take 3 mLs by nebulization every 6 hours as needed for Wheezing 5/3/22   Kelly Odonnell MD   dicyclomine (BENTYL) 10 MG capsule Take 1 capsule by mouth 3 times daily as needed (stomach) 4/12/22   DICK Gregg - CNP   metFORMIN (GLUCOPHAGE) 500 MG tablet Take 1 tablet by mouth 2 times daily (with meals) 3/1/22   Berbreet CAROLYN Emily, DO   alendronate (FOSAMAX) 70 MG tablet Take 1 tablet by mouth every 7 days 3/1/22   Bergabriele Molinapa, DO   atorvastatin (LIPITOR) 20 MG tablet Take 1 tablet by mouth daily 3/1/22   Bergabriele Molinapa, DO   albuterol sulfate  (90 Base) MCG/ACT inhaler Inhale 2 puffs into the lungs every 4 hours as needed for Wheezing 12/15/21   Johana Fernando MD umeclidinium-vilanterol (ANORO ELLIPTA) 62.5-25 MCG/INH AEPB inhaler Inhale 1 puff into the lungs daily 12/15/21   Nora Blankenship MD   lisinopril (PRINIVIL;ZESTRIL) 40 MG tablet TAKE 1 TABLET BY MOUTH EVERY DAY 9/22/21   Yinka Diaz, DO   aspirin 81 MG EC tablet Take 81 mg by mouth daily    Historical Provider, MD       Future Appointments   Date Time Provider Meghna Gordon   6/20/2022  3:30 PM Moin Liisa Snellen, MD [facility]   7/1/2022  1:30 PM Spenser Luis Enrique, DO [facility]   7/1/2022  4:15 PM Navinfransisco Diaz, DO [facility]   2/2/2023  3:30 PM SCHEDULE, SRMX AWV LPN SRMX [facility]     ,   Diabetes Assessment    Medic Alert ID: No  Meal Planning: Avoidance of concentrated sweets   How often do you test your blood sugar?: No Testing   Do you have barriers with adherence to non-pharmacologic self-management interventions?  (Nutrition/Exercise/Self-Monitoring): No   Have you ever had to go to the ED for symptoms of low blood sugar?: No       No patient-reported symptoms   Do you have hyperglycemia symptoms?: No   Do you have hypoglycemia symptoms?: No   Blood Sugar Monitoring Regimen: Not Testing   Blood Sugar Trends: No Change      ,   COPD Assessment    Does the patient understand envrionmental exposure?: Yes  Is the patient able to verbalize Rescue vs. Long Acting medications?: Yes  Does the patient have a nebulizer?: No  Does the patient use a space with inhaled medications?: No     No patient-reported symptoms         Symptoms:  None: Yes      Symptom course: stable  Breathlessness: exertion  Increase use of rapid acting/rescue inhaled medications?: No  Change in chronic cough?: No/At Baseline  Change in sputum?: No/At Baseline  Self Monitoring - SaO2: No      and   General Assessment    Do you have any symptoms that are causing concern?: No

## 2022-06-10 ASSESSMENT — ENCOUNTER SYMPTOMS: DYSPNEA ASSOCIATED WITH: EXERTION

## 2022-06-27 ENCOUNTER — CARE COORDINATION (OUTPATIENT)
Dept: CARE COORDINATION | Age: 72
End: 2022-06-27

## 2022-07-08 ENCOUNTER — CARE COORDINATION (OUTPATIENT)
Dept: CARE COORDINATION | Age: 72
End: 2022-07-08

## 2022-07-09 ENCOUNTER — TELEPHONE ENCOUNTER (OUTPATIENT)
Dept: URBAN - METROPOLITAN AREA CLINIC 121 | Facility: CLINIC | Age: 72
End: 2022-07-09

## 2022-07-09 RX ORDER — UMECLIDINIUM BROMIDE AND VILANTEROL TRIFENATATE 62.5; 25 UG/1; UG/1
POWDER RESPIRATORY (INHALATION) ONCE A DAY
Refills: 0 | OUTPATIENT
Start: 2019-02-28 | End: 2019-08-28

## 2022-07-09 RX ORDER — MESALAMINE 375 MG/1
4 CAPS PO EVERY OTHER DAY CAPSULE, EXTENDED RELEASE ORAL TAKE AS DIRECTED
Refills: 0 | OUTPATIENT
Start: 2015-07-10 | End: 2016-05-17

## 2022-07-09 RX ORDER — ALBUTEROL SULFATE 90 UG/1
AEROSOL, METERED RESPIRATORY (INHALATION) TAKE AS DIRECTED
Refills: 0 | OUTPATIENT
Start: 2016-10-28 | End: 2017-03-06

## 2022-07-09 RX ORDER — TIOTROPIUM BROMIDE 18 UG/1
CAPSULE ORAL; RESPIRATORY (INHALATION)
Refills: 0 | OUTPATIENT
Start: 2014-03-12 | End: 2014-09-17

## 2022-07-09 RX ORDER — OMEPRAZOLE 20 MG/1
CAPSULE, DELAYED RELEASE ORAL
Refills: 0 | OUTPATIENT
Start: 2013-04-11 | End: 2013-07-01

## 2022-07-09 RX ORDER — CHLORTHALIDONE 25 MG/1
TABLET ORAL ONCE A DAY
Refills: 0 | OUTPATIENT
Start: 2016-05-17 | End: 2016-08-09

## 2022-07-09 RX ORDER — HYDROCODONE BITARTRATE AND ACETAMINOPHEN 5; 325 MG/1; MG/1
TABLET ORAL
Refills: 0 | OUTPATIENT
Start: 2014-09-17 | End: 2014-11-20

## 2022-07-09 RX ORDER — PANTOPRAZOLE SODIUM 40 MG/1
TAKE 1 TAB PO QAM 30-60 MINS BEFORE BREAKFAST TABLET, DELAYED RELEASE ORAL ONCE A DAY
Refills: 3 | OUTPATIENT
Start: 2017-05-10 | End: 2017-10-23

## 2022-07-09 RX ORDER — ALBUTEROL SULFATE 0.63 MG/3ML
SOLUTION RESPIRATORY (INHALATION) EVERY 8 HOURS
Refills: 0 | OUTPATIENT
Start: 2017-03-06 | End: 2017-07-06

## 2022-07-09 RX ORDER — ZOLPIDEM TARTRATE 5 MG/1
TABLET, FILM COATED ORAL
Refills: 0 | OUTPATIENT
Start: 2016-08-09 | End: 2016-08-31

## 2022-07-09 RX ORDER — ZOLPIDEM TARTRATE 5 MG/1
TABLET, FILM COATED ORAL
Refills: 0 | OUTPATIENT
Start: 2014-03-12 | End: 2014-09-17

## 2022-07-09 RX ORDER — ATORVASTATIN CALCIUM 10 MG/1
TABLET, FILM COATED ORAL ONCE A DAY
Refills: 0 | OUTPATIENT
Start: 2018-04-25 | End: 2019-02-28

## 2022-07-09 RX ORDER — DICYCLOMINE HYDROCHLORIDE 10 MG/1
TAKE 1 TAB PO THREE TIMES A DAY CAPSULE ORAL THREE TIMES A DAY
Refills: 1 | OUTPATIENT
Start: 2019-08-28 | End: 2020-04-27

## 2022-07-09 RX ORDER — ALBUTEROL SULFATE 2.5 MG/3ML
SOLUTION RESPIRATORY (INHALATION)
Refills: 0 | OUTPATIENT
Start: 2013-07-01 | End: 2014-03-12

## 2022-07-09 RX ORDER — PRAVASTATIN SODIUM 40 MG/1
TABLET ORAL
Refills: 0 | OUTPATIENT
Start: 2012-09-07 | End: 2013-04-11

## 2022-07-09 RX ORDER — MESALAMINE 375 MG/1
CAPSULE, EXTENDED RELEASE ORAL
Refills: 0 | OUTPATIENT
Start: 2015-06-12 | End: 2015-07-10

## 2022-07-09 RX ORDER — METRONIDAZOLE 500 MG/1
TABLET ORAL
Refills: 0 | OUTPATIENT
Start: 2014-09-17 | End: 2014-11-20

## 2022-07-09 RX ORDER — DICYCLOMINE HYDROCHLORIDE 10 MG/1
TAKE 1 TAB PO THREE TIMES A DAY CAPSULE ORAL THREE TIMES A DAY
Refills: 1 | OUTPATIENT
Start: 2017-08-04 | End: 2017-09-01

## 2022-07-09 RX ORDER — ASPIRIN 81 MG/1
TABLET, DELAYED RELEASE ORAL ONCE A DAY
Refills: 0 | OUTPATIENT
Start: 2017-10-23 | End: 2018-04-25

## 2022-07-09 RX ORDER — LISINOPRIL 20 MG/1
TABLET ORAL ONCE A DAY
Refills: 0 | OUTPATIENT
Start: 2019-02-28 | End: 2019-08-28

## 2022-07-09 RX ORDER — ASPIRIN 81 MG/1
TABLET, DELAYED RELEASE ORAL ONCE A DAY
Refills: 0 | OUTPATIENT
Start: 2017-03-06 | End: 2017-07-06

## 2022-07-09 RX ORDER — ASPIRIN 81 MG/1
TABLET, DELAYED RELEASE ORAL ONCE A DAY
Refills: 0 | OUTPATIENT
Start: 2016-05-17 | End: 2016-08-09

## 2022-07-09 RX ORDER — ONDANSETRON HYDROCHLORIDE 4 MG/1
TAKE 1 TAB PO Q 8 HRS PRN NAUSEA TABLET, FILM COATED ORAL TAKE AS DIRECTED
Refills: 0 | OUTPATIENT
Start: 2017-05-10 | End: 2019-02-28

## 2022-07-09 RX ORDER — METHYLPREDNISOLONE 4 MG/1
TABLET ORAL TWICE A DAY
Refills: 0 | OUTPATIENT
Start: 2018-05-17 | End: 2019-02-28

## 2022-07-09 RX ORDER — ASPIRIN 81 MG/1
TABLET, DELAYED RELEASE ORAL ONCE A DAY
Refills: 0 | OUTPATIENT
Start: 2017-07-06 | End: 2017-10-23

## 2022-07-09 RX ORDER — CETIRIZINE HYDROCHLORIDE 10 MG/1
TABLET, FILM COATED ORAL
Refills: 0 | OUTPATIENT
Start: 2013-04-11 | End: 2013-06-10

## 2022-07-09 RX ORDER — ALBUTEROL SULFATE 0.63 MG/3ML
SOLUTION RESPIRATORY (INHALATION) AS NEEDED
Refills: 0 | OUTPATIENT
Start: 2019-02-28 | End: 2019-08-28

## 2022-07-09 RX ORDER — OMEGA-3-ACID ETHYL ESTERS 1 G/1
CAPSULE, LIQUID FILLED ORAL TWICE A DAY
Refills: 0 | OUTPATIENT
Start: 2014-11-20 | End: 2015-01-15

## 2022-07-09 RX ORDER — ATORVASTATIN CALCIUM 10 MG/1
TABLET, FILM COATED ORAL ONCE A DAY
Refills: 0 | OUTPATIENT
Start: 2019-08-28 | End: 2020-02-11

## 2022-07-09 RX ORDER — MESALAMINE 375 MG/1
4 CAPS PO EVERY OTHER DAY CAPSULE, EXTENDED RELEASE ORAL TAKE AS DIRECTED
Refills: 0 | OUTPATIENT
Start: 2016-05-17 | End: 2016-08-09

## 2022-07-09 RX ORDER — ZOLPIDEM TARTRATE 5 MG/1
TABLET, FILM COATED ORAL
Refills: 0 | OUTPATIENT
Start: 2015-06-12 | End: 2015-07-10

## 2022-07-09 RX ORDER — ZOLPIDEM TARTRATE 5 MG/1
TABLET, FILM COATED ORAL
Refills: 0 | OUTPATIENT
Start: 2012-09-07 | End: 2013-04-11

## 2022-07-09 RX ORDER — ZOLPIDEM TARTRATE 5 MG/1
TABLET, FILM COATED ORAL
Refills: 0 | OUTPATIENT
Start: 2016-05-17 | End: 2016-08-09

## 2022-07-09 RX ORDER — ZOLPIDEM TARTRATE 5 MG/1
TABLET, FILM COATED ORAL
Refills: 0 | OUTPATIENT
Start: 2013-04-11 | End: 2013-07-01

## 2022-07-09 RX ORDER — LOSARTAN POTASSIUM 25 MG/1
TABLET, FILM COATED ORAL
Refills: 0 | OUTPATIENT
Start: 2017-05-10 | End: 2017-07-06

## 2022-07-09 RX ORDER — UREA 10 %
LOTION (ML) TOPICAL
Refills: 0 | OUTPATIENT
Start: 2014-11-20 | End: 2015-01-15

## 2022-07-09 RX ORDER — LORATADINE 5 MG
TAKE AS DIRECTED PRIOR TO COLONOSCOPY TABLET,CHEWABLE ORAL TAKE AS DIRECTED
Refills: 0 | OUTPATIENT
Start: 2016-10-18 | End: 2017-03-06

## 2022-07-09 RX ORDER — CHLORTHALIDONE 25 MG/1
TABLET ORAL ONCE A DAY
Refills: 0 | OUTPATIENT
Start: 2016-08-09 | End: 2016-08-09

## 2022-07-09 RX ORDER — LOSARTAN POTASSIUM 25 MG/1
TABLET, FILM COATED ORAL ONCE A DAY
Refills: 0 | OUTPATIENT
Start: 2016-10-30 | End: 2017-03-06

## 2022-07-09 RX ORDER — PRAVASTATIN SODIUM 40 MG/1
TABLET ORAL
Refills: 0 | OUTPATIENT
Start: 2013-04-11 | End: 2013-06-10

## 2022-07-09 RX ORDER — OMEGA-3-ACID ETHYL ESTERS 1 G/1
CAPSULE, LIQUID FILLED ORAL TWICE A DAY
Refills: 0 | OUTPATIENT
Start: 2015-01-15 | End: 2015-06-12

## 2022-07-09 RX ORDER — TIOTROPIUM BROMIDE 18 UG/1
CAPSULE ORAL; RESPIRATORY (INHALATION)
Refills: 0 | OUTPATIENT
Start: 2013-04-11 | End: 2013-07-01

## 2022-07-09 RX ORDER — PANTOPRAZOLE SODIUM 40 MG/1
TAKE 1 TAB PO QAM 30-60 MINS BEFORE BREAKFAST TABLET, DELAYED RELEASE ORAL ONCE A DAY
Refills: 3 | OUTPATIENT
Start: 2017-10-23 | End: 2018-12-14

## 2022-07-09 RX ORDER — CHLORTHALIDONE 25 MG/1
TABLET ORAL ONCE A DAY
Refills: 0 | OUTPATIENT
Start: 2017-07-06 | End: 2017-10-23

## 2022-07-09 RX ORDER — DICYCLOMINE HYDROCHLORIDE 10 MG/1
TAKE 1 TAB PO THREE TIMES A DAY CAPSULE ORAL THREE TIMES A DAY
Refills: 1 | OUTPATIENT
Start: 2017-09-01 | End: 2018-12-03

## 2022-07-09 RX ORDER — TRAMADOL HYDROCHLORIDE 50 MG/1
TABLET ORAL AS NEEDED
Refills: 0 | OUTPATIENT
Start: 2016-08-17 | End: 2017-03-06

## 2022-07-09 RX ORDER — METFORMIN HCL 1000 MG/1
TABLET ORAL
Refills: 0 | OUTPATIENT
Start: 2012-02-09 | End: 2013-04-11

## 2022-07-09 RX ORDER — LOSARTAN POTASSIUM 25 MG/1
TABLET, FILM COATED ORAL ONCE A DAY
Refills: 0 | OUTPATIENT
Start: 2017-07-06 | End: 2017-10-23

## 2022-07-09 RX ORDER — FLUTICASONE PROPIONATE AND SALMETEROL 50; 250 UG/1; UG/1
POWDER RESPIRATORY (INHALATION)
Refills: 0 | OUTPATIENT
Start: 2012-02-09 | End: 2013-04-11

## 2022-07-09 RX ORDER — ALBUTEROL SULFATE 0.63 MG/3ML
SOLUTION RESPIRATORY (INHALATION) EVERY 8 HOURS
Refills: 0 | OUTPATIENT
Start: 2017-07-06 | End: 2017-10-23

## 2022-07-09 RX ORDER — METFORMIN HCL 1000 MG/1
TABLET ORAL
Refills: 0 | OUTPATIENT
Start: 2013-04-11 | End: 2013-07-01

## 2022-07-09 RX ORDER — METFORMIN HCL 500 MG/1
TABLET ORAL TWICE A DAY
Refills: 0 | OUTPATIENT
Start: 2019-02-28 | End: 2019-08-28

## 2022-07-09 RX ORDER — ASPIRIN 81 MG/1
TABLET, COATED ORAL
Refills: 0 | OUTPATIENT
Start: 2014-11-20 | End: 2015-01-15

## 2022-07-09 RX ORDER — VITAM B12 100 MCG
TAB ORAL
Refills: 0 | OUTPATIENT
Start: 2015-01-15 | End: 2015-06-12

## 2022-07-09 RX ORDER — MESALAMINE 375 MG/1
TAKE 4 TABS PO ONCE DAILY CAPSULE, EXTENDED RELEASE ORAL
Refills: 6 | OUTPATIENT
Start: 2014-11-20 | End: 2015-06-12

## 2022-07-09 RX ORDER — ZOLPIDEM TARTRATE 5 MG/1
TABLET, FILM COATED ORAL
Refills: 0 | OUTPATIENT
Start: 2015-07-10 | End: 2016-05-17

## 2022-07-09 RX ORDER — ATORVASTATIN CALCIUM 10 MG/1
TABLET, FILM COATED ORAL
Refills: 0 | OUTPATIENT
Start: 2017-05-10 | End: 2017-07-06

## 2022-07-09 RX ORDER — GLYBURIDE 2.5 MG/1
TABLET ORAL
Refills: 0 | OUTPATIENT
Start: 2012-02-09 | End: 2013-06-10

## 2022-07-09 RX ORDER — SITAGLIPTIN PHOSPHATE 100 MG
TABLET ORAL
Refills: 0 | OUTPATIENT
Start: 2013-07-01 | End: 2014-03-12

## 2022-07-09 RX ORDER — SITAGLIPTIN PHOSPHATE 100 MG
TABLET ORAL
Refills: 0 | OUTPATIENT
Start: 2013-04-11 | End: 2013-07-01

## 2022-07-09 RX ORDER — TIOTROPIUM BROMIDE AND OLODATEROL 3.124; 2.736 UG/1; UG/1
SPRAY, METERED RESPIRATORY (INHALATION) TAKE AS DIRECTED
Refills: 0 | OUTPATIENT
Start: 2017-03-06 | End: 2017-03-06

## 2022-07-09 RX ORDER — UREA 10 %
LOTION (ML) TOPICAL
Refills: 0 | OUTPATIENT
Start: 2015-01-15 | End: 2015-06-12

## 2022-07-09 RX ORDER — OMEPRAZOLE 20 MG/1
CAPSULE, DELAYED RELEASE ORAL
Refills: 0 | OUTPATIENT
Start: 2013-07-01 | End: 2013-07-01

## 2022-07-09 RX ORDER — ZOLPIDEM TARTRATE 5 MG/1
TABLET, FILM COATED ORAL
Refills: 0 | OUTPATIENT
Start: 2019-02-28 | End: 2019-08-28

## 2022-07-09 RX ORDER — ENALAPRIL MALEATE 2.5 MG/1
TABLET ORAL
Refills: 0 | OUTPATIENT
Start: 2012-02-09 | End: 2013-04-11

## 2022-07-09 RX ORDER — TIOTROPIUM BROMIDE 18 UG/1
CAPSULE ORAL; RESPIRATORY (INHALATION)
Refills: 0 | OUTPATIENT
Start: 2012-08-23 | End: 2013-04-11

## 2022-07-09 RX ORDER — ATORVASTATIN CALCIUM 20 MG/1
TABLET, FILM COATED ORAL ONCE A DAY
Refills: 0 | OUTPATIENT
Start: 2016-10-30 | End: 2017-03-06

## 2022-07-09 RX ORDER — ALBUTEROL SULFATE 0.63 MG/3ML
SOLUTION RESPIRATORY (INHALATION) AS NEEDED
Refills: 0 | OUTPATIENT
Start: 2018-04-25 | End: 2019-02-28

## 2022-07-09 RX ORDER — LISINOPRIL 10 MG/1
TABLET ORAL ONCE A DAY
Refills: 0 | OUTPATIENT
Start: 2018-04-25 | End: 2018-05-21

## 2022-07-09 RX ORDER — ALBUTEROL SULFATE 90 UG/1
AEROSOL, METERED RESPIRATORY (INHALATION)
Refills: 0 | OUTPATIENT
Start: 2018-05-17 | End: 2019-02-28

## 2022-07-09 RX ORDER — ATORVASTATIN CALCIUM 10 MG/1
TABLET, FILM COATED ORAL ONCE A DAY
Refills: 0 | OUTPATIENT
Start: 2017-10-23 | End: 2018-04-25

## 2022-07-09 RX ORDER — ATORVASTATIN CALCIUM 10 MG/1
TABLET, FILM COATED ORAL ONCE A DAY
Refills: 0 | OUTPATIENT
Start: 2019-02-28 | End: 2019-08-28

## 2022-07-09 RX ORDER — MESALAMINE 375 MG/1
4 CAPS PO EVERY OTHER DAY CAPSULE, EXTENDED RELEASE ORAL TAKE AS DIRECTED
Refills: 0 | OUTPATIENT
Start: 2016-08-09 | End: 2016-08-09

## 2022-07-09 RX ORDER — ZOLPIDEM TARTRATE 5 MG/1
TABLET, FILM COATED ORAL
Refills: 0 | OUTPATIENT
Start: 2017-07-06 | End: 2017-10-23

## 2022-07-09 RX ORDER — ASPIRIN 81 MG/1
TABLET, COATED ORAL
Refills: 0 | OUTPATIENT
Start: 2015-01-15 | End: 2015-06-12

## 2022-07-09 RX ORDER — ZOLPIDEM TARTRATE 5 MG/1
TABLET, FILM COATED ORAL
Refills: 0 | OUTPATIENT
Start: 2017-03-06 | End: 2017-07-06

## 2022-07-09 RX ORDER — PRAVASTATIN SODIUM 40 MG/1
TABLET ORAL
Refills: 0 | OUTPATIENT
Start: 2012-02-09 | End: 2013-04-11

## 2022-07-09 RX ORDER — METFORMIN HCL 500 MG/1
TABLET ORAL TWICE A DAY
Refills: 0 | OUTPATIENT
Start: 2019-08-28 | End: 2020-02-11

## 2022-07-09 RX ORDER — DICYCLOMINE HYDROCHLORIDE 10 MG/1
TAKE 1 TAB PO BID CAPSULE ORAL TWICE A DAY
Refills: 2 | OUTPATIENT
Start: 2017-05-10 | End: 2017-07-06

## 2022-07-09 RX ORDER — CHLORTHALIDONE 25 MG/1
TABLET ORAL ONCE A DAY
Refills: 0 | OUTPATIENT
Start: 2015-07-10 | End: 2016-05-17

## 2022-07-09 RX ORDER — ZOLPIDEM TARTRATE 5 MG/1
TABLET, FILM COATED ORAL
Refills: 0 | OUTPATIENT
Start: 2013-07-01 | End: 2014-03-12

## 2022-07-09 RX ORDER — HYOSCYAMINE SULFATE 0.12 MG/1
TAKE 1 TAB PO Q 8 HRS PRN PAIN TABLET, ORALLY DISINTEGRATING ORAL TAKE AS DIRECTED
Refills: 0 | OUTPATIENT
Start: 2015-06-12 | End: 2015-07-10

## 2022-07-09 RX ORDER — ASPIRIN 81 MG/1
TABLET, DELAYED RELEASE ORAL ONCE A DAY
Refills: 0 | OUTPATIENT
Start: 2016-11-15 | End: 2017-03-06

## 2022-07-09 RX ORDER — PANTOPRAZOLE SODIUM 40 MG/1
TAKE 1 TAB PO QAM 30-60 MINS BEFORE BREAKFAST TABLET, DELAYED RELEASE ORAL ONCE A DAY
Refills: 11 | OUTPATIENT
Start: 2014-11-20 | End: 2016-08-01

## 2022-07-09 RX ORDER — DICYCLOMINE HYDROCHLORIDE 10 MG/1
TAKE 1 TAB PO THREE TIMES A DAY CAPSULE ORAL THREE TIMES A DAY
Refills: 3 | OUTPATIENT
Start: 2017-05-24 | End: 2017-08-04

## 2022-07-09 RX ORDER — FLUTICASONE PROPIONATE AND SALMETEROL 50; 250 UG/1; UG/1
POWDER RESPIRATORY (INHALATION)
Refills: 0 | OUTPATIENT
Start: 2013-07-01 | End: 2014-03-12

## 2022-07-09 RX ORDER — ZOLPIDEM TARTRATE 5 MG/1
TABLET, FILM COATED ORAL
Refills: 0 | OUTPATIENT
Start: 2018-04-25 | End: 2019-02-28

## 2022-07-09 RX ORDER — PANTOPRAZOLE SODIUM 40 MG/1
TAKE 1 TABLET BY MOUTH EVERY DAY IN THE MORNING 30 TO 60 MIN BEFORE BREAKFAST TABLET, DELAYED RELEASE ORAL ONCE A DAY
Refills: 5 | OUTPATIENT
Start: 2015-11-30 | End: 2016-08-01

## 2022-07-09 RX ORDER — MESALAMINE 375 MG/1
4 CAPS PO EVERY OTHER DAY CAPSULE, EXTENDED RELEASE ORAL
Refills: 0 | OUTPATIENT
Start: 2015-07-10 | End: 2015-07-10

## 2022-07-09 RX ORDER — LISINOPRIL 20 MG/1
TABLET ORAL ONCE A DAY
Refills: 0 | OUTPATIENT
Start: 2019-08-28 | End: 2020-02-11

## 2022-07-09 RX ORDER — TIOTROPIUM BROMIDE 18 UG/1
CAPSULE ORAL; RESPIRATORY (INHALATION)
Refills: 0 | OUTPATIENT
Start: 2013-07-01 | End: 2014-03-12

## 2022-07-09 RX ORDER — ALBUTEROL SULFATE 0.63 MG/3ML
SOLUTION RESPIRATORY (INHALATION) AS NEEDED
Refills: 0 | OUTPATIENT
Start: 2019-08-28 | End: 2020-02-11

## 2022-07-09 RX ORDER — CHLORTHALIDONE 25 MG/1
TABLET ORAL ONCE A DAY
Refills: 0 | OUTPATIENT
Start: 2016-10-30 | End: 2017-03-06

## 2022-07-09 RX ORDER — ATORVASTATIN CALCIUM 10 MG/1
TABLET, FILM COATED ORAL ONCE A DAY
Refills: 0 | OUTPATIENT
Start: 2017-07-06 | End: 2017-10-23

## 2022-07-09 RX ORDER — ASPIRIN 81 MG/1
TABLET, COATED ORAL
Refills: 0 | OUTPATIENT
Start: 2015-06-12 | End: 2015-07-10

## 2022-07-09 RX ORDER — OMEGA-3-ACID ETHYL ESTERS 1 G/1
CAPSULE, LIQUID FILLED ORAL
Refills: 0 | OUTPATIENT
Start: 2013-04-11 | End: 2013-07-01

## 2022-07-09 RX ORDER — ASPIRIN 81 MG/1
TABLET, DELAYED RELEASE ORAL ONCE A DAY
Refills: 0 | OUTPATIENT
Start: 2019-02-28 | End: 2019-08-28

## 2022-07-09 RX ORDER — LOSARTAN POTASSIUM 25 MG/1
TABLET, FILM COATED ORAL ONCE A DAY
Refills: 0 | OUTPATIENT
Start: 2018-05-17 | End: 2019-02-28

## 2022-07-09 RX ORDER — ASPIRIN 81 MG/1
TABLET, DELAYED RELEASE ORAL ONCE A DAY
Refills: 0 | OUTPATIENT
Start: 2019-08-28 | End: 2020-02-11

## 2022-07-09 RX ORDER — VITAM B12 100 MCG
TAB ORAL
Refills: 0 | OUTPATIENT
Start: 2014-11-20 | End: 2015-01-15

## 2022-07-09 RX ORDER — POTASSIUM CHLORIDE 1.5 G/1.58G
POWDER, FOR SOLUTION ORAL
Refills: 0 | OUTPATIENT
Start: 2013-07-01 | End: 2014-03-12

## 2022-07-09 RX ORDER — FLUTICASONE PROPIONATE AND SALMETEROL 50; 250 UG/1; UG/1
POWDER RESPIRATORY (INHALATION)
Refills: 0 | OUTPATIENT
Start: 2013-04-11 | End: 2013-07-01

## 2022-07-09 RX ORDER — PANTOPRAZOLE SODIUM 40 MG/1
TAKE ONE TABLET PO ONCE A DAY 30 MINUTES BEFORE BREAKFAST TABLET, DELAYED RELEASE ORAL ONCE A DAY
Refills: 11 | OUTPATIENT
Start: 2016-05-17 | End: 2017-10-23

## 2022-07-09 RX ORDER — TIOTROPIUM BROMIDE AND OLODATEROL 3.124; 2.736 UG/1; UG/1
SPRAY, METERED RESPIRATORY (INHALATION) TAKE AS DIRECTED
Refills: 0 | OUTPATIENT
Start: 2016-10-24 | End: 2017-03-06

## 2022-07-09 RX ORDER — CHLORTHALIDONE 25 MG/1
TABLET ORAL ONCE A DAY
Refills: 0 | OUTPATIENT
Start: 2017-10-23 | End: 2018-04-25

## 2022-07-09 RX ORDER — LISINOPRIL 10 MG/1
TABLET ORAL ONCE A DAY
Refills: 0 | OUTPATIENT
Start: 2017-10-23 | End: 2018-04-25

## 2022-07-09 RX ORDER — LISINOPRIL 10 MG/1
TABLET ORAL ONCE A DAY
Refills: 0 | OUTPATIENT
Start: 2017-07-06 | End: 2017-10-23

## 2022-07-09 RX ORDER — OMEGA-3-ACID ETHYL ESTERS 1 G/1
CAPSULE, LIQUID FILLED ORAL TWICE A DAY
Refills: 0 | OUTPATIENT
Start: 2014-03-12 | End: 2014-09-17

## 2022-07-09 RX ORDER — CHLORTHALIDONE 25 MG/1
TABLET ORAL ONCE A DAY
Refills: 0 | OUTPATIENT
Start: 2018-04-25 | End: 2019-02-28

## 2022-07-09 RX ORDER — ASPIRIN 81 MG/1
TABLET, COATED ORAL ONCE A DAY
Refills: 0 | OUTPATIENT
Start: 2015-07-10 | End: 2016-05-17

## 2022-07-09 RX ORDER — OMEPRAZOLE 20 MG/1
CAPSULE, DELAYED RELEASE ORAL
Refills: 0 | OUTPATIENT
Start: 2012-02-09 | End: 2013-04-11

## 2022-07-09 RX ORDER — ALBUTEROL SULFATE 0.63 MG/3ML
SOLUTION RESPIRATORY (INHALATION) AS NEEDED
Refills: 0 | OUTPATIENT
Start: 2017-10-23 | End: 2018-04-25

## 2022-07-09 RX ORDER — IBUPROFEN 400 MG/1
TABLET, FILM COATED ORAL
Refills: 0 | OUTPATIENT
Start: 2012-02-09 | End: 2013-04-11

## 2022-07-09 RX ORDER — ALBUTEROL SULFATE 2.5 MG/3ML
SOLUTION RESPIRATORY (INHALATION)
Refills: 0 | OUTPATIENT
Start: 2014-03-12 | End: 2014-09-17

## 2022-07-09 RX ORDER — ASPIRIN 81 MG/1
TABLET, DELAYED RELEASE ORAL ONCE A DAY
Refills: 0 | OUTPATIENT
Start: 2018-04-25 | End: 2019-02-28

## 2022-07-09 RX ORDER — ZOLPIDEM TARTRATE 5 MG/1
TABLET, FILM COATED ORAL
Refills: 0 | OUTPATIENT
Start: 2017-10-23 | End: 2018-04-25

## 2022-07-09 RX ORDER — CHLORTHALIDONE 25 MG/1
TABLET ORAL
Refills: 0 | OUTPATIENT
Start: 2017-05-10 | End: 2017-07-06

## 2022-07-09 RX ORDER — CETIRIZINE HYDROCHLORIDE 10 MG/1
TABLET, FILM COATED ORAL
Refills: 0 | OUTPATIENT
Start: 2012-08-23 | End: 2013-04-11

## 2022-07-09 RX ORDER — POTASSIUM CHLORIDE 1.5 G/1.58G
POWDER, FOR SOLUTION ORAL
Refills: 0 | OUTPATIENT
Start: 2013-04-11 | End: 2013-07-01

## 2022-07-09 RX ORDER — DICYCLOMINE HYDROCHLORIDE 10 MG/1
TAKE 1 TAB PO THREE TIMES A DAY CAPSULE ORAL THREE TIMES A DAY
Refills: 1 | OUTPATIENT
Start: 2018-12-03 | End: 2019-08-28

## 2022-07-09 RX ORDER — ONDANSETRON HYDROCHLORIDE 4 MG/1
TAKE 1 TAB PO Q 6-8 HRS PRN NAUSEA TABLET, FILM COATED ORAL TAKE AS DIRECTED
Refills: 0 | OUTPATIENT
Start: 2015-06-12 | End: 2015-07-10

## 2022-07-09 RX ORDER — ASPIRIN 81 MG/1
TABLET, COATED ORAL
Refills: 0 | OUTPATIENT
Start: 2014-09-17 | End: 2014-11-20

## 2022-07-09 RX ORDER — OMEGA-3-ACID ETHYL ESTERS 1 G/1
CAPSULE, LIQUID FILLED ORAL
Refills: 0 | OUTPATIENT
Start: 2013-07-01 | End: 2013-08-28

## 2022-07-09 RX ORDER — OMEGA-3-ACID ETHYL ESTERS 1 G/1
CAPSULE, LIQUID FILLED ORAL TWICE A DAY
Refills: 0 | OUTPATIENT
Start: 2014-09-17 | End: 2014-11-20

## 2022-07-10 ENCOUNTER — TELEPHONE ENCOUNTER (OUTPATIENT)
Dept: URBAN - METROPOLITAN AREA CLINIC 121 | Facility: CLINIC | Age: 72
End: 2022-07-10

## 2022-07-10 RX ORDER — PANTOPRAZOLE SODIUM 40 MG/1
TAKE 1 TAB PO QAM 30-60 MINS BEFORE BREAKFAST TABLET, DELAYED RELEASE ORAL ONCE A DAY
Refills: 3 | Status: ACTIVE | COMMUNITY
Start: 2019-02-28

## 2022-07-10 RX ORDER — FAMOTIDINE 20 MG/1
TAKE 1 TAB PO QHS TABLET ORAL ONCE A DAY
Refills: 3 | Status: ACTIVE | COMMUNITY
Start: 2020-02-11

## 2022-07-10 RX ORDER — UMECLIDINIUM BROMIDE AND VILANTEROL TRIFENATATE 62.5; 25 UG/1; UG/1
POWDER RESPIRATORY (INHALATION) ONCE A DAY
Refills: 0 | Status: ACTIVE | COMMUNITY
Start: 2019-08-28

## 2022-07-10 RX ORDER — LOSARTAN POTASSIUM 25 MG/1
TABLET, FILM COATED ORAL ONCE A DAY
Refills: 0 | Status: ACTIVE | COMMUNITY
Start: 2017-03-06

## 2022-07-10 RX ORDER — ALBUTEROL SULFATE 0.63 MG/3ML
SOLUTION RESPIRATORY (INHALATION) AS NEEDED
Refills: 0 | Status: ACTIVE | COMMUNITY
Start: 2020-02-11

## 2022-07-10 RX ORDER — CHLORTHALIDONE 25 MG/1
TABLET ORAL ONCE A DAY
Refills: 0 | Status: ACTIVE | COMMUNITY
Start: 2017-03-06

## 2022-07-10 RX ORDER — ZOLPIDEM TARTRATE 5 MG/1
TABLET, FILM COATED ORAL
Refills: 0 | Status: ACTIVE | COMMUNITY
Start: 2016-08-31

## 2022-07-10 RX ORDER — METFORMIN HCL 500 MG/1
TABLET ORAL TWICE A DAY
Refills: 0 | Status: ACTIVE | COMMUNITY
Start: 2020-02-11

## 2022-07-10 RX ORDER — ALBUTEROL SULFATE 90 UG/1
AEROSOL, METERED RESPIRATORY (INHALATION) TAKE AS DIRECTED
Refills: 0 | Status: ACTIVE | COMMUNITY
Start: 2017-03-06

## 2022-07-10 RX ORDER — DICYCLOMINE HYDROCHLORIDE 10 MG/1
TAKE 1 TAB PO THREE TIMES A DAY CAPSULE ORAL THREE TIMES A DAY
Refills: 1 | Status: ACTIVE | COMMUNITY
Start: 2020-04-27

## 2022-07-10 RX ORDER — ASPIRIN 81 MG/1
TABLET, DELAYED RELEASE ORAL ONCE A DAY
Refills: 0 | Status: ACTIVE | COMMUNITY
Start: 2016-08-09

## 2022-07-10 RX ORDER — ATORVASTATIN CALCIUM 10 MG/1
TABLET, FILM COATED ORAL ONCE A DAY
Refills: 0 | Status: ACTIVE | COMMUNITY
Start: 2020-02-11

## 2022-07-10 RX ORDER — ASPIRIN 81 MG/1
TABLET, DELAYED RELEASE ORAL ONCE A DAY
Refills: 0 | Status: ACTIVE | COMMUNITY
Start: 2020-02-11

## 2022-07-10 RX ORDER — ATORVASTATIN CALCIUM 20 MG/1
TABLET, FILM COATED ORAL ONCE A DAY
Refills: 0 | Status: ACTIVE | COMMUNITY
Start: 2017-03-06

## 2022-07-10 RX ORDER — TRAMADOL HYDROCHLORIDE 50 MG/1
TABLET ORAL AS NEEDED
Refills: 0 | Status: ACTIVE | COMMUNITY
Start: 2017-03-06

## 2022-07-10 RX ORDER — OMEPRAZOLE 20 MG/1
TAKE 2 TABS PO QAM 30-60 MINS BEFORE BREAKFAST CAPSULE, DELAYED RELEASE ORAL
Refills: 3 | Status: ACTIVE | COMMUNITY
Start: 2013-05-08

## 2022-07-10 RX ORDER — PANTOPRAZOLE SODIUM 40 MG/1
TAKE 1 TAB EVERY AM 30-60 MINS BEFORE BREAKFAST TABLET, DELAYED RELEASE ORAL
Refills: 0 | Status: ACTIVE | COMMUNITY
Start: 2018-12-14

## 2022-07-10 RX ORDER — LISINOPRIL 20 MG/1
TABLET ORAL ONCE A DAY
Refills: 0 | Status: ACTIVE | COMMUNITY
Start: 2020-02-11

## 2022-07-15 ENCOUNTER — HOSPITAL ENCOUNTER (EMERGENCY)
Age: 72
Discharge: HOME | End: 2022-07-15
Attending: EMERGENCY MEDICINE
Payer: MEDICARE

## 2022-07-15 ENCOUNTER — APPOINTMENT (OUTPATIENT)
Dept: CT IMAGING | Age: 72
End: 2022-07-15
Payer: MEDICARE

## 2022-07-15 VITALS
RESPIRATION RATE: 17 BRPM | SYSTOLIC BLOOD PRESSURE: 148 MMHG | HEART RATE: 88 BPM | DIASTOLIC BLOOD PRESSURE: 96 MMHG | TEMPERATURE: 96.8 F | WEIGHT: 149.8 LBS | OXYGEN SATURATION: 97 % | HEIGHT: 60 IN | BODY MASS INDEX: 29.41 KG/M2

## 2022-07-15 DIAGNOSIS — K57.90 DIVERTICULOSIS: ICD-10-CM

## 2022-07-15 DIAGNOSIS — K76.0 FATTY LIVER: ICD-10-CM

## 2022-07-15 DIAGNOSIS — N10 ACUTE PYELONEPHRITIS: Primary | ICD-10-CM

## 2022-07-15 DIAGNOSIS — E27.8 ADRENAL NODULE (HCC): ICD-10-CM

## 2022-07-15 DIAGNOSIS — N28.1 RENAL CYST: ICD-10-CM

## 2022-07-15 LAB
ALBUMIN SERPL-MCNC: 4.5 GM/DL (ref 3.4–5)
ALP BLD-CCNC: 70 IU/L (ref 40–129)
ALT SERPL-CCNC: 50 U/L (ref 10–40)
ANION GAP SERPL CALCULATED.3IONS-SCNC: 12 MMOL/L (ref 4–16)
AST SERPL-CCNC: 35 IU/L (ref 15–37)
BACTERIA: ABNORMAL /HPF
BASOPHILS ABSOLUTE: 0 K/CU MM
BASOPHILS RELATIVE PERCENT: 0.6 % (ref 0–1)
BILIRUB SERPL-MCNC: 0.4 MG/DL (ref 0–1)
BILIRUBIN URINE: NEGATIVE MG/DL
BLOOD, URINE: NEGATIVE
BUN BLDV-MCNC: 31 MG/DL (ref 6–23)
CALCIUM SERPL-MCNC: 10 MG/DL (ref 8.3–10.6)
CAST TYPE: ABNORMAL /HPF
CHLORIDE BLD-SCNC: 99 MMOL/L (ref 99–110)
CLARITY: ABNORMAL
CO2: 26 MMOL/L (ref 21–32)
COLOR: YELLOW
CREAT SERPL-MCNC: 1.2 MG/DL (ref 0.6–1.1)
CRYSTAL TYPE: NEGATIVE /HPF
DIFFERENTIAL TYPE: ABNORMAL
EOSINOPHILS ABSOLUTE: 0.1 K/CU MM
EOSINOPHILS RELATIVE PERCENT: 1.3 % (ref 0–3)
EPITHELIAL CELLS, UA: 10 /HPF
GFR AFRICAN AMERICAN: 53 ML/MIN/1.73M2
GFR NON-AFRICAN AMERICAN: 44 ML/MIN/1.73M2
GLUCOSE BLD-MCNC: 138 MG/DL (ref 70–99)
GLUCOSE, URINE: NEGATIVE MG/DL
HCT VFR BLD CALC: 40.4 % (ref 37–47)
HEMOGLOBIN: 12.9 GM/DL (ref 12.5–16)
IMMATURE NEUTROPHIL %: 0.4 % (ref 0–0.43)
KETONES, URINE: NEGATIVE MG/DL
LEUKOCYTE ESTERASE, URINE: ABNORMAL
LYMPHOCYTES ABSOLUTE: 1.2 K/CU MM
LYMPHOCYTES RELATIVE PERCENT: 18.4 % (ref 24–44)
MCH RBC QN AUTO: 29.4 PG (ref 27–31)
MCHC RBC AUTO-ENTMCNC: 31.9 % (ref 32–36)
MCV RBC AUTO: 92 FL (ref 78–100)
MONOCYTES ABSOLUTE: 0.7 K/CU MM
MONOCYTES RELATIVE PERCENT: 9.7 % (ref 0–4)
NITRITE URINE, QUANTITATIVE: NEGATIVE
PDW BLD-RTO: 14 % (ref 11.7–14.9)
PH, URINE: 5 (ref 5–8)
PLATELET # BLD: 291 K/CU MM (ref 140–440)
PMV BLD AUTO: 9.4 FL (ref 7.5–11.1)
POTASSIUM SERPL-SCNC: 4.9 MMOL/L (ref 3.5–5.1)
PROTEIN UA: NEGATIVE MG/DL
RBC # BLD: 4.39 M/CU MM (ref 4.2–5.4)
RBC URINE: 0 /HPF (ref 0–6)
SEGMENTED NEUTROPHILS ABSOLUTE COUNT: 4.6 K/CU MM
SEGMENTED NEUTROPHILS RELATIVE PERCENT: 69.6 % (ref 36–66)
SODIUM BLD-SCNC: 137 MMOL/L (ref 135–145)
SPECIFIC GRAVITY UA: 1.02 (ref 1–1.03)
TOTAL IMMATURE NEUTOROPHIL: 0.03 K/CU MM
TOTAL PROTEIN: 7.8 GM/DL (ref 6.4–8.2)
UROBILINOGEN, URINE: 0.2 MG/DL (ref 0.2–1)
WBC # BLD: 6.7 K/CU MM (ref 4–10.5)
WBC UA: 5 /HPF (ref 0–5)

## 2022-07-15 PROCEDURE — 80053 COMPREHEN METABOLIC PANEL: CPT

## 2022-07-15 PROCEDURE — 2580000003 HC RX 258: Performed by: EMERGENCY MEDICINE

## 2022-07-15 PROCEDURE — 96365 THER/PROPH/DIAG IV INF INIT: CPT

## 2022-07-15 PROCEDURE — 87086 URINE CULTURE/COLONY COUNT: CPT

## 2022-07-15 PROCEDURE — 81001 URINALYSIS AUTO W/SCOPE: CPT

## 2022-07-15 PROCEDURE — 6360000002 HC RX W HCPCS: Performed by: EMERGENCY MEDICINE

## 2022-07-15 PROCEDURE — 99284 EMERGENCY DEPT VISIT MOD MDM: CPT

## 2022-07-15 PROCEDURE — 96375 TX/PRO/DX INJ NEW DRUG ADDON: CPT

## 2022-07-15 PROCEDURE — 85025 COMPLETE CBC W/AUTO DIFF WBC: CPT

## 2022-07-15 PROCEDURE — 74176 CT ABD & PELVIS W/O CONTRAST: CPT

## 2022-07-15 PROCEDURE — 6370000000 HC RX 637 (ALT 250 FOR IP): Performed by: EMERGENCY MEDICINE

## 2022-07-15 RX ORDER — CEPHALEXIN 250 MG/1
500 CAPSULE ORAL ONCE
Status: DISCONTINUED | OUTPATIENT
Start: 2022-07-15 | End: 2022-07-15

## 2022-07-15 RX ORDER — KETOROLAC TROMETHAMINE 30 MG/ML
30 INJECTION, SOLUTION INTRAMUSCULAR; INTRAVENOUS ONCE
Status: DISCONTINUED | OUTPATIENT
Start: 2022-07-15 | End: 2022-07-15

## 2022-07-15 RX ORDER — ONDANSETRON 4 MG/1
4 TABLET, FILM COATED ORAL DAILY PRN
Qty: 30 TABLET | Refills: 0 | Status: SHIPPED | OUTPATIENT
Start: 2022-07-15 | End: 2022-10-17

## 2022-07-15 RX ORDER — KETOROLAC TROMETHAMINE 30 MG/ML
15 INJECTION, SOLUTION INTRAMUSCULAR; INTRAVENOUS ONCE
Status: COMPLETED | OUTPATIENT
Start: 2022-07-15 | End: 2022-07-15

## 2022-07-15 RX ORDER — LIDOCAINE 4 G/G
1 PATCH TOPICAL DAILY
Status: DISCONTINUED | OUTPATIENT
Start: 2022-07-15 | End: 2022-07-15 | Stop reason: HOSPADM

## 2022-07-15 RX ORDER — OXYCODONE HYDROCHLORIDE 5 MG/1
5 TABLET ORAL ONCE
Status: DISCONTINUED | OUTPATIENT
Start: 2022-07-15 | End: 2022-07-15

## 2022-07-15 RX ORDER — NAPROXEN 500 MG/1
500 TABLET ORAL 2 TIMES DAILY WITH MEALS
Qty: 60 TABLET | Refills: 0 | Status: SHIPPED | OUTPATIENT
Start: 2022-07-15 | End: 2022-10-17 | Stop reason: ALTCHOICE

## 2022-07-15 RX ORDER — CEFPODOXIME PROXETIL 200 MG/1
200 TABLET, FILM COATED ORAL 2 TIMES DAILY
Qty: 20 TABLET | Refills: 0 | Status: SHIPPED | OUTPATIENT
Start: 2022-07-15 | End: 2022-07-25

## 2022-07-15 RX ADMIN — CEFTRIAXONE SODIUM 1000 MG: 1 INJECTION, POWDER, FOR SOLUTION INTRAMUSCULAR; INTRAVENOUS at 09:30

## 2022-07-15 RX ADMIN — KETOROLAC TROMETHAMINE 15 MG: 30 INJECTION, SOLUTION INTRAMUSCULAR; INTRAVENOUS at 09:29

## 2022-07-15 ASSESSMENT — PAIN DESCRIPTION - LOCATION
LOCATION: FLANK
LOCATION: BACK

## 2022-07-15 ASSESSMENT — PAIN DESCRIPTION - ORIENTATION: ORIENTATION: LEFT

## 2022-07-15 ASSESSMENT — PAIN DESCRIPTION - PAIN TYPE: TYPE: ACUTE PAIN

## 2022-07-15 ASSESSMENT — PAIN DESCRIPTION - DESCRIPTORS: DESCRIPTORS: BURNING

## 2022-07-15 ASSESSMENT — PAIN SCALES - GENERAL
PAINLEVEL_OUTOF10: 10
PAINLEVEL_OUTOF10: 10

## 2022-07-15 ASSESSMENT — PAIN - FUNCTIONAL ASSESSMENT: PAIN_FUNCTIONAL_ASSESSMENT: 0-10

## 2022-07-15 NOTE — ED PROVIDER NOTES
Triage Chief Complaint:   Back Pain (LOWER)    Telida:  Luis A Bran is a 67 y.o. female that presents with left flank and low back pain. Patient was in baseline state of health until 1 week ago when the above started. No inciting event or trauma. Patient reports she has been slightly more active caring for her daughter who broke her leg and she is making more trips up and down the stairs but denies any specific event causing pain. Pain has been persistent now for a week and is described as left low back/flank, currently severe despite Tylenol use at home and at times radiated to her abdomen. Some associated nausea. Additionally, patient reports increased urinary frequency that seems to be worsening. Patient reports that she got up 3 times last night to pee which is atypical for her. No pain with urinating. No fevers or chills. Patient otherwise has been doing well.     ROS:  General:  No fevers, no chills, no weakness  Eyes:  No recent vison changes, no discharge  ENT:  No sore throat, no nasal congestion, no hearing changes  Cardiovascular:  No chest pain, no palpitations  Respiratory:  No shortness of breath, no cough, no wheezing  Gastrointestinal:  No pain, + nausea, no vomiting, no diarrhea  Musculoskeletal:  + muscle pain/back pain, no joint pain  Skin:  No rash, no pruritis, no easy bruising  Neurologic:  No speech problems, no headache, no extremity numbness, no extremity tingling, no extremity weakness  Psychiatric:  No anxiety  Genitourinary:  No dysuria, no hematuria, + frequency, + flank pain  Endocrine:  No unexpected weight gain, no unexpected weight loss  Extremities:  no edema, no pain    Past Medical History:   Diagnosis Date    Acute exacerbation of chronic obstructive pulmonary disease (COPD) ([address] 12/15/2021    Cerebral artery occlusion with cerebral infarction (HCC)     COPD, mild ([address] 6/15/2021    Diabetes mellitus ([address]     Excessive daytime sleepiness 6/15/2021    Former smoker Needs: Not on file   Physical Activity: Not on file   Stress: Not on file   Social Connections: Not on file   Intimate Partner Violence: Not on file   Housing Stability: Not on file     Current Facility-Administered Medications   Medication Dose Route Frequency Provider Last Rate Last Admin    lidocaine 4 % external patch 1 patch  1 patch TransDERmal Daily Kareem Padilla MD   1 patch at 07/15/22 3596     Current Outpatient Medications   Medication Sig Dispense Refill    cefpodoxime (VANTIN) 200 MG tablet Take 1 tablet by mouth in the morning and 1 tablet before bedtime. Do all this for 10 days. 20 tablet 0    naproxen (NAPROSYN) 500 MG tablet Take 1 tablet by mouth in the morning and 1 tablet in the evening. Take with meals.  60 tablet 0    ondansetron (ZOFRAN) 4 MG tablet Take 1 tablet by mouth daily as needed for Nausea or Vomiting 30 tablet 0    ondansetron (ZOFRAN-ODT) 4 MG disintegrating tablet Take 1 tablet by mouth 3 times daily as needed for Nausea or Vomiting 30 tablet 0    cloNIDine (CATAPRES) 0.1 MG tablet Take 1 tablet by mouth 2 times daily as needed for High Blood Pressure (htn >170) 30 tablet 3    diclofenac sodium (VOLTAREN) 1 % GEL Apply 2 g topically 4 times daily 150 g 0    pantoprazole (PROTONIX) 40 MG tablet Take 1 tablet by mouth daily 30 tablet 5    chlorthalidone (HYGROTEN) 50 MG tablet Take 1 tablet by mouth daily 30 tablet 3    amLODIPine (NORVASC) 5 MG tablet Take 1 tablet by mouth daily for 5 days 5 tablet 0    albuterol (PROVENTIL) (2.5 MG/3ML) 0.083% nebulizer solution Take 3 mLs by nebulization every 6 hours as needed for Wheezing 360 each 2    dicyclomine (BENTYL) 10 MG capsule Take 1 capsule by mouth 3 times daily as needed (stomach) 120 capsule 2    metFORMIN (GLUCOPHAGE) 500 MG tablet Take 1 tablet by mouth 2 times daily (with meals) 180 tablet 1    alendronate (FOSAMAX) 70 MG tablet Take 1 tablet by mouth every 7 days 12 tablet 1    atorvastatin (LIPITOR) 20 MG tablet Take 1 tablet by mouth daily 90 tablet 1    albuterol sulfate  (90 Base) MCG/ACT inhaler Inhale 2 puffs into the lungs every 4 hours as needed for Wheezing 1 each 11    umeclidinium-vilanterol (ANORO ELLIPTA) 62.5-25 MCG/INH AEPB inhaler Inhale 1 puff into the lungs daily 1 each 11    lisinopril (PRINIVIL;ZESTRIL) 40 MG tablet TAKE 1 TABLET BY MOUTH EVERY DAY 30 tablet 0    aspirin 81 MG EC tablet Take 81 mg by mouth daily       Allergies   Allergen Reactions    Other Hives    Levaquin [Levofloxacin] Rash       Nursing Notes Reviewed    Physical Exam:  ED Triage Vitals [07/15/22 0854]   Enc Vitals Group      BP (!) 148/96      Heart Rate 88      Resp 17      Temp 96.8 °F (36 °C)      Temp src       SpO2 97 %      Weight 149 lb 12.8 oz (67.9 kg)      Height 5' (1.524 m)      Head Circumference       Peak Flow       Pain Score       Pain Loc       Pain Edu? Excl. in [address]? My pulse ox interpretation is - normal    General appearance:  No acute distress. Appears well. Very pleasant. Mobile about the bed. Skin:  Warm. Dry. There is no rash to the affected flank. No contusions or abrasions. Eye:  Extraocular movements intact. Ears, nose, mouth and throat:  Oral mucosa moist   Neck:  Trachea midline. No bony midline tenderness. Extremity:  No swelling. Normal ROM     Heart:  Regular rate and rhythm, normal S1 & S2, no extra heart sounds. Perfusion:  Intact   Respiratory:  Lungs clear to auscultation bilaterally. Respirations nonlabored. Abdominal:  Normal bowel sounds. Soft. There is mild suprapubic tenderness. Non distended. Back:  + Left CVA tenderness to palpation. There is no midline bony TLS tenderness or step-off. Neurological:  Alert and oriented times 3. No focal neuro deficits.              Psychiatric:  Appropriate    I have reviewed and interpreted all of the currently available lab results from this visit (if applicable):  Results for orders placed or performed during the hospital encounter of 07/15/22   Urinalysis with Microscopic   Result Value Ref Range    Color, UA YELLOW YELLOW    Clarity, UA SLIGHTLY CLOUDY (A) CLEAR    Glucose, Urine NEGATIVE NEGATIVE MG/DL    Bilirubin Urine NEGATIVE NEGATIVE MG/DL    Ketones, Urine NEGATIVE NEGATIVE MG/DL    Specific Gravity, UA 1.020 1.001 - 1.035    Blood, Urine NEGATIVE NEGATIVE    pH, Urine 5.0 5.0 - 8.0    Protein, UA NEGATIVE NEGATIVE MG/DL    Urobilinogen, Urine 0.2 0.2 - 1.0 MG/DL    Nitrite Urine, Quantitative NEGATIVE NEGATIVE    Leukocyte Esterase, Urine SMALL NUMBER OR AMOUNT OBSERVED (A) NEGATIVE    RBC, UA 0 0 - 6 /HPF    WBC, UA 5 0 - 5 /HPF    Epithelial Cells, UA 10 /HPF    Cast Type NO CAST FORMS SEEN NO CAST FORMS SEEN /HPF    Bacteria, UA MANY (A) NEGATIVE /HPF    Crystal Type NEGATIVE NEGATIVE /HPF   CBC with Auto Differential   Result Value Ref Range    WBC 6.7 4.0 - 10.5 K/CU MM    RBC 4.39 4.2 - 5.4 M/CU MM    Hemoglobin 12.9 12.5 - 16.0 GM/DL    Hematocrit 40.4 37 - 47 %    MCV 92.0 78 - 100 FL    MCH 29.4 27 - 31 PG    MCHC 31.9 (L) 32.0 - 36.0 %    RDW 14.0 11.7 - 14.9 %    Platelets 342 483 - 340 K/CU MM    MPV 9.4 7.5 - 11.1 FL    Differential Type AUTOMATED DIFFERENTIAL     Segs Relative 69.6 (H) 36 - 66 %    Lymphocytes % 18.4 (L) 24 - 44 %    Monocytes % 9.7 (H) 0 - 4 %    Eosinophils % 1.3 0 - 3 %    Basophils % 0.6 0 - 1 %    Segs Absolute 4.6 K/CU MM    Lymphocytes Absolute 1.2 K/CU MM    Monocytes Absolute 0.7 K/CU MM    Eosinophils Absolute 0.1 K/CU MM    Basophils Absolute 0.0 K/CU MM    Immature Neutrophil % 0.4 0 - 0.43 %    Total Immature Neutrophil 0.03 K/CU MM   Comprehensive Metabolic Panel   Result Value Ref Range    Sodium 137 135 - 145 MMOL/L    Potassium 4.9 3.5 - 5.1 MMOL/L    Chloride 99 99 - 110 mMol/L    CO2 26 21 - 32 MMOL/L    BUN 31 (H) 6 - 23 MG/DL    CREATININE 1.2 (H) 0.6 - 1.1 MG/DL    Glucose 138 (H) 70 - 99 MG/DL    Calcium 10.0 8.3 - 10.6 MG/DL    Albumin 4.5 3.4 - 5.0 GM/DL Total Protein 7.8 6.4 - 8.2 GM/DL    Total Bilirubin 0.4 0.0 - 1.0 MG/DL    ALT 50 (H) 10 - 40 U/L    AST 35 15 - 37 IU/L    Alkaline Phosphatase 70 40 - 129 IU/L    GFR Non- 44 (L) >60 mL/min/1.73m2    GFR  53 (L) >60 mL/min/1.73m2    Anion Gap 12 4 - 16      Radiographs (if obtained):  [] The following radiograph was interpreted by myself in the absence of a radiologist:   [x] Radiologist's Report Reviewed:  CT ABDOMEN PELVIS WO CONTRAST Additional Contrast? None   Final Result   1. Fatty liver again noted, when compared to the previous study performed   05/22/2022.      2.  Bilateral adrenal gland nodules again seen. 3.  Left renal cortical cyst again identified. 4.  Small hiatal hernia again noted. 5.  Colonic diverticulosis again appreciated. EKG (if obtained): (All EKG's are interpreted by myself in the absence of a cardiologist)    Chart review shows recent radiographs:  No results found. MDM:  Pt presents as above. Emergent conditions considered. Presentation prompted initial urinalysis which demonstrates many bacteria given patient's symptoms I do believe she is likely with infection. Urine is sent for culture. Given patient's age and degree of her pain labs and imaging are obtained to ensure normal renal function and no ureteral stone. IV is established and IV Toradol and IV Rocephin are given. Lidoderm patch is applied to the flank to take over any muscular component. CBC and CMP without clinically significant derangement. CT imaging does demonstrate numerous incidental findings which are all discussed with the patient. No evidence of obstructing stone which is reassuring. Patient on recheck is continuing to appear well. Patiently placed on Ceftin antibiotic course as well as naproxen and Zofran for home. Patient to continue taking Tylenol as well.   Encourage PCP follow-up    I discussed specific signs and symptoms on when to return to the emergency department as well as the need for close outpatient follow-up. Questions sought and answered with the patient. They voice understanding and agree with plan. Is this patient to be included in the SEP-1 Core Measure due to severe sepsis or septic shock? No   Exclusion criteria - the patient is NOT to be included for SEP-1 Core Measure due to:  2+ SIRS criteria are not met          Care of this patient occurred during the COVID-19 pandemic. Clinical Impression:  1. Acute pyelonephritis    2. Fatty liver    3. Adrenal nodule ([address]    4. Renal cyst    5. Diverticulosis      Disposition referral (if applicable):  Rick Hernandez DO  [address] [facility]    Schedule an appointment as soon as possible for a visit       [address]  [phone]  Today  If symptoms worsen  Disposition medications (if applicable):  New Prescriptions    CEFPODOXIME (VANTIN) 200 MG TABLET    Take 1 tablet by mouth in the morning and 1 tablet before bedtime. Do all this for 10 days. NAPROXEN (NAPROSYN) 500 MG TABLET    Take 1 tablet by mouth in the morning and 1 tablet in the evening. Take with meals. ONDANSETRON (ZOFRAN) 4 MG TABLET    Take 1 tablet by mouth daily as needed for Nausea or Vomiting       Comment: Please note this report has been produced using speech recognition software and may contain errors related to that system including errors in grammar, punctuation, and spelling, as well as words and phrases that may be inappropriate. If there are any questions or concerns please feel free to contact the dictating provider for clarification.        Edgardo Rey MD  07/15/22 0597

## 2022-07-16 LAB
CULTURE: NORMAL
Lab: NORMAL
SPECIMEN: NORMAL

## 2022-07-18 ENCOUNTER — CARE COORDINATION (OUTPATIENT)
Dept: CARE COORDINATION | Age: 72
End: 2022-07-18

## 2022-07-26 ENCOUNTER — CARE COORDINATION (OUTPATIENT)
Dept: CARE COORDINATION | Age: 72
End: 2022-07-26

## 2022-07-26 NOTE — CARE COORDINATION
Covering for Stiven HANSON. Attempted to reach patient for care coordination follow up call.   Message left of voicemail to return call to Salazar Coleman or leonidas ACM at [phone]

## 2022-08-01 DIAGNOSIS — I10 ESSENTIAL HYPERTENSION: ICD-10-CM

## 2022-08-01 RX ORDER — LISINOPRIL 40 MG/1
TABLET ORAL
Qty: 30 TABLET | Refills: 0 | OUTPATIENT
Start: 2022-08-01

## 2022-08-02 ENCOUNTER — CARE COORDINATION (OUTPATIENT)
Dept: CARE COORDINATION | Age: 72
End: 2022-08-02

## 2022-08-03 DIAGNOSIS — I10 ESSENTIAL HYPERTENSION: ICD-10-CM

## 2022-08-04 RX ORDER — LISINOPRIL 40 MG/1
TABLET ORAL
Qty: 90 TABLET | Refills: 1 | Status: SHIPPED | OUTPATIENT
Start: 2022-08-04

## 2022-08-16 ENCOUNTER — HOSPITAL ENCOUNTER (OUTPATIENT)
Age: 72
Discharge: HOME | End: 2022-08-16
Payer: MEDICARE

## 2022-08-16 ENCOUNTER — OFFICE VISIT (OUTPATIENT)
Dept: FAMILY MEDICINE CLINIC | Age: 72
End: 2022-08-16
Payer: MEDICARE

## 2022-08-16 VITALS
RESPIRATION RATE: 16 BRPM | SYSTOLIC BLOOD PRESSURE: 130 MMHG | HEIGHT: 60 IN | HEART RATE: 79 BPM | OXYGEN SATURATION: 97 % | DIASTOLIC BLOOD PRESSURE: 64 MMHG | WEIGHT: 135 LBS | BODY MASS INDEX: 26.5 KG/M2

## 2022-08-16 DIAGNOSIS — N18.31 STAGE 3A CHRONIC KIDNEY DISEASE (HCC): ICD-10-CM

## 2022-08-16 DIAGNOSIS — E11.22 TYPE 2 DIABETES MELLITUS WITH STAGE 3A CHRONIC KIDNEY DISEASE, WITHOUT LONG-TERM CURRENT USE OF INSULIN (HCC): ICD-10-CM

## 2022-08-16 DIAGNOSIS — K76.0 FATTY LIVER: ICD-10-CM

## 2022-08-16 DIAGNOSIS — E11.9 TYPE 2 DIABETES MELLITUS WITHOUT COMPLICATION, WITHOUT LONG-TERM CURRENT USE OF INSULIN (HCC): ICD-10-CM

## 2022-08-16 DIAGNOSIS — N18.31 TYPE 2 DIABETES MELLITUS WITH STAGE 3A CHRONIC KIDNEY DISEASE, WITHOUT LONG-TERM CURRENT USE OF INSULIN (HCC): ICD-10-CM

## 2022-08-16 DIAGNOSIS — J44.9 COPD, MILD (HCC): Primary | ICD-10-CM

## 2022-08-16 LAB
CREATININE URINE: 65.1 MG/DL (ref 28–217)
HBA1C MFR BLD: 6.9 %
MICROALBUMIN/CREAT 24H UR: <1.2 MG/DL
MICROALBUMIN/CREAT UR-RTO: NORMAL MG/G CREAT (ref 0–30)

## 2022-08-16 PROCEDURE — 1090F PRES/ABSN URINE INCON ASSESS: CPT | Performed by: STUDENT IN AN ORGANIZED HEALTH CARE EDUCATION/TRAINING PROGRAM

## 2022-08-16 PROCEDURE — 3017F COLORECTAL CA SCREEN DOC REV: CPT | Performed by: STUDENT IN AN ORGANIZED HEALTH CARE EDUCATION/TRAINING PROGRAM

## 2022-08-16 PROCEDURE — G8427 DOCREV CUR MEDS BY ELIG CLIN: HCPCS | Performed by: STUDENT IN AN ORGANIZED HEALTH CARE EDUCATION/TRAINING PROGRAM

## 2022-08-16 PROCEDURE — 1036F TOBACCO NON-USER: CPT | Performed by: STUDENT IN AN ORGANIZED HEALTH CARE EDUCATION/TRAINING PROGRAM

## 2022-08-16 PROCEDURE — G8417 CALC BMI ABV UP PARAM F/U: HCPCS | Performed by: STUDENT IN AN ORGANIZED HEALTH CARE EDUCATION/TRAINING PROGRAM

## 2022-08-16 PROCEDURE — 3023F SPIROM DOC REV: CPT | Performed by: STUDENT IN AN ORGANIZED HEALTH CARE EDUCATION/TRAINING PROGRAM

## 2022-08-16 PROCEDURE — 2022F DILAT RTA XM EVC RTNOPTHY: CPT | Performed by: STUDENT IN AN ORGANIZED HEALTH CARE EDUCATION/TRAINING PROGRAM

## 2022-08-16 PROCEDURE — 3044F HG A1C LEVEL LT 7.0%: CPT | Performed by: STUDENT IN AN ORGANIZED HEALTH CARE EDUCATION/TRAINING PROGRAM

## 2022-08-16 PROCEDURE — 82570 ASSAY OF URINE CREATININE: CPT

## 2022-08-16 PROCEDURE — 99214 OFFICE O/P EST MOD 30 MIN: CPT | Performed by: STUDENT IN AN ORGANIZED HEALTH CARE EDUCATION/TRAINING PROGRAM

## 2022-08-16 PROCEDURE — 1123F ACP DISCUSS/DSCN MKR DOCD: CPT | Performed by: STUDENT IN AN ORGANIZED HEALTH CARE EDUCATION/TRAINING PROGRAM

## 2022-08-16 PROCEDURE — G8399 PT W/DXA RESULTS DOCUMENT: HCPCS | Performed by: STUDENT IN AN ORGANIZED HEALTH CARE EDUCATION/TRAINING PROGRAM

## 2022-08-16 PROCEDURE — 83036 HEMOGLOBIN GLYCOSYLATED A1C: CPT | Performed by: STUDENT IN AN ORGANIZED HEALTH CARE EDUCATION/TRAINING PROGRAM

## 2022-08-16 PROCEDURE — 82043 UR ALBUMIN QUANTITATIVE: CPT

## 2022-08-16 RX ORDER — UMECLIDINIUM BROMIDE AND VILANTEROL TRIFENATATE 62.5; 25 UG/1; UG/1
1 POWDER RESPIRATORY (INHALATION) DAILY
Qty: 1 EACH | Refills: 11 | Status: SHIPPED | OUTPATIENT
Start: 2022-08-16

## 2022-08-16 ASSESSMENT — ENCOUNTER SYMPTOMS
SORE THROAT: 0
WHEEZING: 0
ABDOMINAL PAIN: 0
SHORTNESS OF BREATH: 0
NAUSEA: 0

## 2022-08-16 NOTE — LETTER
[address] [place] [address] Onesimo Diaz  Phone: [phone]  Fax: [phone]    Perez Jeffrey DO         August 16, 2022     Patient: Lionel Portillo   YOB: 1950   Date of Visit: 8/16/2022       To Whom It May Concern: It is my medical opinion that Jennifer Esteves requires a disability parking placard for the following reasons:  She cannot walk 200 feet without stopping to rest.  Duration of need: 5 years    If you have any questions or concerns, please don't hesitate to call.     Sincerely,        Yinka Diaz, DO

## 2022-08-16 NOTE — PROGRESS NOTES
Ovarian Cancer Daughter     Other Sister         suicide    Heart Attack Brother     Pacemaker Brother     No Known Problems Brother     No Known Problems Brother     No Known Problems Brother        SOCIAL HISTORY  Social History     Socioeconomic History    Marital status: Single     Spouse name: None    Number of children: None    Years of education: None    Highest education level: None   Tobacco Use    Smoking status: Former     Packs/day: 1.00     Years: 51.00     Pack years: 51.00     Types: Cigarettes     Start date:      Quit date: 2020     Years since quittin.6    Smokeless tobacco: Never   Vaping Use    Vaping Use: Former    Substances: Flavoring    Devices: Disposable, Pre-filled pod   Substance and Sexual Activity    Alcohol use: Not Currently    Drug use: Not Currently        SURGICAL HISTORY  Past Surgical History:   Procedure Laterality Date    APPENDECTOMY      CHOLECYSTECTOMY      COLONOSCOPY N/A 9/15/2021    COLONOSCOPY POLYPECTOMY SNARE/COLD BIOPSY performed by Shakira Lugo MD at [facility] [address] Left     Left hand    HYSTERECTOMY (CERVIX STATUS UNKNOWN)      JOINT REPLACEMENT Right     2020-Right Hip    OVARY REMOVAL      age 34    TONSILLECTOMY      UPPER GASTROINTESTINAL ENDOSCOPY N/A 9/15/2021    EGD DIAGNOSTIC ONLY performed by Shaikra Lugo MD at [facility], [place], operated by [facility]  Current Outpatient Medications   Medication Sig Dispense Refill    umeclidinium-vilanterol (ANORO ELLIPTA) 62.5-25 MCG/INH AEPB inhaler Inhale 1 puff into the lungs in the morning. 1 each 11    lisinopril (PRINIVIL;ZESTRIL) 40 MG tablet TAKE 1 TABLET BY MOUTH EVERY DAY 90 tablet 1    naproxen (NAPROSYN) 500 MG tablet Take 1 tablet by mouth in the morning and 1 tablet in the evening. Take with meals.  60 tablet 0    ondansetron (ZOFRAN) 4 MG tablet Take 1 tablet by mouth daily as needed for Nausea or Vomiting 30 tablet 0    ondansetron (ZOFRAN-ODT) 4 MG disintegrating tablet Take 1 tablet by mouth 3 times daily as needed for Nausea or Vomiting 30 tablet 0    diclofenac sodium (VOLTAREN) 1 % GEL Apply 2 g topically 4 times daily 150 g 0    chlorthalidone (HYGROTEN) 50 MG tablet Take 1 tablet by mouth daily 30 tablet 3    amLODIPine (NORVASC) 5 MG tablet Take 1 tablet by mouth daily for 5 days 5 tablet 0    albuterol (PROVENTIL) (2.5 MG/3ML) 0.083% nebulizer solution Take 3 mLs by nebulization every 6 hours as needed for Wheezing 360 each 2    dicyclomine (BENTYL) 10 MG capsule Take 1 capsule by mouth 3 times daily as needed (stomach) 120 capsule 2    metFORMIN (GLUCOPHAGE) 500 MG tablet Take 1 tablet by mouth 2 times daily (with meals) 180 tablet 1    alendronate (FOSAMAX) 70 MG tablet Take 1 tablet by mouth every 7 days 12 tablet 1    atorvastatin (LIPITOR) 20 MG tablet Take 1 tablet by mouth daily 90 tablet 1    albuterol sulfate  (90 Base) MCG/ACT inhaler Inhale 2 puffs into the lungs every 4 hours as needed for Wheezing 1 each 11    aspirin 81 MG EC tablet Take 81 mg by mouth daily      pantoprazole (PROTONIX) 40 MG tablet Take 1 tablet by mouth daily 30 tablet 5     No current facility-administered medications for this visit. ALLERGIES  Allergies   Allergen Reactions    Other Hives    Levaquin [Levofloxacin] Rash       PHYSICAL EXAM    /64   Pulse 79   Resp 16   Ht 5' (1.524 m)   Wt 135 lb (61.2 kg)   SpO2 97%   BMI 26.37 kg/m²     Physical Exam  Constitutional:       Appearance: Normal appearance. HENT:      Head: Normocephalic and atraumatic. Eyes:      Extraocular Movements: Extraocular movements intact. Pupils: Pupils are equal, round, and reactive to light. Cardiovascular:      Rate and Rhythm: Normal rate and regular rhythm. Pulses: Normal pulses. Heart sounds: No murmur heard. No friction rub. No gallop.    Pulmonary:      Effort: Pulmonary effort is normal.      Breath sounds: Normal breath sounds. Skin:     General: Skin is warm and dry. Neurological:      General: No focal deficit present. Mental Status: She is alert. Psychiatric:         Mood and Affect: Mood normal.         Behavior: Behavior normal.       ASSESSMENT & PLAN    1. COPD, mild (HCC)    - umeclidinium-vilanterol (ANORO ELLIPTA) 62.5-25 MCG/INH AEPB inhaler; Inhale 1 puff into the lungs in the morning. Dispense: 1 each; Refill: 11    2. Stage 3a chronic kidney disease ([facility] [address]      3. Type 2 diabetes mellitus without complication, without long-term current use of insulin ([facility])    - POCT glycosylated hemoglobin (Hb A1C)  - Microalbumin / Creatinine Urine Ratio; Future    4. Type 2 diabetes mellitus with stage 3a chronic kidney disease, without long-term current use of insulin ([facility] [address]      5. Fatty liver      A1c 6.9 excellent  CKD stable  Follow pulmonary given samples trelegy today as out of her breo-eillipta we will refill  Fatty liver monitor  Bp stable      Return in about 4 months (around 12/16/2022).          Electronically signed by Dimple Singh DO on 8/16/2022

## 2022-09-18 DIAGNOSIS — I10 PRIMARY HYPERTENSION: ICD-10-CM

## 2022-09-19 RX ORDER — CHLORTHALIDONE 50 MG/1
TABLET ORAL
Qty: 90 TABLET | Refills: 1 | Status: SHIPPED | OUTPATIENT
Start: 2022-09-19 | End: 2022-10-17 | Stop reason: ALTCHOICE

## 2022-10-17 ENCOUNTER — HOSPITAL ENCOUNTER (OUTPATIENT)
Dept: WOMENS IMAGING | Age: 72
Discharge: HOME | End: 2022-10-17
Payer: MEDICARE

## 2022-10-17 ENCOUNTER — OFFICE VISIT (OUTPATIENT)
Dept: FAMILY MEDICINE CLINIC | Age: 72
End: 2022-10-17
Payer: MEDICARE

## 2022-10-17 VITALS
HEIGHT: 60 IN | WEIGHT: 137 LBS | SYSTOLIC BLOOD PRESSURE: 122 MMHG | OXYGEN SATURATION: 98 % | DIASTOLIC BLOOD PRESSURE: 82 MMHG | BODY MASS INDEX: 26.9 KG/M2 | HEART RATE: 75 BPM

## 2022-10-17 DIAGNOSIS — K21.9 GASTROESOPHAGEAL REFLUX DISEASE, UNSPECIFIED WHETHER ESOPHAGITIS PRESENT: ICD-10-CM

## 2022-10-17 DIAGNOSIS — E11.9 TYPE 2 DIABETES MELLITUS WITHOUT COMPLICATION, WITHOUT LONG-TERM CURRENT USE OF INSULIN (HCC): ICD-10-CM

## 2022-10-17 DIAGNOSIS — M81.6 LOCALIZED OSTEOPOROSIS, UNSPECIFIED PATHOLOGICAL FRACTURE PRESENCE: ICD-10-CM

## 2022-10-17 DIAGNOSIS — Z12.31 SCREENING MAMMOGRAM, ENCOUNTER FOR: ICD-10-CM

## 2022-10-17 DIAGNOSIS — R30.0 DYSURIA: ICD-10-CM

## 2022-10-17 DIAGNOSIS — F41.9 ANXIETY: Primary | ICD-10-CM

## 2022-10-17 DIAGNOSIS — J44.9 CHRONIC OBSTRUCTIVE PULMONARY DISEASE, UNSPECIFIED COPD TYPE (HCC): ICD-10-CM

## 2022-10-17 DIAGNOSIS — R82.90 ABNORMAL URINALYSIS: Primary | ICD-10-CM

## 2022-10-17 DIAGNOSIS — E78.2 MIXED HYPERLIPIDEMIA: ICD-10-CM

## 2022-10-17 LAB
BILIRUBIN, POC: NEGATIVE
BLOOD URINE, POC: NEGATIVE
CLARITY, POC: CLEAR
COLOR, POC: YELLOW
CREATININE URINE: 60.2 MG/DL (ref 28–259)
GLUCOSE URINE, POC: NEGATIVE
KETONES, POC: NEGATIVE
LEUKOCYTE EST, POC: ABNORMAL
MICROALBUMIN UR-MCNC: <1.2 MG/DL
MICROALBUMIN/CREAT UR-RTO: NORMAL MG/G (ref 0–30)
NITRITE, POC: NEGATIVE
PH, POC: 5.5
PROTEIN, POC: NEGATIVE
SPECIFIC GRAVITY, POC: 1.02
UROBILINOGEN, POC: 0.2

## 2022-10-17 PROCEDURE — G8484 FLU IMMUNIZE NO ADMIN: HCPCS

## 2022-10-17 PROCEDURE — 1036F TOBACCO NON-USER: CPT

## 2022-10-17 PROCEDURE — 3023F SPIROM DOC REV: CPT

## 2022-10-17 PROCEDURE — 77063 BREAST TOMOSYNTHESIS BI: CPT

## 2022-10-17 PROCEDURE — G8417 CALC BMI ABV UP PARAM F/U: HCPCS

## 2022-10-17 PROCEDURE — 2022F DILAT RTA XM EVC RTNOPTHY: CPT

## 2022-10-17 PROCEDURE — G8399 PT W/DXA RESULTS DOCUMENT: HCPCS

## 2022-10-17 PROCEDURE — 3044F HG A1C LEVEL LT 7.0%: CPT

## 2022-10-17 PROCEDURE — 1090F PRES/ABSN URINE INCON ASSESS: CPT

## 2022-10-17 PROCEDURE — 99214 OFFICE O/P EST MOD 30 MIN: CPT

## 2022-10-17 PROCEDURE — 1123F ACP DISCUSS/DSCN MKR DOCD: CPT

## 2022-10-17 PROCEDURE — 3017F COLORECTAL CA SCREEN DOC REV: CPT

## 2022-10-17 PROCEDURE — G8427 DOCREV CUR MEDS BY ELIG CLIN: HCPCS

## 2022-10-17 PROCEDURE — 81002 URINALYSIS NONAUTO W/O SCOPE: CPT

## 2022-10-17 RX ORDER — PANTOPRAZOLE SODIUM 40 MG/1
40 TABLET, DELAYED RELEASE ORAL DAILY
Qty: 90 TABLET | Refills: 1 | Status: SHIPPED | OUTPATIENT
Start: 2022-10-17 | End: 2023-01-15

## 2022-10-17 RX ORDER — BLOOD-GLUCOSE METER
1 KIT MISCELLANEOUS DAILY
Qty: 1 KIT | Refills: 0 | Status: SHIPPED | OUTPATIENT
Start: 2022-10-17

## 2022-10-17 RX ORDER — BUSPIRONE HYDROCHLORIDE 30 MG/1
30 TABLET ORAL NIGHTLY
Qty: 30 TABLET | Refills: 1 | Status: SHIPPED | OUTPATIENT
Start: 2022-10-17 | End: 2022-12-16

## 2022-10-17 RX ORDER — GLUCOSAMINE HCL/CHONDROITIN SU 500-400 MG
CAPSULE ORAL
Qty: 100 STRIP | Refills: 3 | Status: SHIPPED | OUTPATIENT
Start: 2022-10-17

## 2022-10-17 RX ORDER — ALBUTEROL SULFATE 2.5 MG/3ML
2.5 SOLUTION RESPIRATORY (INHALATION) EVERY 6 HOURS PRN
Qty: 360 EACH | Refills: 2 | Status: SHIPPED | OUTPATIENT
Start: 2022-10-17

## 2022-10-17 RX ORDER — LANCETS 30 GAUGE
1 EACH MISCELLANEOUS DAILY
Qty: 100 EACH | Refills: 3 | Status: SHIPPED | OUTPATIENT
Start: 2022-10-17

## 2022-10-17 RX ORDER — ALENDRONATE SODIUM 70 MG/1
70 TABLET ORAL
Qty: 12 TABLET | Refills: 1 | Status: SHIPPED | OUTPATIENT
Start: 2022-10-17

## 2022-10-17 RX ORDER — SULFAMETHOXAZOLE AND TRIMETHOPRIM 800; 160 MG/1; MG/1
1 TABLET ORAL 2 TIMES DAILY
Qty: 6 TABLET | Refills: 0 | Status: SHIPPED | OUTPATIENT
Start: 2022-10-17 | End: 2022-10-20

## 2022-10-17 RX ORDER — ALBUTEROL SULFATE 90 UG/1
2 AEROSOL, METERED RESPIRATORY (INHALATION) EVERY 4 HOURS PRN
Qty: 3 EACH | Refills: 1 | Status: SHIPPED | OUTPATIENT
Start: 2022-10-17

## 2022-10-17 RX ORDER — ATORVASTATIN CALCIUM 20 MG/1
20 TABLET, FILM COATED ORAL DAILY
Qty: 90 TABLET | Refills: 1 | Status: SHIPPED | OUTPATIENT
Start: 2022-10-17

## 2022-10-17 ASSESSMENT — SLEEP AND FATIGUE QUESTIONNAIRES
DO YOU WAKE UP TIRED, ARE YOU TIRED DURING THE DAY, OR DO YOU TAKE NAPS: 1
HOW LIKELY ARE YOU TO NOD OFF OR FALL ASLEEP WHILE SITTING INACTIVE IN A PUBLIC PLACE: 0
HOW LIKELY ARE YOU TO NOD OFF OR FALL ASLEEP WHEN YOU ARE A PASSENGER IN A CAR FOR AN HOUR WITHOUT A BREAK: 0
DO YOU HAVE CONGESTIVE HEART FAILURE (CHF), HYPERTENSION, CORONARY DISEASE, CARDIAC ARRYTHMIAS, DIABETES, OR HAD A STROKE: 2
HOW LIKELY ARE YOU TO NOD OFF OR FALL ASLEEP WHILE SITTING QUIETLY AFTER LUNCH WITHOUT ALCOHOL: 1
HOW LIKELY ARE YOU TO NOD OFF OR FALL ASLEEP WHILE LYING DOWN TO REST IN THE AFTERNOON WHEN CIRCUMSTANCES PERMIT: 1
HOW LIKELY ARE YOU TO NOD OFF OR FALL ASLEEP WHILE WATCHING TV: 0
DO YOU WAKE UP MORE THAN ONCE A NIGHT TO URINATE: 1
HOW LIKELY ARE YOU TO NOD OFF OR FALL ASLEEP WHILE SITTING AND READING: 0
ESS TOTAL SCORE: 2
HAVE YOU BEEN TOLD YOU SNORE, EVEN INTERMITTENTLY: 1
HOW LIKELY ARE YOU TO NOD OFF OR FALL ASLEEP IN A CAR, WHILE STOPPED FOR A FEW MINUTES IN TRAFFIC: 0
HOW LIKELY ARE YOU TO NOD OFF OR FALL ASLEEP WHILE SITTING AND TALKING TO SOMEONE: 0
SLEEP APNEA SCREENER SCORE: 5
NECK CIRCUMFERENCE (INCHES): 15
HAVE YOU EVER BEEN TOLD YOU STOP BREATHING OR HOLD YOUR BREATH WHILE SLEEPING: 0

## 2022-10-17 ASSESSMENT — ENCOUNTER SYMPTOMS
ABDOMINAL PAIN: 0
COLOR CHANGE: 0
CONSTIPATION: 0
WHEEZING: 0
NAUSEA: 0
VOMITING: 0
DIARRHEA: 0
SHORTNESS OF BREATH: 1
ABDOMINAL DISTENTION: 0
BLOOD IN STOOL: 0

## 2022-10-17 NOTE — PROGRESS NOTES
10/17/2022    Alannah Lynn    Chief Complaint   Patient presents with    Insomnia     - insomnia - waking frequently. Pt states will sleep for 2 hours than wake up. Pt states her family has mentioned she snores. Pt states had sleep study several years ago was not told she had sleep apnea. Pt does have copd. See screening  - urinary frequency 2 - 3 days  - need to discuss med for bone density       HPI  History was obtained from patient. Wm Hawk is a pleasant 67 y.o. female who presents today for concerns about insomnia and questions about her medications. Insomnia/Anxiety: She reports that she has struggled with sleep for years and was previously on Buspar at bedtime which she reports was not helpful. She states that she goes to sleep well but awakens several times throughout the night and is not typically able to get back to sleep. She endorses worry about her daughter with her health concerns as well as other life things. 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 Sleep Medicine 10/17/2022   Sitting and reading 0   Watching TV 0   Sitting, inactive in a public place (e.g. a theatre or a meeting) 0   As a passenger in a car for an hour without a break 0   Lying down to rest in the afternoon when circumstances permit 1   Sitting and talking to someone 0   Sitting quietly after a lunch without alcohol 1   In a car, while stopped for a few minutes in traffic 0   [place] Sleepiness Score 2   Neck circumference (Inches) 15     Urinary Frequency: X2-3 days- throughout the day and the night. DM2: Does not have the testing supplies at home. Needs supplies. Last A1C 6.9. COPD: Anoro regularly and nebulizer at home- she gets SOB with her steps at home as they live in a bilevel home. 1. Anxiety    2. Dysuria    3. Type 2 diabetes mellitus without complication, without long-term current use of insulin ([address]    4. Gastroesophageal reflux disease, unspecified whether esophagitis present    5. Chronic obstructive pulmonary disease, unspecified COPD type ([facility] [address]    6. Localized osteoporosis, unspecified pathological fracture presence    7. Mixed hyperlipidemia             REVIEW OF SYMPTOMS    Review of Systems   Constitutional:  Negative for activity change, appetite change, chills, fever and unexpected weight change. Respiratory:  Positive for shortness of breath (Intermittent- worse with exertion). Negative for wheezing. Cardiovascular:  Negative for chest pain and palpitations. Gastrointestinal:  Negative for abdominal distention, abdominal pain, blood in stool, constipation, diarrhea, nausea and vomiting. Genitourinary:  Positive for dysuria and frequency. Skin:  Negative for color change. Neurological:  Negative for syncope and headaches. Psychiatric/Behavioral:  Positive for sleep disturbance. The patient is nervous/anxious.       PAST MEDICAL HISTORY  Past Medical History:   Diagnosis Date    Acute exacerbation of chronic obstructive pulmonary disease (COPD) ([facility] [address] 12/15/2021    Cerebral artery occlusion with cerebral infarction (HCC)     COPD, mild ([facility] [address] 6/15/2021    Diabetes mellitus (HCC)     Excessive daytime sleepiness 6/15/2021    Former smoker 6/15/2021    Hyperlipidemia     Hypertension     Mild obstructive sleep apnea 12/15/2021    Multiple lung nodules on CT 7/27/2021    Shortness of breath 6/15/2021    Snoring 6/15/2021       FAMILY HISTORY  Family History   Problem Relation Age of Onset    Colon Cancer Mother     Heart Attack Father     Other Father         suicide    Other Sister         suicide    Heart Attack Brother     Pacemaker Brother     No Known Problems Brother     No Known Problems Brother     No Known Problems Brother     Ovarian Cancer Daughter     Breast Cancer Neg Hx        SOCIAL HISTORY  Social History     Socioeconomic History    Marital status: Single     Spouse name: None    Number of children: None    Years of education: None    Highest education level: None   Tobacco Use    Smoking status: Former     Packs/day: 1.00     Years: 51.00     Pack years: 51.00     Types: Cigarettes     Start date:      Quit date: 2020     Years since quittin.8    Smokeless tobacco: Never   Vaping Use    Vaping Use: Former    Substances: Flavoring    Devices: Disposable, Pre-filled pod   Substance and Sexual Activity    Alcohol use: Not Currently    Drug use: Not Currently        SURGICAL HISTORY  Past Surgical History:   Procedure Laterality Date    APPENDECTOMY      CHOLECYSTECTOMY      COLONOSCOPY N/A 9/15/2021    COLONOSCOPY POLYPECTOMY SNARE/COLD BIOPSY performed by Gabby Benitez MD at [facility] [address] Left     Left hand    HYSTERECTOMY (CERVIX STATUS UNKNOWN)      JOINT REPLACEMENT Right     2020-Right Hip    OVARY REMOVAL      age 34    TONSILLECTOMY      UPPER GASTROINTESTINAL ENDOSCOPY N/A 9/15/2021    EGD DIAGNOSTIC ONLY performed by Gabby Benitez MD at [facility]  Current Outpatient Medications   Medication Sig Dispense Refill    pantoprazole (PROTONIX) 40 MG tablet Take 1 tablet by mouth daily 90 tablet 1    albuterol (PROVENTIL) (2.5 MG/3ML) 0.083% nebulizer solution Take 3 mLs by nebulization every 6 hours as needed for Wheezing 360 each 2    metFORMIN (GLUCOPHAGE) 500 MG tablet Take 1 tablet by mouth 2 times daily (with meals) 180 tablet 1    alendronate (FOSAMAX) 70 MG tablet Take 1 tablet by mouth every 7 days 12 tablet 1    atorvastatin (LIPITOR) 20 MG tablet Take 1 tablet by mouth daily 90 tablet 1    albuterol sulfate HFA (PROVENTIL;VENTOLIN;PROAIR) 108 (90 Base) MCG/ACT inhaler Inhale 2 puffs into the lungs every 4 hours as needed for Wheezing 3 each 1    busPIRone (BUSPAR) 30 MG tablet Take 30 mg by mouth nightly 30 tablet 1    glucose monitoring (FREESTYLE FREEDOM) kit 1 kit by Does not apply route daily 1 kit 0    Lancets MISC 1 each by Does not apply route daily 100 each 3    blood glucose monitor strips Test 2 times a day & as needed for symptoms of irregular blood glucose. Dispense sufficient amount for indicated testing frequency plus additional to accommodate PRN testing needs. 100 strip 3    sulfamethoxazole-trimethoprim (BACTRIM DS;SEPTRA DS) 800-160 MG per tablet Take 1 tablet by mouth 2 times daily for 3 days 6 tablet 0    umeclidinium-vilanterol (ANORO ELLIPTA) 62.5-25 MCG/INH AEPB inhaler Inhale 1 puff into the lungs in the morning. 1 each 11    lisinopril (PRINIVIL;ZESTRIL) 40 MG tablet TAKE 1 TABLET BY MOUTH EVERY DAY 90 tablet 1    dicyclomine (BENTYL) 10 MG capsule Take 1 capsule by mouth 3 times daily as needed (stomach) 120 capsule 2    aspirin 81 MG EC tablet Take 81 mg by mouth daily       No current facility-administered medications for this visit. ALLERGIES  Allergies   Allergen Reactions    Other Hives    Levaquin [Levofloxacin] Rash       PHYSICAL EXAM    /82   Pulse 75   Ht 5' (1.524 m)   Wt 137 lb (62.1 kg)   SpO2 98%   BMI 26.76 kg/m²     Physical Exam  Vitals and nursing note reviewed. Constitutional:       General: She is not in acute distress. Appearance: Normal appearance. She is well-developed. HENT:      Head: Normocephalic and atraumatic. Eyes:      General: No scleral icterus. Conjunctiva/sclera: Conjunctivae normal.   Neck:      Thyroid: No thyromegaly. Trachea: No tracheal deviation. Cardiovascular:      Rate and Rhythm: Normal rate and regular rhythm. Heart sounds: Normal heart sounds. No murmur heard. No friction rub. No gallop. Pulmonary:      Effort: Pulmonary effort is normal. No respiratory distress. Breath sounds: Normal breath sounds. No wheezing or rales. Abdominal:      General: Bowel sounds are normal. There is no distension. Palpations: Abdomen is soft. There is no hepatomegaly or mass.       Tenderness: no abdominal tenderness There is right CVA tenderness and left CVA tenderness. There is no guarding or rebound. Musculoskeletal:         General: No swelling or deformity. Normal range of motion. Cervical back: Neck supple. Lymphadenopathy:      Cervical: No cervical adenopathy. Skin:     General: Skin is warm and dry. Nails: There is no clubbing. Neurological:      Mental Status: She is alert and oriented to person, place, and time. Mental status is at baseline. Psychiatric:         Mood and Affect: Mood normal.         Behavior: Behavior normal.         Thought Content: Thought content normal.         Judgment: Judgment normal.       ASSESSMENT & PLAN    Vanessa was seen today for insomnia. Diagnoses and all orders for this visit:    Anxiety  Previously tolerated BuSpar well, started on low-dose and stopped prior to titration of dose. Will increase dose to 30 mg nightly and consider doxepin if ineffective. -     busPIRone (BUSPAR) 30 MG tablet; Take 30 mg by mouth nightly    Dysuria  Trace leukocytes noted on UA. Will obtain culture and treat empirically. -     POCT Urinalysis no Micro  -     sulfamethoxazole-trimethoprim (BACTRIM DS;SEPTRA DS) 800-160 MG per tablet; Take 1 tablet by mouth 2 times daily for 3 days    Type 2 diabetes mellitus without complication, without long-term current use of insulin ([facility])  -      DIABETES FOOT EXAM  -     metFORMIN (GLUCOPHAGE) 500 MG tablet; Take 1 tablet by mouth 2 times daily (with meals)  -     glucose monitoring (FREESTYLE FREEDOM) kit; 1 kit by Does not apply route daily  -     Lancets MISC; 1 each by Does not apply route daily  -     blood glucose monitor strips; Test 2 times a day & as needed for symptoms of irregular blood glucose. Dispense sufficient amount for indicated testing frequency plus additional to accommodate PRN testing needs. Gastroesophageal reflux disease, unspecified whether esophagitis present  -     pantoprazole (PROTONIX) 40 MG tablet;  Take 1 tablet by mouth daily  - Condition stable on current regimen. No changes. Chronic obstructive pulmonary disease, unspecified COPD type (HCC)  -     albuterol (PROVENTIL) (2.5 MG/3ML) 0.083% nebulizer solution; Take 3 mLs by nebulization every 6 hours as needed for Wheezing  -     albuterol sulfate HFA (PROVENTIL;VENTOLIN;PROAIR) 108 (90 Base) MCG/ACT inhaler; Inhale 2 puffs into the lungs every 4 hours as needed for Wheezing    Localized osteoporosis, unspecified pathological fracture presence  -     alendronate (FOSAMAX) 70 MG tablet; Take 1 tablet by mouth every 7 days    Mixed hyperlipidemia  -     atorvastatin (LIPITOR) 20 MG tablet; Take 1 tablet by mouth daily      Return in about 4 weeks (around 11/14/2022).          Electronically signed by DICK Saez CNP on 10/17/2022

## 2022-10-18 LAB — URINE CULTURE, ROUTINE: NORMAL

## 2022-10-31 ENCOUNTER — HOSPITAL ENCOUNTER (EMERGENCY)
Age: 72
Discharge: HOME | End: 2022-11-01
Payer: MEDICARE

## 2022-10-31 DIAGNOSIS — M79.604 RIGHT LEG PAIN: Primary | ICD-10-CM

## 2022-10-31 PROCEDURE — 99284 EMERGENCY DEPT VISIT MOD MDM: CPT

## 2022-10-31 ASSESSMENT — PAIN - FUNCTIONAL ASSESSMENT: PAIN_FUNCTIONAL_ASSESSMENT: 0-10

## 2022-10-31 ASSESSMENT — PAIN SCALES - GENERAL: PAINLEVEL_OUTOF10: 7

## 2022-11-01 ENCOUNTER — APPOINTMENT (OUTPATIENT)
Dept: ULTRASOUND IMAGING | Age: 72
End: 2022-11-01
Payer: MEDICARE

## 2022-11-01 ENCOUNTER — APPOINTMENT (OUTPATIENT)
Dept: GENERAL RADIOLOGY | Age: 72
End: 2022-11-01
Payer: MEDICARE

## 2022-11-01 VITALS
RESPIRATION RATE: 16 BRPM | HEIGHT: 63 IN | DIASTOLIC BLOOD PRESSURE: 64 MMHG | WEIGHT: 140 LBS | SYSTOLIC BLOOD PRESSURE: 142 MMHG | BODY MASS INDEX: 24.8 KG/M2 | TEMPERATURE: 98.3 F | OXYGEN SATURATION: 100 % | HEART RATE: 76 BPM

## 2022-11-01 LAB
ALBUMIN SERPL-MCNC: 4 GM/DL (ref 3.4–5)
ALP BLD-CCNC: 68 IU/L (ref 40–129)
ALT SERPL-CCNC: 33 U/L (ref 10–40)
ANION GAP SERPL CALCULATED.3IONS-SCNC: 11 MMOL/L (ref 4–16)
AST SERPL-CCNC: 24 IU/L (ref 15–37)
BASOPHILS ABSOLUTE: 0.1 K/CU MM
BASOPHILS RELATIVE PERCENT: 0.7 % (ref 0–1)
BILIRUB SERPL-MCNC: 0.3 MG/DL (ref 0–1)
BUN BLDV-MCNC: 21 MG/DL (ref 6–23)
CALCIUM SERPL-MCNC: 9.5 MG/DL (ref 8.3–10.6)
CHLORIDE BLD-SCNC: 99 MMOL/L (ref 99–110)
CO2: 26 MMOL/L (ref 21–32)
CREAT SERPL-MCNC: 1.1 MG/DL (ref 0.6–1.1)
DIFFERENTIAL TYPE: ABNORMAL
EOSINOPHILS ABSOLUTE: 0.1 K/CU MM
EOSINOPHILS RELATIVE PERCENT: 1.3 % (ref 0–3)
GFR SERPL CREATININE-BSD FRML MDRD: 53 ML/MIN/1.73M2
GLUCOSE BLD-MCNC: 138 MG/DL (ref 70–99)
HCT VFR BLD CALC: 34.9 % (ref 37–47)
HEMOGLOBIN: 11 GM/DL (ref 12.5–16)
IMMATURE NEUTROPHIL %: 0.5 % (ref 0–0.43)
LYMPHOCYTES ABSOLUTE: 1 K/CU MM
LYMPHOCYTES RELATIVE PERCENT: 12.2 % (ref 24–44)
MCH RBC QN AUTO: 29.4 PG (ref 27–31)
MCHC RBC AUTO-ENTMCNC: 31.5 % (ref 32–36)
MCV RBC AUTO: 93.3 FL (ref 78–100)
MONOCYTES ABSOLUTE: 0.7 K/CU MM
MONOCYTES RELATIVE PERCENT: 7.9 % (ref 0–4)
NUCLEATED RBC %: 0 %
PDW BLD-RTO: 14.2 % (ref 11.7–14.9)
PLATELET # BLD: 301 K/CU MM (ref 140–440)
PMV BLD AUTO: 9.4 FL (ref 7.5–11.1)
POTASSIUM SERPL-SCNC: 4.2 MMOL/L (ref 3.5–5.1)
RBC # BLD: 3.74 M/CU MM (ref 4.2–5.4)
SEGMENTED NEUTROPHILS ABSOLUTE COUNT: 6.6 K/CU MM
SEGMENTED NEUTROPHILS RELATIVE PERCENT: 77.4 % (ref 36–66)
SODIUM BLD-SCNC: 136 MMOL/L (ref 135–145)
TOTAL IMMATURE NEUTOROPHIL: 0.04 K/CU MM
TOTAL NUCLEATED RBC: 0 K/CU MM
TOTAL PROTEIN: 7 GM/DL (ref 6.4–8.2)
TROPONIN T: <0.01 NG/ML
WBC # BLD: 8.5 K/CU MM (ref 4–10.5)

## 2022-11-01 PROCEDURE — 85025 COMPLETE CBC W/AUTO DIFF WBC: CPT

## 2022-11-01 PROCEDURE — 84484 ASSAY OF TROPONIN QUANT: CPT

## 2022-11-01 PROCEDURE — 71045 X-RAY EXAM CHEST 1 VIEW: CPT

## 2022-11-01 PROCEDURE — 80053 COMPREHEN METABOLIC PANEL: CPT

## 2022-11-01 PROCEDURE — 93971 EXTREMITY STUDY: CPT

## 2022-11-01 PROCEDURE — 6370000000 HC RX 637 (ALT 250 FOR IP): Performed by: PHYSICIAN ASSISTANT

## 2022-11-01 RX ORDER — HYDROCODONE BITARTRATE AND ACETAMINOPHEN 5; 325 MG/1; MG/1
1 TABLET ORAL ONCE
Status: COMPLETED | OUTPATIENT
Start: 2022-11-01 | End: 2022-11-01

## 2022-11-01 RX ORDER — TRAMADOL HYDROCHLORIDE 50 MG/1
50 TABLET ORAL EVERY 6 HOURS PRN
Qty: 15 TABLET | Refills: 0 | Status: SHIPPED | OUTPATIENT
Start: 2022-11-01 | End: 2022-11-04

## 2022-11-01 RX ADMIN — HYDROCODONE BITARTRATE AND ACETAMINOPHEN 1 TABLET: 5; 325 TABLET ORAL at 01:23

## 2022-11-01 ASSESSMENT — PAIN DESCRIPTION - LOCATION: LOCATION: LEG

## 2022-11-01 ASSESSMENT — PAIN SCALES - GENERAL: PAINLEVEL_OUTOF10: 6

## 2022-11-01 ASSESSMENT — PAIN DESCRIPTION - ORIENTATION: ORIENTATION: RIGHT;UPPER

## 2022-11-01 NOTE — ED PROVIDER NOTES
Triage Chief Complaint:   Leg Pain (R inner. Sudden onset and became nonambulatory @ home)    [place]:  Today in the ED I had the pleasure of caring for Lorelei Rivers who is a 67 y.o. female that presents today to the ED for evaluation of R sided leg pain. Medial thigh. Ongoing x 1 day. No sob or chest pain. Patient states she was in bed when the pain started. She sat on the toilet and the pain was so bad she felt like she couldn't sit there any longer. Pain is ranked 07/10.      ROS:  REVIEW OF SYSTEMS    At least 10 systems reviewed      All other review of systems are negative  See HPI and nursing notes for additional information       Past Medical History:   Diagnosis Date    Acute exacerbation of chronic obstructive pulmonary disease (COPD) ([address] 12/15/2021    Cerebral artery occlusion with cerebral infarction (HCC)     COPD, mild ([address] 6/15/2021    Diabetes mellitus ([address]     Excessive daytime sleepiness 6/15/2021    Former smoker 6/15/2021    Hyperlipidemia     Hypertension     Mild obstructive sleep apnea 12/15/2021    Multiple lung nodules on CT 7/27/2021    Shortness of breath 6/15/2021    Snoring 6/15/2021     Past Surgical History:   Procedure Laterality Date    APPENDECTOMY      CHOLECYSTECTOMY      COLONOSCOPY N/A 9/15/2021    COLONOSCOPY POLYPECTOMY SNARE/COLD BIOPSY performed by Che Nathan MD at [facility] [address] Left     Left hand    HYSTERECTOMY (CERVIX STATUS UNKNOWN)      JOINT REPLACEMENT Right     12/04/2020-Right Hip    OVARY REMOVAL      age 34    TONSILLECTOMY      UPPER GASTROINTESTINAL ENDOSCOPY N/A 9/15/2021    EGD DIAGNOSTIC ONLY performed by Che Nathan MD at [facility] ENDOSCOPY     Family History   Problem Relation Age of Onset    Colon Cancer Mother     Heart Attack Father     Other Father         suicide    Other Sister         suicide    Heart Attack Brother     Pacemaker Brother     No Known Problems Brother     No Known Problems Brother     No Known Problems Brother     Ovarian Cancer Daughter     Breast Cancer Neg Hx      Social History     Socioeconomic History    Marital status: Single     Spouse name: Not on file    Number of children: Not on file    Years of education: Not on file    Highest education level: Not on file   Occupational History    Not on file   Tobacco Use    Smoking status: Former     Packs/day: 1.00     Years: 51.00     Pack years: 51.00     Types: Cigarettes     Start date: 55     Quit date: 2020     Years since quittin.9    Smokeless tobacco: Never   Vaping Use    Vaping Use: Former    Substances: Flavoring    Devices: Disposable, Pre-filled pod   Substance and Sexual Activity    Alcohol use: Not Currently    Drug use: Not Currently    Sexual activity: Not on file   Other Topics Concern    Not on file   Social History Narrative    Not on file     Social Determinants of Health     Financial Resource Strain: Not on file   Food Insecurity: Not on file   Transportation Needs: Not on file   Physical Activity: Not on file   Stress: Not on file   Social Connections: Not on file   Intimate Partner Violence: Not on file   Housing Stability: Not on file     No current facility-administered medications for this encounter.      Current Outpatient Medications   Medication Sig Dispense Refill    pantoprazole (PROTONIX) 40 MG tablet Take 1 tablet by mouth daily 90 tablet 1    albuterol (PROVENTIL) (2.5 MG/3ML) 0.083% nebulizer solution Take 3 mLs by nebulization every 6 hours as needed for Wheezing 360 each 2    metFORMIN (GLUCOPHAGE) 500 MG tablet Take 1 tablet by mouth 2 times daily (with meals) 180 tablet 1    alendronate (FOSAMAX) 70 MG tablet Take 1 tablet by mouth every 7 days 12 tablet 1    atorvastatin (LIPITOR) 20 MG tablet Take 1 tablet by mouth daily 90 tablet 1    albuterol sulfate HFA (PROVENTIL;VENTOLIN;PROAIR) 108 (90 Base) MCG/ACT inhaler Inhale 2 puffs into the lungs every 4 hours as needed for Wheezing 3 each 1 busPIRone (BUSPAR) 30 MG tablet Take 30 mg by mouth nightly 30 tablet 1    glucose monitoring (FREESTYLE FREEDOM) kit 1 kit by Does not apply route daily 1 kit 0    Lancets MISC 1 each by Does not apply route daily 100 each 3    blood glucose monitor strips Test 2 times a day & as needed for symptoms of irregular blood glucose. Dispense sufficient amount for indicated testing frequency plus additional to accommodate PRN testing needs. 100 strip 3    umeclidinium-vilanterol (ANORO ELLIPTA) 62.5-25 MCG/INH AEPB inhaler Inhale 1 puff into the lungs in the morning. 1 each 11    lisinopril (PRINIVIL;ZESTRIL) 40 MG tablet TAKE 1 TABLET BY MOUTH EVERY DAY 90 tablet 1    dicyclomine (BENTYL) 10 MG capsule Take 1 capsule by mouth 3 times daily as needed (stomach) 120 capsule 2    aspirin 81 MG EC tablet Take 81 mg by mouth daily       Allergies   Allergen Reactions    Other Hives    Levaquin [Levofloxacin] Rash       Nursing Notes Reviewed    Physical Exam:  ED Triage Vitals [10/31/22 2311]   Enc Vitals Group      BP (!) 151/64      Heart Rate 69      Resp 15      Temp 98.3 °F (36.8 °C)      Temp Source Oral      SpO2 98 %      Weight 140 lb (63.5 kg)      Height 5' 3\" (1.6 m)      Head Circumference       Peak Flow       Pain Score       Pain Loc       Pain Edu? Excl. in [facility]OS[facility]? General :Patient is awake alert oriented person place and time no acute distress nontoxic appearing  HEENT: Pupils are equally round and reactive to light extraocular motors are intact conjunctivae clear sclerae white there is no injection no icterus. Nose without any rhinorrhea or epistaxis. Oral mucosa is moist no exudate buccal mucosa shows no ulcerations. Uvula is midline    Neck: Neck is supple full range of motion trachea midline thyroid nonpalpable  Cardiac: Heart regular rate rhythm no murmurs rubs clicks or gallops  Lungs: Lungs are clear to auscultation there is no wheezing rhonchi or rales.  There is no use of accessory muscles no nasal flaring identified. Chest wall: There is no tenderness to palpation over the chest wall or over ribs  Abdomen: Abdomen is soft nontender nondistended. There is no firm or pulsatile masses no rebound rigidity or guarding negative De Oliveira's negative McBurney, no peritoneal signs  Suprapubic:  there is no tenderness to palpation over the external bladder   Musculoskeletal: 5 out of 5 strength in all 4 extremities full flexion extension abduction and adduction supination pronation of all extremities and all digits. No obvious muscle atrophy is noted. No focal muscle deficits are appreciated  Dermatology: Skin is warm and dry there is no obvious abscesses lacerations or lesions noted  Psych: Mentation is grossly normal cognition is grossly normal. Affect is appropriate  Neuro: Motor intact sensory intact cranial nerves II through XII are intact level of consciousness is normal cerebellar function is normal reflexes are grossly normal. No evidence of incontinence or loss of bowel or bladder no saddle anesthesia noted Lymphatic: There is no submandibular or cervical adenopathy appreciated.         I have reviewed and interpreted all of the currently available lab results from this visit (if applicable):  Results for orders placed or performed during the hospital encounter of 10/31/22   CBC with Auto Differential   Result Value Ref Range    WBC 8.5 4.0 - 10.5 K/CU MM    RBC 3.74 (L) 4.2 - 5.4 M/CU MM    Hemoglobin 11.0 (L) 12.5 - 16.0 GM/DL    Hematocrit 34.9 (L) 37 - 47 %    MCV 93.3 78 - 100 FL    MCH 29.4 27 - 31 PG    MCHC 31.5 (L) 32.0 - 36.0 %    RDW 14.2 11.7 - 14.9 %    Platelets 142 516 - 266 K/CU MM    MPV 9.4 7.5 - 11.1 FL    Differential Type AUTOMATED DIFFERENTIAL     Segs Relative 77.4 (H) 36 - 66 %    Lymphocytes % 12.2 (L) 24 - 44 %    Monocytes % 7.9 (H) 0 - 4 %    Eosinophils % 1.3 0 - 3 %    Basophils % 0.7 0 - 1 %    Segs Absolute 6.6 K/CU MM    Lymphocytes Absolute 1.0 K/CU MM    Monocytes Absolute fibroglandular density. There is no new dominant mass, suspicious microcalcification, or area of architectural distortion. No mammographic evidence of malignancy. BIRADS: BIRADS - CATEGORY 1 Negative, no evidence of malignancy. Normal interval follow-up is recommended in 12 months. OVERALL ASSESSMENT - NEGATIVE A letter of notification will be sent to the patient regarding the results. The [employer] recommends annual mammograms for women 40 years and older. MDM:     Interventions given this visit:   Orders Placed This Encounter   Medications    HYDROcodone-acetaminophen (Luiz [place]ton) 5-325 MG per tablet 1 tablet     I estimate there is LOW risk for (including but not limited to) OPEN FRACTURE, COMPARTMENT SYNDROME, DEEP VENOUS THROMBOSIS, COMPLETE  TENDON RUPTURE, NECROTIZING FASCIITIS, or ACUTE ARTERIAL INJURY, thus I consider the discharge disposition reasonable. Zuleyma Osei (or their surrogate) and I have discussed the diagnosis and risks, and we agree with discharging home with close follow-up. We also discussed returning to the Emergency Department immediately if new or worsening symptoms occur. We have discussed the symptoms which are most concerning that necessitate immediate return. I independently managed patient today in the ED    BP (!) 142/64   Pulse 76   Temp 98.3 °F (36.8 °C) (Oral)   Resp 16   Ht 5' 3\" (1.6 m)   Wt 140 lb (63.5 kg)   SpO2 100%   BMI 24.80 kg/m²       Clinical Impression:  1.  Right leg pain        Disposition referral (if applicable):  Dennis DO  [address] [facility]  [phone]    In 1 day    Disposition medications (if applicable):  New Prescriptions    No medications on file         Comment: Please note this report has been produced using speech recognition software and may contain errors related to that system including errors in grammar, punctuation, and spelling, as well as words and phrases that may be inappropriate. If there are any questions or concerns please feel free to contact the dictating provider for clarification. [id number] PA-C   Complaining of R sided leg pain.  [address] S[address] PA-C  11/01/22 9748

## 2022-11-01 NOTE — PLAN OF CARE
Tried calling RN in ED with no answer by RN phone    Patient needs ultrasound. Patient in Room 14 ? Patient in a bed or in a chair.  Ultrasound needs to know to know whether exam can be done there or has be done in ultrasound room

## 2022-11-01 NOTE — ED NOTES
Pt complains of pain in same area where she has fatty tumor. Pt sts she did everyday cleaning at the house nothing else. Pt was able to transfer from ems stretcher to ed stretcher.  No reported injury     Marlena Morales RN  10/31/22 [id number]

## 2022-11-02 ENCOUNTER — OFFICE VISIT (OUTPATIENT)
Dept: FAMILY MEDICINE CLINIC | Age: 72
End: 2022-11-02
Payer: MEDICARE

## 2022-11-02 VITALS
DIASTOLIC BLOOD PRESSURE: 82 MMHG | OXYGEN SATURATION: 97 % | RESPIRATION RATE: 16 BRPM | HEART RATE: 87 BPM | WEIGHT: 138 LBS | HEIGHT: 63 IN | BODY MASS INDEX: 24.45 KG/M2 | SYSTOLIC BLOOD PRESSURE: 139 MMHG

## 2022-11-02 DIAGNOSIS — M79.604 RIGHT LEG PAIN: Primary | ICD-10-CM

## 2022-11-02 DIAGNOSIS — S89.91XA INJURY OF RIGHT LOWER EXTREMITY, INITIAL ENCOUNTER: ICD-10-CM

## 2022-11-02 DIAGNOSIS — D17.23 LIPOMA OF RIGHT THIGH: ICD-10-CM

## 2022-11-02 PROCEDURE — G8484 FLU IMMUNIZE NO ADMIN: HCPCS | Performed by: PHYSICIAN ASSISTANT

## 2022-11-02 PROCEDURE — G8420 CALC BMI NORM PARAMETERS: HCPCS | Performed by: PHYSICIAN ASSISTANT

## 2022-11-02 PROCEDURE — 1036F TOBACCO NON-USER: CPT | Performed by: PHYSICIAN ASSISTANT

## 2022-11-02 PROCEDURE — 3074F SYST BP LT 130 MM HG: CPT | Performed by: PHYSICIAN ASSISTANT

## 2022-11-02 PROCEDURE — 99213 OFFICE O/P EST LOW 20 MIN: CPT | Performed by: PHYSICIAN ASSISTANT

## 2022-11-02 PROCEDURE — 3078F DIAST BP <80 MM HG: CPT | Performed by: PHYSICIAN ASSISTANT

## 2022-11-02 PROCEDURE — 1090F PRES/ABSN URINE INCON ASSESS: CPT | Performed by: PHYSICIAN ASSISTANT

## 2022-11-02 PROCEDURE — G8399 PT W/DXA RESULTS DOCUMENT: HCPCS | Performed by: PHYSICIAN ASSISTANT

## 2022-11-02 PROCEDURE — G8427 DOCREV CUR MEDS BY ELIG CLIN: HCPCS | Performed by: PHYSICIAN ASSISTANT

## 2022-11-02 PROCEDURE — 1123F ACP DISCUSS/DSCN MKR DOCD: CPT | Performed by: PHYSICIAN ASSISTANT

## 2022-11-02 PROCEDURE — 3017F COLORECTAL CA SCREEN DOC REV: CPT | Performed by: PHYSICIAN ASSISTANT

## 2022-11-02 NOTE — PROGRESS NOTES
11/2/2022    Clara Gonzalez    Chief Complaint   Patient presents with    Leg Pain     Presenting for right upper leg pain. Follow-up     ER follow up visit, 10/31/2022. Has not picked up tramadol yet. She will tomorrow. HPI  History was obtained from pt. Minesh Shaikh is a 67 y.o. female with a PMHx as listed below who presents today for right thigh pain. Pain is in the upper inner thigh, pt feels swollen in that area as well. The pain is hard to describe. She says she injured her right leg below the knee \"some time ago\" bumping into the bed and the soreness never really went away. Pt says she has a lipoma in that area that was \"looked at\" in [place] and she thought she might someday need to have it out. The pain halloween night got so bad she almost passed out, she was sitting up in bed when it hit. Then she got up to use the restroom and when she sat down it hurt so bad she couldn't get up then finally did and sat down in bed and pain was very severe and someone had to help her get into bed. She went to ED yesterday and DVT workup was negative. 1. Right leg pain    2. Lipoma of right thigh    3.  Injury of right lower extremity, initial encounter         REVIEW OF SYMPTOMS    Review of Systems    PAST MEDICAL HISTORY  Past Medical History:   Diagnosis Date    Acute exacerbation of chronic obstructive pulmonary disease (COPD) ([address] 12/15/2021    Cerebral artery occlusion with cerebral infarction (HCC)     COPD, mild ([address] 6/15/2021    Diabetes mellitus (HCC)     Excessive daytime sleepiness 6/15/2021    Former smoker 6/15/2021    Hyperlipidemia     Hypertension     Mild obstructive sleep apnea 12/15/2021    Multiple lung nodules on CT 7/27/2021    Shortness of breath 6/15/2021    Snoring 6/15/2021       FAMILY HISTORY  Family History   Problem Relation Age of Onset    Colon Cancer Mother     Heart Attack Father     Other Father         suicide    Other Sister         suicide    Heart Attack Brother Pacemaker Brother     No Known Problems Brother     No Known Problems Brother     No Known Problems Brother     Ovarian Cancer Daughter     Breast Cancer Neg Hx        SOCIAL HISTORY  Social History     Socioeconomic History    Marital status: Single   Tobacco Use    Smoking status: Former     Packs/day: 1.00     Years: 51.00     Pack years: 51.00     Types: Cigarettes     Start date:      Quit date: 2020     Years since quittin.9    Smokeless tobacco: Never   Vaping Use    Vaping Use: Former    Substances: Flavoring    Devices: Disposable, Pre-filled pod   Substance and Sexual Activity    Alcohol use: Not Currently    Drug use: Not Currently        SURGICAL HISTORY  Past Surgical History:   Procedure Laterality Date    APPENDECTOMY      CHOLECYSTECTOMY      COLONOSCOPY N/A 9/15/2021    COLONOSCOPY POLYPECTOMY SNARE/COLD BIOPSY performed by Giuseppe Mendoza MD at [facility] [address] Left     Left hand    HYSTERECTOMY (CERVIX STATUS UNKNOWN)      JOINT REPLACEMENT Right     2020-Right Hip    OVARY REMOVAL      age 34    TONSILLECTOMY      UPPER GASTROINTESTINAL ENDOSCOPY N/A 9/15/2021    EGD DIAGNOSTIC ONLY performed by Giuseppe Mendoza MD at [facility]  Current Outpatient Medications   Medication Sig Dispense Refill    traMADol (ULTRAM) 50 MG tablet Take 1 tablet by mouth every 6 hours as needed for Pain for up to 3 days.  15 tablet 0    pantoprazole (PROTONIX) 40 MG tablet Take 1 tablet by mouth daily 90 tablet 1    albuterol (PROVENTIL) (2.5 MG/3ML) 0.083% nebulizer solution Take 3 mLs by nebulization every 6 hours as needed for Wheezing 360 each 2    metFORMIN (GLUCOPHAGE) 500 MG tablet Take 1 tablet by mouth 2 times daily (with meals) 180 tablet 1    alendronate (FOSAMAX) 70 MG tablet Take 1 tablet by mouth every 7 days 12 tablet 1    atorvastatin (LIPITOR) 20 MG tablet Take 1 tablet by mouth daily 90 tablet 1    albuterol sulfate HFA (PROVENTIL;VENTOLIN;PROAIR) 108 (90 Base) MCG/ACT inhaler Inhale 2 puffs into the lungs every 4 hours as needed for Wheezing 3 each 1    busPIRone (BUSPAR) 30 MG tablet Take 30 mg by mouth nightly 30 tablet 1    glucose monitoring (FREESTYLE FREEDOM) kit 1 kit by Does not apply route daily 1 kit 0    Lancets MISC 1 each by Does not apply route daily 100 each 3    blood glucose monitor strips Test 2 times a day & as needed for symptoms of irregular blood glucose. Dispense sufficient amount for indicated testing frequency plus additional to accommodate PRN testing needs. 100 strip 3    umeclidinium-vilanterol (ANORO ELLIPTA) 62.5-25 MCG/INH AEPB inhaler Inhale 1 puff into the lungs in the morning. 1 each 11    lisinopril (PRINIVIL;ZESTRIL) 40 MG tablet TAKE 1 TABLET BY MOUTH EVERY DAY 90 tablet 1    dicyclomine (BENTYL) 10 MG capsule Take 1 capsule by mouth 3 times daily as needed (stomach) 120 capsule 2    aspirin 81 MG EC tablet Take 81 mg by mouth daily       No current facility-administered medications for this visit. ALLERGIES  Allergies   Allergen Reactions    Other Hives    Levaquin [Levofloxacin] Rash       PHYSICAL EXAM    /82   Pulse 87   Resp 16   Ht 5' 3\" (1.6 m)   Wt 138 lb (62.6 kg)   SpO2 97%   BMI 24.45 kg/m²     Physical Exam  Constitutional:       Appearance: Normal appearance. She is obese. HENT:      Head: Normocephalic and atraumatic. Right Ear: Tympanic membrane and external ear normal.      Left Ear: Tympanic membrane and external ear normal.      Nose: No rhinorrhea. Mouth/Throat:      Mouth: Mucous membranes are moist.      Pharynx: Oropharynx is clear. No oropharyngeal exudate or posterior oropharyngeal erythema. Eyes:      General: No scleral icterus. Extraocular Movements: Extraocular movements intact. Conjunctiva/sclera: Conjunctivae normal.      Pupils: Pupils are equal, round, and reactive to light.    Cardiovascular:      Rate and Rhythm: Normal rate and regular rhythm. Pulses: Normal pulses. Heart sounds: Normal heart sounds. No murmur heard. No friction rub. No gallop. Pulmonary:      Effort: Pulmonary effort is normal.      Breath sounds: Normal breath sounds. No wheezing, rhonchi or rales. Abdominal:      General: Bowel sounds are normal. There is no distension. Palpations: Abdomen is soft. There is no mass. Tenderness: There is no abdominal tenderness. There is no right CVA tenderness, left CVA tenderness, guarding or rebound. Musculoskeletal:         General: Swelling, tenderness and deformity present. Normal range of motion. Cervical back: Normal range of motion and neck supple. No rigidity. No muscular tenderness. Right lower leg: No edema. Left lower leg: No edema. Comments: Mild soft tissue mass approximately 5 cm right inner thigh, mobile and tender  Diffuse tenderness of the right inner and anterior thigh  Point tenderness 2 cm inferior medial to the patella in the area of the tibial plateau  Distal sensation, cap refill and pulses intact   Skin:     General: Skin is warm and dry. Capillary Refill: Capillary refill takes less than 2 seconds. Findings: No bruising, erythema or rash. Neurological:      General: No focal deficit present. Mental Status: She is alert and oriented to person, place, and time. Coordination: Coordination normal.      Gait: Gait normal.   Psychiatric:         Mood and Affect: Mood normal.         Behavior: Behavior normal.       ASSESSMENT & PLAN    1. Right leg pain    - US SOFT TISSUE LIMITED AREA; Future  - XR FEMUR RIGHT STANDARD; Future  - XR TIBIA FIBULA RIGHT (2 VIEWS); Future    2. Lipoma of right thigh    - US SOFT TISSUE LIMITED AREA; Future  - XR FEMUR RIGHT STANDARD; Future    3. Injury of right lower extremity, initial encounter  Like to rule out fracture    No follow-ups on file.          Electronically signed by Ladi Cano Catrina Warrenma on 11/2/2022      Comment: Please note this report has been produced using speech recognition software and may contain errors related to that system including errors in grammar, punctuation, and spelling, as well as words and phrases that may be inappropriate. If there are any questions or concerns please feel free to contact the dictating provider for clarification.

## 2022-11-08 ENCOUNTER — HOSPITAL ENCOUNTER (OUTPATIENT)
Dept: GENERAL RADIOLOGY | Age: 72
Discharge: HOME | End: 2022-11-08
Payer: MEDICARE

## 2022-11-08 ENCOUNTER — HOSPITAL ENCOUNTER (OUTPATIENT)
Age: 72
Discharge: HOME | End: 2022-11-08
Payer: MEDICARE

## 2022-11-08 ENCOUNTER — HOSPITAL ENCOUNTER (OUTPATIENT)
Dept: ULTRASOUND IMAGING | Age: 72
Discharge: HOME | End: 2022-11-08
Payer: MEDICARE

## 2022-11-08 DIAGNOSIS — D17.23 LIPOMA OF RIGHT THIGH: ICD-10-CM

## 2022-11-08 DIAGNOSIS — M79.604 RIGHT LEG PAIN: ICD-10-CM

## 2022-11-08 PROCEDURE — 73590 X-RAY EXAM OF LOWER LEG: CPT

## 2022-11-08 PROCEDURE — 76882 US LMTD JT/FCL EVL NVASC XTR: CPT

## 2022-11-08 PROCEDURE — 73552 X-RAY EXAM OF FEMUR 2/>: CPT

## 2022-11-13 ENCOUNTER — APPOINTMENT (OUTPATIENT)
Dept: GENERAL RADIOLOGY | Age: 72
End: 2022-11-13
Payer: MEDICARE

## 2022-11-13 ENCOUNTER — HOSPITAL ENCOUNTER (EMERGENCY)
Age: 72
Discharge: HOME | End: 2022-11-13
Attending: EMERGENCY MEDICINE
Payer: MEDICARE

## 2022-11-13 VITALS
DIASTOLIC BLOOD PRESSURE: 92 MMHG | BODY MASS INDEX: 26.9 KG/M2 | RESPIRATION RATE: 14 BRPM | SYSTOLIC BLOOD PRESSURE: 189 MMHG | OXYGEN SATURATION: 97 % | TEMPERATURE: 97.8 F | WEIGHT: 137 LBS | HEIGHT: 60 IN | HEART RATE: 84 BPM

## 2022-11-13 DIAGNOSIS — J02.9 SORE THROAT: ICD-10-CM

## 2022-11-13 DIAGNOSIS — R06.00 DYSPNEA AND RESPIRATORY ABNORMALITIES: ICD-10-CM

## 2022-11-13 DIAGNOSIS — R06.89 DYSPNEA AND RESPIRATORY ABNORMALITIES: ICD-10-CM

## 2022-11-13 DIAGNOSIS — J06.9 UPPER RESPIRATORY TRACT INFECTION, UNSPECIFIED TYPE: Primary | ICD-10-CM

## 2022-11-13 DIAGNOSIS — R05.9 COUGH, UNSPECIFIED TYPE: ICD-10-CM

## 2022-11-13 LAB
RAPID INFLUENZA  B AGN: NEGATIVE
RAPID INFLUENZA A AGN: NEGATIVE
SARS-COV-2, NAAT: NOT DETECTED
SOURCE: NORMAL

## 2022-11-13 PROCEDURE — 99284 EMERGENCY DEPT VISIT MOD MDM: CPT

## 2022-11-13 PROCEDURE — 71045 X-RAY EXAM CHEST 1 VIEW: CPT

## 2022-11-13 PROCEDURE — 6370000000 HC RX 637 (ALT 250 FOR IP): Performed by: EMERGENCY MEDICINE

## 2022-11-13 PROCEDURE — 87804 INFLUENZA ASSAY W/OPTIC: CPT

## 2022-11-13 PROCEDURE — 87635 SARS-COV-2 COVID-19 AMP PRB: CPT

## 2022-11-13 RX ORDER — GUAIFENESIN/DEXTROMETHORPHAN 100-10MG/5
5 SYRUP ORAL 4 TIMES DAILY PRN
Qty: 120 ML | Refills: 0 | Status: SHIPPED | OUTPATIENT
Start: 2022-11-13 | End: 2022-11-23

## 2022-11-13 RX ORDER — ALBUTEROL SULFATE 90 UG/1
2 AEROSOL, METERED RESPIRATORY (INHALATION) ONCE
Status: COMPLETED | OUTPATIENT
Start: 2022-11-13 | End: 2022-11-13

## 2022-11-13 RX ORDER — ACETAMINOPHEN 500 MG
1000 TABLET ORAL ONCE
Status: COMPLETED | OUTPATIENT
Start: 2022-11-13 | End: 2022-11-13

## 2022-11-13 RX ORDER — BENZONATATE 100 MG/1
100 CAPSULE ORAL 3 TIMES DAILY PRN
Qty: 10 CAPSULE | Refills: 0 | Status: SHIPPED | OUTPATIENT
Start: 2022-11-13 | End: 2022-11-20

## 2022-11-13 RX ORDER — ACETAMINOPHEN 325 MG/1
650 TABLET ORAL EVERY 6 HOURS PRN
Qty: 120 TABLET | Refills: 3 | Status: SHIPPED | OUTPATIENT
Start: 2022-11-13

## 2022-11-13 RX ORDER — ALBUTEROL SULFATE 90 UG/1
2 AEROSOL, METERED RESPIRATORY (INHALATION) EVERY 4 HOURS PRN
Qty: 18 G | Refills: 1 | Status: SHIPPED | OUTPATIENT
Start: 2022-11-13 | End: 2022-12-13

## 2022-11-13 RX ORDER — BENZONATATE 100 MG/1
100 CAPSULE ORAL ONCE
Status: COMPLETED | OUTPATIENT
Start: 2022-11-13 | End: 2022-11-13

## 2022-11-13 RX ADMIN — ALBUTEROL SULFATE 2 PUFF: 90 AEROSOL, METERED RESPIRATORY (INHALATION) at 08:37

## 2022-11-13 RX ADMIN — ACETAMINOPHEN 1000 MG: 500 TABLET ORAL at 08:24

## 2022-11-13 RX ADMIN — GUAIFENESIN 200 MG: 200 SOLUTION ORAL at 08:23

## 2022-11-13 RX ADMIN — BENZONATATE 100 MG: 100 CAPSULE ORAL at 08:24

## 2022-11-13 ASSESSMENT — ENCOUNTER SYMPTOMS
EYES NEGATIVE: 1
RHINORRHEA: 1
GASTROINTESTINAL NEGATIVE: 1
SORE THROAT: 1
ALLERGIC/IMMUNOLOGIC NEGATIVE: 1
COUGH: 1

## 2022-11-13 ASSESSMENT — PAIN - FUNCTIONAL ASSESSMENT
PAIN_FUNCTIONAL_ASSESSMENT: ACTIVITIES ARE NOT PREVENTED
PAIN_FUNCTIONAL_ASSESSMENT: 0-10

## 2022-11-13 ASSESSMENT — PAIN DESCRIPTION - PAIN TYPE: TYPE: ACUTE PAIN

## 2022-11-13 ASSESSMENT — PAIN DESCRIPTION - LOCATION: LOCATION: THROAT

## 2022-11-13 ASSESSMENT — PAIN SCALES - GENERAL: PAINLEVEL_OUTOF10: 7

## 2022-11-13 NOTE — ED NOTES
The patient was swabbed for COVID-19 and the flu. The patient did not tolerate the flu swab well at all and would not allow the swab to be inserted as far as recommended.            Jonah Griffith RN  11/13/22 0321

## 2022-11-13 NOTE — ED TRIAGE NOTES
Pt arrives with complaints of sore throat, cough, congestion and runny nose for about two days.  Denies fever or chills, states her grandson has been ill with an URI and she believes she caught it from him

## 2022-11-13 NOTE — ED PROVIDER NOTES
[facility]      TRIAGE CHIEF COMPLAINT:   Pharyngitis and URI      Mi'kmaq:  Omar Lr is a 67 y.o. female that presents with complaint of cough, URI symptoms. Patient states she is been sick for last couple days with cough productive green sputum. She has cough wheezing she has COPD. Has been using inhalers, NyQuil. Not getting better. She is had a headache rhinorrhea congestion chills body aches malaise fatigue. Also sore throat. She has no travel, does have a grandson who was sick with rhinovirus she brought him here last week now she is sick. She states he is doing better but she has been sick 2 days and not getting better. Denies other questions or concerns no definite fevers or chest pain or abdominal pain. No other questions. No nausea vomiting diarrhea no abdominal pain. REVIEW OF SYSTEMS:  At least 10 systems reviewed and otherwise acutely negative except as in the [address] Review of Systems   Constitutional:  Positive for chills and fatigue. HENT:  Positive for congestion, ear pain, rhinorrhea, sneezing and sore throat. Eyes: Negative. Respiratory:  Positive for cough. Cardiovascular: Negative. Gastrointestinal: Negative. Endocrine: Negative. Genitourinary: Negative. Musculoskeletal:  Positive for arthralgias and myalgias. Skin: Negative. Allergic/Immunologic: Negative. Neurological:  Positive for headaches. Hematological: Negative. Psychiatric/Behavioral: Negative. All other systems reviewed and are negative.     Past Medical History:   Diagnosis Date    Acute exacerbation of chronic obstructive pulmonary disease (COPD) ([address] 12/15/2021    Cerebral artery occlusion with cerebral infarction (HCC)     COPD, mild ([address] 6/15/2021    Diabetes mellitus ([facility]     Excessive daytime sleepiness 6/15/2021    Former smoker 6/15/2021    Hyperlipidemia     Hypertension     Mild obstructive sleep apnea 12/15/2021    Multiple lung nodules on CT 2021    Shortness of breath 6/15/2021    Snoring 6/15/2021     Past Surgical History:   Procedure Laterality Date    APPENDECTOMY      CHOLECYSTECTOMY      COLONOSCOPY N/A 9/15/2021    COLONOSCOPY POLYPECTOMY SNARE/COLD BIOPSY performed by Neel Carpenter MD at [facility] [address] Left     Left hand    HYSTERECTOMY (CERVIX STATUS UNKNOWN)      JOINT REPLACEMENT Right     2020-Right Hip    OVARY REMOVAL      age 34    TONSILLECTOMY      UPPER GASTROINTESTINAL ENDOSCOPY N/A 9/15/2021    EGD DIAGNOSTIC ONLY performed by Neel Carpenter MD at [facility] ENDOSCOPY     Family History   Problem Relation Age of Onset    Colon Cancer Mother     Heart Attack Father     Other Father         suicide    Other Sister         suicide    Heart Attack Brother     Pacemaker Brother     No Known Problems Brother     No Known Problems Brother     No Known Problems Brother     Ovarian Cancer Daughter     Breast Cancer Neg Hx      Social History     Socioeconomic History    Marital status: Single     Spouse name: Not on file    Number of children: Not on file    Years of education: Not on file    Highest education level: Not on file   Occupational History    Not on file   Tobacco Use    Smoking status: Former     Packs/day: 1.00     Years: 51.00     Pack years: 51.00     Types: Cigarettes     Start date:      Quit date: 2020     Years since quittin.9    Smokeless tobacco: Never   Vaping Use    Vaping Use: Former    Substances: Flavoring    Devices: Disposable, Pre-filled pod   Substance and Sexual Activity    Alcohol use: Not Currently    Drug use: Not Currently    Sexual activity: Not on file   Other Topics Concern    Not on file   Social History Narrative    Not on file     Social Determinants of Health     Financial Resource Strain: Not on file   Food Insecurity: Not on file   Transportation Needs: Not on file   Physical Activity: Not on file   Stress: Not on file   Social Connections: Not on file   Intimate Partner Violence: Not on file   Housing Stability: Not on file     No current facility-administered medications for this encounter. Current Outpatient Medications   Medication Sig Dispense Refill    guaiFENesin-dextromethorphan (ROBITUSSIN DM) 100-10 MG/5ML syrup Take 5 mLs by mouth 4 times daily as needed for Cough 120 mL 0    benzonatate (TESSALON PERLES) 100 MG capsule Take 1 capsule by mouth 3 times daily as needed for Cough 10 capsule 0    albuterol sulfate HFA (PROVENTIL HFA) 108 (90 Base) MCG/ACT inhaler Inhale 2 puffs into the lungs every 4 hours as needed for Wheezing or Shortness of Breath With spacer (and mask if indicated). Thanks. 18 g 1    acetaminophen (TYLENOL) 325 MG tablet Take 2 tablets by mouth every 6 hours as needed for Pain 120 tablet 3    pantoprazole (PROTONIX) 40 MG tablet Take 1 tablet by mouth daily 90 tablet 1    albuterol (PROVENTIL) (2.5 MG/3ML) 0.083% nebulizer solution Take 3 mLs by nebulization every 6 hours as needed for Wheezing 360 each 2    metFORMIN (GLUCOPHAGE) 500 MG tablet Take 1 tablet by mouth 2 times daily (with meals) 180 tablet 1    alendronate (FOSAMAX) 70 MG tablet Take 1 tablet by mouth every 7 days 12 tablet 1    atorvastatin (LIPITOR) 20 MG tablet Take 1 tablet by mouth daily 90 tablet 1    albuterol sulfate HFA (PROVENTIL;VENTOLIN;PROAIR) 108 (90 Base) MCG/ACT inhaler Inhale 2 puffs into the lungs every 4 hours as needed for Wheezing 3 each 1    busPIRone (BUSPAR) 30 MG tablet Take 30 mg by mouth nightly 30 tablet 1    glucose monitoring (FREESTYLE FREEDOM) kit 1 kit by Does not apply route daily 1 kit 0    Lancets MISC 1 each by Does not apply route daily 100 each 3    blood glucose monitor strips Test 2 times a day & as needed for symptoms of irregular blood glucose. Dispense sufficient amount for indicated testing frequency plus additional to accommodate PRN testing needs.  100 strip 3    umeclidinium-vilanterol (ANORO kit 0    Lancets MISC 1 each by Does not apply route daily 100 each 3    blood glucose monitor strips Test 2 times a day & as needed for symptoms of irregular blood glucose. Dispense sufficient amount for indicated testing frequency plus additional to accommodate PRN testing needs. 100 strip 3    umeclidinium-vilanterol (ANORO ELLIPTA) 62.5-25 MCG/INH AEPB inhaler Inhale 1 puff into the lungs in the morning. 1 each 11    lisinopril (PRINIVIL;ZESTRIL) 40 MG tablet TAKE 1 TABLET BY MOUTH EVERY DAY 90 tablet 1    dicyclomine (BENTYL) 10 MG capsule Take 1 capsule by mouth 3 times daily as needed (stomach) 120 capsule 2    aspirin 81 MG EC tablet Take 81 mg by mouth daily         Nursing Notes Reviewed    VITAL SIGNS:  ED Triage Vitals [11/13/22 0811]   Enc Vitals Group      BP (!) 189/92      Heart Rate 84      Resp 14      Temp 97.8 °F (36.6 °C)      Temp Source Infrared      SpO2 97 %      Weight 137 lb (62.1 kg)      Height 5' (1.524 m)      Head Circumference       Peak Flow       Pain Score       Pain Loc       Pain Edu? Excl. in [address] PHYSICAL EXAM:  Physical Exam  Vitals and nursing note reviewed. Constitutional:       General: She is not in acute distress. Appearance: Normal appearance. She is well-developed and well-groomed. She is not ill-appearing, toxic-appearing or diaphoretic. HENT:      Head: Normocephalic and atraumatic. Right Ear: External ear normal.      Left Ear: External ear normal.      Nose: Congestion present. No rhinorrhea. Mouth/Throat:      Mouth: No angioedema. Pharynx: Oropharynx is clear. Uvula midline. Posterior oropharyngeal erythema present. No pharyngeal swelling, oropharyngeal exudate or uvula swelling. Tonsils: No tonsillar exudate or tonsillar abscesses. Eyes:      General: No scleral icterus. Right eye: No discharge. Left eye: No discharge. Extraocular Movements: Extraocular movements intact.       Conjunctiva/sclera: Mood normal.         Behavior: Behavior normal. Behavior is cooperative. Thought Content: Thought content normal.         Judgment: Judgment normal.         I have reviewed andinterpreted all of the currently available lab results from this visit (if applicable):    Results for orders placed or performed during the hospital encounter of 11/13/22   Rapid Flu Swab    Specimen: Nasopharyngeal   Result Value Ref Range    Rapid Influenza A Ag NEGATIVE NEGATIVE    Rapid Influenza B Ag NEGATIVE NEGATIVE   COVID-19, Rapid    Specimen: Nasopharyngeal   Result Value Ref Range    Source NARES     SARS-CoV-2, NAAT NOT DETECTED NOT DETECTED        Radiographs (if obtained):  [] The following radiograph was interpreted by myself in the absence of a radiologist:  [x] Radiologist's Report Reviewed:    Chest x-ray    XR FEMUR RIGHT (MIN 2 VIEWS)    Result Date: 11/11/2022  EXAMINATION: 2 XRAY VIEWS OF THE RIGHT TIBIA AND FIBULA; 2 XRAY VIEWS OF THE RIGHT FEMUR 11/8/2022 11:19 am COMPARISON: None. HISTORY: ORDERING SYSTEM PROVIDED HISTORY: Right leg pain TECHNOLOGIST PROVIDED HISTORY: Reason for Exam: Right leg pain FINDINGS: There is sequela of ORIF of a right femoral neck fracture. The lesser trochanter fracture fragment is displaced along the medial aspect of the right hip. Mild degenerative changes are noted in the right hip. The right femur, tibia, and fibula are intact. The bones are osteopenic. Mild degenerative changes are noted in the knee and ankle. Achilles and plantar spurring are noted. No appreciable soft tissue swelling. 1. No acute traumatic injury involving the right femur, tibia, and fibula. XR TIBIA FIBULA RIGHT (2 VIEWS)    Result Date: 11/11/2022  EXAMINATION: 2 XRAY VIEWS OF THE RIGHT TIBIA AND FIBULA; 2 XRAY VIEWS OF THE RIGHT FEMUR 11/8/2022 11:19 am COMPARISON: None.  HISTORY: ORDERING SYSTEM PROVIDED HISTORY: Right leg pain TECHNOLOGIST PROVIDED HISTORY: Reason for Exam: Right leg pain FINDINGS: There is sequela of ORIF of a right femoral neck fracture. The lesser trochanter fracture fragment is displaced along the medial aspect of the right hip. Mild degenerative changes are noted in the right hip. The right femur, tibia, and fibula are intact. The bones are osteopenic. Mild degenerative changes are noted in the knee and ankle. Achilles and plantar spurring are noted. No appreciable soft tissue swelling. 1. No acute traumatic injury involving the right femur, tibia, and fibula. XR CHEST PORTABLE    Result Date: 11/1/2022  EXAMINATION: ONE XRAY VIEW OF THE CHEST 11/1/2022 12:02 am COMPARISON: May 12, 2022. HISTORY: ORDERING SYSTEM PROVIDED HISTORY: sob TECHNOLOGIST PROVIDED HISTORY: Reason for exam:->sob Reason for Exam: sob FINDINGS: Frontal view of the chest.  Normal lung volume. No focal airspace disease. Remote granulomatous disease. Prominent pulmonary vasculature. Stable cardiomediastinal silhouette and great vessels with redemonstration of cardiomegaly and atherosclerotic thoracic aorta. Multilevel degenerative disc disease. Cardiomegaly and pulmonary vascular congestion. No focal airspace disease. US EXTREMITY RIGHT NON VASC LIMITED    Result Date: 11/8/2022  EXAMINATION: NONVASCULAR ULTRASOUND OF THE RIGHT EXTREMITY 11/8/2022 11:08 am COMPARISON: None. HISTORY: ORDERING SYSTEM PROVIDED HISTORY: Right leg pain TECHNOLOGIST PROVIDED HISTORY: This procedure can be scheduled via [employer]. Access your [employer] account by visiting [employer]. Reason for exam:->eval right thigh lipoma FINDINGS: The clinical area of concern in the right medial thigh soft tissues was assessed. There is a 4.4 x 1.8 x 5.6 cm well-circumscribed hyperechoic mass in the right upper medial thigh. There is minimal internal vascularity. Targeted ultrasound of the right medial thigh demonstrates a hyperechoic mass with minimal internal vascularity. Findings most likely relate to a lipoma. VL DUP LOWER EXTREMITY VENOUS RIGHT    Result Date: 11/1/2022  EXAMINATION: DUPLEX VENOUS ULTRASOUND OF THE RIGHT LOWER EXTREMITY 11/1/2022 12:14 am TECHNIQUE: Duplex ultrasound using B-mode/gray scaled imaging and Doppler spectral analysis and color flow was obtained of the deep venous structures of the right extremity. COMPARISON: None. HISTORY: ORDERING SYSTEM PROVIDED HISTORY: pain TECHNOLOGIST PROVIDED HISTORY: Reason for exam:->pain FINDINGS: Limited visualization of the calf veins. The visualized veins of the right lower extremity are patent and free of echogenic thrombus. The veins demonstrate good compressibility with normal color flow study and spectral analysis. Incidental arterial atheromatous plaque noted. Mildly prominent inguinal lymph node with large fatty hilum. No suspicious lymph node identified. Limited exam of the calf veins. No evidence for acute DVT. [facility] CROW DIGITAL SCREEN SELF REFERRAL W OR WO CAD BILATERAL    Result Date: 10/17/2022  EXAMINATION: SCREENING DIGITAL BILATERAL MAMMOGRAM WITH TOMOSYNTHESIS 10/17/2022 TECHNIQUE: Screening mammography of the bilateral breasts was performed with tomosynthesis. 2D standard and 3D tomosynthesis combination imaging performed through both breasts in the MLO and CC projection. Computer aided detection was utilized in the interpretation of this exam. COMPARISON: Priors dating back to at least 2020 HISTORY: Screening. FINDINGS: There are scattered areas of fibroglandular density. There is no new dominant mass, suspicious microcalcification, or area of architectural distortion. No mammographic evidence of malignancy. BIRADS: BIRADS - CATEGORY 1 Negative, no evidence of malignancy. Normal interval follow-up is recommended in 12 months. OVERALL ASSESSMENT - NEGATIVE A letter of notification will be sent to the patient regarding the results.  The [employer] recommends annual mammograms for women 40 years and older.      EKG (if obtained): (All EKG's are interpreted by myself in the absence of a cardiologist)    MDM:    Patient with complaint of cough, URI symptoms. Again she has been sick for 2 days. Her grandson is sick at home she brought in her last week he was diagnosed with rhinovirus, now she is sick. She states he is doing better but she still sick she has been sick for 2 days. Has productive cough green sputum. She has COPD has been using NyQuil inhalers not getting better. She is had cough congestion fatigue malaise myalgias chills sore throat rhinorrhea sneezing. Denies any fevers or chest pain abdominal pain no nausea vomiting diarrhea no swelling. On arrival she appears well she does still congestion she has a hoarse voice, coughing productive. Vital signs are stable. She has some rhonchi and wheezing on exam but she is in no distress no accessory muscle use no tachypnea. No abdominal pain no nuchal rigidity no fever her airways intact she has no stridor no drooling no side erythema, no tonsillar exudate or swelling uvula is midline not swollen. No angioedema. She appears well this is likely viral URI her grandson was sick with rhinovirus no she sick I am seeing a lot of viral illnesses, URI symptoms here over the last couple weeks. I will do a chest x-ray we will check flu, COVID we will give her a breathing treatment, cough medicine, Tylenol. I do not think she has ACS or DVT or PE or AAA or sepsis. I do not think she needs additional labs or imaging. Patient recheck doing well. Again vital signs are stable. COVID flu and x-ray all negative. Again I think patient has viral illness her grandson has rhinovirus apparently and now she is sick. She has an appointment tomorrow with her family doctor already 9:30 AM.  She appears nontoxic nonseptic discharged home with cough medicine inhaler at this time I do not think she needs antibiotics. She is been sick for 2 days with likely viral URI. Patient stable discharge. Okay with plan. CLINICAL IMPRESSION:  Final diagnoses:   Dyspnea and respiratory abnormalities   Sore throat   Upper respiratory tract infection, unspecified type   Cough, unspecified type       (Please note that portions of this note may have been completed with a voice recognition program. Efforts were made to edit the dictations but occasionally words aremis-transcribed.)    DISPOSITION REFERRAL (if applicable):  Dennis DO  [address] [id number]  [phone]    Schedule an appointment as soon as possible for a visit in 1 day      [address]  [phone]    If symptoms worsen      DISPOSITION MEDICATIONS (if applicable):  New Prescriptions    ACETAMINOPHEN (TYLENOL) 325 MG TABLET    Take 2 tablets by mouth every 6 hours as needed for Pain    ALBUTEROL SULFATE HFA (PROVENTIL HFA) 108 (90 BASE) MCG/ACT INHALER    Inhale 2 puffs into the lungs every 4 hours as needed for Wheezing or Shortness of Breath With spacer (and mask if indicated). Thanks.     BENZONATATE (TESSALON PERLES) 100 MG CAPSULE    Take 1 capsule by mouth 3 times daily as needed for Cough    GUAIFENESIN-DEXTROMETHORPHAN (ROBITUSSIN DM) 100-10 MG/5ML SYRUP    Take 5 mLs by mouth 4 times daily as needed for Cough          Ruth Rueda, DO           Ruth Rueda DO  11/13/22 5203

## 2022-11-13 NOTE — ED NOTES
Discharge instructions and prescription information was given to the patient who expressed understanding of information and follow up care.      Rachelle Sherwood RN  11/13/22 5235

## 2022-11-16 ENCOUNTER — OFFICE VISIT (OUTPATIENT)
Dept: FAMILY MEDICINE CLINIC | Age: 72
End: 2022-11-16
Payer: MEDICARE

## 2022-11-16 VITALS
HEART RATE: 91 BPM | WEIGHT: 137 LBS | OXYGEN SATURATION: 97 % | DIASTOLIC BLOOD PRESSURE: 70 MMHG | RESPIRATION RATE: 20 BRPM | BODY MASS INDEX: 26.9 KG/M2 | SYSTOLIC BLOOD PRESSURE: 112 MMHG | HEIGHT: 60 IN

## 2022-11-16 DIAGNOSIS — B96.89 ACUTE BACTERIAL BRONCHITIS: Primary | ICD-10-CM

## 2022-11-16 DIAGNOSIS — J20.8 ACUTE BACTERIAL BRONCHITIS: Primary | ICD-10-CM

## 2022-11-16 DIAGNOSIS — R05.1 ACUTE COUGH: ICD-10-CM

## 2022-11-16 DIAGNOSIS — J44.1 CHRONIC OBSTRUCTIVE PULMONARY DISEASE WITH ACUTE EXACERBATION (HCC): ICD-10-CM

## 2022-11-16 PROCEDURE — 3078F DIAST BP <80 MM HG: CPT | Performed by: PHYSICIAN ASSISTANT

## 2022-11-16 PROCEDURE — G8417 CALC BMI ABV UP PARAM F/U: HCPCS | Performed by: PHYSICIAN ASSISTANT

## 2022-11-16 PROCEDURE — G8484 FLU IMMUNIZE NO ADMIN: HCPCS | Performed by: PHYSICIAN ASSISTANT

## 2022-11-16 PROCEDURE — 3074F SYST BP LT 130 MM HG: CPT | Performed by: PHYSICIAN ASSISTANT

## 2022-11-16 PROCEDURE — 3017F COLORECTAL CA SCREEN DOC REV: CPT | Performed by: PHYSICIAN ASSISTANT

## 2022-11-16 PROCEDURE — G8427 DOCREV CUR MEDS BY ELIG CLIN: HCPCS | Performed by: PHYSICIAN ASSISTANT

## 2022-11-16 PROCEDURE — 1123F ACP DISCUSS/DSCN MKR DOCD: CPT | Performed by: PHYSICIAN ASSISTANT

## 2022-11-16 PROCEDURE — 99214 OFFICE O/P EST MOD 30 MIN: CPT | Performed by: PHYSICIAN ASSISTANT

## 2022-11-16 PROCEDURE — 3023F SPIROM DOC REV: CPT | Performed by: PHYSICIAN ASSISTANT

## 2022-11-16 PROCEDURE — G8399 PT W/DXA RESULTS DOCUMENT: HCPCS | Performed by: PHYSICIAN ASSISTANT

## 2022-11-16 PROCEDURE — 1036F TOBACCO NON-USER: CPT | Performed by: PHYSICIAN ASSISTANT

## 2022-11-16 PROCEDURE — 1090F PRES/ABSN URINE INCON ASSESS: CPT | Performed by: PHYSICIAN ASSISTANT

## 2022-11-16 RX ORDER — AZITHROMYCIN 250 MG/1
250 TABLET, FILM COATED ORAL SEE ADMIN INSTRUCTIONS
Qty: 6 TABLET | Refills: 0 | Status: SHIPPED | OUTPATIENT
Start: 2022-11-16 | End: 2022-11-21

## 2022-11-16 RX ORDER — PREDNISONE 20 MG/1
20 TABLET ORAL 2 TIMES DAILY
Qty: 10 TABLET | Refills: 0 | Status: SHIPPED | OUTPATIENT
Start: 2022-11-16 | End: 2022-11-21

## 2022-11-16 RX ORDER — DEXTROMETHORPHAN HYDROBROMIDE AND PROMETHAZINE HYDROCHLORIDE 15; 6.25 MG/5ML; MG/5ML
2.5 SYRUP ORAL 4 TIMES DAILY PRN
Qty: 28 ML | Refills: 0 | Status: SHIPPED | OUTPATIENT
Start: 2022-11-16 | End: 2022-11-23

## 2022-11-16 NOTE — PROGRESS NOTES
Breast Cancer Neg Hx        SOCIAL HISTORY  Social History     Socioeconomic History    Marital status: Single     Spouse name: None    Number of children: None    Years of education: None    Highest education level: None   Tobacco Use    Smoking status: Former     Packs/day: 1.00     Years: 51.00     Pack years: 51.00     Types: Cigarettes     Start date:      Quit date: 2020     Years since quittin.9    Smokeless tobacco: Never   Vaping Use    Vaping Use: Former    Substances: Flavoring    Devices: Disposable, Pre-filled pod   Substance and Sexual Activity    Alcohol use: Not Currently    Drug use: Not Currently        SURGICAL HISTORY  Past Surgical History:   Procedure Laterality Date    APPENDECTOMY      CHOLECYSTECTOMY      COLONOSCOPY N/A 9/15/2021    COLONOSCOPY POLYPECTOMY SNARE/COLD BIOPSY performed by Quinton See MD at [facility] [address] Left     Left hand    HYSTERECTOMY (CERVIX STATUS UNKNOWN)      JOINT REPLACEMENT Right     2020-Right Hip    OVARY REMOVAL      age 34    TONSILLECTOMY      UPPER GASTROINTESTINAL ENDOSCOPY N/A 9/15/2021    EGD DIAGNOSTIC ONLY performed by Quinton See MD at [facility]  Current Outpatient Medications   Medication Sig Dispense Refill    azithromycin (ZITHROMAX) 250 MG tablet Take 1 tablet by mouth See Admin Instructions for 5 days 500mg on day 1 followed by 250mg on days 2 - 5 6 tablet 0    predniSONE (DELTASONE) 20 MG tablet Take 1 tablet by mouth 2 times daily for 5 days 10 tablet 0    promethazine-dextromethorphan (PROMETHAZINE-DM) 6.25-15 MG/5ML syrup Take 2.5 mLs by mouth 4 times daily as needed for Cough 28 mL 0    guaiFENesin-dextromethorphan (ROBITUSSIN DM) 100-10 MG/5ML syrup Take 5 mLs by mouth 4 times daily as needed for Cough 120 mL 0    benzonatate (TESSALON PERLES) 100 MG capsule Take 1 capsule by mouth 3 times daily as needed for Cough 10 capsule 0 albuterol sulfate HFA (PROVENTIL HFA) 108 (90 Base) MCG/ACT inhaler Inhale 2 puffs into the lungs every 4 hours as needed for Wheezing or Shortness of Breath With spacer (and mask if indicated). Thanks. 18 g 1    acetaminophen (TYLENOL) 325 MG tablet Take 2 tablets by mouth every 6 hours as needed for Pain 120 tablet 3    pantoprazole (PROTONIX) 40 MG tablet Take 1 tablet by mouth daily 90 tablet 1    albuterol (PROVENTIL) (2.5 MG/3ML) 0.083% nebulizer solution Take 3 mLs by nebulization every 6 hours as needed for Wheezing 360 each 2    metFORMIN (GLUCOPHAGE) 500 MG tablet Take 1 tablet by mouth 2 times daily (with meals) 180 tablet 1    alendronate (FOSAMAX) 70 MG tablet Take 1 tablet by mouth every 7 days 12 tablet 1    atorvastatin (LIPITOR) 20 MG tablet Take 1 tablet by mouth daily 90 tablet 1    albuterol sulfate HFA (PROVENTIL;VENTOLIN;PROAIR) 108 (90 Base) MCG/ACT inhaler Inhale 2 puffs into the lungs every 4 hours as needed for Wheezing 3 each 1    busPIRone (BUSPAR) 30 MG tablet Take 30 mg by mouth nightly 30 tablet 1    glucose monitoring (FREESTYLE FREEDOM) kit 1 kit by Does not apply route daily 1 kit 0    Lancets MISC 1 each by Does not apply route daily 100 each 3    blood glucose monitor strips Test 2 times a day & as needed for symptoms of irregular blood glucose. Dispense sufficient amount for indicated testing frequency plus additional to accommodate PRN testing needs. 100 strip 3    umeclidinium-vilanterol (ANORO ELLIPTA) 62.5-25 MCG/INH AEPB inhaler Inhale 1 puff into the lungs in the morning. 1 each 11    lisinopril (PRINIVIL;ZESTRIL) 40 MG tablet TAKE 1 TABLET BY MOUTH EVERY DAY 90 tablet 1    dicyclomine (BENTYL) 10 MG capsule Take 1 capsule by mouth 3 times daily as needed (stomach) 120 capsule 2    aspirin 81 MG EC tablet Take 81 mg by mouth daily       No current facility-administered medications for this visit.        ALLERGIES  Allergies   Allergen Reactions    Other Hives    Levuin [Levofloxacin] Rash       PHYSICAL EXAM    /70   Pulse 91   Resp 20   Ht 5' (1.524 m)   Wt 137 lb (62.1 kg)   SpO2 97%   BMI 26.76 kg/m²     Physical Exam  Constitutional:       Appearance: Normal appearance. She is normal weight. HENT:      Head: Normocephalic and atraumatic. Right Ear: Tympanic membrane and external ear normal.      Left Ear: Tympanic membrane and external ear normal.      Nose: Congestion and rhinorrhea present. Mouth/Throat:      Mouth: Mucous membranes are moist.      Pharynx: Oropharynx is clear. Posterior oropharyngeal erythema present. No oropharyngeal exudate. Eyes:      General: No scleral icterus. Extraocular Movements: Extraocular movements intact. Conjunctiva/sclera: Conjunctivae normal.      Pupils: Pupils are equal, round, and reactive to light. Cardiovascular:      Rate and Rhythm: Normal rate and regular rhythm. Pulses: Normal pulses. Heart sounds: Normal heart sounds. No murmur heard. No friction rub. No gallop. Pulmonary:      Effort: Pulmonary effort is normal.      Breath sounds: Wheezing and rhonchi present. No rales. Abdominal:      General: Bowel sounds are normal. There is no distension. Palpations: Abdomen is soft. There is no mass. Tenderness: There is no abdominal tenderness. There is no right CVA tenderness, left CVA tenderness, guarding or rebound. Musculoskeletal:         General: No deformity. Normal range of motion. Cervical back: Normal range of motion and neck supple. No rigidity. No muscular tenderness. Right lower leg: No edema. Left lower leg: No edema. Skin:     General: Skin is warm and dry. Capillary Refill: Capillary refill takes less than 2 seconds. Findings: No bruising, erythema or rash. Neurological:      General: No focal deficit present. Mental Status: She is alert and oriented to person, place, and time.       Coordination: Coordination normal. Gait: Gait normal.   Psychiatric:         Mood and Affect: Mood normal.         Behavior: Behavior normal.       ASSESSMENT & PLAN    1. Chronic obstructive pulmonary disease with acute exacerbation (HCC)  COPD exacerbation, high risk for bacterial infection and representation to ER  Is not sleeping well due to significant coughing attacks  - azithromycin (ZITHROMAX) 250 MG tablet; Take 1 tablet by mouth See Admin Instructions for 5 days 500mg on day 1 followed by 250mg on days 2 - 5  Dispense: 6 tablet; Refill: 0  - predniSONE (DELTASONE) 20 MG tablet; Take 1 tablet by mouth 2 times daily for 5 days  Dispense: 10 tablet; Refill: 0    2. Acute bacterial bronchitis    - promethazine-dextromethorphan (PROMETHAZINE-DM) 6.25-15 MG/5ML syrup; Take 2.5 mLs by mouth 4 times daily as needed for Cough  Dispense: 28 mL; Refill: 0    3. Acute cough    - promethazine-dextromethorphan (PROMETHAZINE-DM) 6.25-15 MG/5ML syrup; Take 2.5 mLs by mouth 4 times daily as needed for Cough  Dispense: 28 mL; Refill: 0    Return if symptoms worsen or fail to improve. Electronically signed by Tony Gonzalez, [id number] on 11/16/2022      Comment: Please note this report has been produced using speech recognition software and may contain errors related to that system including errors in grammar, punctuation, and spelling, as well as words and phrases that may be inappropriate. If there are any questions or concerns please feel free to contact the dictating provider for clarification.

## 2022-11-28 ENCOUNTER — HOSPITAL ENCOUNTER (OUTPATIENT)
Dept: CT IMAGING | Age: 72
Discharge: HOME | End: 2022-11-28
Payer: MEDICARE

## 2022-11-28 DIAGNOSIS — R91.8 MULTIPLE LUNG NODULES ON CT: ICD-10-CM

## 2022-11-28 PROCEDURE — 71250 CT THORAX DX C-: CPT

## 2022-12-16 ENCOUNTER — OFFICE VISIT (OUTPATIENT)
Dept: FAMILY MEDICINE CLINIC | Age: 72
End: 2022-12-16
Payer: MEDICARE

## 2022-12-16 VITALS
DIASTOLIC BLOOD PRESSURE: 88 MMHG | HEIGHT: 60 IN | BODY MASS INDEX: 27.15 KG/M2 | WEIGHT: 138.3 LBS | OXYGEN SATURATION: 96 % | RESPIRATION RATE: 16 BRPM | SYSTOLIC BLOOD PRESSURE: 146 MMHG | HEART RATE: 74 BPM

## 2022-12-16 DIAGNOSIS — I10 ESSENTIAL HYPERTENSION: ICD-10-CM

## 2022-12-16 DIAGNOSIS — J44.9 COPD, MILD (HCC): ICD-10-CM

## 2022-12-16 DIAGNOSIS — E11.9 TYPE 2 DIABETES MELLITUS WITHOUT COMPLICATION, WITHOUT LONG-TERM CURRENT USE OF INSULIN (HCC): Primary | ICD-10-CM

## 2022-12-16 DIAGNOSIS — N18.2 TYPE 2 DIABETES MELLITUS WITH STAGE 2 CHRONIC KIDNEY DISEASE, WITHOUT LONG-TERM CURRENT USE OF INSULIN (HCC): ICD-10-CM

## 2022-12-16 DIAGNOSIS — E11.22 TYPE 2 DIABETES MELLITUS WITH STAGE 2 CHRONIC KIDNEY DISEASE, WITHOUT LONG-TERM CURRENT USE OF INSULIN (HCC): ICD-10-CM

## 2022-12-16 DIAGNOSIS — F41.9 ANXIETY: ICD-10-CM

## 2022-12-16 DIAGNOSIS — K21.9 GASTROESOPHAGEAL REFLUX DISEASE WITHOUT ESOPHAGITIS: ICD-10-CM

## 2022-12-16 LAB — HBA1C MFR BLD: 6.2 %

## 2022-12-16 PROCEDURE — 3017F COLORECTAL CA SCREEN DOC REV: CPT | Performed by: STUDENT IN AN ORGANIZED HEALTH CARE EDUCATION/TRAINING PROGRAM

## 2022-12-16 PROCEDURE — 1090F PRES/ABSN URINE INCON ASSESS: CPT | Performed by: STUDENT IN AN ORGANIZED HEALTH CARE EDUCATION/TRAINING PROGRAM

## 2022-12-16 PROCEDURE — G8427 DOCREV CUR MEDS BY ELIG CLIN: HCPCS | Performed by: STUDENT IN AN ORGANIZED HEALTH CARE EDUCATION/TRAINING PROGRAM

## 2022-12-16 PROCEDURE — 99214 OFFICE O/P EST MOD 30 MIN: CPT | Performed by: STUDENT IN AN ORGANIZED HEALTH CARE EDUCATION/TRAINING PROGRAM

## 2022-12-16 PROCEDURE — 3044F HG A1C LEVEL LT 7.0%: CPT | Performed by: STUDENT IN AN ORGANIZED HEALTH CARE EDUCATION/TRAINING PROGRAM

## 2022-12-16 PROCEDURE — 83036 HEMOGLOBIN GLYCOSYLATED A1C: CPT | Performed by: STUDENT IN AN ORGANIZED HEALTH CARE EDUCATION/TRAINING PROGRAM

## 2022-12-16 PROCEDURE — 2022F DILAT RTA XM EVC RTNOPTHY: CPT | Performed by: STUDENT IN AN ORGANIZED HEALTH CARE EDUCATION/TRAINING PROGRAM

## 2022-12-16 PROCEDURE — 3074F SYST BP LT 130 MM HG: CPT | Performed by: STUDENT IN AN ORGANIZED HEALTH CARE EDUCATION/TRAINING PROGRAM

## 2022-12-16 PROCEDURE — 1036F TOBACCO NON-USER: CPT | Performed by: STUDENT IN AN ORGANIZED HEALTH CARE EDUCATION/TRAINING PROGRAM

## 2022-12-16 PROCEDURE — G8417 CALC BMI ABV UP PARAM F/U: HCPCS | Performed by: STUDENT IN AN ORGANIZED HEALTH CARE EDUCATION/TRAINING PROGRAM

## 2022-12-16 PROCEDURE — G8399 PT W/DXA RESULTS DOCUMENT: HCPCS | Performed by: STUDENT IN AN ORGANIZED HEALTH CARE EDUCATION/TRAINING PROGRAM

## 2022-12-16 PROCEDURE — G8484 FLU IMMUNIZE NO ADMIN: HCPCS | Performed by: STUDENT IN AN ORGANIZED HEALTH CARE EDUCATION/TRAINING PROGRAM

## 2022-12-16 PROCEDURE — 3023F SPIROM DOC REV: CPT | Performed by: STUDENT IN AN ORGANIZED HEALTH CARE EDUCATION/TRAINING PROGRAM

## 2022-12-16 PROCEDURE — 1123F ACP DISCUSS/DSCN MKR DOCD: CPT | Performed by: STUDENT IN AN ORGANIZED HEALTH CARE EDUCATION/TRAINING PROGRAM

## 2022-12-16 PROCEDURE — 3078F DIAST BP <80 MM HG: CPT | Performed by: STUDENT IN AN ORGANIZED HEALTH CARE EDUCATION/TRAINING PROGRAM

## 2022-12-16 RX ORDER — AMLODIPINE BESYLATE 5 MG/1
5 TABLET ORAL DAILY
Qty: 90 TABLET | Refills: 1 | Status: SHIPPED | OUTPATIENT
Start: 2022-12-16 | End: 2023-06-14

## 2022-12-16 RX ORDER — BUSPIRONE HYDROCHLORIDE 30 MG/1
30 TABLET ORAL NIGHTLY
Qty: 90 TABLET | Refills: 1 | Status: SHIPPED | OUTPATIENT
Start: 2022-12-16 | End: 2022-12-27

## 2022-12-16 RX ORDER — BLOOD-GLUCOSE METER
1 EACH MISCELLANEOUS DAILY
Qty: 1 KIT | Refills: 2 | Status: SHIPPED | OUTPATIENT
Start: 2022-12-16 | End: 2022-12-27

## 2022-12-16 ASSESSMENT — PATIENT HEALTH QUESTIONNAIRE - PHQ9
SUM OF ALL RESPONSES TO PHQ QUESTIONS 1-9: 1
SUM OF ALL RESPONSES TO PHQ QUESTIONS 1-9: 1
1. LITTLE INTEREST OR PLEASURE IN DOING THINGS: 1
SUM OF ALL RESPONSES TO PHQ QUESTIONS 1-9: 1
2. FEELING DOWN, DEPRESSED OR HOPELESS: 0
SUM OF ALL RESPONSES TO PHQ9 QUESTIONS 1 & 2: 1
SUM OF ALL RESPONSES TO PHQ QUESTIONS 1-9: 1

## 2022-12-16 ASSESSMENT — ENCOUNTER SYMPTOMS
SHORTNESS OF BREATH: 0
WHEEZING: 0
SORE THROAT: 0
ABDOMINAL PAIN: 0
NAUSEA: 0

## 2022-12-16 NOTE — PROGRESS NOTES
12/16/2022    Alice Code    Chief Complaint   Patient presents with    3 Month Follow-Up     4 month ck - needs anoro inhaler refilled   Needs blood sugar monitoring kit - never got hers       HPI  History was obtained from patient. Dorothy Jeffrey is a 67 y.o. female with a PMHx as listed below who presents today for follow up on chronic conditions. No acute complaints. BP elevated today at home 943 at times, systolic    Seen in ED last month, respiratory symptoms have resolved    Anxiety fair    Elevated BP today complaint with lisinopril has been on amlodipine in the past, asymptomatic    1. Type 2 diabetes mellitus without complication, without long-term current use of insulin ([address]    2. Anxiety    3. Type 2 diabetes mellitus with stage 2 chronic kidney disease, without long-term current use of insulin ([address]    4. Essential hypertension             REVIEW OF SYMPTOMS    Review of Systems   Constitutional:  Negative for chills and fatigue. HENT:  Negative for congestion and sore throat. Respiratory:  Negative for shortness of breath and wheezing. Cardiovascular:  Negative for chest pain and palpitations. Gastrointestinal:  Negative for abdominal pain and nausea. Genitourinary:  Negative for frequency and urgency. Neurological:  Negative for light-headedness.      PAST MEDICAL HISTORY  Past Medical History:   Diagnosis Date    Acute exacerbation of chronic obstructive pulmonary disease (COPD) ([address] 12/15/2021    Cerebral artery occlusion with cerebral infarction (HCC)     COPD, mild ([address] 6/15/2021    Diabetes mellitus (HCC)     Excessive daytime sleepiness 6/15/2021    Former smoker 6/15/2021    Hyperlipidemia     Hypertension     Mild obstructive sleep apnea 12/15/2021    Multiple lung nodules on CT 7/27/2021    Shortness of breath 6/15/2021    Snoring 6/15/2021       FAMILY HISTORY  Family History   Problem Relation Age of Onset    Colon Cancer Mother     Heart Attack Father     Other Father suicide    Other Sister         suicide    Heart Attack Brother     Pacemaker Brother     No Known Problems Brother     No Known Problems Brother     No Known Problems Brother     Ovarian Cancer Daughter     Breast Cancer Neg Hx        SOCIAL HISTORY  Social History     Socioeconomic History    Marital status: Single   Tobacco Use    Smoking status: Former     Packs/day: 1.00     Years: 51.00     Pack years: 51.00     Types: Cigarettes     Start date:      Quit date: 2020     Years since quittin.0    Smokeless tobacco: Never   Vaping Use    Vaping Use: Former    Substances: Flavoring    Devices: Disposable, Pre-filled pod   Substance and Sexual Activity    Alcohol use: Not Currently    Drug use: Not Currently        SURGICAL HISTORY  Past Surgical History:   Procedure Laterality Date    APPENDECTOMY      CHOLECYSTECTOMY      COLONOSCOPY N/A 9/15/2021    COLONOSCOPY POLYPECTOMY SNARE/COLD BIOPSY performed by Sarbjit Dunne MD at [facility] [address] Left     Left hand    HYSTERECTOMY (CERVIX STATUS UNKNOWN)      JOINT REPLACEMENT Right     2020-Right Hip    OVARY REMOVAL      age 34    TONSILLECTOMY      UPPER GASTROINTESTINAL ENDOSCOPY N/A 9/15/2021    EGD DIAGNOSTIC ONLY performed by Sarbjit Dunne MD at [facility]  Current Outpatient Medications   Medication Sig Dispense Refill    busPIRone (BUSPAR) 30 MG tablet Take 30 mg by mouth nightly 90 tablet 1    Blood Glucose Monitoring Suppl (ONE TOUCH ULTRA 2) w/Device KIT Inject 1 kit into the skin daily Test daily and PRN 1 kit 2    amLODIPine (NORVASC) 5 MG tablet Take 1 tablet by mouth daily 90 tablet 1    acetaminophen (TYLENOL) 325 MG tablet Take 2 tablets by mouth every 6 hours as needed for Pain 120 tablet 3    pantoprazole (PROTONIX) 40 MG tablet Take 1 tablet by mouth daily 90 tablet 1    albuterol (PROVENTIL) (2.5 MG/3ML) 0.083% nebulizer solution Take 3 mLs by nebulization every 6 hours as needed for Wheezing 360 each 2    metFORMIN (GLUCOPHAGE) 500 MG tablet Take 1 tablet by mouth 2 times daily (with meals) 180 tablet 1    alendronate (FOSAMAX) 70 MG tablet Take 1 tablet by mouth every 7 days 12 tablet 1    atorvastatin (LIPITOR) 20 MG tablet Take 1 tablet by mouth daily 90 tablet 1    albuterol sulfate HFA (PROVENTIL;VENTOLIN;PROAIR) 108 (90 Base) MCG/ACT inhaler Inhale 2 puffs into the lungs every 4 hours as needed for Wheezing 3 each 1    Lancets MISC 1 each by Does not apply route daily 100 each 3    blood glucose monitor strips Test 2 times a day & as needed for symptoms of irregular blood glucose. Dispense sufficient amount for indicated testing frequency plus additional to accommodate PRN testing needs. 100 strip 3    umeclidinium-vilanterol (ANORO ELLIPTA) 62.5-25 MCG/INH AEPB inhaler Inhale 1 puff into the lungs in the morning. 1 each 11    lisinopril (PRINIVIL;ZESTRIL) 40 MG tablet TAKE 1 TABLET BY MOUTH EVERY DAY 90 tablet 1    dicyclomine (BENTYL) 10 MG capsule Take 1 capsule by mouth 3 times daily as needed (stomach) 120 capsule 2    aspirin 81 MG EC tablet Take 81 mg by mouth daily      glucose monitoring (FREESTYLE FREEDOM) kit 1 kit by Does not apply route daily 1 kit 0     No current facility-administered medications for this visit. ALLERGIES  Allergies   Allergen Reactions    Other Hives    Levaquin [Levofloxacin] Rash       PHYSICAL EXAM    BP (!) 146/88 (Site: Left Upper Arm, Position: Sitting, Cuff Size: Medium Adult)   Pulse 74   Resp 16   Ht 5' (1.524 m)   Wt 138 lb 4.8 oz (62.7 kg)   SpO2 96%   BMI 27.01 kg/m²     Physical Exam  Constitutional:       Appearance: Normal appearance. HENT:      Head: Normocephalic and atraumatic. Eyes:      Extraocular Movements: Extraocular movements intact. Pupils: Pupils are equal, round, and reactive to light. Cardiovascular:      Rate and Rhythm: Normal rate and regular rhythm. Pulses: Normal pulses. Heart sounds: No murmur heard. No friction rub. No gallop. Pulmonary:      Effort: Pulmonary effort is normal.      Breath sounds: Normal breath sounds. Musculoskeletal:      Cervical back: Neck supple. Skin:     General: Skin is warm and dry. Neurological:      General: No focal deficit present. Mental Status: She is alert. Psychiatric:         Mood and Affect: Mood normal.         Behavior: Behavior normal.       ASSESSMENT & PLAN    1. Anxiety    - busPIRone (BUSPAR) 30 MG tablet; Take 30 mg by mouth nightly  Dispense: 90 tablet; Refill: 1    2. Type 2 diabetes mellitus without complication, without long-term current use of insulin ([facility])    - Blood Glucose Monitoring Suppl (ONE TOUCH ULTRA 2) w/Device KIT; Inject 1 kit into the skin daily Test daily and PRN  Dispense: 1 kit; Refill: 2    3. Type 2 diabetes mellitus with stage 2 chronic kidney disease, without long-term current use of insulin ([facility])    - POCT glycosylated hemoglobin (Hb A1C)    4. Essential hypertension    - amLODIPine (NORVASC) 5 MG tablet; Take 1 tablet by mouth daily  Dispense: 90 tablet; Refill: 1    BP elevated start amlodipine  DM2 stable  Anxiety stable  CKD stable      Return in about 3 months (around 3/16/2023) for 3 month follow up, BP check 2 weeks.          Electronically signed by Esdras Crespo DO on 12/16/2022

## 2022-12-22 ENCOUNTER — TELEPHONE (OUTPATIENT)
Dept: FAMILY MEDICINE CLINIC | Age: 72
End: 2022-12-22

## 2022-12-22 NOTE — TELEPHONE ENCOUNTER
Left voice message for patient to return call in regards to diabetic testing supply order. Cannot go to [facility] and need to know what pharmacy she would like us to send the order to.

## 2022-12-27 DIAGNOSIS — E11.9 TYPE 2 DIABETES MELLITUS WITHOUT COMPLICATION, WITHOUT LONG-TERM CURRENT USE OF INSULIN (HCC): ICD-10-CM

## 2022-12-27 DIAGNOSIS — F41.9 ANXIETY: ICD-10-CM

## 2022-12-27 RX ORDER — BUSPIRONE HYDROCHLORIDE 30 MG/1
30 TABLET ORAL NIGHTLY
Qty: 90 TABLET | Refills: 1 | Status: SHIPPED | OUTPATIENT
Start: 2022-12-27 | End: 2023-06-25

## 2022-12-27 RX ORDER — BLOOD-GLUCOSE METER
1 EACH MISCELLANEOUS DAILY
Qty: 1 KIT | Refills: 2 | Status: SHIPPED | OUTPATIENT
Start: 2022-12-27 | End: 2023-03-27

## 2022-12-27 NOTE — TELEPHONE ENCOUNTER
Spoke with the patient inregards to diabetic supplies, order was sent to [facility] and they do not do diabetic testing supplies, needs to go to [facility] per the patient. Script for buspar was sent to [facility] as well and needs to go to [facility]. Orders pended.

## 2023-01-03 ENCOUNTER — NURSE ONLY (OUTPATIENT)
Dept: FAMILY MEDICINE CLINIC | Age: 73
End: 2023-01-03

## 2023-01-03 VITALS — SYSTOLIC BLOOD PRESSURE: 190 MMHG | DIASTOLIC BLOOD PRESSURE: 86 MMHG | HEART RATE: 76 BPM | OXYGEN SATURATION: 97 %

## 2023-01-03 DIAGNOSIS — I10 ESSENTIAL HYPERTENSION: ICD-10-CM

## 2023-01-03 DIAGNOSIS — I10 ESSENTIAL HYPERTENSION: Primary | ICD-10-CM

## 2023-01-03 PROCEDURE — 99999 PR OFFICE/OUTPT VISIT,PROCEDURE ONLY: CPT | Performed by: STUDENT IN AN ORGANIZED HEALTH CARE EDUCATION/TRAINING PROGRAM

## 2023-01-03 RX ORDER — AMLODIPINE BESYLATE 10 MG/1
10 TABLET ORAL DAILY
Qty: 90 TABLET | Refills: 1 | Status: SHIPPED | OUTPATIENT
Start: 2023-01-03 | End: 2023-07-02

## 2023-01-03 RX ORDER — AMLODIPINE BESYLATE 10 MG/1
10 TABLET ORAL DAILY
Qty: 90 TABLET | Refills: 1 | Status: SHIPPED | OUTPATIENT
Start: 2023-01-03 | End: 2023-01-03

## 2023-01-03 NOTE — PROGRESS NOTES
Patient came in for a bp recheck today per dr Christine Monzon. Patient reports she took her lisinopril 40mg today but has not picked up and started the amlodipine 5m. /88 on first check and 190/86 at recheck. Per dr Christine Monzon patient to increase dose of amlodipine to 10mg and return in 1 week for a bp recheck after taking both medications. Patient voiced understanding.

## 2023-01-18 ENCOUNTER — OFFICE VISIT (OUTPATIENT)
Dept: FAMILY MEDICINE CLINIC | Age: 73
End: 2023-01-18
Payer: MEDICARE

## 2023-01-18 ENCOUNTER — TELEPHONE (OUTPATIENT)
Dept: FAMILY MEDICINE CLINIC | Age: 73
End: 2023-01-18

## 2023-01-18 VITALS
BODY MASS INDEX: 26.03 KG/M2 | TEMPERATURE: 99.1 F | HEIGHT: 60 IN | OXYGEN SATURATION: 98 % | HEART RATE: 72 BPM | WEIGHT: 132.6 LBS | SYSTOLIC BLOOD PRESSURE: 138 MMHG | DIASTOLIC BLOOD PRESSURE: 78 MMHG

## 2023-01-18 DIAGNOSIS — J44.1 COPD WITH ACUTE EXACERBATION (HCC): ICD-10-CM

## 2023-01-18 DIAGNOSIS — I10 ESSENTIAL HYPERTENSION: ICD-10-CM

## 2023-01-18 DIAGNOSIS — J06.9 URI WITH COUGH AND CONGESTION: Primary | ICD-10-CM

## 2023-01-18 LAB
INFLUENZA A ANTIBODY: NORMAL
INFLUENZA B ANTIBODY: NORMAL

## 2023-01-18 PROCEDURE — G8417 CALC BMI ABV UP PARAM F/U: HCPCS | Performed by: PHYSICIAN ASSISTANT

## 2023-01-18 PROCEDURE — 3023F SPIROM DOC REV: CPT | Performed by: PHYSICIAN ASSISTANT

## 2023-01-18 PROCEDURE — 3078F DIAST BP <80 MM HG: CPT | Performed by: PHYSICIAN ASSISTANT

## 2023-01-18 PROCEDURE — G8399 PT W/DXA RESULTS DOCUMENT: HCPCS | Performed by: PHYSICIAN ASSISTANT

## 2023-01-18 PROCEDURE — 1090F PRES/ABSN URINE INCON ASSESS: CPT | Performed by: PHYSICIAN ASSISTANT

## 2023-01-18 PROCEDURE — 3075F SYST BP GE 130 - 139MM HG: CPT | Performed by: PHYSICIAN ASSISTANT

## 2023-01-18 PROCEDURE — 1123F ACP DISCUSS/DSCN MKR DOCD: CPT | Performed by: PHYSICIAN ASSISTANT

## 2023-01-18 PROCEDURE — 3017F COLORECTAL CA SCREEN DOC REV: CPT | Performed by: PHYSICIAN ASSISTANT

## 2023-01-18 PROCEDURE — 1036F TOBACCO NON-USER: CPT | Performed by: PHYSICIAN ASSISTANT

## 2023-01-18 PROCEDURE — G8427 DOCREV CUR MEDS BY ELIG CLIN: HCPCS | Performed by: PHYSICIAN ASSISTANT

## 2023-01-18 PROCEDURE — G8484 FLU IMMUNIZE NO ADMIN: HCPCS | Performed by: PHYSICIAN ASSISTANT

## 2023-01-18 PROCEDURE — 87804 INFLUENZA ASSAY W/OPTIC: CPT | Performed by: PHYSICIAN ASSISTANT

## 2023-01-18 PROCEDURE — 99213 OFFICE O/P EST LOW 20 MIN: CPT | Performed by: PHYSICIAN ASSISTANT

## 2023-01-18 RX ORDER — ALBUTEROL SULFATE 90 UG/1
2 AEROSOL, METERED RESPIRATORY (INHALATION) 4 TIMES DAILY PRN
Qty: 54 G | Refills: 1 | Status: SHIPPED | OUTPATIENT
Start: 2023-01-18

## 2023-01-18 RX ORDER — METHYLPREDNISOLONE 4 MG/1
TABLET ORAL
Qty: 1 KIT | Refills: 0 | Status: SHIPPED | OUTPATIENT
Start: 2023-01-18

## 2023-01-18 RX ORDER — AZITHROMYCIN 250 MG/1
TABLET, FILM COATED ORAL
Qty: 6 TABLET | Refills: 0 | Status: SHIPPED | OUTPATIENT
Start: 2023-01-18

## 2023-01-18 NOTE — TELEPHONE ENCOUNTER
Patient call transferred to the office from [facility] ([facility]) for nurse triage. Patient reports sore throat and congestion present since yesterday, throat is so sore patient stated it hurts to swallow. Patient denies any other symptoms at the time of the call, was triaged for SOB, patient reported she is always SOB due to COPD, SOB is not anything new. Scheduled the patient for a same day visit with Sisi Johnson at the time of the call.

## 2023-01-18 NOTE — PROGRESS NOTES
1/18/2023    [facility]    Chief Complaint   Patient presents with    Pharyngitis     X's 1 day     Cough     Productive , greenish mucus per pt , pt unsure of fever    Congestion     Head and chest     Other     Pt reports needs new nebulizer , pt has one which is not operating correctly       HPI  History was obtained from patient. She is accompanied by her daughter who is also not feeling well. Barbra Mejia is a 67 y.o. female who presents today with concerns for acute illness. She reports a 24 hour hx of nasal congestion, post nasal drip, throat irritation, sore throat, chest congestion, cough. Hurts to swallow, better since morning. Denies stridor. Has had chills. Pt has not been taking her temp, but is 991 F in office now. Patient states that she has a well-established history of COPD, uses albuterol via nebulizer as needed. She has chronic cough and dyspnea secondary to COPD, worse over the last 24 hours. She has been needing her albuterol over the last 24 hours. States that her machine does not seem to be working appropriately. She tried to change the tubing and it was still not working appropriately. She has had it for at least 4 years. Patient denies hemoptysis, leg pain or swelling. She denies abdominal pain, abdominal distention, nausea, vomiting, diarrhea, loss of taste or smell. No known COVID exposure. She lives at home with her daughter who has had similar symptoms for approximately 4 days. Had COVID approximately one year ago. Dr. Lola Khalil started pt on Amlodipine two weeks ago. Nurse visit for BP check that day was 190/86. BP today 138/78.        PAST MEDICAL HISTORY  Past Medical History:   Diagnosis Date    Acute exacerbation of chronic obstructive pulmonary disease (COPD) ([address] 12/15/2021    Cerebral artery occlusion with cerebral infarction (HCC)     COPD, mild ([address] 6/15/2021    Diabetes mellitus ([address]     Excessive daytime sleepiness 6/15/2021    Former smoker 6/15/2021    Hyperlipidemia     Hypertension     Mild obstructive sleep apnea 12/15/2021    Multiple lung nodules on CT 2021    Shortness of breath 6/15/2021    Snoring 6/15/2021       FAMILY HISTORY  Family History   Problem Relation Age of Onset    Colon Cancer Mother     Heart Attack Father     Other Father         suicide    Other Sister         suicide    Heart Attack Brother     Pacemaker Brother     No Known Problems Brother     No Known Problems Brother     No Known Problems Brother     Ovarian Cancer Daughter     Breast Cancer Neg Hx        SOCIAL HISTORY  Social History     Socioeconomic History    Marital status: Single     Spouse name: None    Number of children: None    Years of education: None    Highest education level: None   Tobacco Use    Smoking status: Former     Packs/day: 1.00     Years: 51.00     Pack years: 51.00     Types: Cigarettes     Start date:      Quit date: 2020     Years since quittin.1    Smokeless tobacco: Never   Vaping Use    Vaping Use: Former    Substances: Flavoring    Devices: Disposable, Pre-filled pod   Substance and Sexual Activity    Alcohol use: Not Currently    Drug use: Not Currently        SURGICAL HISTORY  Past Surgical History:   Procedure Laterality Date    APPENDECTOMY      CHOLECYSTECTOMY      COLONOSCOPY N/A 9/15/2021    COLONOSCOPY POLYPECTOMY SNARE/COLD BIOPSY performed by Gracie Craig MD at [facility] [address] Left     Left hand    HYSTERECTOMY (CERVIX STATUS UNKNOWN)      JOINT REPLACEMENT Right     2020-Right Hip    OVARY REMOVAL      age 34    TONSILLECTOMY      UPPER GASTROINTESTINAL ENDOSCOPY N/A 9/15/2021    EGD DIAGNOSTIC ONLY performed by Gracie Craig MD at [address] [place] [address]  Current Outpatient Medications   Medication Sig Dispense Refill    methylPREDNISolone (MEDROL, MALKA,) 4 MG tablet Use as directed 1 kit 0    albuterol sulfate HFA (VENTOLIN HFA) 108 (90 Base) MCG/ACT inhaler Inhale 2 puffs into the lungs 4 times daily as needed for Wheezing or Shortness of Breath 54 g 1    azithromycin (ZITHROMAX) 250 MG tablet Use as directed 6 tablet 0    amLODIPine (NORVASC) 10 MG tablet Take 1 tablet by mouth daily 90 tablet 1    busPIRone (BUSPAR) 30 MG tablet Take 30 mg by mouth nightly 90 tablet 1    Blood Glucose Monitoring Suppl (ONE TOUCH ULTRA 2) w/Device KIT Inject 1 kit into the skin daily Test daily and PRN 1 kit 2    acetaminophen (TYLENOL) 325 MG tablet Take 2 tablets by mouth every 6 hours as needed for Pain 120 tablet 3    albuterol (PROVENTIL) (2.5 MG/3ML) 0.083% nebulizer solution Take 3 mLs by nebulization every 6 hours as needed for Wheezing 360 each 2    metFORMIN (GLUCOPHAGE) 500 MG tablet Take 1 tablet by mouth 2 times daily (with meals) 180 tablet 1    alendronate (FOSAMAX) 70 MG tablet Take 1 tablet by mouth every 7 days 12 tablet 1    atorvastatin (LIPITOR) 20 MG tablet Take 1 tablet by mouth daily 90 tablet 1    albuterol sulfate HFA (PROVENTIL;VENTOLIN;PROAIR) 108 (90 Base) MCG/ACT inhaler Inhale 2 puffs into the lungs every 4 hours as needed for Wheezing 3 each 1    glucose monitoring (FREESTYLE FREEDOM) kit 1 kit by Does not apply route daily 1 kit 0    Lancets MISC 1 each by Does not apply route daily 100 each 3    blood glucose monitor strips Test 2 times a day & as needed for symptoms of irregular blood glucose. Dispense sufficient amount for indicated testing frequency plus additional to accommodate PRN testing needs.  100 strip 3    lisinopril (PRINIVIL;ZESTRIL) 40 MG tablet TAKE 1 TABLET BY MOUTH EVERY DAY 90 tablet 1    dicyclomine (BENTYL) 10 MG capsule Take 1 capsule by mouth 3 times daily as needed (stomach) 120 capsule 2    aspirin 81 MG EC tablet Take 81 mg by mouth daily      pantoprazole (PROTONIX) 40 MG tablet Take 1 tablet by mouth daily (Patient not taking: Reported on 1/18/2023) 90 tablet 1    umeclidinium-vilanterol (ANORO ELLIPTA) 62.5-25 MCG/INH AEPB inhaler Inhale 1 puff into the lungs in the morning. (Patient not taking: Reported on 1/18/2023) 1 each 11     No current facility-administered medications for this visit. ALLERGIES  Allergies   Allergen Reactions    Other Hives    Levaquin [Levofloxacin] Rash       PHYSICAL EXAM    /78   Pulse 72   Temp 99.1 °F (37.3 °C)   Ht 5' (1.524 m)   Wt 132 lb 9.6 oz (60.1 kg)   SpO2 98%   BMI 25.90 kg/m²     Constitutional:  Well developed, well nourished. Pleasant and cooperative. No acute distress. HENT:  Normocephalic, atraumatic, bilateral external ears normal, bilateral ear canals and TMs normal, oropharynx moist, postnasal drip noted. Uvula midline and benign. Airway patent. Clear rhinorrhea. Eyes:  conjunctiva normal, no discharge, no scleral icterus  Neck:  No tenderness, supple  Lymphatic:  No lymphadenopathy noted  Cardiovascular:  Normal heart rate, normal rhythm, no murmurs, gallops or rubs  Thorax & Lungs: No respiratory distress. Satting 98% on room air. Soft rhonchi scattered throughout all lung fields that cleared entirely with coughing fit. No wheezing or rales appreciated. Skin:  Warm, dry, no erythema, no rash  Extremities:  No edema, no tenderness, no cyanosis  Neurologic:  Alert & oriented   Psychiatric:  Affect normal, mood normal    ASSESSMENT & PLAN    Vanessa was seen today for pharyngitis, cough, congestion and other. Diagnoses and all orders for this visit:    URI with cough and congestion  -     POCT Influenza A/B  -     COVID-19    COPD with acute exacerbation ([facility]  -     DME Order for Nebulizer as OP  -     methylPREDNISolone (MEDROL, MALKA,) 4 MG tablet; Use as directed  -     albuterol sulfate HFA (VENTOLIN HFA) 108 (90 Base) MCG/ACT inhaler; Inhale 2 puffs into the lungs 4 times daily as needed for Wheezing or Shortness of Breath    Essential hypertension   - Well-controlled with addition of amlodipine just 2 weeks ago.   Reading 138/78 in office      Mynor Herrera was evaluated today 01/18/2023 and a DME order was entered for a nebulizer compressor in order to administer Albuterol due to the diagnosis of acute exacerbation of COPD. The need for this equipment and treatment was discussed with the patient and she understands and is in agreement. Rapid influenza test negative- patient uncooperative with specimen collection. May not be accurate. Rule out COVID-19, we will call with results  Increase fluids and rest  Order placed for new nebulizer, use albuterol as needed for chest tightness/shortness of breath/wheezing  Initiate Medrol Dosepak and Z-Bill as prescribed  For any worsening, go to the ED    There are no discontinued medications. No follow-ups on file. Patient verbalizes understanding with the above plan and is in agreement. Patient will call with  questions or concerns. I did don appropriate PPE (including N95 face mask, protective safety glasses, face shield, gloves, and gown) as recommended by the health facility/national standard best practice, during my interaction with the patient. Please note that this chart was generated using dragon dictation software. Although every effort was made to ensure the accuracy of this automated transcription, some errors in transcription may have occurred.     Electronically signed by Tiffanie Bradford PA-C on 1/18/2023

## 2023-01-20 LAB — SARS-COV-2, PCR: NOT DETECTED

## 2023-02-04 ENCOUNTER — APPOINTMENT (OUTPATIENT)
Dept: GENERAL RADIOLOGY | Age: 73
End: 2023-02-04
Payer: MEDICARE

## 2023-02-04 ENCOUNTER — HOSPITAL ENCOUNTER (EMERGENCY)
Age: 73
Discharge: HOME | End: 2023-02-04
Attending: EMERGENCY MEDICINE
Payer: MEDICARE

## 2023-02-04 VITALS
WEIGHT: 137 LBS | DIASTOLIC BLOOD PRESSURE: 81 MMHG | SYSTOLIC BLOOD PRESSURE: 146 MMHG | HEART RATE: 85 BPM | BODY MASS INDEX: 26.9 KG/M2 | RESPIRATION RATE: 16 BRPM | TEMPERATURE: 97.7 F | OXYGEN SATURATION: 97 % | HEIGHT: 60 IN

## 2023-02-04 DIAGNOSIS — M25.512 ACUTE PAIN OF LEFT SHOULDER: Primary | ICD-10-CM

## 2023-02-04 LAB
EKG ATRIAL RATE: 75 BPM
EKG DIAGNOSIS: NORMAL
EKG P AXIS: -20 DEGREES
EKG P-R INTERVAL: 166 MS
EKG Q-T INTERVAL: 374 MS
EKG QRS DURATION: 82 MS
EKG QTC CALCULATION (BAZETT): 417 MS
EKG R AXIS: 63 DEGREES
EKG T AXIS: 79 DEGREES
EKG VENTRICULAR RATE: 75 BPM

## 2023-02-04 PROCEDURE — 73030 X-RAY EXAM OF SHOULDER: CPT

## 2023-02-04 PROCEDURE — 93005 ELECTROCARDIOGRAM TRACING: CPT | Performed by: EMERGENCY MEDICINE

## 2023-02-04 PROCEDURE — 99284 EMERGENCY DEPT VISIT MOD MDM: CPT

## 2023-02-04 PROCEDURE — 73060 X-RAY EXAM OF HUMERUS: CPT

## 2023-02-04 ASSESSMENT — PAIN DESCRIPTION - PAIN TYPE: TYPE: ACUTE PAIN

## 2023-02-04 ASSESSMENT — PAIN SCALES - GENERAL: PAINLEVEL_OUTOF10: 7

## 2023-02-04 ASSESSMENT — PAIN DESCRIPTION - ORIENTATION: ORIENTATION: LEFT

## 2023-02-04 ASSESSMENT — PAIN - FUNCTIONAL ASSESSMENT: PAIN_FUNCTIONAL_ASSESSMENT: 0-10

## 2023-02-04 ASSESSMENT — PAIN DESCRIPTION - FREQUENCY: FREQUENCY: INTERMITTENT

## 2023-02-04 ASSESSMENT — PAIN DESCRIPTION - LOCATION: LOCATION: ARM;SHOULDER

## 2023-02-04 NOTE — ED PROVIDER NOTES
Emergency Department Encounter    Patient: Omar Lr  MRN: [id number]  : 1950  Date of Evaluation: 2023  ED Provider:  Shaylee Torres MD    MDM:    Clinical Impression:  1. Acute pain of left shoulder          Triage Chief Complaint: Arm Pain and Shoulder Pain (Reports of left arm and shoulder pain without any injury. Reports that is has been going on for a month.)      Patient presents with 4-month history of left shoulder pain as below    Additional history was obtained from: Patient and patient's daughter    I completed a structured, evidence-based clinical evaluation to screen for acute emergent condition that poses a threat to life or bodily function. Diagnostic studies/Differential diagnosis included: X-ray evaluation of the left shoulder and humerus as interpreted by me revealed no evidence of acute fracture or dislocation. Patient is neurovascularly intact. No signs of infectious emergency such as cellulitis or abscess. EKG as interpreted by me revealed no evidence of ACS or malignant arrhythmia as below. As the patient has worsening of pain with movement of the shoulder and there was a precipitating event with the patient reaching behind herself as well as the duration of symptoms and the patient's lack of chest pain, I do not suspect ACS or other acute intrathoracic emergencies and etiologies of patient's symptoms. No swelling to suggest DVT. Soft tissue/rotator cuff injury cannot be excluded. Medications ordered in the ED:  ED Medication Orders (From admission, onward)      None            Patient denied need for any pain medication in the emergency department. PLAN  Disposition: Patient will be discharged with strict return precautions and follow up instructions.   Decision To Discharge 2023 12:32:16 PM     Disposition referral (if applicable):  D[facility],   [address] [place] [address]  [phone]    Call in 2 days      Medications prescribed (if applicable):  New Prescriptions    No medications on file     I considered ordering medication for symptomatic control as an outpatient although patient declined stating that she had medication at home. Patient will follow-up with her primary care physician with consideration for MRI evaluation.      ===================================================================    HPI/Pertinent ROS:  Ana Luisa Dale is a 67 y.o. female that presents complaining of 1 month history of left shoulder pain with radiation distally down the left upper extremity. Patient states that at the time of onset of symptoms, she was reaching behind herself. Patient denies any direct injury or trauma. No chest pain. Patient states that she has a history of COPD and has some chronic dyspnea but states that this has not worsened recently. No lower extremity edema. No changes in color or sensation of the left upper extremity. Patient states that pain is worsened with movement of the left upper extremity. Physical Exam:  Triage VS:    ED Triage Vitals   Enc Vitals Group      BP       Pulse       Resp       Temp       Temp src       SpO2       Weight       Height       Head Circumference       Peak Flow       Pain Score       Pain Loc       Pain Edu? Excl. in [address]? General appearance:  No acute distress. Skin:  Warm. Dry. Eye:  Extraocular movements intact. Ears, nose, mouth and throat:  Oral mucosa moist   Neck:  Trachea midline. Extremity:  No swelling. Patient has tenderness to palpation of the left shoulder and proximal to mid humerus. Patient is neurovascularly intact in the axial/median/radial/ulnar nerve distribution. Palpable radial pulse. No breaks in the skin. No skin erythema. No tenderness to palpation of the clavicle or scapula. Patient has pain with elbow flexion as well as elevation of the left shoulder whether that be flexion or abduction.   Heart:  Regular rate and rhythm, normal S1 & S2, no extra heart sounds.    Perfusion:  intact  Respiratory:  Lungs clear to auscultation bilaterally.  Respirations nonlabored.     Abdominal:  Normal bowel sounds.  Soft.  Nontender.  Non distended.  Back: No midline tenderness palpation cervical, thoracic, lumbar sacral spine  Neurological:  Alert and oriented times 3.  No focal neuro deficits.             Psychiatric:  Appropriate    Past Medical History:   Diagnosis Date    Acute exacerbation of chronic obstructive pulmonary disease (COPD) ([facility]) 12/15/2021    Cerebral artery occlusion with cerebral infarction (HCC)     COPD, mild (HCC) 6/15/2021    Diabetes mellitus ([facility])     Excessive daytime sleepiness 6/15/2021    Former smoker 6/15/2021    Hyperlipidemia     Hypertension     Mild obstructive sleep apnea 12/15/2021    Multiple lung nodules on CT 7/27/2021    Shortness of breath 6/15/2021    Snoring 6/15/2021     Past Surgical History:   Procedure Laterality Date    APPENDECTOMY      CHOLECYSTECTOMY      COLONOSCOPY N/A 9/15/2021    COLONOSCOPY POLYPECTOMY SNARE/COLD BIOPSY performed by Doron Ramírez MD at [facility] ENDOSCOPY    EYE SURGERY      FRACTURE SURGERY Left     Left hand    HYSTERECTOMY (CERVIX STATUS UNKNOWN)      JOINT REPLACEMENT Right     12/04/2020-Right Hip    OVARY REMOVAL      age 32    TONSILLECTOMY      UPPER GASTROINTESTINAL ENDOSCOPY N/A 9/15/2021    EGD DIAGNOSTIC ONLY performed by Doron Ramírez MD at [facility] ENDOSCOPY     Family History   Problem Relation Age of Onset    Colon Cancer Mother     Heart Attack Father     Other Father         suicide    Other Sister         suicide    Heart Attack Brother     Pacemaker Brother     No Known Problems Brother     No Known Problems Brother     No Known Problems Brother     Ovarian Cancer Daughter     Breast Cancer Neg Hx      Social History     Socioeconomic History    Marital status: Single     Spouse name: Not on file    Number of children: Not on file    Years of education: Not on  file    Highest education level: Not on file   Occupational History    Not on file   Tobacco Use    Smoking status: Former     Packs/day: 1.00     Years: 51.00     Pack years: 51.00     Types: Cigarettes     Start date: 55     Quit date: 2020     Years since quittin.1    Smokeless tobacco: Never   Vaping Use    Vaping Use: Former    Substances: Flavoring    Devices: Disposable, Pre-filled pod   Substance and Sexual Activity    Alcohol use: Not Currently    Drug use: Not Currently    Sexual activity: Not on file   Other Topics Concern    Not on file   Social History Narrative    Not on file     Social Determinants of Health     Financial Resource Strain: Not on file   Food Insecurity: Not on file   Transportation Needs: Not on file   Physical Activity: Not on file   Stress: Not on file   Social Connections: Not on file   Intimate Partner Violence: Not on file   Housing Stability: Not on file     No current facility-administered medications for this encounter.      Current Outpatient Medications   Medication Sig Dispense Refill    methylPREDNISolone (MEDROL, MALKA,) 4 MG tablet Use as directed (Patient not taking: Reported on 2023) 1 kit 0    albuterol sulfate HFA (VENTOLIN HFA) 108 (90 Base) MCG/ACT inhaler Inhale 2 puffs into the lungs 4 times daily as needed for Wheezing or Shortness of Breath 54 g 1    azithromycin (ZITHROMAX) 250 MG tablet Use as directed (Patient not taking: Reported on 2023) 6 tablet 0    amLODIPine (NORVASC) 10 MG tablet Take 1 tablet by mouth daily 90 tablet 1    busPIRone (BUSPAR) 30 MG tablet Take 30 mg by mouth nightly 90 tablet 1    Blood Glucose Monitoring Suppl (ONE TOUCH ULTRA 2) w/Device KIT Inject 1 kit into the skin daily Test daily and PRN 1 kit 2    acetaminophen (TYLENOL) 325 MG tablet Take 2 tablets by mouth every 6 hours as needed for Pain 120 tablet 3    pantoprazole (PROTONIX) 40 MG tablet Take 1 tablet by mouth daily (Patient not taking: Reported on 1/18/2023) 90 tablet 1    albuterol (PROVENTIL) (2.5 MG/3ML) 0.083% nebulizer solution Take 3 mLs by nebulization every 6 hours as needed for Wheezing (Patient not taking: Reported on 2/4/2023) 360 each 2    metFORMIN (GLUCOPHAGE) 500 MG tablet Take 1 tablet by mouth 2 times daily (with meals) 180 tablet 1    alendronate (FOSAMAX) 70 MG tablet Take 1 tablet by mouth every 7 days 12 tablet 1    atorvastatin (LIPITOR) 20 MG tablet Take 1 tablet by mouth daily 90 tablet 1    albuterol sulfate HFA (PROVENTIL;VENTOLIN;PROAIR) 108 (90 Base) MCG/ACT inhaler Inhale 2 puffs into the lungs every 4 hours as needed for Wheezing 3 each 1    glucose monitoring (FREESTYLE FREEDOM) kit 1 kit by Does not apply route daily 1 kit 0    Lancets MISC 1 each by Does not apply route daily 100 each 3    blood glucose monitor strips Test 2 times a day & as needed for symptoms of irregular blood glucose. Dispense sufficient amount for indicated testing frequency plus additional to accommodate PRN testing needs. 100 strip 3    umeclidinium-vilanterol (ANORO ELLIPTA) 62.5-25 MCG/INH AEPB inhaler Inhale 1 puff into the lungs in the morning.  (Patient not taking: Reported on 1/18/2023) 1 each 11    lisinopril (PRINIVIL;ZESTRIL) 40 MG tablet TAKE 1 TABLET BY MOUTH EVERY DAY 90 tablet 1    dicyclomine (BENTYL) 10 MG capsule Take 1 capsule by mouth 3 times daily as needed (stomach) 120 capsule 2    aspirin 81 MG EC tablet Take 81 mg by mouth daily       Allergies   Allergen Reactions    Other Hives    Levaquin [Levofloxacin] Rash       Nursing Notes Reviewed    I have reviewed and interpreted all of the currently available lab results from this visit (if applicable):  Results for orders placed or performed during the hospital encounter of 02/04/23   EKG 12 Lead   Result Value Ref Range    Ventricular Rate 75 BPM    Atrial Rate 75 BPM    P-R Interval 166 ms    QRS Duration 82 ms    Q-T Interval 374 ms    QTc Calculation (Bazett) 417 ms    P Axis -20 degrees    R Axis 63 degrees    T Axis 79 degrees    Diagnosis       Normal sinus rhythm  Normal ECG  When compared with ECG of 22-MAY-2022 10:59,  QT has shortened        Radiographs (if obtained):  Radiologist's Report Reviewed:  XR HUMERUS LEFT (MIN 2 VIEWS)   Final Result   1. No acute findings nor significant degenerative changes in the left   shoulder or arm. 2. Bony demineralization. XR SHOULDER LEFT (MIN 2 VIEWS)   Final Result   1. No acute findings nor significant degenerative changes in the left   shoulder or arm. 2. Bony demineralization. EKG (if obtained): (All EKG's are interpreted by myself in the absence of a cardiologist)    Rate 75, sinus rhythm, normal axis, normal intervals, no acute ST-T wave changes. Comment: Please note this report has been produced using speech recognition software and may contain errors related to that system including errors in grammar, punctuation, and spelling, as well as words and phrases that may be inappropriate. Efforts were made to edit the dictations.         Yasir Paul MD  02/04/23 [id number]

## 2023-02-04 NOTE — ED NOTES
The patient presents to the er today with complaints of left arm and shoulder pain for a month. She denies any specific injury. The patient was placed in a gown for exam. Family is at the bedside and the call light is within reach.                 Jeffrey Guerra RN  02/04/23 [id number]

## 2023-02-04 NOTE — ED NOTES
Discharge instructions and prescriptions were reviewed and the patient will follow up with the PCP. The patient voiced understanding of these instructions.        Won Rizzo RN  02/04/23 [id number]

## 2023-02-04 NOTE — DISCHARGE INSTRUCTIONS
Return immediately to the emergency department if you experience new or worsening symptoms, chest pain, shortness of breath, changes in color or sensation of your arm, swelling, increased pain, or for any other concerns.

## 2023-02-06 PROCEDURE — 93010 ELECTROCARDIOGRAM REPORT: CPT | Performed by: INTERNAL MEDICINE

## 2023-02-07 ENCOUNTER — TELEPHONE (OUTPATIENT)
Dept: FAMILY MEDICINE CLINIC | Age: 73
End: 2023-02-07

## 2023-02-07 DIAGNOSIS — E78.2 MIXED HYPERLIPIDEMIA: ICD-10-CM

## 2023-02-07 RX ORDER — ATORVASTATIN CALCIUM 20 MG/1
20 TABLET, FILM COATED ORAL DAILY
Qty: 90 TABLET | Refills: 1 | Status: SHIPPED | OUTPATIENT
Start: 2023-02-07

## 2023-02-10 ENCOUNTER — OFFICE VISIT (OUTPATIENT)
Dept: FAMILY MEDICINE CLINIC | Age: 73
End: 2023-02-10
Payer: MEDICARE

## 2023-02-10 VITALS
SYSTOLIC BLOOD PRESSURE: 132 MMHG | DIASTOLIC BLOOD PRESSURE: 82 MMHG | WEIGHT: 132.8 LBS | HEART RATE: 76 BPM | BODY MASS INDEX: 26.07 KG/M2 | HEIGHT: 60 IN | OXYGEN SATURATION: 96 %

## 2023-02-10 DIAGNOSIS — M75.02 ADHESIVE CAPSULITIS OF LEFT SHOULDER: Primary | ICD-10-CM

## 2023-02-10 DIAGNOSIS — M67.912 DISORDER OF LEFT ROTATOR CUFF: ICD-10-CM

## 2023-02-10 DIAGNOSIS — E11.22 TYPE 2 DIABETES MELLITUS WITH STAGE 2 CHRONIC KIDNEY DISEASE, WITHOUT LONG-TERM CURRENT USE OF INSULIN (HCC): ICD-10-CM

## 2023-02-10 DIAGNOSIS — N18.2 TYPE 2 DIABETES MELLITUS WITH STAGE 2 CHRONIC KIDNEY DISEASE, WITHOUT LONG-TERM CURRENT USE OF INSULIN (HCC): ICD-10-CM

## 2023-02-10 DIAGNOSIS — N18.31 STAGE 3A CHRONIC KIDNEY DISEASE (HCC): ICD-10-CM

## 2023-02-10 PROCEDURE — G8417 CALC BMI ABV UP PARAM F/U: HCPCS | Performed by: STUDENT IN AN ORGANIZED HEALTH CARE EDUCATION/TRAINING PROGRAM

## 2023-02-10 PROCEDURE — 1123F ACP DISCUSS/DSCN MKR DOCD: CPT | Performed by: STUDENT IN AN ORGANIZED HEALTH CARE EDUCATION/TRAINING PROGRAM

## 2023-02-10 PROCEDURE — 3046F HEMOGLOBIN A1C LEVEL >9.0%: CPT | Performed by: STUDENT IN AN ORGANIZED HEALTH CARE EDUCATION/TRAINING PROGRAM

## 2023-02-10 PROCEDURE — 1090F PRES/ABSN URINE INCON ASSESS: CPT | Performed by: STUDENT IN AN ORGANIZED HEALTH CARE EDUCATION/TRAINING PROGRAM

## 2023-02-10 PROCEDURE — 3017F COLORECTAL CA SCREEN DOC REV: CPT | Performed by: STUDENT IN AN ORGANIZED HEALTH CARE EDUCATION/TRAINING PROGRAM

## 2023-02-10 PROCEDURE — G8399 PT W/DXA RESULTS DOCUMENT: HCPCS | Performed by: STUDENT IN AN ORGANIZED HEALTH CARE EDUCATION/TRAINING PROGRAM

## 2023-02-10 PROCEDURE — 1036F TOBACCO NON-USER: CPT | Performed by: STUDENT IN AN ORGANIZED HEALTH CARE EDUCATION/TRAINING PROGRAM

## 2023-02-10 PROCEDURE — G8484 FLU IMMUNIZE NO ADMIN: HCPCS | Performed by: STUDENT IN AN ORGANIZED HEALTH CARE EDUCATION/TRAINING PROGRAM

## 2023-02-10 PROCEDURE — 2022F DILAT RTA XM EVC RTNOPTHY: CPT | Performed by: STUDENT IN AN ORGANIZED HEALTH CARE EDUCATION/TRAINING PROGRAM

## 2023-02-10 PROCEDURE — 99214 OFFICE O/P EST MOD 30 MIN: CPT | Performed by: STUDENT IN AN ORGANIZED HEALTH CARE EDUCATION/TRAINING PROGRAM

## 2023-02-10 PROCEDURE — 3075F SYST BP GE 130 - 139MM HG: CPT | Performed by: STUDENT IN AN ORGANIZED HEALTH CARE EDUCATION/TRAINING PROGRAM

## 2023-02-10 PROCEDURE — G8427 DOCREV CUR MEDS BY ELIG CLIN: HCPCS | Performed by: STUDENT IN AN ORGANIZED HEALTH CARE EDUCATION/TRAINING PROGRAM

## 2023-02-10 PROCEDURE — 3079F DIAST BP 80-89 MM HG: CPT | Performed by: STUDENT IN AN ORGANIZED HEALTH CARE EDUCATION/TRAINING PROGRAM

## 2023-02-10 RX ORDER — TRAMADOL HYDROCHLORIDE 50 MG/1
50 TABLET ORAL EVERY 6 HOURS PRN
Qty: 28 TABLET | Refills: 0 | Status: SHIPPED | OUTPATIENT
Start: 2023-02-10 | End: 2023-02-17

## 2023-02-10 ASSESSMENT — ENCOUNTER SYMPTOMS
SORE THROAT: 0
ABDOMINAL PAIN: 0
NAUSEA: 0
SHORTNESS OF BREATH: 0
WHEEZING: 0

## 2023-02-10 ASSESSMENT — PATIENT HEALTH QUESTIONNAIRE - PHQ9
2. FEELING DOWN, DEPRESSED OR HOPELESS: 0
SUM OF ALL RESPONSES TO PHQ9 QUESTIONS 1 & 2: 0
SUM OF ALL RESPONSES TO PHQ QUESTIONS 1-9: 0
1. LITTLE INTEREST OR PLEASURE IN DOING THINGS: 0
SUM OF ALL RESPONSES TO PHQ QUESTIONS 1-9: 0

## 2023-02-10 NOTE — PROGRESS NOTES
2/10/2023    Bruna Gomes    Chief Complaint   Patient presents with    Shoulder Pain     X's 1 month ,left , pt states she may have over extended left arm causing shoulder pain       HPI  History was obtained from patient. Kareem Collins is a 67 y.o. female with a PMHx as listed below who presents today for   Seen in [facility] ED left shoulder pain. Pain has been ongoing for close to 5 weeks. She notes over extension of left arm getting out of shower/ itching back. Significant pain with movement. Has tried OTC creams with no improvement     BP excellent today now on amlodipine 10, and lisinopril 40 daily. 1. Adhesive capsulitis of left shoulder    2. Type 2 diabetes mellitus with stage 2 chronic kidney disease, without long-term current use of insulin (HCC)    3. Stage 3a chronic kidney disease ([address]    4. Disorder of left rotator cuff             REVIEW OF SYMPTOMS    Review of Systems   Constitutional:  Negative for chills and fatigue. HENT:  Negative for congestion and sore throat. Respiratory:  Negative for shortness of breath and wheezing. Cardiovascular:  Negative for chest pain and palpitations. Gastrointestinal:  Negative for abdominal pain and nausea. Genitourinary:  Negative for frequency and urgency. Neurological:  Negative for light-headedness.      PAST MEDICAL HISTORY  Past Medical History:   Diagnosis Date    Acute exacerbation of chronic obstructive pulmonary disease (COPD) ([address] 12/15/2021    Cerebral artery occlusion with cerebral infarction (HCC)     COPD, mild ([address] 6/15/2021    Diabetes mellitus (HCC)     Excessive daytime sleepiness 6/15/2021    Former smoker 6/15/2021    Hyperlipidemia     Hypertension     Mild obstructive sleep apnea 12/15/2021    Multiple lung nodules on CT 7/27/2021    Shortness of breath 6/15/2021    Snoring 6/15/2021       FAMILY HISTORY  Family History   Problem Relation Age of Onset    Colon Cancer Mother     Heart Attack Father     Other Father suicide    Other Sister         suicide    Heart Attack Brother     Pacemaker Brother     No Known Problems Brother     No Known Problems Brother     No Known Problems Brother     Ovarian Cancer Daughter     Breast Cancer Neg Hx        SOCIAL HISTORY  Social History     Socioeconomic History    Marital status: Single   Tobacco Use    Smoking status: Former     Packs/day: 1.00     Years: 51.00     Pack years: 51.00     Types: Cigarettes     Start date:      Quit date: 2020     Years since quittin.1    Smokeless tobacco: Never   Vaping Use    Vaping Use: Former    Substances: Flavoring    Devices: Disposable, Pre-filled pod   Substance and Sexual Activity    Alcohol use: Not Currently    Drug use: Not Currently        SURGICAL HISTORY  Past Surgical History:   Procedure Laterality Date    APPENDECTOMY      CHOLECYSTECTOMY      COLONOSCOPY N/A 9/15/2021    COLONOSCOPY POLYPECTOMY SNARE/COLD BIOPSY performed by Alisha Capone MD at [facility] [address] Left     Left hand    HYSTERECTOMY (CERVIX STATUS UNKNOWN)      JOINT REPLACEMENT Right     2020-Right Hip    OVARY REMOVAL      age 34    TONSILLECTOMY      UPPER GASTROINTESTINAL ENDOSCOPY N/A 9/15/2021    EGD DIAGNOSTIC ONLY performed by Alisha Capone MD at [facility]  Current Outpatient Medications   Medication Sig Dispense Refill    traMADol (ULTRAM) 50 MG tablet Take 1 tablet by mouth every 6 hours as needed for Pain for up to 7 days. Intended supply: 7 days.  Take lowest dose possible to manage pain Max Daily Amount: 200 mg 28 tablet 0    atorvastatin (LIPITOR) 20 MG tablet Take 1 tablet by mouth daily 90 tablet 1    amLODIPine (NORVASC) 10 MG tablet Take 1 tablet by mouth daily 90 tablet 1    busPIRone (BUSPAR) 30 MG tablet Take 30 mg by mouth nightly 90 tablet 1    Blood Glucose Monitoring Suppl (ONE TOUCH ULTRA 2) w/Device KIT Inject 1 kit into the skin daily Test daily and PRN 1 kit 2    acetaminophen (TYLENOL) 325 MG tablet Take 2 tablets by mouth every 6 hours as needed for Pain 120 tablet 3    pantoprazole (PROTONIX) 40 MG tablet Take 1 tablet by mouth daily 90 tablet 1    albuterol (PROVENTIL) (2.5 MG/3ML) 0.083% nebulizer solution Take 3 mLs by nebulization every 6 hours as needed for Wheezing 360 each 2    metFORMIN (GLUCOPHAGE) 500 MG tablet Take 1 tablet by mouth 2 times daily (with meals) 180 tablet 1    alendronate (FOSAMAX) 70 MG tablet Take 1 tablet by mouth every 7 days 12 tablet 1    albuterol sulfate HFA (PROVENTIL;VENTOLIN;PROAIR) 108 (90 Base) MCG/ACT inhaler Inhale 2 puffs into the lungs every 4 hours as needed for Wheezing 3 each 1    glucose monitoring (FREESTYLE FREEDOM) kit 1 kit by Does not apply route daily 1 kit 0    Lancets MISC 1 each by Does not apply route daily 100 each 3    blood glucose monitor strips Test 2 times a day & as needed for symptoms of irregular blood glucose. Dispense sufficient amount for indicated testing frequency plus additional to accommodate PRN testing needs. 100 strip 3    lisinopril (PRINIVIL;ZESTRIL) 40 MG tablet TAKE 1 TABLET BY MOUTH EVERY DAY 90 tablet 1    dicyclomine (BENTYL) 10 MG capsule Take 1 capsule by mouth 3 times daily as needed (stomach) 120 capsule 2    aspirin 81 MG EC tablet Take 81 mg by mouth daily      umeclidinium-vilanterol (ANORO ELLIPTA) 62.5-25 MCG/INH AEPB inhaler Inhale 1 puff into the lungs in the morning. (Patient not taking: No sig reported) 1 each 11     No current facility-administered medications for this visit. ALLERGIES  Allergies   Allergen Reactions    Other Hives    Levaquin [Levofloxacin] Rash       PHYSICAL EXAM    /82   Pulse 76   Ht 5' (1.524 m)   Wt 132 lb 12.8 oz (60.2 kg)   SpO2 96%   BMI 25.94 kg/m²     Physical Exam  Constitutional:       Appearance: Normal appearance. HENT:      Head: Normocephalic and atraumatic.    Eyes:      Extraocular Movements: Extraocular movements intact. Pupils: Pupils are equal, round, and reactive to light. Skin:     General: Skin is warm and dry. Neurological:      General: No focal deficit present. Mental Status: She is alert. Psychiatric:         Mood and Affect: Mood normal.         Behavior: Behavior normal.       MSK: minimal movement left shoudler, pain with movement  2/4 radial pulse      ASSESSMENT & PLAN    1. Adhesive capsulitis of left shoulder    - [facility] - Enmanuel Nation DO, Orthopedic Surgery, [place]  - traMADol (ULTRAM) 50 MG tablet; Take 1 tablet by mouth every 6 hours as needed for Pain for up to 7 days. Intended supply: 7 days. Take lowest dose possible to manage pain Max Daily Amount: 200 mg  Dispense: 28 tablet; Refill: 0    2. Type 2 diabetes mellitus with stage 2 chronic kidney disease, without long-term current use of insulin ([facility])      3. Stage 3a chronic kidney disease ([facility]      4. Disorder of left rotator cuff    - [facility] - Enmanuel Nation DO, Orthopedic Surgery, [place]  - traMADol (ULTRAM) 50 MG tablet; Take 1 tablet by mouth every 6 hours as needed for Pain for up to 7 days. Intended supply: 7 days. Take lowest dose possible to manage pain Max Daily Amount: 200 mg  Dispense: 28 tablet; Refill: 0    Given pain post over stretch incident we will refer to [facility] for further eval    Continue NSAIDs PRN  BP much improved  CM2 well controlled  Mild ckd continue lisinopril    Return for as scheduled.          Electronically signed by Georgette Gr DO on 2/10/2023

## 2023-02-27 ENCOUNTER — OFFICE VISIT (OUTPATIENT)
Dept: ORTHOPEDIC SURGERY | Age: 73
End: 2023-02-27

## 2023-02-27 VITALS — BODY MASS INDEX: 26.7 KG/M2 | WEIGHT: 136 LBS | OXYGEN SATURATION: 95 % | HEIGHT: 60 IN | HEART RATE: 88 BPM

## 2023-02-27 DIAGNOSIS — M75.02 ADHESIVE CAPSULITIS OF LEFT SHOULDER: Primary | ICD-10-CM

## 2023-02-27 ASSESSMENT — ENCOUNTER SYMPTOMS
VOMITING: 0
FACIAL SWELLING: 0
COUGH: 0
EYE PAIN: 0
BACK PAIN: 0
COLOR CHANGE: 0
SHORTNESS OF BREATH: 0
NAUSEA: 0
WHEEZING: 0
PHOTOPHOBIA: 0
EYE REDNESS: 0
ABDOMINAL PAIN: 0
STRIDOR: 0

## 2023-02-27 NOTE — PROGRESS NOTES
Patient is a 67y.o. year old female. Patient is in the office today with left shoulder pain. Patient states that she injured themselves by reaching behind her back to scratch her back and she has had pain ever since. Patient states that the injury happened about 1 month ago. Pain scale  8/10. Her pain is located on the lateral portion of her humerus and radiates to her forearm. She describes the pain a dull and achy. She reports pain with shoulder flexion, abduction and reaching behind her back. She states that she is also starting to have pain in her right shoulder. She was seen in the [facility] ED on 2/4/23, x-rays were taken at that time. She was given tramadol at the ED and states that it does not help with her pain.    Occupation: retired  Dominant Hand: left

## 2023-02-27 NOTE — PROGRESS NOTES
2/27/2023   No chief complaint on file. History of Present Illness:                             Cristhian Naranjo is a 67 y.o. female right handed patient referred by emergency room for evaluation and treatment left shoulder pain. This is evaluated as a personal injury. Patient states that approximately 4 weeks ago she was reaching behind herself with her left arm and felt a painful event into the shoulder most on the lateral portion of the rotator cuff. She states that since then she has had significant lateral and anterior shoulder pain with some pain down into her biceps. She was seen in the emergency room and x-rays were negative for any type of fracture and she was placed in a sling and given pain medication. She has been taking it easy since injury and has refrain from doing much due to the shoulder but states that she has lost significant range of motion. She has no advanced imaging thus far. This is my first time seeing the patient. The pain occurs every day and when active. Location of pain is diffusely throughout shoulder, mostly in the anterior and lateral portion. No history of dislocation. Symptoms are aggravated by ADL's, repetitive use, work at or above shoulder height, difficulty sleeping on affected side. Symptoms are diminished by rest, avoiding the painful activities. Limited activities include: lifting, repetitive use, work at or above shoulder height, difficulty sleeping, difficulty with ADLs. No stiffness is reported. Patient denies numbness, tingling, altered sensation in the extremity. Related history: negative for prior surgery, additional trauma, arthritis or disorders. Is affecting ADLs. Pain is 10/10 at it's worst.    Patient is a diabetic and states she does have periods of increased blood sugar mostly has under control medication and proper eating. Outside reports reviewed: Emergency room imaging reviewed.      Patient's medications, allergies, past medical, surgical, social and family histories were reviewed and updated as appropriate. Patient has not previously attempted CSI, PT, NSAIDs for pain relief       Medical History  Patient's medications, allergies, past medical, surgical, social and family histories were reviewed and updated as appropriate.     Past Medical History:   Diagnosis Date    Acute exacerbation of chronic obstructive pulmonary disease (COPD) ([facility] [address] 12/15/2021    Cerebral artery occlusion with cerebral infarction ([facility]     COPD, mild ([address] 6/15/2021    Diabetes mellitus ([facility]     Excessive daytime sleepiness 6/15/2021    Former smoker 6/15/2021    Hyperlipidemia     Hypertension     Mild obstructive sleep apnea 12/15/2021    Multiple lung nodules on CT 7/27/2021    Shortness of breath 6/15/2021    Snoring 6/15/2021     Past Surgical History:   Procedure Laterality Date    APPENDECTOMY      CHOLECYSTECTOMY      COLONOSCOPY N/A 9/15/2021    COLONOSCOPY POLYPECTOMY SNARE/COLD BIOPSY performed by Ericka Cordon MD at [facility] [address] Left     Left hand    HYSTERECTOMY (CERVIX STATUS UNKNOWN)      JOINT REPLACEMENT Right     12/04/2020-Right Hip    OVARY REMOVAL      age 34    TONSILLECTOMY      UPPER GASTROINTESTINAL ENDOSCOPY N/A 9/15/2021    EGD DIAGNOSTIC ONLY performed by [facility]ordon MD at [facility] ENDOSCOPY     Family History   Problem Relation Age of Onset    Colon Cancer Mother     Heart Attack Father     Other Father         suicide    Other Sister         suicide    Heart Attack Brother     Pacemaker Brother     No Known Problems Brother     No Known Problems Brother     No Known Problems Brother     Ovarian Cancer Daughter     Breast Cancer Neg Hx      Social History     Socioeconomic History    Marital status: Single   Tobacco Use    Smoking status: Former     Packs/day: 1.00     Years: 51.00     Pack years: 51.00     Types: Cigarettes     Start date: 1969     Quit date: 12/4/2020     Years since quittin.2    Smokeless tobacco: Never   Vaping Use    Vaping Use: Former    Substances: Flavoring    Devices: Disposable, Pre-filled pod   Substance and Sexual Activity    Alcohol use: Not Currently    Drug use: Not Currently     Current Outpatient Medications   Medication Sig Dispense Refill    atorvastatin (LIPITOR) 20 MG tablet Take 1 tablet by mouth daily 90 tablet 1    amLODIPine (NORVASC) 10 MG tablet Take 1 tablet by mouth daily 90 tablet 1    busPIRone (BUSPAR) 30 MG tablet Take 30 mg by mouth nightly 90 tablet 1    Blood Glucose Monitoring Suppl (ONE TOUCH ULTRA 2) w/Device KIT Inject 1 kit into the skin daily Test daily and PRN 1 kit 2    acetaminophen (TYLENOL) 325 MG tablet Take 2 tablets by mouth every 6 hours as needed for Pain 120 tablet 3    pantoprazole (PROTONIX) 40 MG tablet Take 1 tablet by mouth daily 90 tablet 1    albuterol (PROVENTIL) (2.5 MG/3ML) 0.083% nebulizer solution Take 3 mLs by nebulization every 6 hours as needed for Wheezing 360 each 2    metFORMIN (GLUCOPHAGE) 500 MG tablet Take 1 tablet by mouth 2 times daily (with meals) 180 tablet 1    alendronate (FOSAMAX) 70 MG tablet Take 1 tablet by mouth every 7 days 12 tablet 1    albuterol sulfate HFA (PROVENTIL;VENTOLIN;PROAIR) 108 (90 Base) MCG/ACT inhaler Inhale 2 puffs into the lungs every 4 hours as needed for Wheezing 3 each 1    glucose monitoring (FREESTYLE FREEDOM) kit 1 kit by Does not apply route daily 1 kit 0    Lancets MISC 1 each by Does not apply route daily 100 each 3    blood glucose monitor strips Test 2 times a day & as needed for symptoms of irregular blood glucose. Dispense sufficient amount for indicated testing frequency plus additional to accommodate PRN testing needs. 100 strip 3    umeclidinium-vilanterol (ANORO ELLIPTA) 62.5-25 MCG/INH AEPB inhaler Inhale 1 puff into the lungs in the morning.  (Patient not taking: No sig reported) 1 each 11    lisinopril (PRINIVIL;ZESTRIL) 40 MG tablet TAKE 1 TABLET BY MOUTH EVERY DAY 90 tablet 1    dicyclomine (BENTYL) 10 MG capsule Take 1 capsule by mouth 3 times daily as needed (stomach) 120 capsule 2    aspirin 81 MG EC tablet Take 81 mg by mouth daily       No current facility-administered medications for this visit. Allergies   Allergen Reactions    Other Hives    Levaquin [Levofloxacin] Rash         Review of Systems   Constitutional:  Positive for activity change. Negative for appetite change, chills, diaphoresis, fatigue, fever and unexpected weight change. HENT:  Negative for congestion, ear pain, facial swelling, hearing loss and sneezing. Eyes:  Negative for photophobia, pain and redness. Respiratory:  Negative for cough, shortness of breath, wheezing and stridor. Cardiovascular:  Negative for chest pain, palpitations and leg swelling. Gastrointestinal:  Negative for abdominal pain, nausea and vomiting. Endocrine: Negative for cold intolerance and heat intolerance. Musculoskeletal:  Positive for arthralgias and myalgias. Negative for back pain, gait problem, joint swelling, neck pain and neck stiffness. Skin:  Negative for color change, pallor, rash and wound. Neurological:  Positive for weakness. Negative for dizziness, facial asymmetry and numbness. Examination:  General Exam:  Vitals: There were no vitals taken for this visit. Physical Exam  Constitutional:       Appearance: Normal appearance. She is normal weight. HENT:      Head: Normocephalic and atraumatic. Nose: Nose normal.   Eyes:      General:         Right eye: No discharge. Left eye: No discharge. Extraocular Movements: Extraocular movements intact. Cardiovascular:      Pulses: Normal pulses. Pulmonary:      Effort: Pulmonary effort is normal.      Breath sounds: Normal breath sounds. Musculoskeletal:         General: Tenderness and signs of injury present. No swelling or deformity.       Right shoulder: Normal. Left shoulder: Tenderness, bony tenderness and crepitus present. No swelling, deformity, effusion or laceration. Decreased range of motion. Decreased strength. Normal pulse. Right upper arm: Normal.      Left upper arm: Normal.      Right elbow: Normal.      Left elbow: Normal.      Right forearm: Normal.      Left forearm: Normal.      Right wrist: Normal.      Left wrist: Normal.      Right hand: Normal.      Left hand: Normal.      Cervical back: Normal and normal range of motion. No rigidity or tenderness. Skin:     General: Skin is warm and dry. Neurological:      General: No focal deficit present. Mental Status: She is alert and oriented to person, place, and time. Sensory: No sensory deficit. Motor: Weakness present. LEFT SHOULDER / UPPER EXTREMITY EXAM      OBSERVATION:      Swelling: none   Deformity: none    Discoloration: none   Scapular winging: none    Scars: none    Atrophy: none      TENDERNESS / CREPITUS (T/C):      Clavicle -/-    SUPRAspinatus  +/-     AC Jt. -/-    INFRAspinatus -/-     SC Jt. -/-    Deltoid -/-     G.  Tuberosity +/-   LH BICEP groove +/-    Acromion: -/-    Midline Neck -/-     Scapular Spine -/-   Trapezium -/-    Inf Border Scapula -/-   GH jt. line - post -/-     Scapulothoracic -/-   GH jt. line - ant +/-       ROM: (* = with pain)  Left shoulder   Right shoulder     AROM (PROM)  AROM (PROM)    FE   90° (100°)*    170° (175°)     ER 0°   30° (35°)*   60° (65°)     ER 90° ABD  not tested  90°  (90°)    IR 90° ABD  not tested  40  (40°)     IR (spine) side   T10        STRENGTH: (* = with pain) Left shoulder   Right shoulder    SCAPTION   4/*5    5/5     IR    5-/5 *   5/5     ER    5-/5 *   5/5     BICEPS   5-/5    5/5     Deltoid   5/5    5/5     Triceps   5/5   5/5        SIGNS:  RUE     Jobes/Empty Can: Positive    NEER: +    ZHU: +     DROP ARM: neg  BELLY PRESS: Minimally positive    LIFT-OFF: neg      X-Body ADD: neg       Bear Hug: Minimally positive    Biceps stretch test: Positive        EXTREMITY NEURO-VASCULAR EXAM:     Sensation grossly intact to light touch all dermatomal regions. DTR 2+ Biceps, Triceps, BR and Negative Demetriss sign    Grossly intact motor function at Elbow, Wrist and Hand    Distal pulses radial and ulnar 2+, brisk cap refill, symmetric. NECK:  Painless FROM and spinous processes non-tender. Negative Spurlings sign. Diagnostic testing:  X-ray images were reviewed by myself and discussed with the patient:  3 views of the left shoulder, 2 views of the left humerus from emergency room demonstrate:  LEFT SHOULDER:  Diffuse bony demineralization. No acute fracture. Joints   maintain anatomic alignment. No significant degenerative changes. No   obvious acute soft tissue abnormality. Unchanged linear scarring in mid to   basilar left lung. LEFT HUMERUS:  Diffuse bony demineralization. No acute nor healing fracture. Joints maintain anatomic alignment. No significant degenerative changes. No   obvious acute soft tissue abnormality. Office Procedures:  No orders of the defined types were placed in this encounter. Assessment and Plan    A: Left shoulder adhesive capsulitis with underlying rotator cuff tendinitis with underlying adhesive capsulitis    P:   I had a thorough conversation with the patient regarding her current physical exam findings and treatment course. I explained that she likely had a rotator cuff strain versus partial tear with her injury 1 month ago but due to the fact that she refrain from using the arm after the injury for prolonged period of time with her underlying diabetes, appears to have developed adhesive capsulitis. I explained that not only as directed but also her passive range of motion is significant limited, especially External rotation.   I explained that this is a prolonged process can take several months to resolve but at this time I would attempt conservative measures including a cortisone injection as well as physical therapy. Patient refused injection fever and was requesting Medrol Dosepak but did not feel comfortable with this due to her diabetes and current issues with keeping her blood sugar under control. She is willing to try formal therapy at this referral provided. She will follow-up in 2 months for reevaluation and she will call before then if she wants to attempt a cortisone injection and we will discuss this again at her next visit. Active and passive range of motion are both encouraged. She will continue ice as well as alternating with heat as needed to help with range of motion and stiffness. She can continue over-the-counter pain medication for pain control. All questions were answered and patient voices understanding.     Electronically signed by Delicia Claros DO on 2/27/2023 at 1:18 PM

## 2023-03-13 DIAGNOSIS — K21.9 GASTROESOPHAGEAL REFLUX DISEASE, UNSPECIFIED WHETHER ESOPHAGITIS PRESENT: ICD-10-CM

## 2023-03-13 RX ORDER — DICYCLOMINE HYDROCHLORIDE 10 MG/1
10 CAPSULE ORAL 3 TIMES DAILY PRN
Qty: 120 CAPSULE | Refills: 2 | Status: SHIPPED | OUTPATIENT
Start: 2023-03-13 | End: 2023-03-16 | Stop reason: SDUPTHER

## 2023-03-16 ENCOUNTER — OFFICE VISIT (OUTPATIENT)
Dept: FAMILY MEDICINE CLINIC | Age: 73
End: 2023-03-16
Payer: MEDICARE

## 2023-03-16 VITALS
HEART RATE: 79 BPM | SYSTOLIC BLOOD PRESSURE: 130 MMHG | OXYGEN SATURATION: 99 % | HEIGHT: 60 IN | BODY MASS INDEX: 26.31 KG/M2 | WEIGHT: 134 LBS | DIASTOLIC BLOOD PRESSURE: 78 MMHG | RESPIRATION RATE: 16 BRPM

## 2023-03-16 DIAGNOSIS — M81.6 LOCALIZED OSTEOPOROSIS, UNSPECIFIED PATHOLOGICAL FRACTURE PRESENCE: ICD-10-CM

## 2023-03-16 DIAGNOSIS — F41.9 ANXIETY: ICD-10-CM

## 2023-03-16 DIAGNOSIS — I10 ESSENTIAL HYPERTENSION: ICD-10-CM

## 2023-03-16 DIAGNOSIS — E27.8 ADRENAL NODULE (HCC): ICD-10-CM

## 2023-03-16 DIAGNOSIS — J44.9 COPD MIXED TYPE (HCC): ICD-10-CM

## 2023-03-16 DIAGNOSIS — E78.2 MIXED HYPERLIPIDEMIA: ICD-10-CM

## 2023-03-16 DIAGNOSIS — K21.9 GASTROESOPHAGEAL REFLUX DISEASE, UNSPECIFIED WHETHER ESOPHAGITIS PRESENT: ICD-10-CM

## 2023-03-16 DIAGNOSIS — E11.9 TYPE 2 DIABETES MELLITUS WITHOUT COMPLICATION, WITHOUT LONG-TERM CURRENT USE OF INSULIN (HCC): ICD-10-CM

## 2023-03-16 DIAGNOSIS — M75.02 ADHESIVE CAPSULITIS OF LEFT SHOULDER: Primary | ICD-10-CM

## 2023-03-16 PROBLEM — E27.9 ADRENAL NODULE (HCC): Status: ACTIVE | Noted: 2023-03-16

## 2023-03-16 LAB
ALBUMIN SERPL-MCNC: 4.3 G/DL (ref 3.4–5)
ALBUMIN/GLOB SERPL: 1.6 {RATIO} (ref 1.1–2.2)
ALP SERPL-CCNC: 74 U/L (ref 40–129)
ALT SERPL-CCNC: 22 U/L (ref 10–40)
ANION GAP SERPL CALCULATED.3IONS-SCNC: 13 MMOL/L (ref 3–16)
AST SERPL-CCNC: 15 U/L (ref 15–37)
BILIRUB SERPL-MCNC: 0.3 MG/DL (ref 0–1)
BUN SERPL-MCNC: 16 MG/DL (ref 7–20)
CALCIUM SERPL-MCNC: 9.4 MG/DL (ref 8.3–10.6)
CHLORIDE SERPL-SCNC: 105 MMOL/L (ref 99–110)
CHOLEST SERPL-MCNC: 125 MG/DL (ref 0–199)
CO2 SERPL-SCNC: 25 MMOL/L (ref 21–32)
CREAT SERPL-MCNC: 0.8 MG/DL (ref 0.6–1.2)
GFR SERPLBLD CREATININE-BSD FMLA CKD-EPI: >60 ML/MIN/{1.73_M2}
GLUCOSE SERPL-MCNC: 101 MG/DL (ref 70–99)
HDLC SERPL-MCNC: 58 MG/DL (ref 40–60)
LDL CHOLESTEROL CALCULATED: 51 MG/DL
POTASSIUM SERPL-SCNC: 4.3 MMOL/L (ref 3.5–5.1)
PROT SERPL-MCNC: 7 G/DL (ref 6.4–8.2)
SODIUM SERPL-SCNC: 143 MMOL/L (ref 136–145)
TRIGL SERPL-MCNC: 81 MG/DL (ref 0–150)
VLDLC SERPL CALC-MCNC: 16 MG/DL

## 2023-03-16 PROCEDURE — 2022F DILAT RTA XM EVC RTNOPTHY: CPT | Performed by: STUDENT IN AN ORGANIZED HEALTH CARE EDUCATION/TRAINING PROGRAM

## 2023-03-16 PROCEDURE — G8399 PT W/DXA RESULTS DOCUMENT: HCPCS | Performed by: STUDENT IN AN ORGANIZED HEALTH CARE EDUCATION/TRAINING PROGRAM

## 2023-03-16 PROCEDURE — G8484 FLU IMMUNIZE NO ADMIN: HCPCS | Performed by: STUDENT IN AN ORGANIZED HEALTH CARE EDUCATION/TRAINING PROGRAM

## 2023-03-16 PROCEDURE — G8427 DOCREV CUR MEDS BY ELIG CLIN: HCPCS | Performed by: STUDENT IN AN ORGANIZED HEALTH CARE EDUCATION/TRAINING PROGRAM

## 2023-03-16 PROCEDURE — G8417 CALC BMI ABV UP PARAM F/U: HCPCS | Performed by: STUDENT IN AN ORGANIZED HEALTH CARE EDUCATION/TRAINING PROGRAM

## 2023-03-16 PROCEDURE — 1090F PRES/ABSN URINE INCON ASSESS: CPT | Performed by: STUDENT IN AN ORGANIZED HEALTH CARE EDUCATION/TRAINING PROGRAM

## 2023-03-16 PROCEDURE — 1123F ACP DISCUSS/DSCN MKR DOCD: CPT | Performed by: STUDENT IN AN ORGANIZED HEALTH CARE EDUCATION/TRAINING PROGRAM

## 2023-03-16 PROCEDURE — 99214 OFFICE O/P EST MOD 30 MIN: CPT | Performed by: STUDENT IN AN ORGANIZED HEALTH CARE EDUCATION/TRAINING PROGRAM

## 2023-03-16 PROCEDURE — 1036F TOBACCO NON-USER: CPT | Performed by: STUDENT IN AN ORGANIZED HEALTH CARE EDUCATION/TRAINING PROGRAM

## 2023-03-16 PROCEDURE — 3023F SPIROM DOC REV: CPT | Performed by: STUDENT IN AN ORGANIZED HEALTH CARE EDUCATION/TRAINING PROGRAM

## 2023-03-16 PROCEDURE — 3075F SYST BP GE 130 - 139MM HG: CPT | Performed by: STUDENT IN AN ORGANIZED HEALTH CARE EDUCATION/TRAINING PROGRAM

## 2023-03-16 PROCEDURE — 3078F DIAST BP <80 MM HG: CPT | Performed by: STUDENT IN AN ORGANIZED HEALTH CARE EDUCATION/TRAINING PROGRAM

## 2023-03-16 PROCEDURE — 3017F COLORECTAL CA SCREEN DOC REV: CPT | Performed by: STUDENT IN AN ORGANIZED HEALTH CARE EDUCATION/TRAINING PROGRAM

## 2023-03-16 PROCEDURE — 3044F HG A1C LEVEL LT 7.0%: CPT | Performed by: STUDENT IN AN ORGANIZED HEALTH CARE EDUCATION/TRAINING PROGRAM

## 2023-03-16 RX ORDER — TRAMADOL HYDROCHLORIDE 50 MG/1
50 TABLET ORAL EVERY 6 HOURS PRN
Qty: 28 TABLET | Refills: 0 | Status: ON HOLD | OUTPATIENT
Start: 2023-03-16 | End: 2023-03-23

## 2023-03-16 RX ORDER — DICYCLOMINE HYDROCHLORIDE 10 MG/1
10 CAPSULE ORAL 3 TIMES DAILY PRN
Qty: 120 CAPSULE | Refills: 2 | Status: ON HOLD | OUTPATIENT
Start: 2023-03-16

## 2023-03-16 RX ORDER — METHYLPREDNISOLONE 4 MG/1
TABLET ORAL
Qty: 1 KIT | Refills: 0 | Status: ON HOLD | OUTPATIENT
Start: 2023-03-16 | End: 2023-03-22

## 2023-03-16 RX ORDER — ALENDRONATE SODIUM 70 MG/1
70 TABLET ORAL
Qty: 12 TABLET | Refills: 1 | Status: ON HOLD | OUTPATIENT
Start: 2023-03-16

## 2023-03-16 RX ORDER — FLUTICASONE FUROATE, UMECLIDINIUM BROMIDE AND VILANTEROL TRIFENATATE 100; 62.5; 25 UG/1; UG/1; UG/1
1 POWDER RESPIRATORY (INHALATION) DAILY
Qty: 3 EACH | Refills: 1 | Status: ON HOLD | OUTPATIENT
Start: 2023-03-16 | End: 2023-09-12

## 2023-03-16 SDOH — ECONOMIC STABILITY: HOUSING INSECURITY
IN THE LAST 12 MONTHS, WAS THERE A TIME WHEN YOU DID NOT HAVE A STEADY PLACE TO SLEEP OR SLEPT IN A SHELTER (INCLUDING NOW)?: PATIENT REFUSED

## 2023-03-16 SDOH — ECONOMIC STABILITY: FOOD INSECURITY: WITHIN THE PAST 12 MONTHS, YOU WORRIED THAT YOUR FOOD WOULD RUN OUT BEFORE YOU GOT MONEY TO BUY MORE.: PATIENT DECLINED

## 2023-03-16 SDOH — ECONOMIC STABILITY: INCOME INSECURITY: HOW HARD IS IT FOR YOU TO PAY FOR THE VERY BASICS LIKE FOOD, HOUSING, MEDICAL CARE, AND HEATING?: PATIENT DECLINED

## 2023-03-16 SDOH — ECONOMIC STABILITY: FOOD INSECURITY: WITHIN THE PAST 12 MONTHS, THE FOOD YOU BOUGHT JUST DIDN'T LAST AND YOU DIDN'T HAVE MONEY TO GET MORE.: PATIENT DECLINED

## 2023-03-16 NOTE — PROGRESS NOTES
Shortness of breath 6/15/2021    Snoring 6/15/2021       FAMILY HISTORY  Family History   Problem Relation Age of Onset    Colon Cancer Mother     Heart Attack Father     Other Father         suicide    Other Sister         suicide    Heart Attack Brother     Pacemaker Brother     No Known Problems Brother     No Known Problems Brother     No Known Problems Brother     Ovarian Cancer Daughter     Breast Cancer Neg Hx        SOCIAL HISTORY  Social History     Socioeconomic History    Marital status: Single     Spouse name: None    Number of children: None    Years of education: None    Highest education level: None   Tobacco Use    Smoking status: Former     Packs/day: 1.00     Years: 51.00     Pack years: 51.00     Types: Cigarettes     Start date:      Quit date: 2020     Years since quittin.2    Smokeless tobacco: Never   Vaping Use    Vaping Use: Former    Substances: Flavoring    Devices: Disposable, Pre-filled pod   Substance and Sexual Activity    Alcohol use: Not Currently    Drug use: Not Currently     Social Determinants of Health     Financial Resource Strain: Unknown    Difficulty of Paying Living Expenses: Patient refused   Food Insecurity: Unknown    Worried About Running Out of Food in the Last Year: Patient refused    [address] in the Last Year: Patient refused   Transportation Needs: Unknown    Lack of Transportation (Non-Medical): Patient refused   Housing Stability: Unknown    Unstable Housing in the Last Year: Patient refused        SURGICAL HISTORY  Past Surgical History:   Procedure Laterality Date    APPENDECTOMY      CHOLECYSTECTOMY      COLONOSCOPY N/A 9/15/2021    COLONOSCOPY POLYPECTOMY SNARE/COLD BIOPSY performed by Ericka Cordon MD at [facility] [address] Left     Left hand    HYSTERECTOMY (CERVIX STATUS UNKNOWN)      JOINT REPLACEMENT Right     2020-Right Hip    OVARY REMOVAL      age 34    TONSILLECTOMY      UPPER

## 2023-03-17 LAB
EST. AVERAGE GLUCOSE BLD GHB EST-MCNC: 142.7 MG/DL
HBA1C MFR BLD: 6.6 %

## 2023-03-20 ENCOUNTER — APPOINTMENT (OUTPATIENT)
Dept: CT IMAGING | Age: 73
DRG: 331 | End: 2023-03-20
Payer: MEDICARE

## 2023-03-20 ENCOUNTER — HOSPITAL ENCOUNTER (INPATIENT)
Age: 73
LOS: 8 days | Discharge: HOME HEALTH | DRG: 331 | End: 2023-03-28
Attending: EMERGENCY MEDICINE | Admitting: STUDENT IN AN ORGANIZED HEALTH CARE EDUCATION/TRAINING PROGRAM
Payer: MEDICARE

## 2023-03-20 DIAGNOSIS — K56.609 INTESTINAL OBSTRUCTION, UNSPECIFIED CAUSE, UNSPECIFIED WHETHER PARTIAL OR COMPLETE (HCC): ICD-10-CM

## 2023-03-20 DIAGNOSIS — K63.1 PERFORATED SIGMOID COLON (HCC): Primary | ICD-10-CM

## 2023-03-20 DIAGNOSIS — K57.80 PERFORATED DIVERTICULUM: ICD-10-CM

## 2023-03-20 DIAGNOSIS — R10.9 ABDOMINAL PAIN, UNSPECIFIED ABDOMINAL LOCATION: ICD-10-CM

## 2023-03-20 LAB
ALBUMIN SERPL-MCNC: 4.2 GM/DL (ref 3.4–5)
ALP BLD-CCNC: 69 IU/L (ref 40–129)
ALT SERPL-CCNC: 20 U/L (ref 10–40)
ANION GAP SERPL CALCULATED.3IONS-SCNC: 12 MMOL/L (ref 4–16)
AST SERPL-CCNC: 19 IU/L (ref 15–37)
BANDED NEUTROPHILS ABSOLUTE COUNT: 0.7 K/CU MM
BANDED NEUTROPHILS RELATIVE PERCENT: 4 % (ref 5–11)
BILIRUB SERPL-MCNC: 0.4 MG/DL (ref 0–1)
BILIRUBIN URINE: NEGATIVE MG/DL
BLOOD, URINE: NEGATIVE
BUN SERPL-MCNC: 24 MG/DL (ref 6–23)
CALCIUM SERPL-MCNC: 8.8 MG/DL (ref 8.3–10.6)
CHLORIDE BLD-SCNC: 98 MMOL/L (ref 99–110)
CLARITY: CLEAR
CO2: 24 MMOL/L (ref 21–32)
COLOR: YELLOW
COMMENT UA: NORMAL
CREAT SERPL-MCNC: 0.9 MG/DL (ref 0.6–1.1)
DIFFERENTIAL TYPE: ABNORMAL
GFR SERPL CREATININE-BSD FRML MDRD: >60 ML/MIN/1.73M2
GLUCOSE BLD-MCNC: 130 MG/DL (ref 70–99)
GLUCOSE SERPL-MCNC: 126 MG/DL (ref 70–99)
GLUCOSE, URINE: NEGATIVE MG/DL
HCT VFR BLD CALC: 35.4 % (ref 37–47)
HEMOGLOBIN: 11.4 GM/DL (ref 12.5–16)
KETONES, URINE: NEGATIVE MG/DL
LACTATE: 1.7 MMOL/L (ref 0.5–1.9)
LEUKOCYTE ESTERASE, URINE: NEGATIVE
LIPASE: 21 IU/L (ref 13–60)
LYMPHOCYTES ABSOLUTE: 1.6 K/CU MM
LYMPHOCYTES RELATIVE PERCENT: 9 % (ref 24–44)
MCH RBC QN AUTO: 27.9 PG (ref 27–31)
MCHC RBC AUTO-ENTMCNC: 32.2 % (ref 32–36)
MCV RBC AUTO: 86.8 FL (ref 78–100)
MONOCYTES ABSOLUTE: 0.4 K/CU MM
MONOCYTES RELATIVE PERCENT: 2 % (ref 0–4)
NITRITE URINE, QUANTITATIVE: NEGATIVE
PDW BLD-RTO: 15.9 % (ref 11.7–14.9)
PH, URINE: 6.5 (ref 5–8)
PLATELET # BLD: 267 K/CU MM (ref 140–440)
PMV BLD AUTO: 10.8 FL (ref 7.5–11.1)
POTASSIUM SERPL-SCNC: 5 MMOL/L (ref 3.5–5.1)
PROTEIN UA: NEGATIVE MG/DL
RBC # BLD: 4.08 M/CU MM (ref 4.2–5.4)
SEGMENTED NEUTROPHILS ABSOLUTE COUNT: 14.9 K/CU MM
SEGMENTED NEUTROPHILS RELATIVE PERCENT: 85 % (ref 36–66)
SODIUM BLD-SCNC: 134 MMOL/L (ref 135–145)
SPECIFIC GRAVITY UA: <1.005 (ref 1–1.03)
TOTAL PROTEIN: 6.9 GM/DL (ref 6.4–8.2)
UROBILINOGEN, URINE: 0.2 MG/DL (ref 0.2–1)
WBC # BLD: 17.6 K/CU MM (ref 4–10.5)
WBC # BLD: ABNORMAL 10*3/UL

## 2023-03-20 PROCEDURE — 87086 URINE CULTURE/COLONY COUNT: CPT

## 2023-03-20 PROCEDURE — 87040 BLOOD CULTURE FOR BACTERIA: CPT

## 2023-03-20 PROCEDURE — 99285 EMERGENCY DEPT VISIT HI MDM: CPT

## 2023-03-20 PROCEDURE — 83690 ASSAY OF LIPASE: CPT

## 2023-03-20 PROCEDURE — 82962 GLUCOSE BLOOD TEST: CPT

## 2023-03-20 PROCEDURE — 85027 COMPLETE CBC AUTOMATED: CPT

## 2023-03-20 PROCEDURE — 84145 PROCALCITONIN (PCT): CPT

## 2023-03-20 PROCEDURE — 74176 CT ABD & PELVIS W/O CONTRAST: CPT

## 2023-03-20 PROCEDURE — 81003 URINALYSIS AUTO W/O SCOPE: CPT

## 2023-03-20 PROCEDURE — 2580000003 HC RX 258: Performed by: EMERGENCY MEDICINE

## 2023-03-20 PROCEDURE — 6370000000 HC RX 637 (ALT 250 FOR IP): Performed by: EMERGENCY MEDICINE

## 2023-03-20 PROCEDURE — 83605 ASSAY OF LACTIC ACID: CPT

## 2023-03-20 PROCEDURE — 2580000003 HC RX 258: Performed by: STUDENT IN AN ORGANIZED HEALTH CARE EDUCATION/TRAINING PROGRAM

## 2023-03-20 PROCEDURE — 96375 TX/PRO/DX INJ NEW DRUG ADDON: CPT

## 2023-03-20 PROCEDURE — 2500000003 HC RX 250 WO HCPCS: Performed by: EMERGENCY MEDICINE

## 2023-03-20 PROCEDURE — 1200000000 HC SEMI PRIVATE

## 2023-03-20 PROCEDURE — 86140 C-REACTIVE PROTEIN: CPT

## 2023-03-20 PROCEDURE — 6360000002 HC RX W HCPCS: Performed by: STUDENT IN AN ORGANIZED HEALTH CARE EDUCATION/TRAINING PROGRAM

## 2023-03-20 PROCEDURE — 96365 THER/PROPH/DIAG IV INF INIT: CPT

## 2023-03-20 PROCEDURE — 80053 COMPREHEN METABOLIC PANEL: CPT

## 2023-03-20 PROCEDURE — 6360000002 HC RX W HCPCS: Performed by: EMERGENCY MEDICINE

## 2023-03-20 PROCEDURE — 85007 BL SMEAR W/DIFF WBC COUNT: CPT

## 2023-03-20 PROCEDURE — 96376 TX/PRO/DX INJ SAME DRUG ADON: CPT

## 2023-03-20 PROCEDURE — 96367 TX/PROPH/DG ADDL SEQ IV INF: CPT

## 2023-03-20 PROCEDURE — 36415 COLL VENOUS BLD VENIPUNCTURE: CPT

## 2023-03-20 RX ORDER — DICYCLOMINE HYDROCHLORIDE 10 MG/ML
20 INJECTION INTRAMUSCULAR ONCE
Status: DISCONTINUED | OUTPATIENT
Start: 2023-03-20 | End: 2023-03-20

## 2023-03-20 RX ORDER — SODIUM CHLORIDE 0.9 % (FLUSH) 0.9 %
5-40 SYRINGE (ML) INJECTION EVERY 12 HOURS SCHEDULED
Status: DISCONTINUED | OUTPATIENT
Start: 2023-03-20 | End: 2023-03-28 | Stop reason: HOSPADM

## 2023-03-20 RX ORDER — IPRATROPIUM BROMIDE AND ALBUTEROL SULFATE 2.5; .5 MG/3ML; MG/3ML
1 SOLUTION RESPIRATORY (INHALATION)
Status: DISCONTINUED | OUTPATIENT
Start: 2023-03-20 | End: 2023-03-21

## 2023-03-20 RX ORDER — DEXTROSE MONOHYDRATE 100 MG/ML
INJECTION, SOLUTION INTRAVENOUS CONTINUOUS PRN
Status: DISCONTINUED | OUTPATIENT
Start: 2023-03-20 | End: 2023-03-28 | Stop reason: HOSPADM

## 2023-03-20 RX ORDER — BUSPIRONE HYDROCHLORIDE 15 MG/1
30 TABLET ORAL NIGHTLY
Status: DISCONTINUED | OUTPATIENT
Start: 2023-03-20 | End: 2023-03-28 | Stop reason: HOSPADM

## 2023-03-20 RX ORDER — ONDANSETRON 4 MG/1
4 TABLET, ORALLY DISINTEGRATING ORAL EVERY 8 HOURS PRN
Status: DISCONTINUED | OUTPATIENT
Start: 2023-03-20 | End: 2023-03-28 | Stop reason: HOSPADM

## 2023-03-20 RX ORDER — LISINOPRIL 20 MG/1
40 TABLET ORAL DAILY
Status: DISCONTINUED | OUTPATIENT
Start: 2023-03-21 | End: 2023-03-28 | Stop reason: HOSPADM

## 2023-03-20 RX ORDER — SODIUM CHLORIDE 0.9 % (FLUSH) 0.9 %
5-40 SYRINGE (ML) INJECTION PRN
Status: DISCONTINUED | OUTPATIENT
Start: 2023-03-20 | End: 2023-03-28 | Stop reason: HOSPADM

## 2023-03-20 RX ORDER — ATORVASTATIN CALCIUM 10 MG/1
20 TABLET, FILM COATED ORAL DAILY
Status: DISCONTINUED | OUTPATIENT
Start: 2023-03-21 | End: 2023-03-28 | Stop reason: HOSPADM

## 2023-03-20 RX ORDER — POTASSIUM CHLORIDE 7.45 MG/ML
10 INJECTION INTRAVENOUS PRN
Status: DISCONTINUED | OUTPATIENT
Start: 2023-03-20 | End: 2023-03-28 | Stop reason: HOSPADM

## 2023-03-20 RX ORDER — ALBUTEROL SULFATE 2.5 MG/3ML
2.5 SOLUTION RESPIRATORY (INHALATION) EVERY 6 HOURS PRN
Status: DISCONTINUED | OUTPATIENT
Start: 2023-03-20 | End: 2023-03-26

## 2023-03-20 RX ORDER — ALENDRONATE SODIUM 70 MG/1
70 TABLET ORAL
Status: DISCONTINUED | OUTPATIENT
Start: 2023-03-25 | End: 2023-03-20 | Stop reason: CLARIF

## 2023-03-20 RX ORDER — POTASSIUM CHLORIDE 20 MEQ/1
40 TABLET, EXTENDED RELEASE ORAL PRN
Status: DISCONTINUED | OUTPATIENT
Start: 2023-03-20 | End: 2023-03-28 | Stop reason: HOSPADM

## 2023-03-20 RX ORDER — ONDANSETRON 4 MG/1
4 TABLET, ORALLY DISINTEGRATING ORAL ONCE
Status: COMPLETED | OUTPATIENT
Start: 2023-03-20 | End: 2023-03-20

## 2023-03-20 RX ORDER — MAGNESIUM HYDROXIDE/ALUMINUM HYDROXICE/SIMETHICONE 120; 1200; 1200 MG/30ML; MG/30ML; MG/30ML
30 SUSPENSION ORAL EVERY 6 HOURS PRN
Status: DISCONTINUED | OUTPATIENT
Start: 2023-03-20 | End: 2023-03-28 | Stop reason: HOSPADM

## 2023-03-20 RX ORDER — SODIUM CHLORIDE, SODIUM LACTATE, POTASSIUM CHLORIDE, CALCIUM CHLORIDE 600; 310; 30; 20 MG/100ML; MG/100ML; MG/100ML; MG/100ML
INJECTION, SOLUTION INTRAVENOUS CONTINUOUS
Status: DISPENSED | OUTPATIENT
Start: 2023-03-20 | End: 2023-03-21

## 2023-03-20 RX ORDER — MAGNESIUM SULFATE IN WATER 40 MG/ML
2000 INJECTION, SOLUTION INTRAVENOUS PRN
Status: DISCONTINUED | OUTPATIENT
Start: 2023-03-20 | End: 2023-03-28 | Stop reason: HOSPADM

## 2023-03-20 RX ORDER — DICYCLOMINE HYDROCHLORIDE 10 MG/1
10 CAPSULE ORAL 3 TIMES DAILY PRN
Status: DISCONTINUED | OUTPATIENT
Start: 2023-03-20 | End: 2023-03-28 | Stop reason: HOSPADM

## 2023-03-20 RX ORDER — PANTOPRAZOLE SODIUM 40 MG/1
40 TABLET, DELAYED RELEASE ORAL DAILY
Status: DISCONTINUED | OUTPATIENT
Start: 2023-03-21 | End: 2023-03-28 | Stop reason: HOSPADM

## 2023-03-20 RX ORDER — HYDROCODONE BITARTRATE AND ACETAMINOPHEN 5; 325 MG/1; MG/1
1 TABLET ORAL EVERY 6 HOURS PRN
Status: DISCONTINUED | OUTPATIENT
Start: 2023-03-20 | End: 2023-03-27

## 2023-03-20 RX ORDER — ASPIRIN 81 MG/1
81 TABLET ORAL DAILY
Status: DISCONTINUED | OUTPATIENT
Start: 2023-03-21 | End: 2023-03-28 | Stop reason: HOSPADM

## 2023-03-20 RX ORDER — ACETAMINOPHEN 650 MG/1
650 SUPPOSITORY RECTAL EVERY 6 HOURS PRN
Status: DISCONTINUED | OUTPATIENT
Start: 2023-03-20 | End: 2023-03-28 | Stop reason: HOSPADM

## 2023-03-20 RX ORDER — ONDANSETRON 2 MG/ML
4 INJECTION INTRAMUSCULAR; INTRAVENOUS EVERY 30 MIN PRN
Status: DISCONTINUED | OUTPATIENT
Start: 2023-03-20 | End: 2023-03-20

## 2023-03-20 RX ORDER — AMLODIPINE BESYLATE 10 MG/1
10 TABLET ORAL DAILY
Status: DISCONTINUED | OUTPATIENT
Start: 2023-03-21 | End: 2023-03-28 | Stop reason: HOSPADM

## 2023-03-20 RX ORDER — BUDESONIDE AND FORMOTEROL FUMARATE DIHYDRATE 160; 4.5 UG/1; UG/1
2 AEROSOL RESPIRATORY (INHALATION) 2 TIMES DAILY
Status: DISCONTINUED | OUTPATIENT
Start: 2023-03-21 | End: 2023-03-28 | Stop reason: HOSPADM

## 2023-03-20 RX ORDER — ACETAMINOPHEN 325 MG/1
650 TABLET ORAL EVERY 6 HOURS PRN
Status: DISCONTINUED | OUTPATIENT
Start: 2023-03-20 | End: 2023-03-28 | Stop reason: HOSPADM

## 2023-03-20 RX ORDER — 0.9 % SODIUM CHLORIDE 0.9 %
1000 INTRAVENOUS SOLUTION INTRAVENOUS ONCE
Status: COMPLETED | OUTPATIENT
Start: 2023-03-20 | End: 2023-03-20

## 2023-03-20 RX ORDER — SODIUM CHLORIDE 9 MG/ML
INJECTION, SOLUTION INTRAVENOUS PRN
Status: DISCONTINUED | OUTPATIENT
Start: 2023-03-20 | End: 2023-03-28 | Stop reason: HOSPADM

## 2023-03-20 RX ORDER — INSULIN LISPRO 100 [IU]/ML
0-4 INJECTION, SOLUTION INTRAVENOUS; SUBCUTANEOUS NIGHTLY
Status: DISCONTINUED | OUTPATIENT
Start: 2023-03-20 | End: 2023-03-28 | Stop reason: HOSPADM

## 2023-03-20 RX ORDER — METRONIDAZOLE 500 MG/100ML
500 INJECTION, SOLUTION INTRAVENOUS ONCE
Status: COMPLETED | OUTPATIENT
Start: 2023-03-20 | End: 2023-03-20

## 2023-03-20 RX ORDER — METRONIDAZOLE 500 MG/100ML
500 INJECTION, SOLUTION INTRAVENOUS EVERY 8 HOURS
Status: DISCONTINUED | OUTPATIENT
Start: 2023-03-21 | End: 2023-03-28

## 2023-03-20 RX ORDER — ENOXAPARIN SODIUM 100 MG/ML
40 INJECTION SUBCUTANEOUS DAILY
Status: DISCONTINUED | OUTPATIENT
Start: 2023-03-21 | End: 2023-03-28 | Stop reason: HOSPADM

## 2023-03-20 RX ORDER — FAMOTIDINE 20 MG/1
20 TABLET, FILM COATED ORAL ONCE
Status: DISCONTINUED | OUTPATIENT
Start: 2023-03-20 | End: 2023-03-28 | Stop reason: HOSPADM

## 2023-03-20 RX ORDER — MORPHINE SULFATE 4 MG/ML
4 INJECTION, SOLUTION INTRAMUSCULAR; INTRAVENOUS EVERY 30 MIN PRN
Status: DISCONTINUED | OUTPATIENT
Start: 2023-03-20 | End: 2023-03-20 | Stop reason: ALTCHOICE

## 2023-03-20 RX ORDER — POLYETHYLENE GLYCOL 3350 17 G/17G
17 POWDER, FOR SOLUTION ORAL DAILY PRN
Status: DISCONTINUED | OUTPATIENT
Start: 2023-03-20 | End: 2023-03-28 | Stop reason: HOSPADM

## 2023-03-20 RX ORDER — MORPHINE SULFATE 4 MG/ML
4 INJECTION, SOLUTION INTRAMUSCULAR; INTRAVENOUS EVERY 30 MIN PRN
Status: DISCONTINUED | OUTPATIENT
Start: 2023-03-20 | End: 2023-03-20

## 2023-03-20 RX ORDER — DICYCLOMINE HYDROCHLORIDE 10 MG/1
20 CAPSULE ORAL ONCE
Status: COMPLETED | OUTPATIENT
Start: 2023-03-20 | End: 2023-03-20

## 2023-03-20 RX ORDER — TRAMADOL HYDROCHLORIDE 50 MG/1
50 TABLET ORAL EVERY 6 HOURS PRN
Status: DISCONTINUED | OUTPATIENT
Start: 2023-03-20 | End: 2023-03-28 | Stop reason: HOSPADM

## 2023-03-20 RX ORDER — ONDANSETRON 2 MG/ML
4 INJECTION INTRAMUSCULAR; INTRAVENOUS EVERY 30 MIN PRN
Status: COMPLETED | OUTPATIENT
Start: 2023-03-20 | End: 2023-03-20

## 2023-03-20 RX ORDER — ONDANSETRON 2 MG/ML
4 INJECTION INTRAMUSCULAR; INTRAVENOUS EVERY 6 HOURS PRN
Status: DISCONTINUED | OUTPATIENT
Start: 2023-03-20 | End: 2023-03-28 | Stop reason: HOSPADM

## 2023-03-20 RX ORDER — INSULIN LISPRO 100 [IU]/ML
0-4 INJECTION, SOLUTION INTRAVENOUS; SUBCUTANEOUS
Status: DISCONTINUED | OUTPATIENT
Start: 2023-03-21 | End: 2023-03-28 | Stop reason: HOSPADM

## 2023-03-20 RX ADMIN — DICYCLOMINE HYDROCHLORIDE 20 MG: 10 CAPSULE ORAL at 15:56

## 2023-03-20 RX ADMIN — IPRATROPIUM BROMIDE AND ALBUTEROL SULFATE 1 AMPULE: 2.5; .5 SOLUTION RESPIRATORY (INHALATION) at 18:32

## 2023-03-20 RX ADMIN — ONDANSETRON 4 MG: 2 INJECTION INTRAMUSCULAR; INTRAVENOUS at 16:25

## 2023-03-20 RX ADMIN — HYDROMORPHONE HYDROCHLORIDE 0.5 MG: 1 INJECTION, SOLUTION INTRAMUSCULAR; INTRAVENOUS; SUBCUTANEOUS at 22:55

## 2023-03-20 RX ADMIN — MORPHINE SULFATE 4 MG: 4 INJECTION, SOLUTION INTRAMUSCULAR; INTRAVENOUS at 19:30

## 2023-03-20 RX ADMIN — SODIUM CHLORIDE 1000 ML: 9 INJECTION, SOLUTION INTRAVENOUS at 16:25

## 2023-03-20 RX ADMIN — MORPHINE SULFATE 4 MG: 4 INJECTION, SOLUTION INTRAMUSCULAR; INTRAVENOUS at 16:25

## 2023-03-20 RX ADMIN — SODIUM CHLORIDE 1000 ML: 9 INJECTION, SOLUTION INTRAVENOUS at 17:26

## 2023-03-20 RX ADMIN — ONDANSETRON 4 MG: 2 INJECTION INTRAMUSCULAR; INTRAVENOUS at 17:08

## 2023-03-20 RX ADMIN — MORPHINE SULFATE 4 MG: 4 INJECTION, SOLUTION INTRAMUSCULAR; INTRAVENOUS at 17:08

## 2023-03-20 RX ADMIN — PIPERACILLIN AND TAZOBACTAM 3375 MG: 3; .375 INJECTION, POWDER, LYOPHILIZED, FOR SOLUTION INTRAVENOUS at 16:31

## 2023-03-20 RX ADMIN — ONDANSETRON 4 MG: 2 INJECTION INTRAMUSCULAR; INTRAVENOUS at 19:30

## 2023-03-20 RX ADMIN — SODIUM CHLORIDE, POTASSIUM CHLORIDE, SODIUM LACTATE AND CALCIUM CHLORIDE: 600; 310; 30; 20 INJECTION, SOLUTION INTRAVENOUS at 22:53

## 2023-03-20 RX ADMIN — ONDANSETRON 4 MG: 4 TABLET, ORALLY DISINTEGRATING ORAL at 15:56

## 2023-03-20 RX ADMIN — METRONIDAZOLE 500 MG: 500 INJECTION, SOLUTION INTRAVENOUS at 17:16

## 2023-03-20 ASSESSMENT — PAIN DESCRIPTION - LOCATION
LOCATION: ABDOMEN

## 2023-03-20 ASSESSMENT — PAIN DESCRIPTION - FREQUENCY
FREQUENCY: CONTINUOUS
FREQUENCY: CONTINUOUS

## 2023-03-20 ASSESSMENT — ENCOUNTER SYMPTOMS
ABDOMINAL PAIN: 1
EYES NEGATIVE: 1
RESPIRATORY NEGATIVE: 1
CONSTIPATION: 1
ALLERGIC/IMMUNOLOGIC NEGATIVE: 1

## 2023-03-20 ASSESSMENT — PAIN DESCRIPTION - PAIN TYPE
TYPE: ACUTE PAIN
TYPE: ACUTE PAIN

## 2023-03-20 ASSESSMENT — PAIN DESCRIPTION - DESCRIPTORS
DESCRIPTORS: SHARP
DESCRIPTORS: SHARP

## 2023-03-20 ASSESSMENT — PAIN - FUNCTIONAL ASSESSMENT
PAIN_FUNCTIONAL_ASSESSMENT: PREVENTS OR INTERFERES WITH ALL ACTIVE AND SOME PASSIVE ACTIVITIES
PAIN_FUNCTIONAL_ASSESSMENT: 0-10
PAIN_FUNCTIONAL_ASSESSMENT: PREVENTS OR INTERFERES WITH ALL ACTIVE AND SOME PASSIVE ACTIVITIES

## 2023-03-20 ASSESSMENT — PAIN SCALES - GENERAL
PAINLEVEL_OUTOF10: 10
PAINLEVEL_OUTOF10: 7
PAINLEVEL_OUTOF10: 7

## 2023-03-20 ASSESSMENT — PAIN DESCRIPTION - ONSET
ONSET: PROGRESSIVE
ONSET: PROGRESSIVE

## 2023-03-20 ASSESSMENT — PAIN DESCRIPTION - ORIENTATION
ORIENTATION: LOWER
ORIENTATION: LOWER

## 2023-03-20 NOTE — ED NOTES
1 attempt to place IV, blood draw,  Red green, purple and blue no lactic notified Dr Elmer Pollock.       Jonathan Jj, RN  03/20/23 [id number]

## 2023-03-20 NOTE — ED PROVIDER NOTES
SNARE/COLD BIOPSY performed by Surya Gonzalez MD at [facility] 320 Left     Left hand    HYSTERECTOMY (CERVIX STATUS UNKNOWN)      JOINT REPLACEMENT Right     2020-Right Hip    OVARY REMOVAL      age 34    TONSILLECTOMY      UPPER GASTROINTESTINAL ENDOSCOPY N/A 9/15/2021    EGD DIAGNOSTIC ONLY performed by Surya Gonzalze MD at [facility] ENDOSCOPY     Family History   Problem Relation Age of Onset    Colon Cancer Mother     Heart Attack Father     Other Father         suicide    Other Sister         suicide    Heart Attack Brother     Pacemaker Brother     No Known Problems Brother     No Known Problems Brother     No Known Problems Brother     Ovarian Cancer Daughter     Breast Cancer Neg Hx      Social History     Socioeconomic History    Marital status: Single     Spouse name: Not on file    Number of children: Not on file    Years of education: Not on file    Highest education level: Not on file   Occupational History    Not on file   Tobacco Use    Smoking status: Former     Packs/day: 1.00     Years: 51.00     Pack years: 51.00     Types: Cigarettes     Start date:      Quit date: 2020     Years since quittin.2    Smokeless tobacco: Never   Vaping Use    Vaping Use: Former    Substances: Flavoring    Devices: Disposable, Pre-filled pod   Substance and Sexual Activity    Alcohol use: Not Currently    Drug use: Not Currently    Sexual activity: Not on file   Other Topics Concern    Not on file   Social History Narrative    Not on file     Social Determinants of Health     Financial Resource Strain: Unknown    Difficulty of Paying Living Expenses: Patient refused   Food Insecurity: Unknown    Worried About [address] in the Last Year: Patient refused    [address] in the Last Year: Patient refused   Transportation Needs: Unknown    Lack of Transportation (Medical):  Not on file    Lack of Transportation (Non-Medical): Patient refused   Physical Activity: Not on file   Stress: Not on file   Social Connections: Not on file   Intimate Partner Violence: Not on file   Housing Stability: Unknown    Unable to Pay for Housing in the Last Year: Not on file    Number of [place] in the Last Year: Not on file    Unstable Housing in the Last Year: Patient refused     Current Facility-Administered Medications   Medication Dose Route Frequency Provider Last Rate Last Admin    0.9 % sodium chloride bolus  1,000 mL IntraVENous Once Beatriz Rimes, DO 1,000 mL/hr at 03/20/23 1625 1,000 mL at 03/20/23 1625    HYDROcodone-acetaminophen (NORCO) 5-325 MG per tablet 1 tablet  1 tablet Oral Q6H PRN Beatriz Rimes, DO        famotidine (PEPCID) tablet 20 mg  20 mg Oral Once Beatriz Rimes, DO        0.9 % sodium chloride bolus  1,000 mL IntraVENous Once Beatriz Rimes, DO        morphine sulfate (PF) injection 4 mg  4 mg IntraVENous Q30 Min PRN Beatriz Rimes, DO   4 mg at 03/20/23 1625    ondansetron (ZOFRAN) injection 4 mg  4 mg IntraVENous Q30 Min PRN Beatriz Rimes, DO   4 mg at 03/20/23 1625    piperacillin-tazobactam (ZOSYN) 3,375 mg in sodium chloride 0.9 % 50 mL IVPB (mini-bag)  3,375 mg IntraVENous Once Beatriz Rimes,  mL/hr at 03/20/23 1631 3,375 mg at 03/20/23 1631    metronidazole (FLAGYL) 500 mg in 0.9% NaCl 100 mL IVPB premix  500 mg IntraVENous Once Beatriz Rimes, DO         Current Outpatient Medications   Medication Sig Dispense Refill    dicyclomine (BENTYL) 10 MG capsule Take 1 capsule by mouth 3 times daily as needed (stomach) 120 capsule 2    alendronate (FOSAMAX) 70 MG tablet Take 1 tablet by mouth every 7 days 12 tablet 1    methylPREDNISolone (MEDROL DOSEPACK) 4 MG tablet Take by mouth. 1 kit 0    traMADol (ULTRAM) 50 MG tablet Take 1 tablet by mouth every 6 hours as needed for Pain for up to 7 days. Intended supply: 7 days.  Take lowest dose possible to manage pain Max Daily Amount: 200 mg 28 tablet 0    fluticasone-umeclidin-vilant

## 2023-03-21 ENCOUNTER — ANESTHESIA (OUTPATIENT)
Dept: OPERATING ROOM | Age: 73
DRG: 331 | End: 2023-03-21
Payer: MEDICARE

## 2023-03-21 ENCOUNTER — ANESTHESIA EVENT (OUTPATIENT)
Dept: OPERATING ROOM | Age: 73
DRG: 331 | End: 2023-03-21
Payer: MEDICARE

## 2023-03-21 LAB
ALBUMIN SERPL-MCNC: 3.7 GM/DL (ref 3.4–5)
ALP BLD-CCNC: 60 IU/L (ref 40–129)
ALT SERPL-CCNC: 32 U/L (ref 10–40)
ANION GAP SERPL CALCULATED.3IONS-SCNC: 10 MMOL/L (ref 4–16)
APTT: 33.7 SECONDS (ref 25.1–37.1)
AST SERPL-CCNC: 20 IU/L (ref 15–37)
BASOPHILS ABSOLUTE: 0 K/CU MM
BASOPHILS RELATIVE PERCENT: 0.2 % (ref 0–1)
BILIRUB SERPL-MCNC: 0.5 MG/DL (ref 0–1)
BUN SERPL-MCNC: 17 MG/DL (ref 6–23)
CALCIUM SERPL-MCNC: 7.9 MG/DL (ref 8.3–10.6)
CHLORIDE BLD-SCNC: 103 MMOL/L (ref 99–110)
CO2: 26 MMOL/L (ref 21–32)
CREAT SERPL-MCNC: 0.8 MG/DL (ref 0.6–1.1)
CRP SERPL HS-MCNC: 47.3 MG/L
DIFFERENTIAL TYPE: ABNORMAL
EOSINOPHILS ABSOLUTE: 0.1 K/CU MM
EOSINOPHILS RELATIVE PERCENT: 0.5 % (ref 0–3)
GFR SERPL CREATININE-BSD FRML MDRD: >60 ML/MIN/1.73M2
GLUCOSE BLD-MCNC: 108 MG/DL (ref 70–99)
GLUCOSE BLD-MCNC: 114 MG/DL (ref 70–99)
GLUCOSE BLD-MCNC: 119 MG/DL (ref 70–99)
GLUCOSE BLD-MCNC: 170 MG/DL (ref 70–99)
GLUCOSE BLD-MCNC: 173 MG/DL (ref 70–99)
GLUCOSE SERPL-MCNC: 115 MG/DL (ref 70–99)
HCT VFR BLD CALC: 32.9 % (ref 37–47)
HEMOGLOBIN: 10.4 GM/DL (ref 12.5–16)
IMMATURE NEUTROPHIL %: 1.5 % (ref 0–0.43)
INR BLD: 0.99 INDEX
LACTIC ACID, SEPSIS: 1.3 MMOL/L (ref 0.5–1.9)
LACTIC ACID, SEPSIS: 1.6 MMOL/L (ref 0.5–1.9)
LYMPHOCYTES ABSOLUTE: 1.5 K/CU MM
LYMPHOCYTES RELATIVE PERCENT: 14.3 % (ref 24–44)
MCH RBC QN AUTO: 28.3 PG (ref 27–31)
MCHC RBC AUTO-ENTMCNC: 31.6 % (ref 32–36)
MCV RBC AUTO: 89.4 FL (ref 78–100)
MONOCYTES ABSOLUTE: 0.7 K/CU MM
MONOCYTES RELATIVE PERCENT: 6.9 % (ref 0–4)
NUCLEATED RBC %: 0 %
PDW BLD-RTO: 16 % (ref 11.7–14.9)
PLATELET # BLD: 300 K/CU MM (ref 140–440)
PMV BLD AUTO: 8.9 FL (ref 7.5–11.1)
POTASSIUM SERPL-SCNC: 4.4 MMOL/L (ref 3.5–5.1)
PROCALCITONIN SERPL-MCNC: 0.3 NG/ML
PROTHROMBIN TIME: 12.8 SECONDS (ref 11.7–14.5)
RBC # BLD: 3.68 M/CU MM (ref 4.2–5.4)
SEGMENTED NEUTROPHILS ABSOLUTE COUNT: 7.8 K/CU MM
SEGMENTED NEUTROPHILS RELATIVE PERCENT: 76.6 % (ref 36–66)
SODIUM BLD-SCNC: 139 MMOL/L (ref 135–145)
TOTAL IMMATURE NEUTOROPHIL: 0.15 K/CU MM
TOTAL NUCLEATED RBC: 0 K/CU MM
TOTAL PROTEIN: 5.8 GM/DL (ref 6.4–8.2)
WBC # BLD: 10.2 K/CU MM (ref 4–10.5)

## 2023-03-21 PROCEDURE — 99223 1ST HOSP IP/OBS HIGH 75: CPT | Performed by: SURGERY

## 2023-03-21 PROCEDURE — 6360000002 HC RX W HCPCS

## 2023-03-21 PROCEDURE — 88307 TISSUE EXAM BY PATHOLOGIST: CPT | Performed by: PATHOLOGY

## 2023-03-21 PROCEDURE — 80053 COMPREHEN METABOLIC PANEL: CPT

## 2023-03-21 PROCEDURE — 7100000001 HC PACU RECOVERY - ADDTL 15 MIN: Performed by: SURGERY

## 2023-03-21 PROCEDURE — 6360000002 HC RX W HCPCS: Performed by: ANESTHESIOLOGY

## 2023-03-21 PROCEDURE — 1200000000 HC SEMI PRIVATE

## 2023-03-21 PROCEDURE — 2500000003 HC RX 250 WO HCPCS: Performed by: NURSE ANESTHETIST, CERTIFIED REGISTERED

## 2023-03-21 PROCEDURE — 3600000014 HC SURGERY LEVEL 4 ADDTL 15MIN: Performed by: SURGERY

## 2023-03-21 PROCEDURE — 85730 THROMBOPLASTIN TIME PARTIAL: CPT

## 2023-03-21 PROCEDURE — 6360000002 HC RX W HCPCS: Performed by: SURGERY

## 2023-03-21 PROCEDURE — 6360000002 HC RX W HCPCS: Performed by: HOSPITALIST

## 2023-03-21 PROCEDURE — 6370000000 HC RX 637 (ALT 250 FOR IP): Performed by: ANESTHESIOLOGY

## 2023-03-21 PROCEDURE — 82962 GLUCOSE BLOOD TEST: CPT

## 2023-03-21 PROCEDURE — 85610 PROTHROMBIN TIME: CPT

## 2023-03-21 PROCEDURE — 0DTN0ZZ RESECTION OF SIGMOID COLON, OPEN APPROACH: ICD-10-PCS | Performed by: SURGERY

## 2023-03-21 PROCEDURE — 2580000003 HC RX 258: Performed by: SURGERY

## 2023-03-21 PROCEDURE — 83605 ASSAY OF LACTIC ACID: CPT

## 2023-03-21 PROCEDURE — 36415 COLL VENOUS BLD VENIPUNCTURE: CPT

## 2023-03-21 PROCEDURE — 6360000002 HC RX W HCPCS: Performed by: NURSE ANESTHETIST, CERTIFIED REGISTERED

## 2023-03-21 PROCEDURE — 6360000002 HC RX W HCPCS: Performed by: STUDENT IN AN ORGANIZED HEALTH CARE EDUCATION/TRAINING PROGRAM

## 2023-03-21 PROCEDURE — 2500000003 HC RX 250 WO HCPCS: Performed by: STUDENT IN AN ORGANIZED HEALTH CARE EDUCATION/TRAINING PROGRAM

## 2023-03-21 PROCEDURE — 3600000004 HC SURGERY LEVEL 4 BASE: Performed by: SURGERY

## 2023-03-21 PROCEDURE — 99222 1ST HOSP IP/OBS MODERATE 55: CPT | Performed by: INTERNAL MEDICINE

## 2023-03-21 PROCEDURE — 7100000000 HC PACU RECOVERY - FIRST 15 MIN: Performed by: SURGERY

## 2023-03-21 PROCEDURE — 3700000000 HC ANESTHESIA ATTENDED CARE: Performed by: SURGERY

## 2023-03-21 PROCEDURE — 94640 AIRWAY INHALATION TREATMENT: CPT

## 2023-03-21 PROCEDURE — 85025 COMPLETE CBC W/AUTO DIFF WBC: CPT

## 2023-03-21 PROCEDURE — 6370000000 HC RX 637 (ALT 250 FOR IP): Performed by: STUDENT IN AN ORGANIZED HEALTH CARE EDUCATION/TRAINING PROGRAM

## 2023-03-21 PROCEDURE — C1894 INTRO/SHEATH, NON-LASER: HCPCS | Performed by: SURGERY

## 2023-03-21 PROCEDURE — 2500000003 HC RX 250 WO HCPCS: Performed by: SURGERY

## 2023-03-21 PROCEDURE — 2709999900 HC NON-CHARGEABLE SUPPLY: Performed by: SURGERY

## 2023-03-21 PROCEDURE — 2580000003 HC RX 258: Performed by: STUDENT IN AN ORGANIZED HEALTH CARE EDUCATION/TRAINING PROGRAM

## 2023-03-21 PROCEDURE — 2720000010 HC SURG SUPPLY STERILE: Performed by: SURGERY

## 2023-03-21 PROCEDURE — 3700000001 HC ADD 15 MINUTES (ANESTHESIA): Performed by: SURGERY

## 2023-03-21 PROCEDURE — 2700000000 HC OXYGEN THERAPY PER DAY

## 2023-03-21 RX ORDER — KETAMINE HCL 50MG/ML(1)
SYRINGE (ML) INTRAVENOUS PRN
Status: DISCONTINUED | OUTPATIENT
Start: 2023-03-21 | End: 2023-03-21 | Stop reason: SDUPTHER

## 2023-03-21 RX ORDER — SODIUM CHLORIDE 0.9 % (FLUSH) 0.9 %
5-40 SYRINGE (ML) INJECTION EVERY 12 HOURS SCHEDULED
Status: DISCONTINUED | OUTPATIENT
Start: 2023-03-21 | End: 2023-03-21

## 2023-03-21 RX ORDER — ROCURONIUM BROMIDE 10 MG/ML
INJECTION, SOLUTION INTRAVENOUS PRN
Status: DISCONTINUED | OUTPATIENT
Start: 2023-03-21 | End: 2023-03-21 | Stop reason: SDUPTHER

## 2023-03-21 RX ORDER — SODIUM CHLORIDE 9 MG/ML
INJECTION, SOLUTION INTRAVENOUS PRN
Status: DISCONTINUED | OUTPATIENT
Start: 2023-03-21 | End: 2023-03-21

## 2023-03-21 RX ORDER — FENTANYL CITRATE 50 UG/ML
INJECTION, SOLUTION INTRAMUSCULAR; INTRAVENOUS PRN
Status: DISCONTINUED | OUTPATIENT
Start: 2023-03-21 | End: 2023-03-21 | Stop reason: SDUPTHER

## 2023-03-21 RX ORDER — LABETALOL HYDROCHLORIDE 5 MG/ML
10 INJECTION, SOLUTION INTRAVENOUS
Status: DISCONTINUED | OUTPATIENT
Start: 2023-03-21 | End: 2023-03-21

## 2023-03-21 RX ORDER — SODIUM CHLORIDE 0.9 % (FLUSH) 0.9 %
5-40 SYRINGE (ML) INJECTION PRN
Status: DISCONTINUED | OUTPATIENT
Start: 2023-03-21 | End: 2023-03-21

## 2023-03-21 RX ORDER — CEFAZOLIN SODIUM 2 G/100ML
2000 INJECTION, SOLUTION INTRAVENOUS ONCE
Status: DISCONTINUED | OUTPATIENT
Start: 2023-03-21 | End: 2023-03-21 | Stop reason: SDUPTHER

## 2023-03-21 RX ORDER — MIDAZOLAM HYDROCHLORIDE 2 MG/2ML
2 INJECTION, SOLUTION INTRAMUSCULAR; INTRAVENOUS
Status: DISCONTINUED | OUTPATIENT
Start: 2023-03-21 | End: 2023-03-21

## 2023-03-21 RX ORDER — HYDRALAZINE HYDROCHLORIDE 20 MG/ML
10 INJECTION INTRAMUSCULAR; INTRAVENOUS
Status: DISCONTINUED | OUTPATIENT
Start: 2023-03-21 | End: 2023-03-21

## 2023-03-21 RX ORDER — LIDOCAINE HYDROCHLORIDE 20 MG/ML
INJECTION, SOLUTION EPIDURAL; INFILTRATION; INTRACAUDAL; PERINEURAL PRN
Status: DISCONTINUED | OUTPATIENT
Start: 2023-03-21 | End: 2023-03-21 | Stop reason: SDUPTHER

## 2023-03-21 RX ORDER — PROPOFOL 10 MG/ML
INJECTION, EMULSION INTRAVENOUS PRN
Status: DISCONTINUED | OUTPATIENT
Start: 2023-03-21 | End: 2023-03-21 | Stop reason: SDUPTHER

## 2023-03-21 RX ORDER — IPRATROPIUM BROMIDE AND ALBUTEROL SULFATE 2.5; .5 MG/3ML; MG/3ML
1 SOLUTION RESPIRATORY (INHALATION)
Status: COMPLETED | OUTPATIENT
Start: 2023-03-21 | End: 2023-03-21

## 2023-03-21 RX ORDER — MEPERIDINE HYDROCHLORIDE 25 MG/ML
12.5 INJECTION INTRAMUSCULAR; INTRAVENOUS; SUBCUTANEOUS EVERY 5 MIN PRN
Status: DISCONTINUED | OUTPATIENT
Start: 2023-03-21 | End: 2023-03-21

## 2023-03-21 RX ORDER — SODIUM CHLORIDE, SODIUM LACTATE, POTASSIUM CHLORIDE, CALCIUM CHLORIDE 600; 310; 30; 20 MG/100ML; MG/100ML; MG/100ML; MG/100ML
INJECTION, SOLUTION INTRAVENOUS CONTINUOUS PRN
Status: DISCONTINUED | OUTPATIENT
Start: 2023-03-21 | End: 2023-03-21 | Stop reason: SDUPTHER

## 2023-03-21 RX ORDER — LIDOCAINE HYDROCHLORIDE 20 MG/ML
INJECTION, SOLUTION INFILTRATION; PERINEURAL CONTINUOUS PRN
Status: DISCONTINUED | OUTPATIENT
Start: 2023-03-21 | End: 2023-03-21 | Stop reason: SDUPTHER

## 2023-03-21 RX ORDER — ONDANSETRON 2 MG/ML
4 INJECTION INTRAMUSCULAR; INTRAVENOUS
Status: DISCONTINUED | OUTPATIENT
Start: 2023-03-21 | End: 2023-03-21

## 2023-03-21 RX ORDER — FENTANYL CITRATE 50 UG/ML
25 INJECTION, SOLUTION INTRAMUSCULAR; INTRAVENOUS EVERY 5 MIN PRN
Status: COMPLETED | OUTPATIENT
Start: 2023-03-21 | End: 2023-03-21

## 2023-03-21 RX ORDER — DEXAMETHASONE SODIUM PHOSPHATE 4 MG/ML
INJECTION, SOLUTION INTRA-ARTICULAR; INTRALESIONAL; INTRAMUSCULAR; INTRAVENOUS; SOFT TISSUE PRN
Status: DISCONTINUED | OUTPATIENT
Start: 2023-03-21 | End: 2023-03-21 | Stop reason: SDUPTHER

## 2023-03-21 RX ORDER — ONDANSETRON 2 MG/ML
INJECTION INTRAMUSCULAR; INTRAVENOUS PRN
Status: DISCONTINUED | OUTPATIENT
Start: 2023-03-21 | End: 2023-03-21 | Stop reason: SDUPTHER

## 2023-03-21 RX ORDER — DROPERIDOL 2.5 MG/ML
0.62 INJECTION, SOLUTION INTRAMUSCULAR; INTRAVENOUS
Status: DISCONTINUED | OUTPATIENT
Start: 2023-03-21 | End: 2023-03-21

## 2023-03-21 RX ORDER — GLYCOPYRROLATE 0.2 MG/ML
INJECTION INTRAMUSCULAR; INTRAVENOUS PRN
Status: DISCONTINUED | OUTPATIENT
Start: 2023-03-21 | End: 2023-03-21 | Stop reason: SDUPTHER

## 2023-03-21 RX ADMIN — HYDROMORPHONE HYDROCHLORIDE 0.5 MG: 1 INJECTION, SOLUTION INTRAMUSCULAR; INTRAVENOUS; SUBCUTANEOUS at 14:25

## 2023-03-21 RX ADMIN — HYDROMORPHONE HYDROCHLORIDE 0.5 MG: 1 INJECTION, SOLUTION INTRAMUSCULAR; INTRAVENOUS; SUBCUTANEOUS at 05:59

## 2023-03-21 RX ADMIN — Medication 10 MG: at 12:19

## 2023-03-21 RX ADMIN — LIDOCAINE HYDROCHLORIDE 100 MG: 20 INJECTION, SOLUTION EPIDURAL; INFILTRATION; INTRACAUDAL; PERINEURAL at 11:33

## 2023-03-21 RX ADMIN — BUDESONIDE AND FORMOTEROL FUMARATE DIHYDRATE 2 PUFF: 160; 4.5 AEROSOL RESPIRATORY (INHALATION) at 08:32

## 2023-03-21 RX ADMIN — FENTANYL CITRATE 50 MCG: 50 INJECTION, SOLUTION INTRAMUSCULAR; INTRAVENOUS at 11:55

## 2023-03-21 RX ADMIN — FENTANYL CITRATE 25 MCG: 50 INJECTION INTRAMUSCULAR; INTRAVENOUS at 14:02

## 2023-03-21 RX ADMIN — SUGAMMADEX 200 MG: 100 INJECTION, SOLUTION INTRAVENOUS at 12:58

## 2023-03-21 RX ADMIN — FENTANYL CITRATE 50 MCG: 50 INJECTION, SOLUTION INTRAMUSCULAR; INTRAVENOUS at 11:33

## 2023-03-21 RX ADMIN — METRONIDAZOLE 500 MG: 500 INJECTION, SOLUTION INTRAVENOUS at 16:43

## 2023-03-21 RX ADMIN — PIPERACILLIN AND TAZOBACTAM 3375 MG: 3; .375 INJECTION, POWDER, LYOPHILIZED, FOR SOLUTION INTRAVENOUS at 22:49

## 2023-03-21 RX ADMIN — PIPERACILLIN AND TAZOBACTAM 3375 MG: 3; .375 INJECTION, POWDER, LYOPHILIZED, FOR SOLUTION INTRAVENOUS at 06:04

## 2023-03-21 RX ADMIN — TIOTROPIUM BROMIDE INHALATION SPRAY 2 PUFF: 3.12 SPRAY, METERED RESPIRATORY (INHALATION) at 08:32

## 2023-03-21 RX ADMIN — METRONIDAZOLE 500 MG: 500 INJECTION, SOLUTION INTRAVENOUS at 01:41

## 2023-03-21 RX ADMIN — GLYCOPYRROLATE 0.2 MG: 0.2 INJECTION, SOLUTION INTRAMUSCULAR; INTRAVENOUS at 12:03

## 2023-03-21 RX ADMIN — IPRATROPIUM BROMIDE AND ALBUTEROL SULFATE 1 AMPULE: .5; 3 SOLUTION RESPIRATORY (INHALATION) at 13:36

## 2023-03-21 RX ADMIN — LIDOCAINE HYDROCHLORIDE 2 MG/KG/HR: 20 INJECTION, SOLUTION INFILTRATION; PERINEURAL at 11:33

## 2023-03-21 RX ADMIN — SODIUM CHLORIDE, POTASSIUM CHLORIDE, SODIUM LACTATE AND CALCIUM CHLORIDE: 600; 310; 30; 20 INJECTION, SOLUTION INTRAVENOUS at 06:02

## 2023-03-21 RX ADMIN — METRONIDAZOLE 500 MG: 500 INJECTION, SOLUTION INTRAVENOUS at 10:19

## 2023-03-21 RX ADMIN — ONDANSETRON 4 MG: 2 INJECTION INTRAMUSCULAR; INTRAVENOUS at 11:33

## 2023-03-21 RX ADMIN — SODIUM CHLORIDE, PRESERVATIVE FREE 10 ML: 5 INJECTION INTRAVENOUS at 09:03

## 2023-03-21 RX ADMIN — HYDROMORPHONE HYDROCHLORIDE 0.5 MG: 1 INJECTION, SOLUTION INTRAMUSCULAR; INTRAVENOUS; SUBCUTANEOUS at 09:57

## 2023-03-21 RX ADMIN — Medication 10 MG: at 12:30

## 2023-03-21 RX ADMIN — Medication 30 MG: at 11:33

## 2023-03-21 RX ADMIN — HYDROMORPHONE HYDROCHLORIDE 1 MG: 1 INJECTION, SOLUTION INTRAMUSCULAR; INTRAVENOUS; SUBCUTANEOUS at 13:13

## 2023-03-21 RX ADMIN — HYDROMORPHONE HYDROCHLORIDE 0.25 MG: 1 INJECTION, SOLUTION INTRAMUSCULAR; INTRAVENOUS; SUBCUTANEOUS at 17:40

## 2023-03-21 RX ADMIN — ROCURONIUM BROMIDE 50 MG: 10 INJECTION INTRAVENOUS at 11:33

## 2023-03-21 RX ADMIN — SODIUM CHLORIDE, SODIUM LACTATE, POTASSIUM CHLORIDE, CALCIUM CHLORIDE: 600; 310; 30; 20 INJECTION, SOLUTION INTRAVENOUS at 11:37

## 2023-03-21 RX ADMIN — PROPOFOL 100 MG: 10 INJECTION, EMULSION INTRAVENOUS at 11:33

## 2023-03-21 RX ADMIN — SODIUM CHLORIDE, POTASSIUM CHLORIDE, SODIUM LACTATE AND CALCIUM CHLORIDE: 600; 310; 30; 20 INJECTION, SOLUTION INTRAVENOUS at 14:19

## 2023-03-21 RX ADMIN — DEXAMETHASONE SODIUM PHOSPHATE 4 MG: 4 INJECTION, SOLUTION INTRAMUSCULAR; INTRAVENOUS at 11:33

## 2023-03-21 RX ADMIN — BUDESONIDE AND FORMOTEROL FUMARATE DIHYDRATE 2 PUFF: 160; 4.5 AEROSOL RESPIRATORY (INHALATION) at 20:58

## 2023-03-21 RX ADMIN — HYDROMORPHONE HYDROCHLORIDE 0.5 MG: 1 INJECTION, SOLUTION INTRAMUSCULAR; INTRAVENOUS; SUBCUTANEOUS at 22:55

## 2023-03-21 RX ADMIN — FENTANYL CITRATE 25 MCG: 50 INJECTION INTRAMUSCULAR; INTRAVENOUS at 13:29

## 2023-03-21 RX ADMIN — CEFAZOLIN 2000 MG: 2 INJECTION, POWDER, FOR SOLUTION INTRAMUSCULAR; INTRAVENOUS at 11:37

## 2023-03-21 RX ADMIN — PIPERACILLIN AND TAZOBACTAM 3375 MG: 3; .375 INJECTION, POWDER, LYOPHILIZED, FOR SOLUTION INTRAVENOUS at 14:35

## 2023-03-21 ASSESSMENT — ENCOUNTER SYMPTOMS
COLOR CHANGE: 0
NAUSEA: 0
WHEEZING: 0
SHORTNESS OF BREATH: 0
CONSTIPATION: 0
APNEA: 0
ANAL BLEEDING: 0
CHOKING: 0
EYE REDNESS: 0
SORE THROAT: 0
RECTAL PAIN: 0
EYE ITCHING: 0
ABDOMINAL PAIN: 1
PHOTOPHOBIA: 0
BACK PAIN: 0
ABDOMINAL PAIN: 0
STRIDOR: 0
SORE THROAT: 0
ABDOMINAL DISTENTION: 1

## 2023-03-21 ASSESSMENT — PAIN DESCRIPTION - LOCATION
LOCATION: ABDOMEN

## 2023-03-21 ASSESSMENT — PAIN DESCRIPTION - DESCRIPTORS
DESCRIPTORS: ACHING
DESCRIPTORS: ACHING
DESCRIPTORS: DISCOMFORT
DESCRIPTORS: ACHING
DESCRIPTORS: DISCOMFORT

## 2023-03-21 ASSESSMENT — PAIN SCALES - GENERAL
PAINLEVEL_OUTOF10: 9
PAINLEVEL_OUTOF10: 9
PAINLEVEL_OUTOF10: 7
PAINLEVEL_OUTOF10: 10
PAINLEVEL_OUTOF10: 7
PAINLEVEL_OUTOF10: 0
PAINLEVEL_OUTOF10: 5
PAINLEVEL_OUTOF10: 0
PAINLEVEL_OUTOF10: 7
PAINLEVEL_OUTOF10: 6
PAINLEVEL_OUTOF10: 7
PAINLEVEL_OUTOF10: 0

## 2023-03-21 ASSESSMENT — PAIN DESCRIPTION - ORIENTATION
ORIENTATION: LOWER
ORIENTATION: MID

## 2023-03-21 ASSESSMENT — PAIN DESCRIPTION - ONSET
ONSET: ON-GOING

## 2023-03-21 ASSESSMENT — PAIN DESCRIPTION - PAIN TYPE
TYPE: SURGICAL PAIN

## 2023-03-21 ASSESSMENT — PAIN - FUNCTIONAL ASSESSMENT
PAIN_FUNCTIONAL_ASSESSMENT: PREVENTS OR INTERFERES SOME ACTIVE ACTIVITIES AND ADLS
PAIN_FUNCTIONAL_ASSESSMENT: PREVENTS OR INTERFERES SOME ACTIVE ACTIVITIES AND ADLS
PAIN_FUNCTIONAL_ASSESSMENT: 0-10
PAIN_FUNCTIONAL_ASSESSMENT: PREVENTS OR INTERFERES SOME ACTIVE ACTIVITIES AND ADLS
PAIN_FUNCTIONAL_ASSESSMENT: PREVENTS OR INTERFERES SOME ACTIVE ACTIVITIES AND ADLS

## 2023-03-21 ASSESSMENT — PAIN SCALES - WONG BAKER
WONGBAKER_NUMERICALRESPONSE: 6
WONGBAKER_NUMERICALRESPONSE: 0

## 2023-03-21 ASSESSMENT — PAIN DESCRIPTION - FREQUENCY
FREQUENCY: CONTINUOUS

## 2023-03-21 NOTE — CONSULTS
17 g Oral Daily PRN Pearl Antonio MD        acetaminophen (TYLENOL) tablet 650 mg  650 mg Oral Q6H PRN Pearl Antonio MD        Or    acetaminophen (TYLENOL) suppository 650 mg  650 mg Rectal Q6H PRN Pearl Antonio MD        lactated ringers IV soln infusion   IntraVENous Continuous Pearl Antonio  mL/hr at 03/21/23 0602 New Bag at 03/21/23 0602    magnesium sulfate 2000 mg in 50 mL IVPB premix  2,000 mg IntraVENous PRN Pearl Antonio MD        potassium chloride (KLOR-CON M) extended release tablet 40 mEq  40 mEq Oral PRN Pearl Antonio MD        Or    potassium bicarb-citric acid (EFFER-K) effervescent tablet 40 mEq  40 mEq Oral PRN Pearl Antonio MD        Or    potassium chloride 10 mEq/100 mL IVPB (Peripheral Line)  10 mEq IntraVENous PRN Pearl Antonio MD        aluminum & magnesium hydroxide-simethicone (MAALOX) 200-200-20 MG/5ML suspension 30 mL  30 mL Oral Q6H PRN Pearl Antonio MD        piperacillin-tazobactam (ZOSYN) 3,375 mg in sodium chloride 0.9 % 50 mL IVPB (mini-bag)  3,375 mg IntraVENous Q8H Pearl Antonio MD   Stopped at 03/21/23 1019    metronidazole (FLAGYL) 500 mg in 0.9% NaCl 100 mL IVPB premix  500 mg IntraVENous Q8H Pearl Antonio  mL/hr at 03/21/23 1019 500 mg at 03/21/23 1019    glucose chewable tablet 16 g  4 tablet Oral PRN Pearl Antonio MD        dextrose bolus 10% 125 mL  125 mL IntraVENous PRN Pearl Antonio MD        Or    dextrose bolus 10% 250 mL  250 mL IntraVENous PRN Pearl Antonio MD        glucagon (rDNA) injection 1 mg  1 mg SubCUTAneous PRN Pearl Antonio MD        dextrose 10 % infusion   IntraVENous Continuous PRN Pearl Antonio MD        budesonide-formoterol (SYMBICORT) 160-4.5 MCG/ACT inhaler 2 puff  2 puff Inhalation BID Pearl Antonio MD   2 puff at 03/21/23 9398    And    tiotropium (SPIRIVA RESPIMAT) 2.5 MCG/ACT inhaler 2 puff  2 puff Inhalation Daily Pearl Antonio MD   2 puff at 03/21/23 6158       Allergies:   Other and Levaquin [levofloxacin]    Social History:   Social History 03/21/2023 06:26 AM    EOSABS 0.1 03/21/2023 06:26 AM    BASOSABS 0.0 03/21/2023 06:26 AM    DIFFTYPE AUTOMATED DIFFERENTIAL 03/21/2023 06:26 AM     CMP:    Lab Results   Component Value Date/Time     03/21/2023 06:26 AM    K 4.4 03/21/2023 06:26 AM     03/21/2023 06:26 AM    CO2 26 03/21/2023 06:26 AM    BUN 17 03/21/2023 06:26 AM    CREATININE 0.8 03/21/2023 06:26 AM    GFRAA 53 07/15/2022 09:27 AM    AGRATIO 1.6 03/16/2023 11:56 AM    LABGLOM >60 03/21/2023 06:26 AM    GLUCOSE 115 03/21/2023 06:26 AM    PROT 5.8 03/21/2023 06:26 AM    LABALBU 3.7 03/21/2023 06:26 AM    CALCIUM 7.9 03/21/2023 06:26 AM    BILITOT 0.5 03/21/2023 06:26 AM    ALKPHOS 60 03/21/2023 06:26 AM    AST 20 03/21/2023 06:26 AM    ALT 32 03/21/2023 06:26 AM       IMPRESSION:        Patient Active Problem List:     Shortness of breath     Snoring     Excessive daytime sleepiness     Former smoker     COPD, mild (HCC)     Chest pain     History of CVA (cerebrovascular accident)     Mixed hyperlipidemia     Gastroesophageal reflux disease without esophagitis     Anxiety     Multiple lung nodules on CT     Hx of colonic polyps     Benign neoplasm of sigmoid colon     Acute exacerbation of chronic obstructive pulmonary disease (COPD) ([facility] [address]     Mild obstructive sleep apnea     Type 2 diabetes mellitus without complication, without long-term current use of insulin (HCC)     Chronic renal disease, stage III ([address] [872218]     Essential hypertension     Seasonal allergies     Primary insomnia     Chronic pain of right hip     Closed intertrochanteric fracture of hip, right, initial encounter ([facility] [address]     Type 2 diabetes mellitus with chronic kidney disease     Fatty liver     Adrenal nodule (HCC)     Perforated sigmoid colon ([address]     Perforated diverticulum      PLAN:    Pt with worsening abd pain even though wbc improved overnight. Will proceed with exp lap and sigmoid resection with possible temporary colostomy today.  Care discussed with

## 2023-03-21 NOTE — BRIEF OP NOTE
Brief Postoperative Note      Patient: Nando Gomez  YOB: 1950  MRN: [id number]    Date of Procedure: 3/21/2023    Pre-Op Diagnosis: perforated diverticulitis    Post-Op Diagnosis: Same       Procedure(s):  LAPAROTOMY EXPLORATORY sigmoid resection with primary anastomosis     Surgeon(s):  Cyn Sandhu MD    Assistant:  First Assistant: Deborah Sweeney    Anesthesia: General    Estimated Blood Loss (mL): Minimal    Complications: None    Specimens:   ID Type Source Tests Collected by Time Destination   A : COLON Tissue Colon SURGICAL PATHOLOGY Cyn Sandhu MD 3/21/2023 1210        Implants:  * No implants in log *      Drains:   Closed/Suction Drain Right Abdomen Bulb (Active)   Site Description Clean, dry & intact 03/21/23 1313   Dressing Status Clean, dry & intact 03/21/23 1313   Drainage Appearance Dark Red 03/21/23 1313   Drain Status To bulb suction 03/21/23 1313       NG/OG/NJ/NE Tube Nasogastric 18 fr Right nostril (Active)   Surrounding Skin Clean, dry & intact 03/21/23 1313   Securement device Tape 03/21/23 1313   Status Suction-low intermittent 03/21/23 1313   NG/OG/NJ/NE External Measurement (cm) 60 cm 03/21/23 1313   Drainage Appearance Brown 03/21/23 1313       Urinary Catheter 03/21/23 Lyn (Active)   Catheter Indications Perioperative use for selected surgical procedures 03/21/23 1313   Site Assessment Pink; No urethral drainage 03/21/23 1313   Urine Color Yellow 03/21/23 1313   Urine Appearance Clear 03/21/23 1313   Collection Container Standard 03/21/23 1313   Catheter Best Practices  Tamper seal intact; Bag below bladder;Bag not on floor; Lack of dependent loop in tubing;Drainage bag less than half full 03/21/23 1313   Status Draining 03/21/23 1313       [REMOVED] NG/OG/NJ/NE Tube Orogastric 18 fr Center mouth (Removed)       Findings: minimal contamination    Electronically signed by Kingston Babinski, MD on 3/21/2023 at 2:01 PM

## 2023-03-21 NOTE — PROGRESS NOTES
Pt drowsy oxygen saturation 88-90% Oxygen placed at 2 l to take pt to prep hold via bed transport staff here

## 2023-03-21 NOTE — ANESTHESIA PRE PROCEDURE
03/21/2023 06:26 AM    ALT 32 03/21/2023 06:26 AM       POC Tests:   Recent Labs     03/21/23  1022   POCGLU 114*       Coags:   Lab Results   Component Value Date/Time    PROTIME 12.8 03/21/2023 06:26 AM    INR 0.99 03/21/2023 06:26 AM    APTT 33.7 03/21/2023 06:26 AM       HCG (If Applicable): No results found for: PREGTESTUR, PREGSERUM, HCG, HCGQUANT     ABGs: No results found for: PHART, PO2ART, RDD2AKR, ASK7KPG, BEART, M2BSGRGU     Type & Screen (If Applicable):  No results found for: LABABO, LABRH    Drug/Infectious Status (If Applicable):  No results found for: HIV, HEPCAB    COVID-19 Screening (If Applicable):   Lab Results   Component Value Date/Time    COVID19 Not Detected 01/18/2023 12:55 PM           Anesthesia Evaluation  Patient summary reviewed  Airway: Mallampati: II  TM distance: >3 FB   Neck ROM: full  Mouth opening: > = 3 FB   Dental:          Pulmonary:normal exam    (+) COPD:  sleep apnea:                             Cardiovascular:    (+) hypertension:,                   Neuro/Psych:   (+) CVA:,             GI/Hepatic/Renal:   (+) GERD:, renal disease: CRI,           Endo/Other:    (+) Diabetes, . Abdominal:             Vascular: Other Findings:           Anesthesia Plan      general     ASA 3       Induction: intravenous. MIPS: Prophylactic antiemetics administered. Anesthetic plan and risks discussed with patient. Use of blood products discussed with patient whom consented to blood products. Plan discussed with CRNA.     Attending anesthesiologist reviewed and agrees with Allyn Anthony MD   3/21/2023

## 2023-03-21 NOTE — PROGRESS NOTES
PLEASE NOTE:  Per P/T policy, the order for alendronate will be discontinued while the patient is in the hospital and may be resumed upon discharge pending physician approval.  Thanks!   Rinku Ellis, [facility]  3/20/2023 10:41 PM

## 2023-03-21 NOTE — CARE COORDINATION
Case Management Assessment  Initial Evaluation    Date/Time of Evaluation: 3/21/2023 9:14 AM  Assessment Completed by: Jaqueline Cordon RN    If patient is discharged prior to next notation, then this note serves as note for discharge by case management. Patient Name: Luis Guadalupe                   YOB: 1950  Diagnosis: Perforated sigmoid colon ([facility] [address] [K63.1]  Abdominal pain, unspecified abdominal location [R10.9]  Perforated diverticulum [K57.80]                   Date / Time: 3/20/2023  2:45 PM    Patient Admission Status: Inpatient   Readmission Risk (Low < 19, Mod (19-27), High > 27): Readmission Risk Score: 13    Current PCP: Randa Brewer, DO  PCP verified by CM? Yes    Chart Reviewed: Yes      History Provided by: Medical Record  Patient Orientation: Alert and Oriented, Person, Place    Patient Cognition: Alert    Hospitalization in the last 30 days (Readmission):  No    If yes, Readmission Assessment in  Navigator will be completed. Advance Directives:      Code Status: Full Code   Patient's Primary Decision Maker is: Legal Next of Kin    Primary Decision MakeGerda Lees  Child - [phone]    Discharge Planning:    Patient lives with: Family Members, Children Type of Home: House  Primary Care Giver: Self  Patient Support Systems include: Children, Family Members   Current Financial resources: Medicare  Current community resources: None  Current services prior to admission: None            Current DME:              Type of Home Care services:  None    ADLS  Prior functional level: Independent in ADLs/IADLs  Current functional level: Independent in ADLs/IADLs    PT AM-PAC:   /24  OT AM-PAC:   /24    Family can provide assistance at DC: Yes  Would you like Case Management to discuss the discharge plan with any other family members/significant others, and if so, who?  No  Plans to Return to Present Housing: Yes  Other Identified Issues/Barriers to RETURNING to current housing:

## 2023-03-21 NOTE — CONSULTS
Department of Internal Medicine  Gastroenterology Consult Note  Lonnie Jiménez MD      Reason for Consult:  Sigmoid perforation. Primary Care Physician:  Wayne Trujillo DO    History Obtained From:  patient    HISTORY OF PRESENT ILLNESS:              The patient is a 67 y.o.  female who presents with acute abdominal pain yesterday morning. She had been constipated for several months prior to this. She states that the pain became worse and her abdomen became progressively distended and she presented to the ED and was admitted. The ct of the abdomen showed a small area of loculated extraluminal gas, next to the sigmoid colon, diverticulosis and evidence of pneumoperitoneum. She was started on antibiotics and has not noticed any improvement in her pain. Sx have been worsening.      Past Medical History:        Diagnosis Date    Acute exacerbation of chronic obstructive pulmonary disease (COPD) ([address] 12/15/2021    Cerebral artery occlusion with cerebral infarction (HCC)     COPD, mild ([address] 6/15/2021    Diabetes mellitus ([address]     Excessive daytime sleepiness 6/15/2021    Former smoker 6/15/2021    Hyperlipidemia     Hypertension     Mild obstructive sleep apnea 12/15/2021    Multiple lung nodules on CT 7/27/2021    Shortness of breath 6/15/2021    Snoring 6/15/2021       Past Surgical History:        Procedure Laterality Date    APPENDECTOMY      CHOLECYSTECTOMY      COLONOSCOPY N/A 9/15/2021    COLONOSCOPY POLYPECTOMY SNARE/COLD BIOPSY performed by Pancho Pearl MD at [facility] 320 Left     Left hand    HYSTERECTOMY (CERVIX STATUS UNKNOWN)      JOINT REPLACEMENT Right     12/04/2020-Right Hip    OVARY REMOVAL      age 34    TONSILLECTOMY      UPPER GASTROINTESTINAL ENDOSCOPY N/A 9/15/2021    EGD DIAGNOSTIC ONLY performed by Pancho Pearl MD at [facility] ENDOSCOPY       Medications Prior to Admission:    Prior to Admission medications    Medication Sig Start Date End Date glucose monitoring (FREESTYLE FREEDOM) kit 1 kit by Does not apply route daily 10/17/22   DICK Gloria CNP   Lancets MISC 1 each by Does not apply route daily 10/17/22   DICK Gloria CNP   blood glucose monitor strips Test 2 times a day & as needed for symptoms of irregular blood glucose. Dispense sufficient amount for indicated testing frequency plus additional to accommodate PRN testing needs. 10/17/22   DICK Gloria CNP   umeclidinium-vilanterol (ANORO ELLIPTA) 62.5-25 MCG/INH AEPB inhaler Inhale 1 puff into the lungs in the morning. Patient not taking: No sig reported 8/16/22   Yinka Diaz DO   lisinopril (PRINIVIL;ZESTRIL) 40 MG tablet TAKE 1 TABLET BY MOUTH EVERY DAY 8/4/22   Yinka Diaz DO   aspirin 81 MG EC tablet Take 81 mg by mouth daily    Historical Provider, MD       Allergies: Allergies   Allergen Reactions    Other Hives    Levaquin [Levofloxacin] Rash   . Social History:    TOBACCO:  No  ETOH:  No    Family History:   Family History   Problem Relation Age of Onset    Colon Cancer Mother     Heart Attack Father     Other Father         suicide    Other Sister         suicide    Heart Attack Brother     Pacemaker Brother     No Known Problems Brother     No Known Problems Brother     No Known Problems Brother     Ovarian Cancer Daughter     Breast Cancer Neg Hx        REVIEW OF SYSTEMS: (POSITIVES WILL BE UNDERLINED)  CONSTITUTIONAL:    Weight change,fatigue, fever, chills  EYES:  Diplopia, change in vision  EARS:  hearing loss, tinnitus, vertigo  NOSE:   epistaxis  MOUTH/THROAT:     hoarseness, sore throat. RESPIRATORY:  SOB,  cough, sputum, hemoptysis  CARDIOVASCULAR : chest pain,palpitations, dyspnea exertion, orthopnea, paroxysmal nocturnal dyspnea, pedal edema. GASTROINTESTINAL:  See HPI  GENITOURINARY:   dysuria, hematuria, . HEMATOLOGIC/LYMPHATIC:   Anemia, bleeding tendencies.   MUSCULOSKELETAL:    myalgias,  joint pain  NEUROLOGICAL:   Loss of

## 2023-03-21 NOTE — PROGRESS NOTES
4 Eyes Skin Assessment     NAME:  Frederick Soares  YOB: 1950  MEDICAL RECORD NUMBER:  [id number]    The patient is being assessed for  Admission    I agree that One RN has performed a thorough Head to Toe Skin Assessment on the patient. ALL assessment sites listed below have been assessed. Areas assessed by both nurses:    Head, Face, Ears, Shoulders, Back, Chest, Arms, Elbows, Hands, Sacrum. Buttock, Coccyx, Ischium, and Legs. Feet and Heels        Does the Patient have a Wound?  No noted wound(s)       Vipul Prevention initiated by RN: No   Wound Care Orders initiated by RN: No    Pressure Injury (Stage 3,4, Unstageable, DTI, NWPT, and Complex wounds) if present, place referral order by RN under : No    New and Established Ostomies, if present place, referral order under : No      Nurse 1 eSignature: Electronically signed by Albert Bal LPN on 3/06/76 at 8:01 PM EDT    **SHARE this note so that the co-signing nurse can place an eSignature**    Nurse 2 eSignature: Electronically signed by Filomena De Leon RN on 3/21/23 at 1:28 AM EDT

## 2023-03-21 NOTE — H&P
V2.0  History and Physical      Name:  Peggy Silva /Age/Sex: 1950  (67 y.o. female)   MRN & CSN:  [id number] & [id number] Encounter Date/Time: 3/20/2023 8:19 PM EDT   Location:  [facility]/OTF1 PCP: Dimple Singh DO       Hospital Day: 1    Assessment and Plan:   Peggy Silva is a 67 y.o. female with a pmh of COPD, diabetes mellitus, HLD, HTN, mild JESSE, who presents with perforated sigmoid diverticulitis      Past Medical History:   Diagnosis Date    Acute exacerbation of chronic obstructive pulmonary disease (COPD) ([address] 12/15/2021    Cerebral artery occlusion with cerebral infarction ([address]     COPD, mild ([address] 6/15/2021    Diabetes mellitus ([address]     Excessive daytime sleepiness 6/15/2021    Former smoker 6/15/2021    Hyperlipidemia     Hypertension     Mild obstructive sleep apnea 12/15/2021    Multiple lung nodules on CT 2021    Shortness of breath 6/15/2021    Snoring 6/15/2021       Perforated sigmoid diverticulitis:  -Admit to MedSurg  -Telemetry  -IV fluids  -Patient s/p Zosyn and metronidazole  -We will continue IV Zosyn and metronidazole  -Leukocytosis 17 K  -Monitor fever, WBC curve  -Lactic acid 1.7  -Follow Pro-Vikas, CRP  -Follow blood cultures  -Urinalysis unremarkable  -Pain control  -Antibiotics  -N.p.o. for possible surgery   COPD:  -Continue with DuoNebs as needed, Anoro Ellipta  HTN:  HLD:  -Continue with ASA, amlodipine, lisinopril, atorvastatin  Miscellaneous:  -Continue home medications except as contraindicated      Disposition:   Current Living situation: Home  Expected Disposition: Home  Estimated D/C: 2-3 days    Diet Diet NPO   DVT Prophylaxis [x] Lovenox, []  Heparin, [] SCDs, [] Ambulation,  [] Eliquis, [] Xarelto, [] Coumadin   Code Status Prior   Surrogate Decision Maker/ POA Self     History from:     patient, electronic medical record    History of Present Illness:     Chief Complaint: Abdomen pain, dysuria and constipation  Peggy Silva is a 67 y.o. female with education: None    Highest education level: None   Tobacco Use    Smoking status: Former     Packs/day: 1.00     Years: 51.00     Pack years: 51.00     Types: Cigarettes     Start date:      Quit date: 2020     Years since quittin.2    Smokeless tobacco: Never   Vaping Use    Vaping Use: Former    Substances: Flavoring    Devices: Disposable, Pre-filled pod   Substance and Sexual Activity    Alcohol use: Not Currently    Drug use: Not Currently     Social Determinants of Health     Financial Resource Strain: Unknown    Difficulty of Paying Living Expenses: Patient refused   Food Insecurity: Unknown    Worried About Running Out of Food in the Last Year: Patient refused    Ran Out of Food in the Last Year: Patient refused   Transportation Needs: Unknown    Lack of Transportation (Non-Medical): Patient refused   Housing Stability: Unknown    Unstable Housing in the Last Year: Patient refused       Medications:   Medications:    famotidine  20 mg Oral Once    ipratropium-albuterol  1 ampule Inhalation Q4H WA      Infusions:   PRN Meds: HYDROcodone 5 mg - acetaminophen, 1 tablet, Q6H PRN  morphine, 4 mg, Q30 Min PRN        Labs      CBC:   Recent Labs     23  1537   WBC 17.6*   HGB 11.4*        BMP:    Recent Labs     23  1537   *   K 5.0   CL 98*   CO2 24   BUN 24*   CREATININE 0.9   GLUCOSE 126*     Hepatic:   Recent Labs     23  1537   AST 19   ALT 20   BILITOT 0.4   ALKPHOS 69     Lipids:   Lab Results   Component Value Date/Time    CHOL 143 06/15/2021 10:55 AM    HDL 58 2023 11:56 AM    TRIG 79 06/15/2021 10:55 AM     Hemoglobin A1C:   Lab Results   Component Value Date/Time    LABA1C 6.6 2023 11:56 AM     TSH: No results found for: TSH  Troponin:   Lab Results   Component Value Date/Time    TROPONINT <0.010 2022 01:05 AM    TROPONINT <0.010 2022 09:45 AM    TROPONINT <0.010 2022 10:10 PM     Lactic Acid: No results for input(s): LACTA in the

## 2023-03-21 NOTE — PROGRESS NOTES
1313: Arrived to PACU from OR. Monitors applied, alarms on. Report obtained from Dinesh Vital and Edgardo Stevenson. 1315: Slightly restless but not responding to commands. 1329: Moans and nods head yes when asked if having pain. Medicated and much reassurance given. 1336: Repositioned in bed, Duo neb started for end expiratory wheeze. Deep breaths encouraged. 1345: Duo neb complete. Coughs with encouragement. Denies being cold, dozes quickly. 1425: Turned and repositioned in bed, medicated for abdominal discomfort. Ice chips given. 1458: Transported to room 1115. Family at bedside.

## 2023-03-21 NOTE — PROGRESS NOTES
03/20/23  1537 03/21/23 0626   * 139   K 5.0 4.4   CL 98* 103   CO2 24 26   BUN 24* 17   CREATININE 0.9 0.8     Recent Labs     03/20/23  1537 03/21/23  0626   AST 19 20   ALT 20 32   BILITOT 0.4 0.5   ALKPHOS 69 60     Recent Labs     03/21/23  0626   INR 0.99     No results for input(s): CKTOTAL, CKMB, CKMBINDEX, TROPONINT in the last 72 hours.   Lab Results   Component Value Date/Time    LABA1C 6.6 03/16/2023 11:56 AM     CALCIUM:  7.9/26 (03/21 0626)  No results found for: MG        Electronically signed by Claudia Figueroa MD on 3/21/2023 at 9:43 AM

## 2023-03-22 LAB
CULTURE: NORMAL
GLUCOSE BLD-MCNC: 110 MG/DL (ref 70–99)
GLUCOSE BLD-MCNC: 125 MG/DL (ref 70–99)
GLUCOSE BLD-MCNC: 130 MG/DL (ref 70–99)
GLUCOSE BLD-MCNC: 134 MG/DL (ref 70–99)
Lab: NORMAL
SPECIMEN: NORMAL

## 2023-03-22 PROCEDURE — 6360000002 HC RX W HCPCS: Performed by: SURGERY

## 2023-03-22 PROCEDURE — 6370000000 HC RX 637 (ALT 250 FOR IP): Performed by: SURGERY

## 2023-03-22 PROCEDURE — 82962 GLUCOSE BLOOD TEST: CPT

## 2023-03-22 PROCEDURE — 1200000000 HC SEMI PRIVATE

## 2023-03-22 PROCEDURE — 94640 AIRWAY INHALATION TREATMENT: CPT

## 2023-03-22 PROCEDURE — 2500000003 HC RX 250 WO HCPCS: Performed by: SURGERY

## 2023-03-22 PROCEDURE — 94761 N-INVAS EAR/PLS OXIMETRY MLT: CPT

## 2023-03-22 PROCEDURE — 51798 US URINE CAPACITY MEASURE: CPT

## 2023-03-22 PROCEDURE — 2700000000 HC OXYGEN THERAPY PER DAY

## 2023-03-22 PROCEDURE — 2580000003 HC RX 258: Performed by: SURGERY

## 2023-03-22 RX ADMIN — LISINOPRIL 40 MG: 20 TABLET ORAL at 15:19

## 2023-03-22 RX ADMIN — AMLODIPINE BESYLATE 10 MG: 10 TABLET ORAL at 11:20

## 2023-03-22 RX ADMIN — BUDESONIDE AND FORMOTEROL FUMARATE DIHYDRATE 2 PUFF: 160; 4.5 AEROSOL RESPIRATORY (INHALATION) at 07:27

## 2023-03-22 RX ADMIN — PIPERACILLIN AND TAZOBACTAM 3375 MG: 3; .375 INJECTION, POWDER, LYOPHILIZED, FOR SOLUTION INTRAVENOUS at 14:21

## 2023-03-22 RX ADMIN — METRONIDAZOLE 500 MG: 500 INJECTION, SOLUTION INTRAVENOUS at 04:06

## 2023-03-22 RX ADMIN — HYDROMORPHONE HYDROCHLORIDE 0.5 MG: 1 INJECTION, SOLUTION INTRAMUSCULAR; INTRAVENOUS; SUBCUTANEOUS at 09:44

## 2023-03-22 RX ADMIN — ASPIRIN 81 MG: 81 TABLET, COATED ORAL at 11:20

## 2023-03-22 RX ADMIN — BUDESONIDE AND FORMOTEROL FUMARATE DIHYDRATE 2 PUFF: 160; 4.5 AEROSOL RESPIRATORY (INHALATION) at 20:43

## 2023-03-22 RX ADMIN — METRONIDAZOLE 500 MG: 500 INJECTION, SOLUTION INTRAVENOUS at 17:50

## 2023-03-22 RX ADMIN — ATORVASTATIN CALCIUM 20 MG: 10 TABLET, FILM COATED ORAL at 14:55

## 2023-03-22 RX ADMIN — HYDROMORPHONE HYDROCHLORIDE 0.5 MG: 1 INJECTION, SOLUTION INTRAMUSCULAR; INTRAVENOUS; SUBCUTANEOUS at 14:13

## 2023-03-22 RX ADMIN — ENOXAPARIN SODIUM 40 MG: 100 INJECTION SUBCUTANEOUS at 10:07

## 2023-03-22 RX ADMIN — METRONIDAZOLE 500 MG: 500 INJECTION, SOLUTION INTRAVENOUS at 09:52

## 2023-03-22 RX ADMIN — ALBUTEROL SULFATE 2.5 MG: 2.5 SOLUTION RESPIRATORY (INHALATION) at 20:35

## 2023-03-22 RX ADMIN — BUSPIRONE HYDROCHLORIDE 30 MG: 15 TABLET ORAL at 22:32

## 2023-03-22 RX ADMIN — PIPERACILLIN AND TAZOBACTAM 3375 MG: 3; .375 INJECTION, POWDER, LYOPHILIZED, FOR SOLUTION INTRAVENOUS at 22:37

## 2023-03-22 RX ADMIN — HYDROMORPHONE HYDROCHLORIDE 0.5 MG: 1 INJECTION, SOLUTION INTRAMUSCULAR; INTRAVENOUS; SUBCUTANEOUS at 04:04

## 2023-03-22 RX ADMIN — PIPERACILLIN AND TAZOBACTAM 3375 MG: 3; .375 INJECTION, POWDER, LYOPHILIZED, FOR SOLUTION INTRAVENOUS at 06:12

## 2023-03-22 RX ADMIN — SODIUM CHLORIDE, PRESERVATIVE FREE 10 ML: 5 INJECTION INTRAVENOUS at 22:27

## 2023-03-22 RX ADMIN — HYDROMORPHONE HYDROCHLORIDE 0.5 MG: 1 INJECTION, SOLUTION INTRAMUSCULAR; INTRAVENOUS; SUBCUTANEOUS at 17:50

## 2023-03-22 RX ADMIN — HYDROMORPHONE HYDROCHLORIDE 0.5 MG: 1 INJECTION, SOLUTION INTRAMUSCULAR; INTRAVENOUS; SUBCUTANEOUS at 22:26

## 2023-03-22 RX ADMIN — TIOTROPIUM BROMIDE INHALATION SPRAY 2 PUFF: 3.12 SPRAY, METERED RESPIRATORY (INHALATION) at 07:33

## 2023-03-22 ASSESSMENT — PAIN DESCRIPTION - ORIENTATION
ORIENTATION: MID
ORIENTATION: LEFT;RIGHT
ORIENTATION: MID

## 2023-03-22 ASSESSMENT — PAIN DESCRIPTION - DESCRIPTORS
DESCRIPTORS: ACHING
DESCRIPTORS: ACHING
DESCRIPTORS: SORE
DESCRIPTORS: SHARP
DESCRIPTORS: DISCOMFORT;ACHING
DESCRIPTORS: SORE;ACHING
DESCRIPTORS: STABBING
DESCRIPTORS: ACHING;PENETRATING
DESCRIPTORS: ACHING
DESCRIPTORS: ACHING;SORE
DESCRIPTORS: ACHING

## 2023-03-22 ASSESSMENT — PAIN DESCRIPTION - PAIN TYPE
TYPE: SURGICAL PAIN
TYPE: ACUTE PAIN
TYPE: SURGICAL PAIN
TYPE: SURGICAL PAIN

## 2023-03-22 ASSESSMENT — PAIN DESCRIPTION - FREQUENCY
FREQUENCY: CONTINUOUS
FREQUENCY: INTERMITTENT
FREQUENCY: CONTINUOUS

## 2023-03-22 ASSESSMENT — PAIN SCALES - WONG BAKER
WONGBAKER_NUMERICALRESPONSE: 6
WONGBAKER_NUMERICALRESPONSE: 6
WONGBAKER_NUMERICALRESPONSE: 8

## 2023-03-22 ASSESSMENT — PAIN - FUNCTIONAL ASSESSMENT
PAIN_FUNCTIONAL_ASSESSMENT: PREVENTS OR INTERFERES SOME ACTIVE ACTIVITIES AND ADLS
PAIN_FUNCTIONAL_ASSESSMENT: ACTIVITIES ARE NOT PREVENTED

## 2023-03-22 ASSESSMENT — PAIN DESCRIPTION - ONSET
ONSET: PROGRESSIVE
ONSET: ON-GOING

## 2023-03-22 ASSESSMENT — PAIN SCALES - GENERAL
PAINLEVEL_OUTOF10: 7
PAINLEVEL_OUTOF10: 7
PAINLEVEL_OUTOF10: 5
PAINLEVEL_OUTOF10: 6
PAINLEVEL_OUTOF10: 7
PAINLEVEL_OUTOF10: 9
PAINLEVEL_OUTOF10: 8
PAINLEVEL_OUTOF10: 10
PAINLEVEL_OUTOF10: 7
PAINLEVEL_OUTOF10: 8
PAINLEVEL_OUTOF10: 10
PAINLEVEL_OUTOF10: 7
PAINLEVEL_OUTOF10: 8

## 2023-03-22 ASSESSMENT — PAIN DESCRIPTION - LOCATION
LOCATION: ABDOMEN;GROIN
LOCATION: ABDOMEN

## 2023-03-22 NOTE — PROGRESS NOTES
03/22/23 1115   Encounter Summary   Encounter Overview/Reason  Attempted Encounter   Service Provided For: Patient not available   Last Encounter  03/22/23  (patient was not available at the time of visit)   Begin Time 1115   End Time  1118   Total Time Calculated 3 min   Assessment/Intervention/Outcome   Assessment Unable to assess   Plan and Referrals   Plan/Referrals Continue Support (comment)  (as needed)

## 2023-03-22 NOTE — PROGRESS NOTES
V2.0  [facility] [place]      Name:  Tye Momin /Age/Sex: 1950  (67 y.o. female)   MRN & CSN:  [id number] & [id number] Encounter Date/Time: 3/22/2023 9:43 AM EDT    Location:  [address] [place], [place] [address] PCP: Armin Espinosa DO       Hospital Day: 3    Assessment and Plan:   Tye Momin is a 67 y.o. female who presents with perforated sigmoid diverticulitis      Plan:  Perforated sigmoid diverticulitis s/p exploratory lap with sigmoid resection with primary anastomosis 3/21.  -3/21: On IV antibiotics. CT abdomen pelvis 3/20: Perforated sigmoid diverticulitis. General surgery performed exploratory laparotomy with sigmoid resection with primary anastomosis. CRP 47. Procalcitonin 0.305.  -3/22: BCx 3/20: NGTD. PT/OT when okay with general surgery. Dysuria  -3/21: UA: Unremarkable. .  On IV antibiotics for perforated diverticulitis. Possible hypoxia  - 3/22: On 3 L nasal cannula. Likely postop hypoxia. Wean oxygen as tolerated. COPD  HTN  HLD  DM    Diet Diet NPO    DVT Prophylaxis [x] Lovenox, [] Heparin, [] Eliquis, [] Xarelto  [] SCDs       Code Status Full Code   Disposition From: Home  Expected Disposition: TBD  Estimated Date of Discharge: 3/25  Patient requires continued admission due to perforated diverticulitis   Home O2 None     Subjective/Interval history:   Chief Complaint: Abdominal Pain (Pt c/o mchugh lower abd pain that radiates into her lower back that started this morning when she woke up. )     Had surgery yesterday and has postop abdominal pain. She also feels soreness in her throat. Review of Systems:    Negative unless mentioned above    Objective:      Intake/Output Summary (Last 24 hours) at 3/22/2023 1010  Last data filed at 3/22/2023 1003  Gross per 24 hour   Intake 3691.03 ml   Output 2090 ml   Net 1601.03 ml        Vitals:   BP (!) 156/74   Pulse 75   Temp 98.3 °F (36.8 °C) (Oral)   Resp 15   Ht 5' (1.524 m)   Wt 139 lb 8.8 oz (63.3 kg)   SpO2 100% (rDNA), 1 mg, PRN  dextrose, , Continuous PRN      Labs      Recent Labs     03/20/23  1537 03/21/23 0626   WBC 17.6* 10.2   HGB 11.4* 10.4*   HCT 35.4* 32.9*    300      Recent Labs     03/20/23  1537 03/21/23 0626   * 139   K 5.0 4.4   CL 98* 103   CO2 24 26   BUN 24* 17   CREATININE 0.9 0.8     Recent Labs     03/20/23  1537 03/21/23 0626   AST 19 20   ALT 20 32   BILITOT 0.4 0.5   ALKPHOS 69 60     Recent Labs     03/21/23 0626   INR 0.99     No results for input(s): CKTOTAL, CKMB, CKMBINDEX, TROPONINT in the last 72 hours.   Lab Results   Component Value Date/Time    LABA1C 6.6 03/16/2023 11:56 AM     CALCIUM:  7.9/26 (03/21 0626)  No results found for: MG        Electronically signed by Pérez Bautista MD on 3/22/2023 at 10:10 AM

## 2023-03-23 LAB
ANION GAP SERPL CALCULATED.3IONS-SCNC: 11 MMOL/L (ref 4–16)
BUN SERPL-MCNC: 20 MG/DL (ref 6–23)
CALCIUM SERPL-MCNC: 8.5 MG/DL (ref 8.3–10.6)
CHLORIDE BLD-SCNC: 96 MMOL/L (ref 99–110)
CO2: 27 MMOL/L (ref 21–32)
CREAT SERPL-MCNC: 0.9 MG/DL (ref 0.6–1.1)
CRP SERPL HS-MCNC: 120.2 MG/L
GFR SERPL CREATININE-BSD FRML MDRD: >60 ML/MIN/1.73M2
GLUCOSE BLD-MCNC: 104 MG/DL (ref 70–99)
GLUCOSE BLD-MCNC: 127 MG/DL (ref 70–99)
GLUCOSE BLD-MCNC: 133 MG/DL (ref 70–99)
GLUCOSE BLD-MCNC: 239 MG/DL (ref 70–99)
GLUCOSE SERPL-MCNC: 127 MG/DL (ref 70–99)
HCT VFR BLD CALC: 36.1 % (ref 37–47)
HEMOGLOBIN: 11.3 GM/DL (ref 12.5–16)
MCH RBC QN AUTO: 28.1 PG (ref 27–31)
MCHC RBC AUTO-ENTMCNC: 31.3 % (ref 32–36)
MCV RBC AUTO: 89.8 FL (ref 78–100)
PDW BLD-RTO: 15.5 % (ref 11.7–14.9)
PLATELET # BLD: 361 K/CU MM (ref 140–440)
PMV BLD AUTO: 9.4 FL (ref 7.5–11.1)
POTASSIUM SERPL-SCNC: 4.1 MMOL/L (ref 3.5–5.1)
RBC # BLD: 4.02 M/CU MM (ref 4.2–5.4)
SARS-COV-2 RDRP RESP QL NAA+PROBE: NOT DETECTED
SODIUM BLD-SCNC: 134 MMOL/L (ref 135–145)
SOURCE: NORMAL
WBC # BLD: 10.9 K/CU MM (ref 4–10.5)

## 2023-03-23 PROCEDURE — 87635 SARS-COV-2 COVID-19 AMP PRB: CPT

## 2023-03-23 PROCEDURE — 6360000002 HC RX W HCPCS: Performed by: SURGERY

## 2023-03-23 PROCEDURE — 85027 COMPLETE CBC AUTOMATED: CPT

## 2023-03-23 PROCEDURE — 80048 BASIC METABOLIC PNL TOTAL CA: CPT

## 2023-03-23 PROCEDURE — 94640 AIRWAY INHALATION TREATMENT: CPT

## 2023-03-23 PROCEDURE — 2580000003 HC RX 258: Performed by: SURGERY

## 2023-03-23 PROCEDURE — 6370000000 HC RX 637 (ALT 250 FOR IP): Performed by: SURGERY

## 2023-03-23 PROCEDURE — 36415 COLL VENOUS BLD VENIPUNCTURE: CPT

## 2023-03-23 PROCEDURE — 82962 GLUCOSE BLOOD TEST: CPT

## 2023-03-23 PROCEDURE — 86140 C-REACTIVE PROTEIN: CPT

## 2023-03-23 PROCEDURE — 99024 POSTOP FOLLOW-UP VISIT: CPT | Performed by: SURGERY

## 2023-03-23 PROCEDURE — 1200000000 HC SEMI PRIVATE

## 2023-03-23 PROCEDURE — 94761 N-INVAS EAR/PLS OXIMETRY MLT: CPT

## 2023-03-23 PROCEDURE — 2500000003 HC RX 250 WO HCPCS: Performed by: SURGERY

## 2023-03-23 RX ORDER — LACTOBACILLUS RHAMNOSUS GG 10B CELL
1 CAPSULE ORAL
Status: DISCONTINUED | OUTPATIENT
Start: 2023-03-24 | End: 2023-03-28 | Stop reason: HOSPADM

## 2023-03-23 RX ORDER — BACLOFEN 10 MG/1
5 TABLET ORAL 2 TIMES DAILY
Status: DISCONTINUED | OUTPATIENT
Start: 2023-03-23 | End: 2023-03-28 | Stop reason: HOSPADM

## 2023-03-23 RX ADMIN — SODIUM CHLORIDE, PRESERVATIVE FREE 10 ML: 5 INJECTION INTRAVENOUS at 21:27

## 2023-03-23 RX ADMIN — PIPERACILLIN AND TAZOBACTAM 3375 MG: 3; .375 INJECTION, POWDER, LYOPHILIZED, FOR SOLUTION INTRAVENOUS at 14:43

## 2023-03-23 RX ADMIN — METRONIDAZOLE 500 MG: 500 INJECTION, SOLUTION INTRAVENOUS at 18:54

## 2023-03-23 RX ADMIN — ENOXAPARIN SODIUM 40 MG: 100 INJECTION SUBCUTANEOUS at 08:33

## 2023-03-23 RX ADMIN — SODIUM CHLORIDE, PRESERVATIVE FREE 10 ML: 5 INJECTION INTRAVENOUS at 14:50

## 2023-03-23 RX ADMIN — INSULIN LISPRO 1 UNITS: 100 INJECTION, SOLUTION INTRAVENOUS; SUBCUTANEOUS at 12:41

## 2023-03-23 RX ADMIN — ALBUTEROL SULFATE 2.5 MG: 2.5 SOLUTION RESPIRATORY (INHALATION) at 20:45

## 2023-03-23 RX ADMIN — METRONIDAZOLE 500 MG: 500 INJECTION, SOLUTION INTRAVENOUS at 09:04

## 2023-03-23 RX ADMIN — HYDROMORPHONE HYDROCHLORIDE 0.5 MG: 1 INJECTION, SOLUTION INTRAMUSCULAR; INTRAVENOUS; SUBCUTANEOUS at 08:32

## 2023-03-23 RX ADMIN — PIPERACILLIN AND TAZOBACTAM 3375 MG: 3; .375 INJECTION, POWDER, LYOPHILIZED, FOR SOLUTION INTRAVENOUS at 22:48

## 2023-03-23 RX ADMIN — ATORVASTATIN CALCIUM 20 MG: 10 TABLET, FILM COATED ORAL at 08:33

## 2023-03-23 RX ADMIN — BACLOFEN 5 MG: 10 TABLET ORAL at 21:10

## 2023-03-23 RX ADMIN — HYDROMORPHONE HYDROCHLORIDE 0.5 MG: 1 INJECTION, SOLUTION INTRAMUSCULAR; INTRAVENOUS; SUBCUTANEOUS at 03:53

## 2023-03-23 RX ADMIN — BUDESONIDE AND FORMOTEROL FUMARATE DIHYDRATE 2 PUFF: 160; 4.5 AEROSOL RESPIRATORY (INHALATION) at 09:25

## 2023-03-23 RX ADMIN — AMLODIPINE BESYLATE 10 MG: 10 TABLET ORAL at 08:33

## 2023-03-23 RX ADMIN — BUSPIRONE HYDROCHLORIDE 30 MG: 15 TABLET ORAL at 21:08

## 2023-03-23 RX ADMIN — ALBUTEROL SULFATE 2.5 MG: 2.5 SOLUTION RESPIRATORY (INHALATION) at 09:24

## 2023-03-23 RX ADMIN — SODIUM CHLORIDE, PRESERVATIVE FREE 10 ML: 5 INJECTION INTRAVENOUS at 21:38

## 2023-03-23 RX ADMIN — LISINOPRIL 40 MG: 20 TABLET ORAL at 08:33

## 2023-03-23 RX ADMIN — BACLOFEN 5 MG: 10 TABLET ORAL at 12:35

## 2023-03-23 RX ADMIN — PANTOPRAZOLE SODIUM 40 MG: 40 TABLET, DELAYED RELEASE ORAL at 05:47

## 2023-03-23 RX ADMIN — PIPERACILLIN AND TAZOBACTAM 3375 MG: 3; .375 INJECTION, POWDER, LYOPHILIZED, FOR SOLUTION INTRAVENOUS at 05:48

## 2023-03-23 RX ADMIN — BUDESONIDE AND FORMOTEROL FUMARATE DIHYDRATE 2 PUFF: 160; 4.5 AEROSOL RESPIRATORY (INHALATION) at 20:53

## 2023-03-23 RX ADMIN — ACETAMINOPHEN 650 MG: 325 TABLET ORAL at 21:09

## 2023-03-23 RX ADMIN — MAGNESIUM HYDROXIDE 30 ML: 400 SUSPENSION ORAL at 12:40

## 2023-03-23 RX ADMIN — TIOTROPIUM BROMIDE INHALATION SPRAY 2 PUFF: 3.12 SPRAY, METERED RESPIRATORY (INHALATION) at 09:25

## 2023-03-23 RX ADMIN — METRONIDAZOLE 500 MG: 500 INJECTION, SOLUTION INTRAVENOUS at 01:33

## 2023-03-23 RX ADMIN — ASPIRIN 81 MG: 81 TABLET, COATED ORAL at 08:33

## 2023-03-23 RX ADMIN — HYDROMORPHONE HYDROCHLORIDE 0.5 MG: 1 INJECTION, SOLUTION INTRAMUSCULAR; INTRAVENOUS; SUBCUTANEOUS at 14:32

## 2023-03-23 ASSESSMENT — PAIN DESCRIPTION - DESCRIPTORS
DESCRIPTORS: POUNDING;ACHING
DESCRIPTORS: ACHING
DESCRIPTORS: ACHING
DESCRIPTORS: SHARP

## 2023-03-23 ASSESSMENT — PAIN DESCRIPTION - ORIENTATION
ORIENTATION: MID
ORIENTATION: MID

## 2023-03-23 ASSESSMENT — ENCOUNTER SYMPTOMS
EYE REDNESS: 0
CONSTIPATION: 0
ABDOMINAL PAIN: 1
BACK PAIN: 0
PHOTOPHOBIA: 0
ANAL BLEEDING: 0
STRIDOR: 0
CHOKING: 0
EYE ITCHING: 0
APNEA: 0
COLOR CHANGE: 0
SORE THROAT: 0
RECTAL PAIN: 0

## 2023-03-23 ASSESSMENT — PAIN DESCRIPTION - LOCATION
LOCATION: ABDOMEN
LOCATION: HEAD
LOCATION: ABDOMEN
LOCATION: HEAD;ABDOMEN
LOCATION: ABDOMEN

## 2023-03-23 ASSESSMENT — PAIN SCALES - GENERAL
PAINLEVEL_OUTOF10: 7
PAINLEVEL_OUTOF10: 4
PAINLEVEL_OUTOF10: 9
PAINLEVEL_OUTOF10: 0
PAINLEVEL_OUTOF10: 8
PAINLEVEL_OUTOF10: 0
PAINLEVEL_OUTOF10: 8

## 2023-03-23 ASSESSMENT — PAIN DESCRIPTION - PAIN TYPE: TYPE: SURGICAL PAIN

## 2023-03-23 ASSESSMENT — PAIN SCALES - WONG BAKER
WONGBAKER_NUMERICALRESPONSE: 0
WONGBAKER_NUMERICALRESPONSE: 0
WONGBAKER_NUMERICALRESPONSE: 6
WONGBAKER_NUMERICALRESPONSE: 0

## 2023-03-23 ASSESSMENT — PAIN - FUNCTIONAL ASSESSMENT: PAIN_FUNCTIONAL_ASSESSMENT: PREVENTS OR INTERFERES SOME ACTIVE ACTIVITIES AND ADLS

## 2023-03-23 NOTE — PROGRESS NOTES
PRN  potassium chloride, 40 mEq, PRN   Or  potassium alternative oral replacement, 40 mEq, PRN   Or  potassium chloride, 10 mEq, PRN  aluminum & magnesium hydroxide-simethicone, 30 mL, Q6H PRN  glucose, 4 tablet, PRN  dextrose bolus, 125 mL, PRN   Or  dextrose bolus, 250 mL, PRN  glucagon (rDNA), 1 mg, PRN  dextrose, , Continuous PRN      Labs      Recent Labs     03/21/23 0626 03/23/23 0211   WBC 10.2 10.9*   HGB 10.4* 11.3*   HCT 32.9* 36.1*    361      Recent Labs     03/21/23 0626 03/23/23 0211    134*   K 4.4 4.1    96*   CO2 26 27   BUN 17 20   CREATININE 0.8 0.9     Recent Labs     03/21/23 0626   AST 20   ALT 32   BILITOT 0.5   ALKPHOS 60     Recent Labs     03/21/23 0626   INR 0.99     No results for input(s): CKTOTAL, CKMB, CKMBINDEX, TROPONINT in the last 72 hours.   Lab Results   Component Value Date/Time    LABA1C 6.6 03/16/2023 11:56 AM     CALCIUM:  8.5/27 (03/23 0211)  No results found for: MG        Electronically signed by Deborah Guajardo MD on 3/23/2023 at 4:34 PM

## 2023-03-23 NOTE — PROGRESS NOTES
Physical Therapy  Attempt Note    PT glenis attempted x 2 this PM. She is refusing initially 2/2 inc pain and awaiting pain meds ; following coordination of care w/ RN Madeleine and return following medication, pt is requesting attempt tomorrow 2/2 feeling lethargic and concerns w/ breathing following analgesia. Reinforced need for inc OOB, use of gravity and ambulation to assist in return of bowel function, education about role w/ DC planning, MD orders to inc activity. She cont to request reattempt ; will follow and assist as pt disposition allows.     Piotr Henry PT, DPT

## 2023-03-23 NOTE — PROGRESS NOTES
data recorded. I/O last 3 completed shifts:  In: -   Out: 4500 [Urine:2970; Emesis/NG output:1500; Drains:30]      Physical Exam:    Physical Exam  Constitutional:       Appearance: She is well-developed. HENT:      Head: Normocephalic. Eyes:      Pupils: Pupils are equal, round, and reactive to light. Cardiovascular:      Rate and Rhythm: Normal rate. Pulmonary:      Effort: Pulmonary effort is normal.   Abdominal:      General: There is no distension. Palpations: Abdomen is soft. There is no mass. Tenderness: There is abdominal tenderness. There is no guarding or rebound. Musculoskeletal:         General: Normal range of motion. Cervical back: Normal range of motion and neck supple. Skin:     General: Skin is warm. Neurological:      Mental Status: She is alert and oriented to person, place, and time.            Scheduled Meds:   famotidine  20 mg Oral Once    amLODIPine  10 mg Oral Daily    aspirin  81 mg Oral Daily    atorvastatin  20 mg Oral Daily    lisinopril  40 mg Oral Daily    busPIRone  30 mg Oral Nightly    insulin lispro  0-4 Units SubCUTAneous TID WC    insulin lispro  0-4 Units SubCUTAneous Nightly    pantoprazole  40 mg Oral Daily    sodium chloride flush  5-40 mL IntraVENous 2 times per day    enoxaparin  40 mg SubCUTAneous Daily    piperacillin-tazobactam  3,375 mg IntraVENous Q8H    metroNIDAZOLE  500 mg IntraVENous Q8H    budesonide-formoterol  2 puff Inhalation BID    And    tiotropium  2 puff Inhalation Daily     Continuous Infusions:   sodium chloride      dextrose       PRN Meds:Benzocaine-Menthol, HYDROmorphone **OR** HYDROmorphone, HYDROcodone 5 mg - acetaminophen, albuterol, dicyclomine, traMADol, sodium chloride flush, sodium chloride, ondansetron **OR** ondansetron, polyethylene glycol, acetaminophen **OR** acetaminophen, magnesium sulfate, potassium chloride **OR** potassium alternative oral replacement **OR** potassium chloride, aluminum & magnesium hydroxide-simethicone, glucose, dextrose bolus **OR** dextrose bolus, glucagon (rDNA), dextrose      Labs/Imaging Results:   Lab Results   Component Value Date    WBC 10.9 (H) 03/23/2023    HGB 11.3 (L) 03/23/2023    HCT 36.1 (L) 03/23/2023    MCV 89.8 03/23/2023     03/23/2023     Lab Results   Component Value Date     (L) 03/23/2023    K 4.1 03/23/2023    CL 96 (L) 03/23/2023    CO2 27 03/23/2023    BUN 20 03/23/2023    CREATININE 0.9 03/23/2023    GLUCOSE 127 (H) 03/23/2023    CALCIUM 8.5 03/23/2023    PROT 5.8 (L) 03/21/2023    LABALBU 3.7 03/21/2023    BILITOT 0.5 03/21/2023    ALKPHOS 60 03/21/2023    AST 20 03/21/2023    ALT 32 03/21/2023    LABGLOM >60 03/23/2023    GFRAA 53 (L) 07/15/2022    AGRATIO 1.6 03/16/2023       Assessment:     Patient Active Problem List:     Shortness of breath     Snoring     Excessive daytime sleepiness     Former smoker     COPD, mild (HCC)     Chest pain     History of CVA (cerebrovascular accident)     Mixed hyperlipidemia     Gastroesophageal reflux disease without esophagitis     Anxiety     Multiple lung nodules on CT     Hx of colonic polyps     Benign neoplasm of sigmoid colon     Acute exacerbation of chronic obstructive pulmonary disease (COPD) (HCC)     Mild obstructive sleep apnea     Type 2 diabetes mellitus without complication, without long-term current use of insulin (HCC)     Chronic renal disease, stage III ([address] [624622]     Essential hypertension     Seasonal allergies     Primary insomnia     Chronic pain of right hip     Closed intertrochanteric fracture of hip, right, initial encounter ([facility]     Type 2 diabetes mellitus with chronic kidney disease     Fatty liver     Adrenal nodule (HCC)     Perforated sigmoid colon ([address]     Perforated diverticulum          Plan: We will remove NG tube and start on clears. We will add muscle relaxer and stool softener. Await bowel function.   Recommend increased ambulation      Nayan Hawley MD

## 2023-03-24 ENCOUNTER — TELEPHONE (OUTPATIENT)
Dept: FAMILY MEDICINE CLINIC | Age: 73
End: 2023-03-24

## 2023-03-24 LAB
GLUCOSE BLD-MCNC: 114 MG/DL (ref 70–99)
GLUCOSE BLD-MCNC: 116 MG/DL (ref 70–99)
GLUCOSE BLD-MCNC: 117 MG/DL (ref 70–99)
GLUCOSE BLD-MCNC: 118 MG/DL (ref 70–99)
GLUCOSE BLD-MCNC: 99 MG/DL (ref 70–99)

## 2023-03-24 PROCEDURE — 6360000002 HC RX W HCPCS: Performed by: SURGERY

## 2023-03-24 PROCEDURE — 2580000003 HC RX 258: Performed by: SURGERY

## 2023-03-24 PROCEDURE — 2500000003 HC RX 250 WO HCPCS: Performed by: SURGERY

## 2023-03-24 PROCEDURE — 6370000000 HC RX 637 (ALT 250 FOR IP): Performed by: SURGERY

## 2023-03-24 PROCEDURE — 97162 PT EVAL MOD COMPLEX 30 MIN: CPT

## 2023-03-24 PROCEDURE — 82962 GLUCOSE BLOOD TEST: CPT

## 2023-03-24 PROCEDURE — 97535 SELF CARE MNGMENT TRAINING: CPT

## 2023-03-24 PROCEDURE — 97166 OT EVAL MOD COMPLEX 45 MIN: CPT

## 2023-03-24 PROCEDURE — 94640 AIRWAY INHALATION TREATMENT: CPT

## 2023-03-24 PROCEDURE — 97116 GAIT TRAINING THERAPY: CPT

## 2023-03-24 PROCEDURE — 6370000000 HC RX 637 (ALT 250 FOR IP): Performed by: HOSPITALIST

## 2023-03-24 PROCEDURE — 1200000000 HC SEMI PRIVATE

## 2023-03-24 RX ADMIN — METRONIDAZOLE 500 MG: 500 INJECTION, SOLUTION INTRAVENOUS at 17:29

## 2023-03-24 RX ADMIN — HYDROMORPHONE HYDROCHLORIDE 0.5 MG: 1 INJECTION, SOLUTION INTRAMUSCULAR; INTRAVENOUS; SUBCUTANEOUS at 08:30

## 2023-03-24 RX ADMIN — LISINOPRIL 40 MG: 20 TABLET ORAL at 08:31

## 2023-03-24 RX ADMIN — SODIUM CHLORIDE: 9 INJECTION, SOLUTION INTRAVENOUS at 06:17

## 2023-03-24 RX ADMIN — HYDROMORPHONE HYDROCHLORIDE 0.5 MG: 1 INJECTION, SOLUTION INTRAMUSCULAR; INTRAVENOUS; SUBCUTANEOUS at 03:04

## 2023-03-24 RX ADMIN — METRONIDAZOLE 500 MG: 500 INJECTION, SOLUTION INTRAVENOUS at 02:53

## 2023-03-24 RX ADMIN — PIPERACILLIN AND TAZOBACTAM 3375 MG: 3; .375 INJECTION, POWDER, LYOPHILIZED, FOR SOLUTION INTRAVENOUS at 06:18

## 2023-03-24 RX ADMIN — BACLOFEN 5 MG: 10 TABLET ORAL at 08:31

## 2023-03-24 RX ADMIN — DICYCLOMINE HYDROCHLORIDE 10 MG: 10 CAPSULE ORAL at 08:30

## 2023-03-24 RX ADMIN — HYDROCODONE BITARTRATE AND ACETAMINOPHEN 1 TABLET: 5; 325 TABLET ORAL at 17:07

## 2023-03-24 RX ADMIN — BUSPIRONE HYDROCHLORIDE 30 MG: 15 TABLET ORAL at 21:38

## 2023-03-24 RX ADMIN — MAGNESIUM HYDROXIDE 30 ML: 400 SUSPENSION ORAL at 08:32

## 2023-03-24 RX ADMIN — HYDROMORPHONE HYDROCHLORIDE 0.5 MG: 1 INJECTION, SOLUTION INTRAMUSCULAR; INTRAVENOUS; SUBCUTANEOUS at 17:50

## 2023-03-24 RX ADMIN — PIPERACILLIN AND TAZOBACTAM 3375 MG: 3; .375 INJECTION, POWDER, LYOPHILIZED, FOR SOLUTION INTRAVENOUS at 14:16

## 2023-03-24 RX ADMIN — SODIUM CHLORIDE: 9 INJECTION, SOLUTION INTRAVENOUS at 02:53

## 2023-03-24 RX ADMIN — ATORVASTATIN CALCIUM 20 MG: 10 TABLET, FILM COATED ORAL at 08:31

## 2023-03-24 RX ADMIN — PIPERACILLIN AND TAZOBACTAM 3375 MG: 3; .375 INJECTION, POWDER, LYOPHILIZED, FOR SOLUTION INTRAVENOUS at 22:30

## 2023-03-24 RX ADMIN — PANTOPRAZOLE SODIUM 40 MG: 40 TABLET, DELAYED RELEASE ORAL at 05:30

## 2023-03-24 RX ADMIN — AMLODIPINE BESYLATE 10 MG: 10 TABLET ORAL at 08:31

## 2023-03-24 RX ADMIN — ENOXAPARIN SODIUM 40 MG: 100 INJECTION SUBCUTANEOUS at 08:31

## 2023-03-24 RX ADMIN — Medication 1 CAPSULE: at 08:31

## 2023-03-24 RX ADMIN — SODIUM CHLORIDE, PRESERVATIVE FREE 10 ML: 5 INJECTION INTRAVENOUS at 21:38

## 2023-03-24 RX ADMIN — ALBUTEROL SULFATE 2.5 MG: 2.5 SOLUTION RESPIRATORY (INHALATION) at 20:12

## 2023-03-24 RX ADMIN — ASPIRIN 81 MG: 81 TABLET, COATED ORAL at 08:31

## 2023-03-24 RX ADMIN — SODIUM CHLORIDE, PRESERVATIVE FREE 10 ML: 5 INJECTION INTRAVENOUS at 03:05

## 2023-03-24 RX ADMIN — SODIUM CHLORIDE, PRESERVATIVE FREE 10 ML: 5 INJECTION INTRAVENOUS at 08:35

## 2023-03-24 RX ADMIN — SODIUM CHLORIDE 25 ML: 9 INJECTION, SOLUTION INTRAVENOUS at 14:15

## 2023-03-24 RX ADMIN — SODIUM CHLORIDE: 9 INJECTION, SOLUTION INTRAVENOUS at 22:29

## 2023-03-24 RX ADMIN — BUDESONIDE AND FORMOTEROL FUMARATE DIHYDRATE 2 PUFF: 160; 4.5 AEROSOL RESPIRATORY (INHALATION) at 20:20

## 2023-03-24 RX ADMIN — METRONIDAZOLE 500 MG: 500 INJECTION, SOLUTION INTRAVENOUS at 08:42

## 2023-03-24 RX ADMIN — BACLOFEN 5 MG: 10 TABLET ORAL at 21:38

## 2023-03-24 ASSESSMENT — PAIN SCALES - WONG BAKER
WONGBAKER_NUMERICALRESPONSE: 0
WONGBAKER_NUMERICALRESPONSE: 8
WONGBAKER_NUMERICALRESPONSE: 0
WONGBAKER_NUMERICALRESPONSE: 8
WONGBAKER_NUMERICALRESPONSE: 6
WONGBAKER_NUMERICALRESPONSE: 0
WONGBAKER_NUMERICALRESPONSE: 4
WONGBAKER_NUMERICALRESPONSE: 0

## 2023-03-24 ASSESSMENT — PAIN - FUNCTIONAL ASSESSMENT
PAIN_FUNCTIONAL_ASSESSMENT: ACTIVITIES ARE NOT PREVENTED
PAIN_FUNCTIONAL_ASSESSMENT: ACTIVITIES ARE NOT PREVENTED

## 2023-03-24 ASSESSMENT — PAIN SCALES - GENERAL
PAINLEVEL_OUTOF10: 8
PAINLEVEL_OUTOF10: 0
PAINLEVEL_OUTOF10: 6
PAINLEVEL_OUTOF10: 0
PAINLEVEL_OUTOF10: 8
PAINLEVEL_OUTOF10: 8
PAINLEVEL_OUTOF10: 10
PAINLEVEL_OUTOF10: 0
PAINLEVEL_OUTOF10: 4

## 2023-03-24 ASSESSMENT — PAIN DESCRIPTION - PAIN TYPE
TYPE: ACUTE PAIN
TYPE: ACUTE PAIN

## 2023-03-24 ASSESSMENT — PAIN DESCRIPTION - LOCATION
LOCATION: ABDOMEN

## 2023-03-24 ASSESSMENT — PAIN DESCRIPTION - DESCRIPTORS
DESCRIPTORS: ACHING
DESCRIPTORS: ACHING
DESCRIPTORS: DISCOMFORT

## 2023-03-24 ASSESSMENT — PAIN DESCRIPTION - ORIENTATION
ORIENTATION: MID
ORIENTATION: MID

## 2023-03-24 ASSESSMENT — PAIN DESCRIPTION - FREQUENCY: FREQUENCY: INTERMITTENT

## 2023-03-24 NOTE — PROGRESS NOTES
endorses limited pain prior to initiation. Communication with other providers:  Handoff to RN, co-eval with OT Alessandra  Restrictions: fall risk; general precautions; abdominal precautions    Home Setup/Prior level of function  Social/Functional History  Lives With: Daughter  Type of Home: House  Home Layout: Two level, Bed/Bath upstairs  Home Access: Stairs to enter with rails  Entrance Stairs - Number of Steps: 7 total  Bathroom Shower/Tub: Tub/Shower unit  Bathroom Toilet: Standard  Bathroom Equipment: Shower chair, Grab bars in shower (BSC)  Home Equipment: Mckenzie [place]  Has the patient had two or more falls in the past year or any fall with injury in the past year?: No  ADL Assistance: Independent  Homemaking Assistance: Independent  Ambulation Assistance: Independent (typically ind no AD)  Transfer Assistance: Independent  Active : Yes  Occupation: Retired  Type of Occupation: retired from [address] [place] [address] Street: typically active lifestyle, likes to go swimming, fishing, play with her puppy dog \"[facility]\"    Examination of body systems (includes body structures/functions, activity/participation limitations):  Observation:  Supine, awake, alert, agreeable. She is guarded in pain and redirectable w/ cues. Tele, BP cuff, IV, bed alarm in place upon arrival  Posture: WFL  Vision:  WFL  Hearing:  slight [place]  Cardiopulmonary:  WFL  Cognition: A&O x 4 ; pleasantly confused, follows commands w/ cues for redirection, distractible in pain, impaired recall of recent events, fair safety awareness, min cues needed for sequencing/setup, min cues needed for initiation, cues to bring inc awareness to errors    Musculoskeletal  ROM R/L: AROM WFL. Strength R/L:  grossly >3/5, as observed in function and ROM ; strength deferred w/ recent abdominal surgery.     Sensation: WFL  Tone: normotonic  Coordination: WFL  Proprioception: WFL    Mobility:  Supine to sit:  SBA  Transfers: CGA  Sitting balance:  good. Standing balance:  fair. Gait: 10 ft + 35 ft using FWW at [facility] w/ intermittent min A to steady w/ sudden onset of pain ; endurance/pain limiting distance, abdominal guarding t/o, dec steffen, fwd flexed posture w/ light UE reliance, narrow ELVIS, reciprocal    AMPAC 6 Clicks Inpatient Mobility:  AM-PAC Inpatient Mobility Raw Score : 17    Goals:  Pt Goals: return to PLOF  Short Term Goals  Time Frame for Short Term Goals: 1 week  Short Term Goal 1: pt will complete bed mobility at mod ind  Short Term Goal 2: pt will complete transfers at mod ind  Short Term Goal 3: pt will ambulate 150 ft using FWW at sup level  Short Term Goal 4: pt will verbalize and demonstrate understanding of abdominal precautions w/ min cues  Short Term Goal 5: pt will ascend/descend 7 steps w/ single rail at sup level     Treatment plan:  Bed mobility, functional mobility, transfers, balance, gait, TA, TX, strength activities, neuro    Treatment:  Gait Training:  Cues were given for safety, sequence, device management, balance, posture, awareness, path.       Positioned for comfort and pressure relief  Safety: patient left in chair, call light within reach, RN notified, gait belt used, all needs met    Time:   Time in: 1508  Time out: 1538  Timed treatment minutes: 20  Total time + eval: 30    Electronically signed by:    Cathi Marie PT, DPT  3/24/2023, 4:06 PM

## 2023-03-24 NOTE — CARE COORDINATION
This RN case manager to bedside to discuss DC. Patient would like [facility] at discharge and is requesting [facility]. Referral made. She will also need transport at discharge. There are 11 steps to get into home.

## 2023-03-24 NOTE — PROGRESS NOTES
Assistance: Independent (typically ind no AD)  Transfer Assistance: Independent  Active : Yes  Occupation: Retired  Type of Occupation: retired from [address]: typically active lifestyle, likes to go swimming, fishing, play with her puppy dog [address]"    Pt's daughter at bedside, currently has broken leg and is awaiting surgery herself. States that she may have help nearby from family but that they are questionable support. Examination:  Observation: Pt was semi fowlers in bed resting upon arrival, agreeable to session. Tele, PIV  Vision: [employer]  Hearing: min [place]  Objective Measures: stable    Body Systems and functions:  ROM: WFL in BUE  Strength: 4/5 in BUE  Sensation: WFL  Tone: normotonic in BUE  Coordination: movements fluid and coordinated  Activity Tolerance: fair+    Activities of Daily Living (ADLs):  Feeding: ind  Grooming: setup/SBA standing at sink  Toileting: SBA  UB dressing/bathing: min A  LB dressing/bathing: min A    *pt ADL function inferred from gross functional assessment of mobility, balance, posture, safety awareness, activity tolerance (unless otherwise indicated)    Cognitive and Psychosocial Functioning:  Overall cognitive status: A/Ox4, WNL  Affect: very pleasant    AM-PAC 6 click short form for inpatient daily activity:   How much help from another person does the patient currently need. .. Unable  Dep A Lot  Max A A Lot   Mod A A Little  Min A A Little   CGA  SBA None   Mod I  Indep  Sup   1. Putting on and taking off regular lower body clothing? [] 1    [] 2   [] 2   [x] 3   [] 3   [] 4      2. Bathing (including washing, rinsing, drying)? [] 1   [] 2   [] 2 [x] 3 [] 3 [] 4   3. Toileting, which includes using toilet, bedpan, or urinal? [] 1    [] 2   [] 2   [] 3   [x] 3   [] 4     4. Putting on and taking off regular upper body clothing? [] 1   [] 2   [] 2   [x] 3   [] 3    [] 4      5. Taking care of personal grooming such as brushing teeth?  [] 1   [] Education and Training, Patient+Caregiver Education and Training, Equipment Evaluation Education and Procurement, Positioning, Self Care Training, Home Management Training, Coordination Training, neuromuscular re-education    Pt will complete therapeutic exercise/activity to increase independence in ADL/IADL function    Pt will practice functional transfers and mobility with AD for increased safety and independence    Pt Would Benefit from Continued Edu on none  Adaptive Equipment Recommendations: none    Goals:  Time frame for goals: 2 weeks  Pt will complete feeding tasks with ind  Pt will complete grooming tasks with mod I standing at sink  Pt will complete toileting tasks with mod I using standard commode  Pt will complete UB dressing and bathing tasks with ind  Pt will complete LB dressing and bathing tasks with mod I  Pt will complete functional mobility to bathroom and back with mod I using RW  Pt will complete 10 minutes of therapeutic exercise/activity with good tolerance and 0 rest breaks    Time:   Time in: 1508  Time out: 1538  Treatment Minutes: 15  Evaluation Minutes: 10  Total time: 30    Electronically signed by:      Cano Comfort, OTR/L  3/24/2023, 3:17 PM

## 2023-03-24 NOTE — PROGRESS NOTES
V2.0  [facility] [place]      Name:  Gia Hooker /Age/Sex: 1950  (67 y.o. female)   MRN & CSN:  [id number] & [id number] Encounter Date/Time: 3/24/2023 9:43 AM EDT    Location:  [address] [place], [place] [address] PCP: Kari Bryant DO       Hospital Day: 5    Assessment and Plan:   Gia Hooker is a 67 y.o. female who presents with perforated sigmoid diverticulitis      Plan:  Perforated sigmoid diverticulitis s/p exploratory lap with sigmoid resection with primary anastomosis 3/21.  -3/21: On IV antibiotics. CT abdomen pelvis 3/20: Perforated sigmoid diverticulitis. Exploratory laparotomy with sigmoid resection with primary anastomosis 3/21. CRP 47. Procalcitonin 0.305.  -3/22: BCx 3/20: NGTD.   - 3/23: Diet advanced to clear liquids. NG tube removed. D/W general surgery and will order PT/OT. Ordered probiotics. .  - 3/24: General surgery recommending antibiotics for a total of 10 days. Dysuria and urinary retention  -3/21: UA: Unremarkable. .  On IV antibiotics for perforated diverticulitis. - 3/23: Lyn catheter removed. Retaining greater than 600 cc and was straight cathed. Has urinated on her own. UCx 3/20: Mixed sandra. Possible hypoxia  - 3/22:  Likely postop hypoxia. Wean oxygen as tolerated. - 3/23: On room air. Hypertension  - 3/23: On amlodipine and lisinopril. COPD  HTN  HLD  DM    Diet ADULT DIET;  Clear Liquid    DVT Prophylaxis [x] Lovenox, [] Heparin, [] Eliquis, [] Xarelto  [] SCDs       Code Status Full Code   Disposition From: Home  Expected Disposition: Home health care  Estimated Date of Discharge: 3/26  Patient requires continued admission due to awaiting for patient to tolerate and advance diet and to be cleared by general surgery   Home O2 None     Subjective/Interval history:   Chief Complaint: Abdominal Pain (Pt c/o mchugh lower abd pain that radiates into her lower back that started this morning when she woke up. )     Still has soreness in abdomen, which improves with pain medication. Tolerating her clear liquid diet. Having gas. No BM yet. Review of Systems:    Negative unless mentioned above    Objective: Intake/Output Summary (Last 24 hours) at 3/24/2023 1016  Last data filed at 3/24/2023 3352  Gross per 24 hour   Intake --   Output 46 ml   Net -46 ml        Vitals:   BP (!) 143/60   Pulse 82   Temp 97.7 °F (36.5 °C) (Oral)   Resp 13   Ht 5' (1.524 m)   Wt 131 lb 2.8 oz (59.5 kg)   SpO2 95%   BMI 25.62 kg/m²     Physical Exam:   General: NAD, laying in bed  Eyes: no discharge  HENT: NCAT  Cardiovascular: RRR  Respiratory: Clear to auscultation   Gastrointestinal: Soft, has LISA drain on right side and midline dressing  Genitourinary: no suprapubic tenderness  Musculoskeletal: No edema, nontender  Skin: warm, dry, surgical wounds  Neuro: Alert. No gross deficits  Psych: Mood appropriate.      Medications:   Medications:    baclofen  5 mg Oral BID    magnesium hydroxide  30 mL Oral Daily    lactobacillus  1 capsule Oral Daily with breakfast    famotidine  20 mg Oral Once    amLODIPine  10 mg Oral Daily    aspirin  81 mg Oral Daily    atorvastatin  20 mg Oral Daily    lisinopril  40 mg Oral Daily    busPIRone  30 mg Oral Nightly    insulin lispro  0-4 Units SubCUTAneous TID WC    insulin lispro  0-4 Units SubCUTAneous Nightly    pantoprazole  40 mg Oral Daily    sodium chloride flush  5-40 mL IntraVENous 2 times per day    enoxaparin  40 mg SubCUTAneous Daily    piperacillin-tazobactam  3,375 mg IntraVENous Q8H    metroNIDAZOLE  500 mg IntraVENous Q8H    budesonide-formoterol  2 puff Inhalation BID    And    tiotropium  2 puff Inhalation Daily      Infusions:    sodium chloride 25 mL/hr at 03/24/23 0617    dextrose       PRN Meds: Benzocaine-Menthol, 1 lozenge, Q2H PRN  HYDROmorphone, 0.25 mg, Q3H PRN   Or  HYDROmorphone, 0.5 mg, Q3H PRN  HYDROcodone 5 mg - acetaminophen, 1 tablet, Q6H PRN  albuterol, 2.5 mg, Q6H PRN  dicyclomine, 10 mg, TID

## 2023-03-24 NOTE — CARE COORDINATION
[address] [facility] Liaison spoke with pts daughter Vaughn Davis and  initiated to [facility] referral. Verified address, pcp and number. No wounds/IVs noted.  Please place inpatient consult to home health needs order in [facility].

## 2023-03-24 NOTE — PLAN OF CARE
Problem: ABCDS Injury Assessment  Goal: Absence of physical injury  Outcome: Progressing     Problem: Safety - Adult  Goal: Free from fall injury  Outcome: Progressing     Problem: Discharge Planning  Goal: Discharge to home or other facility with appropriate resources  Outcome: Progressing  Flowsheets (Taken 3/23/2023 2130)  Discharge to home or other facility with appropriate resources: Identify barriers to discharge with patient and caregiver     Problem: Pain  Goal: Verbalizes/displays adequate comfort level or baseline comfort level  Outcome: Progressing     Problem: Chronic Conditions and Co-morbidities  Goal: Patient's chronic conditions and co-morbidity symptoms are monitored and maintained or improved  Outcome: Progressing  Flowsheets (Taken 3/23/2023 2130)  Care Plan - Patient's Chronic Conditions and Co-Morbidity Symptoms are Monitored and Maintained or Improved: Monitor and assess patient's chronic conditions and comorbid symptoms for stability, deterioration, or improvement     Problem: Skin/Tissue Integrity  Goal: Absence of new skin breakdown  Description: 1. Monitor for areas of redness and/or skin breakdown  2. Assess vascular access sites hourly  3. Every 4-6 hours minimum:  Change oxygen saturation probe site  4. Every 4-6 hours:  If on nasal continuous positive airway pressure, respiratory therapy assess nares and determine need for appliance change or resting period.   Outcome: Progressing

## 2023-03-25 LAB
CULTURE: NORMAL
CULTURE: NORMAL
GLUCOSE BLD-MCNC: 113 MG/DL (ref 70–99)
GLUCOSE BLD-MCNC: 118 MG/DL (ref 70–99)
GLUCOSE BLD-MCNC: 124 MG/DL (ref 70–99)
GLUCOSE BLD-MCNC: 129 MG/DL (ref 70–99)
GLUCOSE BLD-MCNC: 136 MG/DL (ref 70–99)
Lab: NORMAL
Lab: NORMAL
SPECIMEN: NORMAL
SPECIMEN: NORMAL

## 2023-03-25 PROCEDURE — 2580000003 HC RX 258: Performed by: SURGERY

## 2023-03-25 PROCEDURE — 6360000002 HC RX W HCPCS: Performed by: SURGERY

## 2023-03-25 PROCEDURE — 6370000000 HC RX 637 (ALT 250 FOR IP): Performed by: SURGERY

## 2023-03-25 PROCEDURE — 99024 POSTOP FOLLOW-UP VISIT: CPT | Performed by: SURGERY

## 2023-03-25 PROCEDURE — 1200000000 HC SEMI PRIVATE

## 2023-03-25 PROCEDURE — 2500000003 HC RX 250 WO HCPCS: Performed by: SURGERY

## 2023-03-25 PROCEDURE — 82962 GLUCOSE BLOOD TEST: CPT

## 2023-03-25 PROCEDURE — 6370000000 HC RX 637 (ALT 250 FOR IP): Performed by: HOSPITALIST

## 2023-03-25 PROCEDURE — 94640 AIRWAY INHALATION TREATMENT: CPT

## 2023-03-25 PROCEDURE — 94761 N-INVAS EAR/PLS OXIMETRY MLT: CPT

## 2023-03-25 RX ADMIN — BUSPIRONE HYDROCHLORIDE 30 MG: 15 TABLET ORAL at 20:49

## 2023-03-25 RX ADMIN — PIPERACILLIN AND TAZOBACTAM 3375 MG: 3; .375 INJECTION, POWDER, LYOPHILIZED, FOR SOLUTION INTRAVENOUS at 06:05

## 2023-03-25 RX ADMIN — ATORVASTATIN CALCIUM 20 MG: 10 TABLET, FILM COATED ORAL at 09:08

## 2023-03-25 RX ADMIN — TRAMADOL HYDROCHLORIDE 50 MG: 50 TABLET, COATED ORAL at 18:22

## 2023-03-25 RX ADMIN — METRONIDAZOLE 500 MG: 500 INJECTION, SOLUTION INTRAVENOUS at 11:55

## 2023-03-25 RX ADMIN — PIPERACILLIN AND TAZOBACTAM 3375 MG: 3; .375 INJECTION, POWDER, LYOPHILIZED, FOR SOLUTION INTRAVENOUS at 15:20

## 2023-03-25 RX ADMIN — BACLOFEN 5 MG: 10 TABLET ORAL at 20:49

## 2023-03-25 RX ADMIN — MAGNESIUM HYDROXIDE 30 ML: 400 SUSPENSION ORAL at 09:08

## 2023-03-25 RX ADMIN — HYDROCODONE BITARTRATE AND ACETAMINOPHEN 1 TABLET: 5; 325 TABLET ORAL at 22:19

## 2023-03-25 RX ADMIN — AMLODIPINE BESYLATE 10 MG: 10 TABLET ORAL at 09:08

## 2023-03-25 RX ADMIN — HYDROMORPHONE HYDROCHLORIDE 0.5 MG: 1 INJECTION, SOLUTION INTRAMUSCULAR; INTRAVENOUS; SUBCUTANEOUS at 09:20

## 2023-03-25 RX ADMIN — PANTOPRAZOLE SODIUM 40 MG: 40 TABLET, DELAYED RELEASE ORAL at 06:06

## 2023-03-25 RX ADMIN — TIOTROPIUM BROMIDE INHALATION SPRAY 2 PUFF: 3.12 SPRAY, METERED RESPIRATORY (INHALATION) at 07:59

## 2023-03-25 RX ADMIN — SODIUM CHLORIDE: 9 INJECTION, SOLUTION INTRAVENOUS at 03:04

## 2023-03-25 RX ADMIN — SODIUM CHLORIDE, PRESERVATIVE FREE 10 ML: 5 INJECTION INTRAVENOUS at 03:27

## 2023-03-25 RX ADMIN — HYDROCODONE BITARTRATE AND ACETAMINOPHEN 1 TABLET: 5; 325 TABLET ORAL at 15:18

## 2023-03-25 RX ADMIN — BUDESONIDE AND FORMOTEROL FUMARATE DIHYDRATE 2 PUFF: 160; 4.5 AEROSOL RESPIRATORY (INHALATION) at 20:42

## 2023-03-25 RX ADMIN — ENOXAPARIN SODIUM 40 MG: 100 INJECTION SUBCUTANEOUS at 09:10

## 2023-03-25 RX ADMIN — PIPERACILLIN AND TAZOBACTAM 3375 MG: 3; .375 INJECTION, POWDER, LYOPHILIZED, FOR SOLUTION INTRAVENOUS at 22:29

## 2023-03-25 RX ADMIN — SODIUM CHLORIDE: 9 INJECTION, SOLUTION INTRAVENOUS at 06:04

## 2023-03-25 RX ADMIN — Medication 1 CAPSULE: at 09:08

## 2023-03-25 RX ADMIN — SODIUM CHLORIDE, PRESERVATIVE FREE 10 ML: 5 INJECTION INTRAVENOUS at 20:50

## 2023-03-25 RX ADMIN — HYDROMORPHONE HYDROCHLORIDE 0.5 MG: 1 INJECTION, SOLUTION INTRAMUSCULAR; INTRAVENOUS; SUBCUTANEOUS at 20:49

## 2023-03-25 RX ADMIN — SODIUM CHLORIDE, PRESERVATIVE FREE 10 ML: 5 INJECTION INTRAVENOUS at 09:09

## 2023-03-25 RX ADMIN — ALBUTEROL SULFATE 2.5 MG: 2.5 SOLUTION RESPIRATORY (INHALATION) at 07:58

## 2023-03-25 RX ADMIN — ASPIRIN 81 MG: 81 TABLET, COATED ORAL at 09:08

## 2023-03-25 RX ADMIN — BUDESONIDE AND FORMOTEROL FUMARATE DIHYDRATE 2 PUFF: 160; 4.5 AEROSOL RESPIRATORY (INHALATION) at 07:49

## 2023-03-25 RX ADMIN — HYDROMORPHONE HYDROCHLORIDE 0.5 MG: 1 INJECTION, SOLUTION INTRAMUSCULAR; INTRAVENOUS; SUBCUTANEOUS at 03:26

## 2023-03-25 RX ADMIN — METRONIDAZOLE 500 MG: 500 INJECTION, SOLUTION INTRAVENOUS at 19:18

## 2023-03-25 RX ADMIN — ALBUTEROL SULFATE 2.5 MG: 2.5 SOLUTION RESPIRATORY (INHALATION) at 21:00

## 2023-03-25 RX ADMIN — BACLOFEN 5 MG: 10 TABLET ORAL at 09:08

## 2023-03-25 RX ADMIN — LISINOPRIL 40 MG: 20 TABLET ORAL at 09:08

## 2023-03-25 RX ADMIN — METRONIDAZOLE 500 MG: 500 INJECTION, SOLUTION INTRAVENOUS at 03:04

## 2023-03-25 ASSESSMENT — PAIN SCALES - WONG BAKER
WONGBAKER_NUMERICALRESPONSE: 0

## 2023-03-25 ASSESSMENT — PAIN SCALES - GENERAL
PAINLEVEL_OUTOF10: 0
PAINLEVEL_OUTOF10: 7
PAINLEVEL_OUTOF10: 0
PAINLEVEL_OUTOF10: 0
PAINLEVEL_OUTOF10: 8
PAINLEVEL_OUTOF10: 7
PAINLEVEL_OUTOF10: 7
PAINLEVEL_OUTOF10: 5
PAINLEVEL_OUTOF10: 0
PAINLEVEL_OUTOF10: 5
PAINLEVEL_OUTOF10: 7

## 2023-03-25 ASSESSMENT — PAIN DESCRIPTION - ONSET
ONSET: ON-GOING
ONSET: ON-GOING

## 2023-03-25 ASSESSMENT — PAIN DESCRIPTION - PAIN TYPE
TYPE: SURGICAL PAIN
TYPE: SURGICAL PAIN

## 2023-03-25 ASSESSMENT — PAIN DESCRIPTION - LOCATION
LOCATION: ABDOMEN
LOCATION: KNEE

## 2023-03-25 ASSESSMENT — PAIN - FUNCTIONAL ASSESSMENT
PAIN_FUNCTIONAL_ASSESSMENT: PREVENTS OR INTERFERES SOME ACTIVE ACTIVITIES AND ADLS
PAIN_FUNCTIONAL_ASSESSMENT: PREVENTS OR INTERFERES WITH MANY ACTIVE NOT PASSIVE ACTIVITIES

## 2023-03-25 ASSESSMENT — PAIN DESCRIPTION - FREQUENCY
FREQUENCY: INTERMITTENT
FREQUENCY: CONTINUOUS

## 2023-03-25 ASSESSMENT — PAIN DESCRIPTION - ORIENTATION
ORIENTATION: MID

## 2023-03-25 ASSESSMENT — PAIN DESCRIPTION - DESCRIPTORS
DESCRIPTORS: ACHING
DESCRIPTORS: ACHING
DESCRIPTORS: SORE
DESCRIPTORS: ACHING
DESCRIPTORS: SORE
DESCRIPTORS: ACHING

## 2023-03-25 NOTE — PROGRESS NOTES
GENERAL SURGERY PROGRESS NOTE    Candy Odonnell is a 67 y.o. female status post sigmoidectomy with primary anastomosis 3/21. Subjective:    No acute events. Denies bowel movements/flatus. Denies shortness of breath, chest pain, fevers/chills, nausea/vomiting. Objective:    Vitals: VITALS:  BP (!) 151/67   Pulse 99   Temp 98.2 °F (36.8 °C) (Oral)   Resp 20   Ht 5' (1.524 m)   Wt 132 lb 11.5 oz (60.2 kg)   SpO2 96%   BMI 25.92 kg/m²     I/O: 03/24 0701 - 03/25 0700  In: 1564.7 [P.O.:200;  I.V.:101.5]  Out: 45 [Drains:45]    Labs/Imaging Results:   Lab Results   Component Value Date/Time     03/23/2023 02:11 AM    K 4.1 03/23/2023 02:11 AM    CL 96 03/23/2023 02:11 AM    CO2 27 03/23/2023 02:11 AM    BUN 20 03/23/2023 02:11 AM    CREATININE 0.9 03/23/2023 02:11 AM    GLUCOSE 127 03/23/2023 02:11 AM    CALCIUM 8.5 03/23/2023 02:11 AM      Lab Results   Component Value Date    WBC 10.9 (H) 03/23/2023    HGB 11.3 (L) 03/23/2023    HCT 36.1 (L) 03/23/2023    MCV 89.8 03/23/2023     03/23/2023          IV Fluids:   sodium chloride Last Rate: 25 mL/hr at 03/25/23 0604    dextrose    Scheduled Meds:   baclofen, 5 mg, Oral, BID    magnesium hydroxide, 30 mL, Oral, Daily    lactobacillus, 1 capsule, Oral, Daily with breakfast    famotidine, 20 mg, Oral, Once    amLODIPine, 10 mg, Oral, Daily    aspirin, 81 mg, Oral, Daily    atorvastatin, 20 mg, Oral, Daily    lisinopril, 40 mg, Oral, Daily    busPIRone, 30 mg, Oral, Nightly    insulin lispro, 0-4 Units, SubCUTAneous, TID WC    insulin lispro, 0-4 Units, SubCUTAneous, Nightly    pantoprazole, 40 mg, Oral, Daily    sodium chloride flush, 5-40 mL, IntraVENous, 2 times per day    enoxaparin, 40 mg, SubCUTAneous, Daily    piperacillin-tazobactam, 3,375 mg, IntraVENous, Q8H    metroNIDAZOLE, 500 mg, IntraVENous, Q8H    budesonide-formoterol, 2 puff, Inhalation, BID **AND** tiotropium, 2 puff, Inhalation, Daily    Physical Exam:  General: A&O x 3, no distress. HEENT: Anicteric sclerae, MMM. Extremities: No edema bilat LE. Abdomen: Soft, nondistended, nontender    Incisions look    Assessment and Plan:     Patient Active Problem List:     Shortness of breath     Snoring     Excessive daytime sleepiness     Former smoker     COPD, mild (HCC)     Chest pain     History of CVA (cerebrovascular accident)     Mixed hyperlipidemia     Gastroesophageal reflux disease without esophagitis     Anxiety     Multiple lung nodules on CT     Hx of colonic polyps     Benign neoplasm of sigmoid colon     Acute exacerbation of chronic obstructive pulmonary disease (COPD) (HCC)     Mild obstructive sleep apnea     Type 2 diabetes mellitus without complication, without long-term current use of insulin (HCC)     Chronic renal disease, stage III ([address] [313131]     Essential hypertension     Seasonal allergies     Primary insomnia     Chronic pain of right hip     Closed intertrochanteric fracture of hip, right, initial encounter ([address]     Type 2 diabetes mellitus with chronic kidney disease     Fatty liver     Adrenal nodule (HCC)     Perforated sigmoid colon ([address]     Perforated diverticulum      Principal Problem:    Perforated sigmoid colon ([address]  Plan:    Active Problems:    Perforated diverticulum  Plan:     Status post sigmoidectomy with primary anastomosis 3/21  Awaiting bowel movements  Continue soft diet  Continue daily dressing changes  Monitor LISA output           Leisa Sutherland DO

## 2023-03-25 NOTE — PROGRESS NOTES
50 mg, Q6H PRN  sodium chloride flush, 5-40 mL, PRN  sodium chloride, , PRN  ondansetron, 4 mg, Q8H PRN   Or  ondansetron, 4 mg, Q6H PRN  polyethylene glycol, 17 g, Daily PRN  acetaminophen, 650 mg, Q6H PRN   Or  acetaminophen, 650 mg, Q6H PRN  magnesium sulfate, 2,000 mg, PRN  potassium chloride, 40 mEq, PRN   Or  potassium alternative oral replacement, 40 mEq, PRN   Or  potassium chloride, 10 mEq, PRN  aluminum & magnesium hydroxide-simethicone, 30 mL, Q6H PRN  glucose, 4 tablet, PRN  dextrose bolus, 125 mL, PRN   Or  dextrose bolus, 250 mL, PRN  glucagon (rDNA), 1 mg, PRN  dextrose, , Continuous PRN      Labs      Recent Labs     03/23/23 0211   WBC 10.9*   HGB 11.3*   HCT 36.1*           Recent Labs     03/23/23 0211   *   K 4.1   CL 96*   CO2 27   BUN 20   CREATININE 0.9       No results for input(s): AST, ALT, ALB, BILIDIR, BILITOT, ALKPHOS in the last 72 hours. No results for input(s): INR in the last 72 hours. No results for input(s): CKTOTAL, CKMB, CKMBINDEX, TROPONINT in the last 72 hours.   Lab Results   Component Value Date/Time    LABA1C 6.6 03/16/2023 11:56 AM     CALCIUM:  8.5/27 (03/23 0211)  No results found for: MG        Electronically signed by Myles Cano MD on 3/25/2023 at 9:04 AM

## 2023-03-26 LAB
ANION GAP SERPL CALCULATED.3IONS-SCNC: 8 MMOL/L (ref 4–16)
BUN SERPL-MCNC: 13 MG/DL (ref 6–23)
CALCIUM SERPL-MCNC: 7.4 MG/DL (ref 8.3–10.6)
CHLORIDE BLD-SCNC: 105 MMOL/L (ref 99–110)
CO2: 26 MMOL/L (ref 21–32)
CREAT SERPL-MCNC: 0.8 MG/DL (ref 0.6–1.1)
GFR SERPL CREATININE-BSD FRML MDRD: >60 ML/MIN/1.73M2
GLUCOSE BLD-MCNC: 128 MG/DL (ref 70–99)
GLUCOSE BLD-MCNC: 129 MG/DL (ref 70–99)
GLUCOSE BLD-MCNC: 135 MG/DL (ref 70–99)
GLUCOSE BLD-MCNC: 142 MG/DL (ref 70–99)
GLUCOSE SERPL-MCNC: 123 MG/DL (ref 70–99)
HCT VFR BLD CALC: 32.6 % (ref 37–47)
HEMOGLOBIN: 10.1 GM/DL (ref 12.5–16)
MAGNESIUM: 1.8 MG/DL (ref 1.8–2.4)
MCH RBC QN AUTO: 28 PG (ref 27–31)
MCHC RBC AUTO-ENTMCNC: 31 % (ref 32–36)
MCV RBC AUTO: 90.3 FL (ref 78–100)
PDW BLD-RTO: 15.3 % (ref 11.7–14.9)
PHOSPHORUS: 2.9 MG/DL (ref 2.5–4.9)
PLATELET # BLD: 292 K/CU MM (ref 140–440)
PMV BLD AUTO: 9.2 FL (ref 7.5–11.1)
POTASSIUM SERPL-SCNC: 3.9 MMOL/L (ref 3.5–5.1)
RBC # BLD: 3.61 M/CU MM (ref 4.2–5.4)
SODIUM BLD-SCNC: 139 MMOL/L (ref 135–145)
WBC # BLD: 7.7 K/CU MM (ref 4–10.5)

## 2023-03-26 PROCEDURE — 6360000002 HC RX W HCPCS: Performed by: SURGERY

## 2023-03-26 PROCEDURE — 99024 POSTOP FOLLOW-UP VISIT: CPT | Performed by: SURGERY

## 2023-03-26 PROCEDURE — 2580000003 HC RX 258: Performed by: SURGERY

## 2023-03-26 PROCEDURE — 94761 N-INVAS EAR/PLS OXIMETRY MLT: CPT

## 2023-03-26 PROCEDURE — 80048 BASIC METABOLIC PNL TOTAL CA: CPT

## 2023-03-26 PROCEDURE — 6370000000 HC RX 637 (ALT 250 FOR IP): Performed by: SURGERY

## 2023-03-26 PROCEDURE — 82962 GLUCOSE BLOOD TEST: CPT

## 2023-03-26 PROCEDURE — 84100 ASSAY OF PHOSPHORUS: CPT

## 2023-03-26 PROCEDURE — 85027 COMPLETE CBC AUTOMATED: CPT

## 2023-03-26 PROCEDURE — 94640 AIRWAY INHALATION TREATMENT: CPT

## 2023-03-26 PROCEDURE — 51798 US URINE CAPACITY MEASURE: CPT

## 2023-03-26 PROCEDURE — 83735 ASSAY OF MAGNESIUM: CPT

## 2023-03-26 PROCEDURE — 36415 COLL VENOUS BLD VENIPUNCTURE: CPT

## 2023-03-26 PROCEDURE — 6370000000 HC RX 637 (ALT 250 FOR IP): Performed by: HOSPITALIST

## 2023-03-26 PROCEDURE — 6360000002 HC RX W HCPCS: Performed by: STUDENT IN AN ORGANIZED HEALTH CARE EDUCATION/TRAINING PROGRAM

## 2023-03-26 PROCEDURE — 1200000000 HC SEMI PRIVATE

## 2023-03-26 PROCEDURE — 2500000003 HC RX 250 WO HCPCS: Performed by: SURGERY

## 2023-03-26 RX ORDER — ALBUTEROL SULFATE 2.5 MG/3ML
2.5 SOLUTION RESPIRATORY (INHALATION) 3 TIMES DAILY
Status: DISCONTINUED | OUTPATIENT
Start: 2023-03-26 | End: 2023-03-27

## 2023-03-26 RX ADMIN — BUDESONIDE AND FORMOTEROL FUMARATE DIHYDRATE 2 PUFF: 160; 4.5 AEROSOL RESPIRATORY (INHALATION) at 20:33

## 2023-03-26 RX ADMIN — TIOTROPIUM BROMIDE INHALATION SPRAY 2 PUFF: 3.12 SPRAY, METERED RESPIRATORY (INHALATION) at 07:46

## 2023-03-26 RX ADMIN — BUDESONIDE AND FORMOTEROL FUMARATE DIHYDRATE 2 PUFF: 160; 4.5 AEROSOL RESPIRATORY (INHALATION) at 07:46

## 2023-03-26 RX ADMIN — METRONIDAZOLE 500 MG: 500 INJECTION, SOLUTION INTRAVENOUS at 01:05

## 2023-03-26 RX ADMIN — HYDROCODONE BITARTRATE AND ACETAMINOPHEN 1 TABLET: 5; 325 TABLET ORAL at 16:27

## 2023-03-26 RX ADMIN — ATORVASTATIN CALCIUM 20 MG: 10 TABLET, FILM COATED ORAL at 09:30

## 2023-03-26 RX ADMIN — AMLODIPINE BESYLATE 10 MG: 10 TABLET ORAL at 09:31

## 2023-03-26 RX ADMIN — Medication 1 CAPSULE: at 09:30

## 2023-03-26 RX ADMIN — METRONIDAZOLE 500 MG: 500 INJECTION, SOLUTION INTRAVENOUS at 09:35

## 2023-03-26 RX ADMIN — SODIUM CHLORIDE, PRESERVATIVE FREE 10 ML: 5 INJECTION INTRAVENOUS at 02:32

## 2023-03-26 RX ADMIN — HYDROMORPHONE HYDROCHLORIDE 0.5 MG: 1 INJECTION, SOLUTION INTRAMUSCULAR; INTRAVENOUS; SUBCUTANEOUS at 21:18

## 2023-03-26 RX ADMIN — ENOXAPARIN SODIUM 40 MG: 100 INJECTION SUBCUTANEOUS at 09:30

## 2023-03-26 RX ADMIN — MAGNESIUM HYDROXIDE 30 ML: 400 SUSPENSION ORAL at 09:30

## 2023-03-26 RX ADMIN — ALBUTEROL SULFATE 2.5 MG: 2.5 SOLUTION RESPIRATORY (INHALATION) at 07:45

## 2023-03-26 RX ADMIN — BUSPIRONE HYDROCHLORIDE 30 MG: 15 TABLET ORAL at 20:46

## 2023-03-26 RX ADMIN — PIPERACILLIN AND TAZOBACTAM 3375 MG: 3; .375 INJECTION, POWDER, LYOPHILIZED, FOR SOLUTION INTRAVENOUS at 20:49

## 2023-03-26 RX ADMIN — LISINOPRIL 40 MG: 20 TABLET ORAL at 09:31

## 2023-03-26 RX ADMIN — ASPIRIN 81 MG: 81 TABLET, COATED ORAL at 09:31

## 2023-03-26 RX ADMIN — ALBUTEROL SULFATE 2.5 MG: 2.5 SOLUTION RESPIRATORY (INHALATION) at 14:41

## 2023-03-26 RX ADMIN — BACLOFEN 5 MG: 10 TABLET ORAL at 20:46

## 2023-03-26 RX ADMIN — POLYETHYLENE GLYCOL 3350 17 G: 17 POWDER, FOR SOLUTION ORAL at 09:50

## 2023-03-26 RX ADMIN — BACLOFEN 5 MG: 10 TABLET ORAL at 09:31

## 2023-03-26 RX ADMIN — ALBUTEROL SULFATE 2.5 MG: 2.5 SOLUTION RESPIRATORY (INHALATION) at 20:33

## 2023-03-26 RX ADMIN — PANTOPRAZOLE SODIUM 40 MG: 40 TABLET, DELAYED RELEASE ORAL at 05:16

## 2023-03-26 RX ADMIN — HYDROCODONE BITARTRATE AND ACETAMINOPHEN 1 TABLET: 5; 325 TABLET ORAL at 09:31

## 2023-03-26 RX ADMIN — SODIUM CHLORIDE, PRESERVATIVE FREE 10 ML: 5 INJECTION INTRAVENOUS at 20:59

## 2023-03-26 RX ADMIN — METRONIDAZOLE 500 MG: 500 INJECTION, SOLUTION INTRAVENOUS at 16:25

## 2023-03-26 RX ADMIN — PIPERACILLIN AND TAZOBACTAM 3375 MG: 3; .375 INJECTION, POWDER, LYOPHILIZED, FOR SOLUTION INTRAVENOUS at 05:12

## 2023-03-26 RX ADMIN — HYDROMORPHONE HYDROCHLORIDE 0.5 MG: 1 INJECTION, SOLUTION INTRAMUSCULAR; INTRAVENOUS; SUBCUTANEOUS at 05:25

## 2023-03-26 RX ADMIN — PIPERACILLIN AND TAZOBACTAM 3375 MG: 3; .375 INJECTION, POWDER, LYOPHILIZED, FOR SOLUTION INTRAVENOUS at 13:13

## 2023-03-26 ASSESSMENT — PAIN DESCRIPTION - ORIENTATION
ORIENTATION: MID
ORIENTATION: LOWER
ORIENTATION: MID

## 2023-03-26 ASSESSMENT — PAIN DESCRIPTION - LOCATION
LOCATION: ABDOMEN

## 2023-03-26 ASSESSMENT — PAIN SCALES - GENERAL
PAINLEVEL_OUTOF10: 5
PAINLEVEL_OUTOF10: 7
PAINLEVEL_OUTOF10: 5
PAINLEVEL_OUTOF10: 5
PAINLEVEL_OUTOF10: 6

## 2023-03-26 ASSESSMENT — PAIN DESCRIPTION - ONSET
ONSET: ON-GOING
ONSET: ON-GOING

## 2023-03-26 ASSESSMENT — PAIN DESCRIPTION - DESCRIPTORS
DESCRIPTORS: ACHING
DESCRIPTORS: SORE
DESCRIPTORS: ACHING;CRAMPING;TENDER

## 2023-03-26 ASSESSMENT — PAIN SCALES - WONG BAKER
WONGBAKER_NUMERICALRESPONSE: 0

## 2023-03-26 ASSESSMENT — PAIN - FUNCTIONAL ASSESSMENT
PAIN_FUNCTIONAL_ASSESSMENT: PREVENTS OR INTERFERES SOME ACTIVE ACTIVITIES AND ADLS
PAIN_FUNCTIONAL_ASSESSMENT: PREVENTS OR INTERFERES SOME ACTIVE ACTIVITIES AND ADLS
PAIN_FUNCTIONAL_ASSESSMENT: ACTIVITIES ARE NOT PREVENTED

## 2023-03-26 ASSESSMENT — PAIN DESCRIPTION - PAIN TYPE
TYPE: SURGICAL PAIN
TYPE: SURGICAL PAIN

## 2023-03-26 ASSESSMENT — PAIN DESCRIPTION - FREQUENCY
FREQUENCY: CONTINUOUS
FREQUENCY: CONTINUOUS

## 2023-03-26 NOTE — PLAN OF CARE
Problem: ABCDS Injury Assessment  Goal: Absence of physical injury  3/26/2023 1652 by Elena Rios LPN  Outcome: Progressing  3/26/2023 0654 by Brayan Minor LPN  Outcome: Progressing     Problem: Safety - Adult  Goal: Free from fall injury  3/26/2023 1652 by Elena Rios LPN  Outcome: Progressing  3/26/2023 0654 by Brayan Minor LPN  Outcome: Progressing     Problem: Discharge Planning  Goal: Discharge to home or other facility with appropriate resources  3/26/2023 1652 by Elena Rios LPN  Outcome: Progressing  3/26/2023 0654 by Brayan Minor LPN  Outcome: Progressing     Problem: Pain  Goal: Verbalizes/displays adequate comfort level or baseline comfort level  3/26/2023 1652 by Elena Rios LPN  Outcome: Progressing  3/26/2023 0654 by Brayan Minor LPN  Outcome: Progressing     Problem: Chronic Conditions and Co-morbidities  Goal: Patient's chronic conditions and co-morbidity symptoms are monitored and maintained or improved  3/26/2023 1652 by Elena Rios LPN  Outcome: Progressing  3/26/2023 0654 by Brayan Minor LPN  Outcome: Progressing     Problem: Skin/Tissue Integrity  Goal: Absence of new skin breakdown  Description: 1. Monitor for areas of redness and/or skin breakdown  2. Assess vascular access sites hourly  3. Every 4-6 hours minimum:  Change oxygen saturation probe site  4. Every 4-6 hours:  If on nasal continuous positive airway pressure, respiratory therapy assess nares and determine need for appliance change or resting period.   3/26/2023 1652 by Elena Rios LPN  Outcome: Progressing  3/26/2023 0654 by Brayan Minor LPN  Outcome: Progressing

## 2023-03-26 NOTE — PROGRESS NOTES
GENERAL SURGERY PROGRESS NOTE    Nando Gomez is a 67 y.o. female status post sigmoidectomy with primary anastomosis 3/21. Subjective:    No acute events. +flatus. Denies shortness of breath, chest pain, fevers/chills, nausea/vomiting.     Objective:    Vitals: VITALS:  BP (!) 156/55   Pulse 78   Temp 98 °F (36.7 °C) (Oral)   Resp 20   Ht 5' (1.524 m)   Wt 132 lb 11.5 oz (60.2 kg)   SpO2 97%   BMI 25.92 kg/m²     I/O: 03/25 0701 - 03/26 0700  In: 10 [I.V.:10]  Out: 230 [Urine:200; Drains:30]    Labs/Imaging Results:   Lab Results   Component Value Date/Time     03/26/2023 03:36 AM    K 3.9 03/26/2023 03:36 AM     03/26/2023 03:36 AM    CO2 26 03/26/2023 03:36 AM    BUN 13 03/26/2023 03:36 AM    CREATININE 0.8 03/26/2023 03:36 AM    GLUCOSE 123 03/26/2023 03:36 AM    CALCIUM 7.4 03/26/2023 03:36 AM      Lab Results   Component Value Date    WBC 7.7 03/26/2023    HGB 10.1 (L) 03/26/2023    HCT 32.6 (L) 03/26/2023    MCV 90.3 03/26/2023     03/26/2023          IV Fluids:   sodium chloride Last Rate: 20 mL/hr at 03/25/23 2228    dextrose    Scheduled Meds:   albuterol, 2.5 mg, Nebulization, TID    baclofen, 5 mg, Oral, BID    magnesium hydroxide, 30 mL, Oral, Daily    lactobacillus, 1 capsule, Oral, Daily with breakfast    famotidine, 20 mg, Oral, Once    amLODIPine, 10 mg, Oral, Daily    aspirin, 81 mg, Oral, Daily    atorvastatin, 20 mg, Oral, Daily    lisinopril, 40 mg, Oral, Daily    busPIRone, 30 mg, Oral, Nightly    insulin lispro, 0-4 Units, SubCUTAneous, TID WC    insulin lispro, 0-4 Units, SubCUTAneous, Nightly    pantoprazole, 40 mg, Oral, Daily    sodium chloride flush, 5-40 mL, IntraVENous, 2 times per day    enoxaparin, 40 mg, SubCUTAneous, Daily    piperacillin-tazobactam, 3,375 mg, IntraVENous, Q8H    metroNIDAZOLE, 500 mg, IntraVENous, Q8H    budesonide-formoterol, 2 puff, Inhalation, BID **AND** tiotropium, 2 puff, Inhalation, Daily    Physical Exam:  General: A&O x 3, no distress. HEENT: Anicteric sclerae, MMM. Extremities: No edema bilat LE. Abdomen: Soft, expected incisional tenderness, nondistended, no rebound or guarding. Assessment and Plan:     Patient Active Problem List:     Shortness of breath     Snoring     Excessive daytime sleepiness     Former smoker     COPD, mild ([address]     Chest pain     History of CVA (cerebrovascular accident)     Mixed hyperlipidemia     Gastroesophageal reflux disease without esophagitis     Anxiety     Multiple lung nodules on CT     Hx of colonic polyps     Benign neoplasm of sigmoid colon     Acute exacerbation of chronic obstructive pulmonary disease (COPD) ([address]     Mild obstructive sleep apnea     Type 2 diabetes mellitus without complication, without long-term current use of insulin (HCC)     Chronic renal disease, stage III ([address] [708972]     Essential hypertension     Seasonal allergies     Primary insomnia     Chronic pain of right hip     Closed intertrochanteric fracture of hip, right, initial encounter [address] [facility]     Type 2 diabetes mellitus with chronic kidney disease     Fatty liver     Adrenal nodule (HCC)     Perforated sigmoid colon ([address]     Perforated diverticulum      Principal Problem:    Perforated sigmoid colon ([address]  Plan:    Active Problems:    Perforated diverticulum  Plan:     Status post sigmoidectomy with primary anastomosis 3/21  + flatus   Continue soft diet  Continue daily dressing changes  Monitor LISA output           Lona Allen DO

## 2023-03-26 NOTE — PROGRESS NOTES
V2.0  [facility] [place] Hospitalist Progress Note      Name:  Tanesha Carpio /Age/Sex: 1950  (67 y.o. female)   MRN & CSN:  [id number] & [id number] Encounter Date/Time: 3/26/2023 9:43 AM EDT    Location:  [facility] PCP: Emilie Jameson DO       Hospital Day: 7    Assessment and Plan:   Tanesha Carpio is a 67 y.o. female who presents with perforated sigmoid diverticulitis      Plan:  Perforated sigmoid diverticulitis s/p exploratory lap with sigmoid resection with primary anastomosis 3/21.  -On IV antibiotics. CT abdomen pelvis 3/20: Perforated sigmoid diverticulitis. Exploratory laparotomy with sigmoid resection with primary anastomosis 3/21. CRP 47. Procalcitonin 0.305. -BCx 3/20: Negative  -NG tube removed 3/23. Diet advanced to clear liquids. Now advanced to soft  -- PT/OT. probiotics. - General surgery recommending antibiotics for a total of 10 days. -- awaiting return of bowel function  Dysuria and urinary retention  -UA: Unremarkable. .  On IV antibiotics for perforated diverticulitis. - 3/23: Lyn catheter removed. Retaining greater than 600 cc and was straight cathed. Has urinated on her own. UCx 3/20: Mixed sandra. Possible hypoxia - resolved  - Likely postop hypoxia.   - On room air. Hypertension  - On amlodipine and lisinopril. COPD  HTN  HLD  DM    Diet ADULT DIET; Dysphagia - Soft and Bite Sized    DVT Prophylaxis [x] Lovenox, [] Heparin, [] Eliquis, [] Xarelto  [] SCDs       Code Status Full Code   Disposition From: Home  Expected Disposition: Home health care  Estimated Date of Discharge: 3/26  Patient requires continued admission due to awaiting for patient to tolerate and advance diet and to be cleared by general surgery   Home O2 None     Subjective/Interval history:   Chief Complaint: Abdominal Pain (Pt c/o mchugh lower abd pain that radiates into her lower back that started this morning when she woke up. )     Seen and evaluated at bedside.  Still has soreness in abdomen, which improves with pain medication. Tolerating her soft diet. Having gas. No BM yet. Review of Systems:    Negative unless mentioned above    Objective: Intake/Output Summary (Last 24 hours) at 3/26/2023 0908  Last data filed at 3/26/2023 0526  Gross per 24 hour   Intake 10 ml   Output 230 ml   Net -220 ml          Vitals:   BP (!) 169/69   Pulse 73   Temp 98.4 °F (36.9 °C) (Oral)   Resp 16   Ht 5' (1.524 m)   Wt 132 lb 11.5 oz (60.2 kg)   SpO2 98%   BMI 25.92 kg/m²     Physical Exam:   General: NAD, laying in bed  Eyes: no discharge  HENT: NCAT  Cardiovascular: RRR  Respiratory: Clear to auscultation   Gastrointestinal: Soft, has LISA drain on right side and midline dressing  Genitourinary: no suprapubic tenderness  Musculoskeletal: No edema, nontender  Skin: warm, dry, surgical wounds  Neuro: Alert. No gross deficits  Psych: Mood appropriate.      Medications:   Medications:    albuterol  2.5 mg Nebulization TID    baclofen  5 mg Oral BID    magnesium hydroxide  30 mL Oral Daily    lactobacillus  1 capsule Oral Daily with breakfast    famotidine  20 mg Oral Once    amLODIPine  10 mg Oral Daily    aspirin  81 mg Oral Daily    atorvastatin  20 mg Oral Daily    lisinopril  40 mg Oral Daily    busPIRone  30 mg Oral Nightly    insulin lispro  0-4 Units SubCUTAneous TID WC    insulin lispro  0-4 Units SubCUTAneous Nightly    pantoprazole  40 mg Oral Daily    sodium chloride flush  5-40 mL IntraVENous 2 times per day    enoxaparin  40 mg SubCUTAneous Daily    piperacillin-tazobactam  3,375 mg IntraVENous Q8H    metroNIDAZOLE  500 mg IntraVENous Q8H    budesonide-formoterol  2 puff Inhalation BID    And    tiotropium  2 puff Inhalation Daily      Infusions:    sodium chloride 20 mL/hr at 03/25/23 2228    dextrose       PRN Meds: Benzocaine-Menthol, 1 lozenge, Q2H PRN  HYDROmorphone, 0.25 mg, Q3H PRN   Or  HYDROmorphone, 0.5 mg, Q3H PRN  HYDROcodone 5 mg - acetaminophen, 1 tablet, Q6H PRN  dicyclomine, 10 mg,

## 2023-03-27 LAB
GLUCOSE BLD-MCNC: 115 MG/DL (ref 70–99)
GLUCOSE BLD-MCNC: 124 MG/DL (ref 70–99)
GLUCOSE BLD-MCNC: 139 MG/DL (ref 70–99)
GLUCOSE BLD-MCNC: 140 MG/DL (ref 70–99)

## 2023-03-27 PROCEDURE — 6370000000 HC RX 637 (ALT 250 FOR IP): Performed by: HOSPITALIST

## 2023-03-27 PROCEDURE — 2580000003 HC RX 258: Performed by: SURGERY

## 2023-03-27 PROCEDURE — 6370000000 HC RX 637 (ALT 250 FOR IP): Performed by: STUDENT IN AN ORGANIZED HEALTH CARE EDUCATION/TRAINING PROGRAM

## 2023-03-27 PROCEDURE — 6370000000 HC RX 637 (ALT 250 FOR IP): Performed by: SURGERY

## 2023-03-27 PROCEDURE — 1200000000 HC SEMI PRIVATE

## 2023-03-27 PROCEDURE — 97110 THERAPEUTIC EXERCISES: CPT

## 2023-03-27 PROCEDURE — 97530 THERAPEUTIC ACTIVITIES: CPT

## 2023-03-27 PROCEDURE — 97116 GAIT TRAINING THERAPY: CPT

## 2023-03-27 PROCEDURE — 92610 EVALUATE SWALLOWING FUNCTION: CPT

## 2023-03-27 PROCEDURE — 6360000002 HC RX W HCPCS: Performed by: SURGERY

## 2023-03-27 PROCEDURE — 2500000003 HC RX 250 WO HCPCS: Performed by: SURGERY

## 2023-03-27 PROCEDURE — 82962 GLUCOSE BLOOD TEST: CPT

## 2023-03-27 PROCEDURE — 94640 AIRWAY INHALATION TREATMENT: CPT

## 2023-03-27 RX ORDER — ALBUTEROL SULFATE 90 UG/1
2 AEROSOL, METERED RESPIRATORY (INHALATION) EVERY 4 HOURS PRN
Status: DISCONTINUED | OUTPATIENT
Start: 2023-03-27 | End: 2023-03-28 | Stop reason: HOSPADM

## 2023-03-27 RX ORDER — ALBUTEROL SULFATE 90 UG/1
2 AEROSOL, METERED RESPIRATORY (INHALATION) 2 TIMES DAILY
Status: DISCONTINUED | OUTPATIENT
Start: 2023-03-27 | End: 2023-03-28 | Stop reason: HOSPADM

## 2023-03-27 RX ORDER — HYDROCODONE BITARTRATE AND ACETAMINOPHEN 5; 325 MG/1; MG/1
1 TABLET ORAL EVERY 6 HOURS PRN
Status: DISCONTINUED | OUTPATIENT
Start: 2023-03-27 | End: 2023-03-28 | Stop reason: HOSPADM

## 2023-03-27 RX ADMIN — PANTOPRAZOLE SODIUM 40 MG: 40 TABLET, DELAYED RELEASE ORAL at 06:06

## 2023-03-27 RX ADMIN — BACLOFEN 5 MG: 10 TABLET ORAL at 21:20

## 2023-03-27 RX ADMIN — SODIUM CHLORIDE, PRESERVATIVE FREE 10 ML: 5 INJECTION INTRAVENOUS at 21:20

## 2023-03-27 RX ADMIN — PIPERACILLIN AND TAZOBACTAM 3375 MG: 3; .375 INJECTION, POWDER, LYOPHILIZED, FOR SOLUTION INTRAVENOUS at 06:06

## 2023-03-27 RX ADMIN — MAGNESIUM HYDROXIDE 30 ML: 400 SUSPENSION ORAL at 09:37

## 2023-03-27 RX ADMIN — PIPERACILLIN AND TAZOBACTAM 3375 MG: 3; .375 INJECTION, POWDER, LYOPHILIZED, FOR SOLUTION INTRAVENOUS at 14:48

## 2023-03-27 RX ADMIN — METRONIDAZOLE 500 MG: 500 INJECTION, SOLUTION INTRAVENOUS at 01:14

## 2023-03-27 RX ADMIN — Medication 1 CAPSULE: at 09:34

## 2023-03-27 RX ADMIN — SODIUM CHLORIDE 25 ML: 9 INJECTION, SOLUTION INTRAVENOUS at 14:46

## 2023-03-27 RX ADMIN — SODIUM CHLORIDE: 9 INJECTION, SOLUTION INTRAVENOUS at 01:10

## 2023-03-27 RX ADMIN — AMLODIPINE BESYLATE 10 MG: 10 TABLET ORAL at 09:35

## 2023-03-27 RX ADMIN — ALBUTEROL SULFATE 2 PUFF: 90 AEROSOL, METERED RESPIRATORY (INHALATION) at 19:50

## 2023-03-27 RX ADMIN — HYDROCODONE BITARTRATE AND ACETAMINOPHEN 1 TABLET: 5; 325 TABLET ORAL at 14:51

## 2023-03-27 RX ADMIN — HYDROCODONE BITARTRATE AND ACETAMINOPHEN 1 TABLET: 5; 325 TABLET ORAL at 09:34

## 2023-03-27 RX ADMIN — BUDESONIDE AND FORMOTEROL FUMARATE DIHYDRATE 2 PUFF: 160; 4.5 AEROSOL RESPIRATORY (INHALATION) at 19:52

## 2023-03-27 RX ADMIN — ATORVASTATIN CALCIUM 20 MG: 10 TABLET, FILM COATED ORAL at 09:34

## 2023-03-27 RX ADMIN — LISINOPRIL 40 MG: 20 TABLET ORAL at 09:34

## 2023-03-27 RX ADMIN — BACLOFEN 5 MG: 10 TABLET ORAL at 09:34

## 2023-03-27 RX ADMIN — ENOXAPARIN SODIUM 40 MG: 100 INJECTION SUBCUTANEOUS at 09:35

## 2023-03-27 RX ADMIN — SODIUM CHLORIDE: 9 INJECTION, SOLUTION INTRAVENOUS at 06:04

## 2023-03-27 RX ADMIN — METRONIDAZOLE 500 MG: 500 INJECTION, SOLUTION INTRAVENOUS at 16:41

## 2023-03-27 RX ADMIN — ASPIRIN 81 MG: 81 TABLET, COATED ORAL at 09:34

## 2023-03-27 RX ADMIN — METRONIDAZOLE 500 MG: 500 INJECTION, SOLUTION INTRAVENOUS at 09:44

## 2023-03-27 RX ADMIN — BUSPIRONE HYDROCHLORIDE 30 MG: 15 TABLET ORAL at 21:20

## 2023-03-27 RX ADMIN — HYDROMORPHONE HYDROCHLORIDE 0.5 MG: 1 INJECTION, SOLUTION INTRAMUSCULAR; INTRAVENOUS; SUBCUTANEOUS at 01:03

## 2023-03-27 RX ADMIN — SODIUM CHLORIDE, PRESERVATIVE FREE 5 ML: 5 INJECTION INTRAVENOUS at 14:44

## 2023-03-27 ASSESSMENT — PAIN SCALES - WONG BAKER
WONGBAKER_NUMERICALRESPONSE: 0
WONGBAKER_NUMERICALRESPONSE: 0

## 2023-03-27 ASSESSMENT — PAIN SCALES - GENERAL
PAINLEVEL_OUTOF10: 7
PAINLEVEL_OUTOF10: 6
PAINLEVEL_OUTOF10: 6
PAINLEVEL_OUTOF10: 7
PAINLEVEL_OUTOF10: 3

## 2023-03-27 ASSESSMENT — PAIN DESCRIPTION - LOCATION
LOCATION: ABDOMEN
LOCATION: BACK;ABDOMEN
LOCATION: ABDOMEN
LOCATION: ABDOMEN

## 2023-03-27 ASSESSMENT — PAIN DESCRIPTION - ORIENTATION: ORIENTATION: INNER;LOWER;MID

## 2023-03-27 ASSESSMENT — PAIN - FUNCTIONAL ASSESSMENT: PAIN_FUNCTIONAL_ASSESSMENT: ACTIVITIES ARE NOT PREVENTED

## 2023-03-27 ASSESSMENT — PAIN DESCRIPTION - DESCRIPTORS
DESCRIPTORS: ACHING;SORE;SHARP
DESCRIPTORS: ACHING;DISCOMFORT;SORE

## 2023-03-27 NOTE — PROGRESS NOTES
Abdominal Pain (Pt c/o mchugh lower abd pain that radiates into her lower back that started this morning when she woke up. )     Seen and evaluated at bedside. Tolerating her soft diet. Having gas. No BM yet. Reports she had bowel movement. Review of Systems:    Negative unless mentioned above    Objective: Intake/Output Summary (Last 24 hours) at 3/27/2023 1112  Last data filed at 3/26/2023 2355  Gross per 24 hour   Intake --   Output 640 ml   Net -640 ml        Vitals:   BP (!) 139/59   Pulse 73   Temp 98 °F (36.7 °C) (Oral)   Resp 16   Ht 5' (1.524 m)   Wt 138 lb 10.7 oz (62.9 kg)   SpO2 100%   BMI 27.08 kg/m²     Physical Exam:   General: NAD, laying in bed  Eyes: no discharge  HENT: NCAT  Cardiovascular: RRR  Respiratory: Clear to auscultation   Gastrointestinal: Soft, has LISA drain on right side with serosanguinous and midline dressing  Genitourinary: no suprapubic tenderness  Musculoskeletal: No edema, nontender  Skin: warm, dry, surgical wounds  Neuro: Alert. No gross deficits  Psych: Mood appropriate.      Medications:   Medications:    albuterol sulfate HFA  2 puff Inhalation BID    baclofen  5 mg Oral BID    magnesium hydroxide  30 mL Oral Daily    lactobacillus  1 capsule Oral Daily with breakfast    famotidine  20 mg Oral Once    amLODIPine  10 mg Oral Daily    aspirin  81 mg Oral Daily    atorvastatin  20 mg Oral Daily    lisinopril  40 mg Oral Daily    busPIRone  30 mg Oral Nightly    insulin lispro  0-4 Units SubCUTAneous TID WC    insulin lispro  0-4 Units SubCUTAneous Nightly    pantoprazole  40 mg Oral Daily    sodium chloride flush  5-40 mL IntraVENous 2 times per day    enoxaparin  40 mg SubCUTAneous Daily    piperacillin-tazobactam  3,375 mg IntraVENous Q8H    metroNIDAZOLE  500 mg IntraVENous Q8H    budesonide-formoterol  2 puff Inhalation BID    And    tiotropium  2 puff Inhalation Daily      Infusions:    sodium chloride 20 mL/hr at 03/27/23 0604    dextrose       PRN Meds: HYDROcodone 5 mg - acetaminophen, 1 tablet, Q6H PRN  albuterol sulfate HFA, 2 puff, Q4H PRN  Benzocaine-Menthol, 1 lozenge, Q2H PRN  dicyclomine, 10 mg, TID PRN  traMADol, 50 mg, Q6H PRN  sodium chloride flush, 5-40 mL, PRN  sodium chloride, , PRN  ondansetron, 4 mg, Q8H PRN   Or  ondansetron, 4 mg, Q6H PRN  polyethylene glycol, 17 g, Daily PRN  acetaminophen, 650 mg, Q6H PRN   Or  acetaminophen, 650 mg, Q6H PRN  magnesium sulfate, 2,000 mg, PRN  potassium chloride, 40 mEq, PRN   Or  potassium alternative oral replacement, 40 mEq, PRN   Or  potassium chloride, 10 mEq, PRN  aluminum & magnesium hydroxide-simethicone, 30 mL, Q6H PRN  glucose, 4 tablet, PRN  dextrose bolus, 125 mL, PRN   Or  dextrose bolus, 250 mL, PRN  glucagon (rDNA), 1 mg, PRN  dextrose, , Continuous PRN      Labs      Recent Labs     03/26/23 0336   WBC 7.7   HGB 10.1*   HCT 32.6*         Recent Labs     03/26/23 0336      K 3.9      CO2 26   PHOS 2.9   BUN 13   CREATININE 0.8       Lab Results   Component Value Date/Time    LABA1C 6.6 03/16/2023 11:56 AM     CALCIUM:  7.4/26 (03/26 0336)  Lab Results   Component Value Date/Time    MG 1.8 03/26/2023 03:36 AM           Electronically signed by Alicja Mendoza MD on 3/27/2023 at 11:12 AM

## 2023-03-27 NOTE — PLAN OF CARE
Problem: ABCDS Injury Assessment  Goal: Absence of physical injury  3/27/2023 0121 by Christoph Hernandez LPN  Outcome: Progressing  3/26/2023 1652 by Gia Byrnes LPN  Outcome: Progressing     Problem: Safety - Adult  Goal: Free from fall injury  3/27/2023 0121 by Christoph Hernandez LPN  Outcome: Progressing  3/26/2023 1652 by Gia Byrnes LPN  Outcome: Progressing     Problem: Discharge Planning  Goal: Discharge to home or other facility with appropriate resources  3/27/2023 0121 by Christoph Hernandez LPN  Outcome: Progressing  3/26/2023 1652 by Gia Byrnes LPN  Outcome: Progressing     Problem: Pain  Goal: Verbalizes/displays adequate comfort level or baseline comfort level  3/27/2023 0121 by Christoph Hernandez LPN  Outcome: Progressing  3/26/2023 1652 by Gia Byrnes LPN  Outcome: Progressing     Problem: Chronic Conditions and Co-morbidities  Goal: Patient's chronic conditions and co-morbidity symptoms are monitored and maintained or improved  3/27/2023 0121 by Christoph Hernandez LPN  Outcome: Progressing  3/26/2023 1652 by Gia Byrnes LPN  Outcome: Progressing     Problem: Skin/Tissue Integrity  Goal: Absence of new skin breakdown  Description: 1. Monitor for areas of redness and/or skin breakdown  2. Assess vascular access sites hourly  3. Every 4-6 hours minimum:  Change oxygen saturation probe site  4. Every 4-6 hours:  If on nasal continuous positive airway pressure, respiratory therapy assess nares and determine need for appliance change or resting period.   3/27/2023 0121 by Christoph Hernandez LPN  Outcome: Progressing  3/26/2023 1652 by Gia Byrnes LPN  Outcome: Progressing

## 2023-03-27 NOTE — PROGRESS NOTES
and Perforated diverticulum on their problem list.  ONSET DATE: this admission    Recent Chest Xray/CT of Chest: see chart    Date of Eval: 3/27/2023  Evaluating Therapist: GERA Travis    Current Diet level:  Current Diet : Soft and Bite-Sized      Primary Complaint       Pain:  Pain Assessment  Pain Assessment: 0-10  Pain Level: 6  Lobo-Baker Pain Rating: No hurt  Patient's Stated Pain Goal: 0 - No pain  Pain Location: Abdomen  Pain Orientation: Mid  Pain Descriptors: Aching, Sore, Sharp  Functional Pain Assessment: Prevents or interferes some active activities and ADLs  Pain Type: Surgical pain  Pain Frequency: Continuous  Pain Onset: On-going  Non-Pharmaceutical Pain Intervention(s): Distraction  Response to Pain Intervention: Patient satisfied  Side Effects: No reported side effects  Multiple Pain Sites: No    Reason for Referral  Geremias Nieto was referred for a bedside swallow evaluation to assess the efficiency of her swallow function, identify signs and symptoms of aspiration and make recommendations regarding safe dietary consistencies, effective compensatory strategies, and safe eating environment. Impression  Dysphagia Diagnosis: Swallow function appears WFL;No clinical indicators of dysphagia        Treatment Plan  Requires SLP Intervention: No             Recommended Diet and Intervention        Recommended Form of Meds: Meds in puree          Compensatory Swallowing Strategies       Treatment/Goals       General  Chart Reviewed: Yes  Behavior/Cognition: Alert; Cooperative;Pleasant mood  Communication Observation: Functional  Follows Directions: Simple  Dentition: Adequate  Patient Positioning: Upright in bed  Baseline Vocal Quality: Normal  Prior Dysphagia History: none known prior to admission  Consistencies Administered: Soft and Bite-Sized; Thin           Vision/Hearing  Vision  Vision: Within Functional Limits  Hearing  Hearing: Within functional limits    Oral Motor Deficits  Oral/Motor  Oral Hygiene: Moist    Oral Phase Dysfunction  Oral Phase  Oral Phase: WFL     Indicators of Pharyngeal Phase Dysfunction   Pharyngeal Phase   Pharyngeal Phase: WFL    Prognosis       Education  Patient Education: Results/recommendations  Patient Education Response: Verbalizes understanding;Demonstrated understanding             Therapy Time  SLP Individual Minutes  Time In: 1100  Time Out: 9302  Minutes: 2011 [facility] Alix Belinda [address]  3/27/2023 12:28 PM

## 2023-03-27 NOTE — OP NOTE
Operative Report-      Patient ID:  Imelda Morgan  [id number]  67 y.o.  1950    Indications: Perforated diverticulitis    Pre-operative Diagnosis: Perforated diverticulitis      Post-operative Diagnosis:  Same    Procedure:  Exploratory laparotomy with sigmoid colon resection and primary anastomosis with drain placement    Surgeon: Ish Velez MD    Anesthesia: General Endotracheal Anesthesia    ASA Class: 3    Findings: same with min contamination    Estimated Blood Loss:  Minimal           Drains: x1           Total IV Fluids: 500 ml           Specimens: sigmoid colon             Complications:  None; patient tolerated the procedure well. Disposition: PACU - hemodynamically stable. Condition: stable        Procedure Details    The patient was seen again in the Holding Room. The risks, benefits, complications, treatment options, and expected outcomes were discussed with the patient. The possibilities of reaction to medication, pulmonary aspiration, perforation of viscus, bleeding, recurrent infection, the need for additional procedures, and development of a complication requiring transfusion or further operation were discussed with the patient and/or family. There was concurrence with the proposed plan, and informed consent was obtained. The site of surgery was properly noted/marked. The patient was taken to the Operating Room, identified as Imelda Morgan, and the procedure verified as colon resection. A Time Out was held and the above information confirmed     The patient was brought to the operating room and placed supine. Lyn catheter placed preoperatively without incidence. The abdomen was prepped and draped in the usual sterile fashion. A midline incision was made, infra umbilicus down to the pubicsymphysis and the incision was deepened through the subcutaneous tissue and entering the peritoneum without incident using Bovie cautery.   Upon general inspection, the small bowel

## 2023-03-27 NOTE — PROGRESS NOTES
Occupational Therapy  . Occupational Therapy Treatment Note    Name: Nando Gomez MRN: [id number] :   1950   Date:  3/27/2023   Admission Date: 3/20/2023 Room:  [address] [place] [address]-A     Primary Problem:      Restrictions/Precautions:          General precautions, fall risk  abdominal precautions      Communication with other providers: Per chart review, patient ok for tx. Subjective:  Patient states:  I  hope to go home tomorrow  Pain:   Location, Type, Intensity (0/10 to 10/10):  none stated- just had pain meds    Objective:    Observation: patient supine. She is agreeable to bed level- just got back in bed form sitting in recliner. She also walker with PT this date. Objective Measures:  tele, IV    Treatment, including education:    Patient in semi fowlers. She demos ROM for BLE and BUEs including- ankle flex/ext, straight leg raises, shoulder flex/ext, wrist flex/ext, finger flex/ext, x7-10 each. Patient is able to recall 3/3 abdominal precautions. Patient and daughter educated on role of OT , benefits of OT and rationale for therapeutic intervention. Benefit of OOB/EOB activities, benefit of movement. AE/AD, WS/EC,   What to expect with Home health care, home safety, stair safety, homemaking, community programs to aid in healing and purchasing/borrowing AD/AE,     All therapeutic intervention performed c emphasis on BUE strengthening and endurance to  increase strength for functional tasks / transfers. Patient left safely in bed at end of session, with call light in reach, alarm on and nursing aware. Gait belt was used for func transfers / mobility.         Assessment / Impression:    Patient's tolerance of treatment: fair +  Adverse Reaction: None  Significant change in status and impact: Improved from initial evaluation  Barriers to improvement: weakness      Plan for Next Session:      Continue with OT POC      Time in:  1507  Time out:  1543  Timed treatment minutes:  28  Total treatment time:  39      Electronically signed by:    ANGEL Alegria COTA/L [id number]    3/27/2023, 3:49 PM

## 2023-03-27 NOTE — PROGRESS NOTES
Dr. Trudy Ogden at bedside, PETER to remove LISA drain as pt will be discharging home. Drain removed, pt tolerated well, dressing applied and education provided about care instructions. All questions answered.

## 2023-03-27 NOTE — ANESTHESIA POSTPROCEDURE EVALUATION
Department of Anesthesiology  Postprocedure Note    Patient: Matt Berg  MRN: [id number]  YOB: 1950  Date of evaluation: 3/27/2023      Procedure Summary     Date: 03/21/23 Room / Location: [address] [place] [address]    Anesthesia Start: 7102 Anesthesia Stop: 0919    Procedure: LAPAROTOMY EXPLORATORY (Abdomen) Diagnosis:       Intestinal obstruction, unspecified cause, unspecified whether partial or complete (HCC)      (Intestinal obstruction, unspecified cause, unspecified whether partial or complete ([facility] [K56.609])    Surgeons: Azar Uribe MD Responsible Provider: Christie Xie MD    Anesthesia Type: general ASA Status: 3          Anesthesia Type: No value filed.     Florecita Phase I: Florecita Score: 7    Florecita Phase II:        Anesthesia Post Evaluation    Patient location during evaluation: PACU  Patient participation: complete - patient participated  Level of consciousness: awake  Pain score: 1  Airway patency: patent  Nausea & Vomiting: no nausea  Complications: no  Cardiovascular status: hemodynamically stable  Respiratory status: nasal cannula  Hydration status: euvolemic  Multimodal analgesia pain management approach

## 2023-03-27 NOTE — RT PROTOCOL NOTE
RT Inhaler-Nebulizer Bronchodilator Protocol Note    There is a bronchodilator order in the chart from a provider indicating to follow the RT Bronchodilator Protocol and there is an Initiate RT Inhaler-Nebulizer Bronchodilator Protocol order as well (see protocol at bottom of note). CXR Findings:  No results found. The findings from the last RT Protocol Assessment were as follows:   History Pulmonary Disease: Chronic pulmonary disease  Respiratory Pattern: Regular pattern and RR 12-20 bpm  Breath Sounds: Slightly diminished and/or crackles  Cough: Strong, spontaneous, non-productive  Indication for Bronchodilator Therapy:    Bronchodilator Assessment Score: 4    Aerosolized bronchodilator medication orders have been revised according to the RT Inhaler-Nebulizer Bronchodilator Protocol below. Respiratory Therapist to perform RT Therapy Protocol Assessment initially then follow the protocol. Repeat RT Therapy Protocol Assessment PRN for score 0-3 or on second treatment, BID, and PRN for scores above 3. No Indications - adjust the frequency to every 6 hours PRN wheezing or bronchospasm, if no treatments needed after 48 hours then discontinue using Per Protocol order mode. If indication present, adjust the RT bronchodilator orders based on the Bronchodilator Assessment Score as indicated below. Use Inhaler orders unless patient has one or more of the following: on home nebulizer, not able to hold breath for 10 seconds, is not alert and oriented, cannot activate and use MDI correctly, or respiratory rate 25 breaths per minute or more, then use the equivalent nebulizer order(s) with same Frequency and PRN reasons based on the score. If a patient is on this medication at home then do not decrease Frequency below that used at home.     0-3 - enter or revise RT bronchodilator order(s) to equivalent RT Bronchodilator order with Frequency of every 4 hours PRN for wheezing or increased work of breathing using Per Protocol order mode. 4-6 - enter or revise RT Bronchodilator order(s) to two equivalent RT bronchodilator orders with one order with BID Frequency and one order with Frequency of every 4 hours PRN wheezing or increased work of breathing using Per Protocol order mode. 7-10 - enter or revise RT Bronchodilator order(s) to two equivalent RT bronchodilator orders with one order with TID Frequency and one order with Frequency of every 4 hours PRN wheezing or increased work of breathing using Per Protocol order mode. 11-13 - enter or revise RT Bronchodilator order(s) to one equivalent RT bronchodilator order with QID Frequency and an Albuterol order with Frequency of every 4 hours PRN wheezing or increased work of breathing using Per Protocol order mode. Greater than 13 - enter or revise RT Bronchodilator order(s) to one equivalent RT bronchodilator order with every 4 hours Frequency and an Albuterol order with Frequency of every 2 hours PRN wheezing or increased work of breathing using Per Protocol order mode. RT to enter RT Home Evaluation for COPD & MDI Assessment order using Per Protocol order mode.     Electronically signed by Lashay Taylor RCP on 3/27/2023 at 10:40 AM

## 2023-03-27 NOTE — CARE COORDINATION
Chart reviewed. Drain was removed by surgery. The plan is home with [facility]. The patient will need transport set up at discharge. Call Nury Antonio at [phone].

## 2023-03-28 VITALS
OXYGEN SATURATION: 98 % | HEART RATE: 79 BPM | TEMPERATURE: 98.2 F | DIASTOLIC BLOOD PRESSURE: 118 MMHG | RESPIRATION RATE: 18 BRPM | HEIGHT: 60 IN | BODY MASS INDEX: 26.19 KG/M2 | SYSTOLIC BLOOD PRESSURE: 139 MMHG | WEIGHT: 133.38 LBS

## 2023-03-28 LAB
GLUCOSE BLD-MCNC: 103 MG/DL (ref 70–99)
GLUCOSE BLD-MCNC: 130 MG/DL (ref 70–99)

## 2023-03-28 PROCEDURE — 6370000000 HC RX 637 (ALT 250 FOR IP): Performed by: HOSPITALIST

## 2023-03-28 PROCEDURE — 94761 N-INVAS EAR/PLS OXIMETRY MLT: CPT

## 2023-03-28 PROCEDURE — 94640 AIRWAY INHALATION TREATMENT: CPT

## 2023-03-28 PROCEDURE — 2580000003 HC RX 258: Performed by: SURGERY

## 2023-03-28 PROCEDURE — 6370000000 HC RX 637 (ALT 250 FOR IP): Performed by: SURGERY

## 2023-03-28 PROCEDURE — 2500000003 HC RX 250 WO HCPCS: Performed by: SURGERY

## 2023-03-28 PROCEDURE — 99024 POSTOP FOLLOW-UP VISIT: CPT | Performed by: SURGERY

## 2023-03-28 PROCEDURE — 6360000002 HC RX W HCPCS: Performed by: SURGERY

## 2023-03-28 PROCEDURE — 6370000000 HC RX 637 (ALT 250 FOR IP): Performed by: STUDENT IN AN ORGANIZED HEALTH CARE EDUCATION/TRAINING PROGRAM

## 2023-03-28 PROCEDURE — 82962 GLUCOSE BLOOD TEST: CPT

## 2023-03-28 RX ORDER — CIPROFLOXACIN 500 MG/1
500 TABLET, FILM COATED ORAL 2 TIMES DAILY
Qty: 6 TABLET | Refills: 0 | Status: SHIPPED | OUTPATIENT
Start: 2023-03-28 | End: 2023-03-31

## 2023-03-28 RX ORDER — METRONIDAZOLE 500 MG/1
500 TABLET ORAL 3 TIMES DAILY
Qty: 9 TABLET | Refills: 0 | Status: SHIPPED | OUTPATIENT
Start: 2023-03-28 | End: 2023-03-31

## 2023-03-28 RX ORDER — LACTOBACILLUS RHAMNOSUS GG 10B CELL
1 CAPSULE ORAL
Qty: 30 CAPSULE | Refills: 0 | Status: SHIPPED | OUTPATIENT
Start: 2023-03-28

## 2023-03-28 RX ORDER — MAGNESIUM HYDROXIDE/ALUMINUM HYDROXICE/SIMETHICONE 120; 1200; 1200 MG/30ML; MG/30ML; MG/30ML
30 SUSPENSION ORAL EVERY 6 HOURS PRN
Qty: 1 EACH | Refills: 0 | Status: SHIPPED | OUTPATIENT
Start: 2023-03-28

## 2023-03-28 RX ORDER — HYDROCODONE BITARTRATE AND ACETAMINOPHEN 5; 325 MG/1; MG/1
1 TABLET ORAL EVERY 6 HOURS PRN
Qty: 20 TABLET | Refills: 0 | Status: SHIPPED | OUTPATIENT
Start: 2023-03-28 | End: 2023-04-02

## 2023-03-28 RX ADMIN — METRONIDAZOLE 500 MG: 500 INJECTION, SOLUTION INTRAVENOUS at 00:32

## 2023-03-28 RX ADMIN — TIOTROPIUM BROMIDE INHALATION SPRAY 2 PUFF: 3.12 SPRAY, METERED RESPIRATORY (INHALATION) at 08:24

## 2023-03-28 RX ADMIN — Medication 1 CAPSULE: at 10:24

## 2023-03-28 RX ADMIN — BACLOFEN 5 MG: 10 TABLET ORAL at 10:23

## 2023-03-28 RX ADMIN — ALBUTEROL SULFATE 2 PUFF: 90 AEROSOL, METERED RESPIRATORY (INHALATION) at 08:24

## 2023-03-28 RX ADMIN — PANTOPRAZOLE SODIUM 40 MG: 40 TABLET, DELAYED RELEASE ORAL at 05:57

## 2023-03-28 RX ADMIN — AMLODIPINE BESYLATE 10 MG: 10 TABLET ORAL at 10:24

## 2023-03-28 RX ADMIN — ATORVASTATIN CALCIUM 20 MG: 10 TABLET, FILM COATED ORAL at 10:24

## 2023-03-28 RX ADMIN — ASPIRIN 81 MG: 81 TABLET, COATED ORAL at 10:23

## 2023-03-28 RX ADMIN — MAGNESIUM HYDROXIDE 30 ML: 400 SUSPENSION ORAL at 10:23

## 2023-03-28 RX ADMIN — LISINOPRIL 40 MG: 20 TABLET ORAL at 10:23

## 2023-03-28 RX ADMIN — BUDESONIDE AND FORMOTEROL FUMARATE DIHYDRATE 2 PUFF: 160; 4.5 AEROSOL RESPIRATORY (INHALATION) at 08:24

## 2023-03-28 RX ADMIN — SODIUM CHLORIDE, PRESERVATIVE FREE 10 ML: 5 INJECTION INTRAVENOUS at 10:26

## 2023-03-28 RX ADMIN — HYDROCODONE BITARTRATE AND ACETAMINOPHEN 1 TABLET: 5; 325 TABLET ORAL at 05:57

## 2023-03-28 RX ADMIN — SODIUM CHLORIDE: 9 INJECTION, SOLUTION INTRAVENOUS at 00:31

## 2023-03-28 RX ADMIN — PIPERACILLIN AND TAZOBACTAM 3375 MG: 3; .375 INJECTION, POWDER, LYOPHILIZED, FOR SOLUTION INTRAVENOUS at 00:30

## 2023-03-28 ASSESSMENT — ENCOUNTER SYMPTOMS
ABDOMINAL PAIN: 1
BACK PAIN: 0
CHOKING: 0
COLOR CHANGE: 0
APNEA: 0
EYE REDNESS: 0
PHOTOPHOBIA: 0
SORE THROAT: 0
ANAL BLEEDING: 0
RECTAL PAIN: 0
EYE ITCHING: 0
CONSTIPATION: 0
STRIDOR: 0

## 2023-03-28 ASSESSMENT — PAIN DESCRIPTION - DESCRIPTORS
DESCRIPTORS: ACHING;SORE
DESCRIPTORS: ACHING

## 2023-03-28 ASSESSMENT — PAIN DESCRIPTION - ORIENTATION
ORIENTATION: MID
ORIENTATION: MID

## 2023-03-28 ASSESSMENT — PAIN - FUNCTIONAL ASSESSMENT: PAIN_FUNCTIONAL_ASSESSMENT: ACTIVITIES ARE NOT PREVENTED

## 2023-03-28 ASSESSMENT — PAIN SCALES - GENERAL
PAINLEVEL_OUTOF10: 7
PAINLEVEL_OUTOF10: 7

## 2023-03-28 ASSESSMENT — PAIN DESCRIPTION - LOCATION
LOCATION: ABDOMEN
LOCATION: ABDOMEN

## 2023-03-28 NOTE — PROGRESS NOTES
Outpatient Pharmacy Progress Note for Meds-to-Beds    Total number of Prescriptions Filled: 3  The following medications were dispensed to the patient during the discharge process:  MAALOX  Ciprofloxacin  Lactobacillus    Additional Documentation:  Patient's family member picked-up the medication(s) in the OP Pharmacy (daughter)  Patient does have paper Rx for a narcotic but requires a signature from the provider. Thank you for letting us serve your patients.   [address] [facility]    [address] [address] [facility]    Phone: [phone]    Fax: [phone]

## 2023-03-28 NOTE — DISCHARGE SUMMARY
surgery recommending antibiotics for a total of 10 days. Instructed patient that we are transitioning from dilaudid to oral opiates to prepare for dc from hospital. awaiting LISA output slowing down. Dysuria and urinary retention  -UA: Unremarkable. .  On IV antibiotics for perforated diverticulitis. - 3/23: Lyn catheter removed. Retaining greater than 600 cc and was straight cathed. Has urinated on her own. UCx 3/20: Mixed sandra. Possible hypoxia - resolved  - Likely postop hypoxia.   - On room air. Hypertension  - On amlodipine and lisinopril. Dysphagia        -Pills get stuck in her throat. We will get SLP eval.    The patient expressed appropriate understanding of, and agreement with the discharge recommendations, medications, and plan. Consults this admission:  IP CONSULT TO GENERAL SURGERY  IP CONSULT TO GENERAL SURGERY  IP CONSULT TO GI  IP CONSULT TO GI  IP CONSULT TO HOME CARE NEEDS    Discharge Instructions: Follow up appointments: general surgery  Primary care physician: Abdi Oro DO within 2 weeks  Diet: diabetic diet   Activity: activity as tolerated  Disposition: Discharged to:   [x]Home, []C, []SNF, []Acute Rehab, []Hospice   Condition on discharge: Stable  Labs and Tests to be Followed up as an outpatient by PCP or Specialist: none    Discharge Medications:        Medication List        START taking these medications      aluminum & magnesium hydroxide-simethicone 200-200-20 MG/5ML Susp suspension  Commonly known as: MAALOX  Take 30 mLs by mouth every 6 hours as needed for Indigestion     ciprofloxacin 500 MG tablet  Commonly known as: CIPRO  Take 1 tablet by mouth 2 times daily for 3 days     HYDROcodone-acetaminophen 5-325 MG per tablet  Commonly known as: NORCO  Take 1 tablet by mouth every 6 hours as needed for Pain for up to 5 days.  Max Daily Amount: 4 tablets     lactobacillus capsule  Take 1 capsule by mouth daily (with breakfast)     metroNIDAZOLE 500 MG TSH: No results found for: TSH  Troponin:   Lab Results   Component Value Date/Time    TROPONINT <0.010 11/01/2022 01:05 AM    TROPONINT <0.010 05/22/2022 09:45 AM    TROPONINT <0.010 05/13/2022 10:10 PM     Lactic Acid: No results for input(s): LACTA in the last 72 hours. BNP: No results for input(s): PROBNP in the last 72 hours.   UA:  Lab Results   Component Value Date/Time    NITRU NEGATIVE 03/20/2023 04:02 PM    COLORU YELLOW 03/20/2023 04:02 PM    PHUR 5.5 10/17/2022 09:08 AM    WBCUA 5 07/15/2022 08:57 AM    RBCUA 0 07/15/2022 08:57 AM    BACTERIA MANY 07/15/2022 08:57 AM    CLARITYU CLEAR 03/20/2023 04:02 PM    SPECGRAV <1.005 03/20/2023 04:02 PM    LEUKOCYTESUR NEGATIVE 03/20/2023 04:02 PM    UROBILINOGEN 0.2 03/20/2023 04:02 PM    BILIRUBINUR NEGATIVE 03/20/2023 04:02 PM    BILIRUBINUR negative 10/17/2022 09:08 AM    BLOODU NEGATIVE 03/20/2023 04:02 PM    GLUCOSEU negative 10/17/2022 09:08 AM    KETUA NEGATIVE 03/20/2023 04:02 PM     Urine Cultures:   Lab Results   Component Value Date/Time    LABURIN No growth at 18 to 36 hours 10/17/2022 09:11 AM     Blood Cultures: No results found for: BC  No results found for: BLOODCULT2  Organism: No results found for: ORG    Time Spent Discharging patient 45 minutes    Electronically signed by Dorma Collet, MD on 3/28/2023 at 9:53 AM

## 2023-03-28 NOTE — PROGRESS NOTES
Comprehensive Nutrition Assessment    Type and Reason for Visit:  Initial (length of stay)    Nutrition Recommendations/Plan:   Continue current diet, soft with low fiber   Encourage small, more frequent meals   Will continue to follow up during stay     Malnutrition Assessment:  Malnutrition Status: At risk for malnutrition (Comment) (03/28/23 1012)    Context:  Acute Illness       Nutrition Assessment:    Admit with perforated sigmoid colon, diverticulitis now s/p exp lap with colon resection. Able to advance to soft diet, eating some of meals. Encouraged to consume small, more frequent meals as needed. Provided information on soft, low fiber diet to patient and daughter. Copy of low fiber diet from nutrition care [facility] provided with contact number. Noted plan for discharge today. Will follow at moderate nutrition risk if remains inpatient. Nutrition Related Findings:    resting in bed then up to edge of bed to drink coffee, hx COPD, DM meds noted: insulin coverage, culturelle Wound Type: Surgical Incision       Current Nutrition Intake & Therapies:    Average Meal Intake: 26-50%, %  Average Supplements Intake: None Ordered  ADULT DIET; Dysphagia - Soft and Bite Sized    Anthropometric Measures:  Height: 5' (152.4 cm)  Ideal Body Weight (IBW): 100 lbs (45 kg)    Admission Body Weight: 136 lb 14.5 oz (62.1 kg)  Current Body Weight: 133 lb 6.1 oz (60.5 kg), 133.4 % IBW. Weight Source: Bed Scale  Current BMI (kg/m2): 26  Usual Body Weight: 137 lb 9.1 oz (62.4 kg) (per hx May 2022)  % Weight Change (Calculated): -3  Weight Adjustment For: No Adjustment                 BMI Categories: Overweight (BMI 25.0-29. 9)    Estimated Daily Nutrient Needs:  Energy Requirements Based On: Formula  Weight Used for Energy Requirements: Current  Energy (kcal/day): 1438 (mifflin st jeor)  Weight Used for Protein Requirements: Ideal  Protein (g/day): 54-63 (1.2-1.4 g/kg)  Method Used for Fluid Requirements: ml/Kg  Fluid

## 2023-03-28 NOTE — CARE COORDINATION
[facility] Liaison spoke with pt and daughter. And is aware of discharge & will initiate Gia Cullen.

## 2023-03-28 NOTE — PROGRESS NOTES
General Surgery-[facility] Day: 9    Chief Complaint on Admission: perforated diverticulitis      Subjective:     Frederick Soares is a 75 y.o. female with s/p sig colon resection and primary anastomosis . Patient reports abdominal pain. Tolerating ADULT DIET; Dysphagia - Soft and Bite Sized. + BM.     ROS:  Review of Systems   Constitutional:  Negative for chills and fever. HENT:  Negative for ear pain, mouth sores, sore throat and tinnitus. Eyes:  Negative for photophobia, redness and itching. Respiratory:  Negative for apnea, choking and stridor. Cardiovascular:  Negative for chest pain and palpitations. Gastrointestinal:  Positive for abdominal pain. Negative for anal bleeding, constipation and rectal pain. Endocrine: Negative for polydipsia. Genitourinary:  Negative for enuresis, flank pain and hematuria. Musculoskeletal:  Negative for back pain, joint swelling and myalgias. Skin:  Negative for color change and pallor. Allergic/Immunologic: Negative for environmental allergies. Neurological:  Negative for syncope and speech difficulty. Psychiatric/Behavioral:  Negative for confusion and hallucinations. Allergies  Other and Levaquin [levofloxacin]          Diagnosis Date    Acute exacerbation of chronic obstructive pulmonary disease (COPD) ([facility] 12/15/2021    Cerebral artery occlusion with cerebral infarction (HCC)     COPD, mild ([address] 6/15/2021    Diabetes mellitus ([facility]     Excessive daytime sleepiness 6/15/2021    Former smoker 6/15/2021    Hyperlipidemia     Hypertension     Mild obstructive sleep apnea 12/15/2021    Multiple lung nodules on CT 2021    Shortness of breath 6/15/2021    Snoring 6/15/2021       Objective:     Vitals:    23 1021   BP: (!) 139/118   Pulse: 79   Resp: 18   Temp: 98.2 °F (36.8 °C)   SpO2: 98%       TEMPERATURE:  Current - Temp: 98.2 °F (36.8 °C);  Max - Temp  Av.3 °F (36.8 °C)  Min: 98.2 °F (36.8 °C)  Max: 98.4 °F (36.9

## 2023-03-30 ENCOUNTER — TELEPHONE (OUTPATIENT)
Dept: FAMILY MEDICINE CLINIC | Age: 73
End: 2023-03-30

## 2023-03-30 ENCOUNTER — CLINICAL DOCUMENTATION ONLY (OUTPATIENT)
Facility: CLINIC | Age: 73
End: 2023-03-30

## 2023-03-30 NOTE — TELEPHONE ENCOUNTER
Care Transitions Initial Follow Up Call    Outreach made within 2 business days of discharge: Yes    Patient: Carmella Duong Patient : 1950   MRN: [id number]  Reason for Admission: There are no discharge diagnoses documented for the most recent discharge. Discharge Date: 3/28/23       Spoke with: patient    Discharge department/facility: [facility]    TCM Interactive Patient Contact:  Was patient able to fill all prescriptions: Yes  Was patient instructed to bring all medications to the follow-up visit: Yes  Is patient taking all medications as directed in the discharge summary?  Yes  Does patient understand their discharge instructions: Yes  Does patient have questions or concerns that need addressed prior to 7-14 day follow up office visit: no    Scheduled appointment with PCP within 7-14 days    Follow Up  Future Appointments   Date Time Provider Meghna Gordon   2023  4:00 PM Enrike Scott MD [address] [facility]   2023 11:00 AM [address] [facility] [address] [facility]   2023  4:00 PM Elise Darnell DO [facility] EN ORTH [facility]   2023  3:00 PM Mara Beckman DO [facility]       Matthieu Bustamante MA

## 2023-04-03 NOTE — PROGRESS NOTES
Physician Progress Note      Ronda Gibson  CSN #:                  [id number]  :                       1950  ADMIT DATE:       3/20/2023 2:45 PM  [address] DATE:        3/28/2023 12:01 PM  RESPONDING  PROVIDER #:        José Antonio Burr MD          QUERY TEXT:    Patient presented with leukocytosis, CRP 47.3. Patient was diagnosed with   perforated diverticulitis and treated with IV Zosyn and Flagyl and discharged   on Cipro and Flagyl x 3 additional days. Patient underwent exploratory   laparotomy sigmoid resection with primary anastomosis during admission. Based   on clinical indicators and treatment, can the patient's condition be further   specified as: The medical record reflects the following:  Risk Factors: perforated sigmoid diverticulitis  Clinical Indicators: WBC 17.6, HR 90 on 3/21  Treatment: IVF, IV Ancef, IV Flagyl, IV Zosyn    IRINEO Go, RN, [facility]  Clinical   [phone]  Options provided:  -- Sepsis due to diverticulitis, present on admission  -- Signs and symptoms of localized infection only without Sepsis  -- Other - I will add my own diagnosis  -- Disagree - Not applicable / Not valid  -- Disagree - Clinically unable to determine / Unknown  -- Refer to Clinical Documentation Reviewer    PROVIDER RESPONSE TEXT:    This patient has sepsis due to diverticulitis which was present on admission.     Query created by: Dc Dove on 4/3/2023 1:07 PM      Electronically signed by:  José Antonio Burr MD 4/3/2023 4:08 PM

## 2023-04-04 DIAGNOSIS — I10 ESSENTIAL HYPERTENSION: ICD-10-CM

## 2023-04-04 RX ORDER — LISINOPRIL 40 MG/1
TABLET ORAL
Qty: 90 TABLET | Refills: 1 | Status: SHIPPED | OUTPATIENT
Start: 2023-04-04

## 2023-04-06 DIAGNOSIS — K63.1 PERFORATED SIGMOID COLON (HCC): Primary | ICD-10-CM

## 2023-04-06 RX ORDER — HYDROCODONE BITARTRATE AND ACETAMINOPHEN 5; 325 MG/1; MG/1
1 TABLET ORAL EVERY 6 HOURS PRN
Qty: 12 TABLET | Refills: 0 | Status: SHIPPED | OUTPATIENT
Start: 2023-04-06 | End: 2023-04-13

## 2023-04-12 ENCOUNTER — OFFICE VISIT (OUTPATIENT)
Dept: FAMILY MEDICINE CLINIC | Age: 73
End: 2023-04-12

## 2023-04-12 VITALS
OXYGEN SATURATION: 98 % | HEIGHT: 60 IN | HEART RATE: 88 BPM | WEIGHT: 127 LBS | DIASTOLIC BLOOD PRESSURE: 58 MMHG | SYSTOLIC BLOOD PRESSURE: 118 MMHG | BODY MASS INDEX: 24.94 KG/M2

## 2023-04-12 DIAGNOSIS — J44.9 COPD, MILD (HCC): ICD-10-CM

## 2023-04-12 DIAGNOSIS — E11.9 TYPE 2 DIABETES MELLITUS WITHOUT COMPLICATION, WITHOUT LONG-TERM CURRENT USE OF INSULIN (HCC): ICD-10-CM

## 2023-04-12 DIAGNOSIS — Z09 HOSPITAL DISCHARGE FOLLOW-UP: Primary | ICD-10-CM

## 2023-04-12 DIAGNOSIS — K59.00 CONSTIPATION, UNSPECIFIED CONSTIPATION TYPE: ICD-10-CM

## 2023-04-12 RX ORDER — POLYETHYLENE GLYCOL 3350 17 G/17G
17 POWDER, FOR SOLUTION ORAL DAILY PRN
Qty: 527 G | Refills: 5 | Status: SHIPPED | OUTPATIENT
Start: 2023-04-12 | End: 2023-10-15

## 2023-05-11 ENCOUNTER — SCHEDULED TELEPHONE ENCOUNTER (OUTPATIENT)
Dept: SURGERY | Age: 73
End: 2023-05-11

## 2023-05-11 DIAGNOSIS — Z09 POSTOPERATIVE EXAMINATION: Primary | ICD-10-CM

## 2023-05-11 PROCEDURE — APPNB30 APP NON BILLABLE TIME 0-30 MINS: Performed by: PHYSICIAN ASSISTANT

## 2023-05-11 PROCEDURE — 99024 POSTOP FOLLOW-UP VISIT: CPT | Performed by: PHYSICIAN ASSISTANT

## 2023-05-11 NOTE — PROGRESS NOTES
Mary Kate Ortiz is a 75 y.o. female evaluated via telephone on 5/11/2023 for Post-Op Check  . Documentation:  I communicated with the patient and/or health care decision maker about how she is doing post-op. Details of this discussion including any medical advice provided:     SUBJECTIVE:  Patient presents today for her 2nd post op visit following ex lap, sigmoid resection with primary anastomosis. Pt reports that pain is intermittent and mild, near the incision  Pt is  tolerating a regular diet does have intermittent nausea. BMs are Constipation. Pt has to take MoM and metamucil to elicit a BM. Incisions: well healed    PLAN:   Increase activity as tolerated. Recommend Miralax daily rather than MoM. F/u with Herrera Henriquez or Dr. Montana Nieto for post-op colonoscopy. Total Time: Global period - Not billable. Mary Kate Ortiz was evaluated through a synchronous (real-time) audio encounter. Patient identification was verified at the start of the visit. She (or guardian if applicable) is aware that this is a billable service, which includes applicable co-pays. This visit was conducted with the patient's (and/or legal guardian's) verbal consent. She has not had a related appointment within my department in the past 7 days or scheduled within the next 24 hours. The patient was located at Home: Rajinder Medina [address] [place] [id number]. The provider was located at Home ([address]: [place].     Note: not billable if this call serves to triage the patient into an appointment for the relevant concern    Cade Mejia PA-C

## 2023-05-12 RX ORDER — SELENIUM 50 MCG
TABLET ORAL
Qty: 90 CAPSULE | Refills: 1 | Status: SHIPPED | OUTPATIENT
Start: 2023-05-12 | End: 2023-11-08

## 2023-05-17 ENCOUNTER — TELEPHONE (OUTPATIENT)
Dept: GASTROENTEROLOGY | Age: 73
End: 2023-05-17

## 2023-05-17 NOTE — TELEPHONE ENCOUNTER
Called pt. In regards to a referral stating pt needed to follow up w/ our office following the cscope in 2021. Lm for pt to omayra back to make an appt.

## 2023-05-24 ENCOUNTER — TELEPHONE (OUTPATIENT)
Dept: GASTROENTEROLOGY | Age: 73
End: 2023-05-24

## 2023-05-24 NOTE — TELEPHONE ENCOUNTER
Called pt. In regards to a referral stating pt needed to follow up w/ our office following the cscope in 2021. VM full, no contact.

## 2023-05-31 ENCOUNTER — TELEPHONE (OUTPATIENT)
Dept: GASTROENTEROLOGY | Age: 73
End: 2023-05-31

## 2023-05-31 NOTE — TELEPHONE ENCOUNTER
Called pt. For the 3rd time In regards to a referral stating pt needed to follow up w/ our office following the cscope in 2021. LM for pt to call back to make an appt.

## 2023-06-07 DIAGNOSIS — E11.9 TYPE 2 DIABETES MELLITUS WITHOUT COMPLICATION, WITHOUT LONG-TERM CURRENT USE OF INSULIN (HCC): ICD-10-CM

## 2023-06-28 ENCOUNTER — TELEPHONE (OUTPATIENT)
Dept: FAMILY MEDICINE CLINIC | Age: 73
End: 2023-06-28

## 2023-06-29 DIAGNOSIS — K21.9 GASTROESOPHAGEAL REFLUX DISEASE, UNSPECIFIED WHETHER ESOPHAGITIS PRESENT: ICD-10-CM

## 2023-06-29 RX ORDER — PANTOPRAZOLE SODIUM 40 MG/1
TABLET, DELAYED RELEASE ORAL
Qty: 90 TABLET | Refills: 1 | Status: SHIPPED | OUTPATIENT
Start: 2023-06-29

## 2023-07-01 NOTE — PROGRESS NOTES
5/12/2022    Suellen Marcum    Chief Complaint   Patient presents with    Hypertension     5/12/22 12:30 pm bp 224/105 p 68    Other     pain left axilla radiates under left breast. 1 month    Shortness of Breath     increased sob. 1 day       HPI  History was obtained from pt. Clement Cano is a 67 y.o. female with a PMHx as listed below who presents today for evaluation of the pain she has been having in the left lower axilla and under the breast for the past month. She has also been having increased SOB for the past 1 day. Her bp earlier today was 224/105. Here it is 194/92. I recommended to the patient that we call the emergency squad due to her blood pressure being so elevated with symptoms of shortness of breath. She agreed to this. She denies any history of pulmonary embolus.     1. Hypertensive emergency         REVIEW OF SYMPTOMS    Review of Systems    PAST MEDICAL HISTORY  Past Medical History:   Diagnosis Date    Acute exacerbation of chronic obstructive pulmonary disease (COPD) ([address] 12/15/2021    Cerebral artery occlusion with cerebral infarction ([address]     COPD, mild ([address] 6/15/2021    Diabetes mellitus ([address]     Excessive daytime sleepiness 6/15/2021    Former smoker 6/15/2021    Hyperlipidemia     Hypertension     Mild obstructive sleep apnea 12/15/2021    Multiple lung nodules on CT 7/27/2021    Shortness of breath 6/15/2021    Snoring 6/15/2021       FAMILY HISTORY  Family History   Problem Relation Age of Onset    Colon Cancer Mother     Heart Attack Father     Other Father         suicide    Ovarian Cancer Daughter     Other Sister         suicide    Heart Attack Brother     Pacemaker Brother     No Known Problems Brother     No Known Problems Brother     No Known Problems Brother        SOCIAL HISTORY  Social History     Socioeconomic History    Marital status: Single     Spouse name: Not on file    Number of children: Not on file    Years of education: Not on file   Malina Peralta Highest education level: Not on file   Occupational History    Not on file   Tobacco Use    Smoking status: Former Smoker     Packs/day: 1.00     Years: 51.00     Pack years: 51.00     Start date:      Quit date: 2020     Years since quittin.4    Smokeless tobacco: Never Used   Vaping Use    Vaping Use: Former    Substances: Flavoring    Devices: Disposable, Pre-filled pod   Substance and Sexual Activity    Alcohol use: Not Currently    Drug use: Not Currently    Sexual activity: Not on file   Other Topics Concern    Not on file   Social History Narrative    Not on file     Social Determinants of Health     Financial Resource Strain: Low Risk     Difficulty of Paying Living Expenses: Not hard at all   Food Insecurity: No Food Insecurity    Worried About [address] in the Last Year: Never true    [address] [employer] in the Last Year: Never true   Transportation Needs: No Transportation Needs    Lack of Transportation (Medical): No    Lack of Transportation (Non-Medical):  No   Physical Activity:     Days of Exercise per Week: Not on file    Minutes of Exercise per Session: Not on file   Stress:     Feeling of Stress : Not on file   Social Connections:     Frequency of Communication with Friends and Family: Not on file    Frequency of Social Gatherings with Friends and Family: Not on file    Attends Denominational Services: Not on file    Active Member of Clubs or Organizations: Not on file    Attends Club or Organization Meetings: Not on file    Marital Status: Not on file   Intimate Partner Violence:     Fear of Current or Ex-Partner: Not on file    Emotionally Abused: Not on file    Physically Abused: Not on file    Sexually Abused: Not on file   Housing Stability:     Unable to Pay for Housing in the Last Year: Not on file    Number of [place] in the Last Year: Not on file    Unstable Housing in the Last Year: Not on file        SURGICAL HISTORY  Past Surgical History:   Procedure Laterality Date    APPENDECTOMY      CHOLECYSTECTOMY      COLONOSCOPY N/A 9/15/2021    COLONOSCOPY POLYPECTOMY SNARE/COLD BIOPSY performed by Jean-Pierre Stewart MD at [address] Left     Left hand    HYSTERECTOMY      JOINT REPLACEMENT Right     12/04/2020-Right Hip    OVARY REMOVAL      age 34    TONSILLECTOMY      UPPER GASTROINTESTINAL ENDOSCOPY N/A 9/15/2021    EGD DIAGNOSTIC ONLY performed by Jean-Pierre Stewart MD at [facility], [place], operated by [facility]  Current Outpatient Medications   Medication Sig Dispense Refill    albuterol (PROVENTIL) (2.5 MG/3ML) 0.083% nebulizer solution Take 3 mLs by nebulization every 6 hours as needed for Wheezing 360 each 2    lidocaine 4 % external patch Place 1 patch onto the skin daily 15 patch 0    dicyclomine (BENTYL) 10 MG capsule Take 1 capsule by mouth 3 times daily as needed (stomach) 120 capsule 2    metFORMIN (GLUCOPHAGE) 500 MG tablet Take 1 tablet by mouth 2 times daily (with meals) 180 tablet 1    alendronate (FOSAMAX) 70 MG tablet Take 1 tablet by mouth every 7 days 12 tablet 1    atorvastatin (LIPITOR) 20 MG tablet Take 1 tablet by mouth daily 90 tablet 1    pantoprazole (PROTONIX) 40 MG tablet Take 1 tablet by mouth daily 30 tablet 5    albuterol sulfate  (90 Base) MCG/ACT inhaler Inhale 2 puffs into the lungs every 4 hours as needed for Wheezing 1 each 11    umeclidinium-vilanterol (ANORO ELLIPTA) 62.5-25 MCG/INH AEPB inhaler Inhale 1 puff into the lungs daily 1 each 11    lisinopril (PRINIVIL;ZESTRIL) 40 MG tablet TAKE 1 TABLET BY MOUTH EVERY DAY 30 tablet 0    aspirin 81 MG EC tablet Take 81 mg by mouth daily       No current facility-administered medications for this visit.        ALLERGIES  Allergies   Allergen Reactions    Other Hives    Levaquin [Levofloxacin] Rash       PHYSICAL EXAM    BP (!) 194/92   Pulse 70   Ht 5' (1.524 m)   Wt 143 lb (64.9 kg) SpO2 98%   BMI 27.93 kg/m²     Physical Exam  Constitutional:       Appearance: Normal appearance. She is obese. HENT:      Head: Normocephalic and atraumatic. Left Ear: External ear normal.   Eyes:      General: No scleral icterus. Extraocular Movements: Extraocular movements intact. Conjunctiva/sclera: Conjunctivae normal.      Pupils: Pupils are equal, round, and reactive to light. Cardiovascular:      Rate and Rhythm: Normal rate and regular rhythm. Heart sounds: No murmur heard. No friction rub. No gallop. Comments: pulses bounding, intense s4  Pulmonary:      Effort: Pulmonary effort is normal.      Breath sounds: Normal breath sounds. No wheezing, rhonchi or rales. Abdominal:      General: Bowel sounds are normal. There is no distension. Palpations: Abdomen is soft. There is no mass. Tenderness: There is no abdominal tenderness. There is no right CVA tenderness, left CVA tenderness, guarding or rebound. Musculoskeletal:         General: No deformity. Normal range of motion. Cervical back: Normal range of motion and neck supple. No rigidity. No muscular tenderness. Right lower leg: No edema. Left lower leg: No edema. Skin:     General: Skin is warm and dry. Capillary Refill: Capillary refill takes less than 2 seconds. Findings: No bruising, erythema or rash. Neurological:      General: No focal deficit present. Mental Status: She is alert and oriented to person, place, and time. Coordination: Coordination normal.      Gait: Gait normal.   Psychiatric:         Mood and Affect: Mood normal.         Behavior: Behavior normal.         ASSESSMENT & PLAN    1. Hypertensive emergency  Pt sent to ER       No follow-ups on file.          Electronically signed by GONZÁLEZ Pollard on 5/12/2022      Comment: Please note this report has been produced using speech recognition software and may contain errors related to that system including errors in grammar, punctuation, and spelling, as well as words and phrases that may be inappropriate. If there are any questions or concerns please feel free to contact the dictating provider for clarification. You can access the FollowMyHealth Patient Portal offered by [facility] by registering at the following website: [email or web address]. By joining [email or web address]’s FollowMyHealth portal, you will also be able to view your health information using other applications (apps) compatible with our system.

## 2023-09-01 ENCOUNTER — HOSPITAL ENCOUNTER (EMERGENCY)
Age: 73
Discharge: HOME | End: 2023-09-01
Attending: EMERGENCY MEDICINE
Payer: MEDICARE

## 2023-09-01 ENCOUNTER — APPOINTMENT (OUTPATIENT)
Dept: GENERAL RADIOLOGY | Age: 73
End: 2023-09-01
Payer: MEDICARE

## 2023-09-01 VITALS
DIASTOLIC BLOOD PRESSURE: 76 MMHG | SYSTOLIC BLOOD PRESSURE: 187 MMHG | HEART RATE: 95 BPM | BODY MASS INDEX: 27.09 KG/M2 | WEIGHT: 138 LBS | HEIGHT: 60 IN | OXYGEN SATURATION: 98 % | TEMPERATURE: 98 F | RESPIRATION RATE: 16 BRPM

## 2023-09-01 DIAGNOSIS — M19.90 OSTEOARTHRITIS, UNSPECIFIED OSTEOARTHRITIS TYPE, UNSPECIFIED SITE: ICD-10-CM

## 2023-09-01 DIAGNOSIS — E78.2 MIXED HYPERLIPIDEMIA: ICD-10-CM

## 2023-09-01 DIAGNOSIS — M25.512 LEFT SHOULDER PAIN, UNSPECIFIED CHRONICITY: Primary | ICD-10-CM

## 2023-09-01 PROCEDURE — 99284 EMERGENCY DEPT VISIT MOD MDM: CPT

## 2023-09-01 PROCEDURE — 73030 X-RAY EXAM OF SHOULDER: CPT

## 2023-09-01 PROCEDURE — 93005 ELECTROCARDIOGRAM TRACING: CPT

## 2023-09-01 PROCEDURE — 6370000000 HC RX 637 (ALT 250 FOR IP): Performed by: EMERGENCY MEDICINE

## 2023-09-01 RX ORDER — HYDROCODONE BITARTRATE AND ACETAMINOPHEN 5; 325 MG/1; MG/1
1 TABLET ORAL ONCE
Status: COMPLETED | OUTPATIENT
Start: 2023-09-01 | End: 2023-09-01

## 2023-09-01 RX ORDER — HYDROCODONE BITARTRATE AND ACETAMINOPHEN 5; 325 MG/1; MG/1
1 TABLET ORAL EVERY 6 HOURS PRN
Qty: 12 TABLET | Refills: 0 | Status: SHIPPED | OUTPATIENT
Start: 2023-09-01 | End: 2023-09-04

## 2023-09-01 RX ADMIN — HYDROCODONE BITARTRATE AND ACETAMINOPHEN 1 TABLET: 5; 325 TABLET ORAL at 18:39

## 2023-09-01 ASSESSMENT — PAIN DESCRIPTION - ORIENTATION: ORIENTATION: LEFT

## 2023-09-01 ASSESSMENT — PAIN SCALES - GENERAL
PAINLEVEL_OUTOF10: 8
PAINLEVEL_OUTOF10: 9

## 2023-09-01 ASSESSMENT — PAIN - FUNCTIONAL ASSESSMENT: PAIN_FUNCTIONAL_ASSESSMENT: 0-10

## 2023-09-01 ASSESSMENT — PAIN DESCRIPTION - LOCATION: LOCATION: ARM;SHOULDER

## 2023-09-01 NOTE — DISCHARGE INSTRUCTIONS
Your x-ray shows that you may have osteoarthritis of the humeral head versus avascular necrosis. You may need an MRI in the future if your pain continues. Please discuss this with your doctor. Please remove your sling several times a day and perform gentle range of motion exercises with your shoulder so you do not get a frozen shoulder.

## 2023-09-01 NOTE — ED PROVIDER NOTES
EMERGENCY DEPARTMENT ENCOUNTER      CHIEF COMPLAINT:   Left shoulder pain    HPI: Vish Sauer is a 75 y.o. female who presents to the Emergency Department complaining of left shoulder pain. The patient states this started several months ago. It seems to wax and wane in severity. She states that it has been more painful for the past month. It became excruciatingly painful today. It feels achy in nature and is sharp at times. It is worse with movement and better with rest.  She denies any known trauma to the area. The patient denies a history of DVT or PE. The patient denies fevers, chills, chest pain, shortness of breath, abdominal pain, numbness, tingling, weakness, or any other complaints. REVIEW OF SYSTEMS:  CONSTITUTIONAL:  Denies fever, chills, weight loss or weakness  EYES:  Denies photophobia or discharge  ENT:  Denies sore throat or ear pain  CARDIOVASCULAR:  Denies chest pain, palpitations or swelling  RESPIRATORY:  Denies cough or shortness of breath  GI: Denies abdominal pain, nausea, vomiting, or diarrhea  MUSCULOSKELETAL: See HPI  SKIN:  No rash  NEUROLOGIC:  Denies headache, focal weakness or sensory changes  All systems negative except as marked. \"Remaining review of systems reviewed and negative. I have reviewed the nursing triage documentation and agree unless otherwise noted below. \"    PAST MEDICAL HISTORY:   Past Medical History:   Diagnosis Date    Acute exacerbation of chronic obstructive pulmonary disease (COPD) ([address]) 12/15/2021    Cerebral artery occlusion with cerebral infarction (HCC)     COPD, mild ([address]) 6/15/2021    Diabetes mellitus ([address])     Excessive daytime sleepiness 6/15/2021    Former smoker 6/15/2021    Hyperlipidemia     Hypertension     Mild obstructive sleep apnea 12/15/2021    Multiple lung nodules on CT 7/27/2021    Shortness of breath 6/15/2021    Snoring 6/15/2021       CURRENT MEDICATIONS:   Home medications reviewed.     SURGICAL HISTORY:   Past Surgical

## 2023-09-01 NOTE — ED NOTES
Reports left arm shoulder pain around 2 months.  Worsening to with movement [address], RN  09/01/23 [address], RN  09/01/23 4592

## 2023-09-02 LAB
EKG ATRIAL RATE: 86 BPM
EKG DIAGNOSIS: NORMAL
EKG P AXIS: 67 DEGREES
EKG P-R INTERVAL: 188 MS
EKG Q-T INTERVAL: 346 MS
EKG QRS DURATION: 80 MS
EKG QTC CALCULATION (BAZETT): 414 MS
EKG R AXIS: 36 DEGREES
EKG T AXIS: 41 DEGREES
EKG VENTRICULAR RATE: 86 BPM

## 2023-09-05 RX ORDER — ATORVASTATIN CALCIUM 20 MG/1
TABLET, FILM COATED ORAL
Qty: 90 TABLET | Refills: 1 | Status: SHIPPED | OUTPATIENT
Start: 2023-09-05

## 2023-09-12 ENCOUNTER — OFFICE VISIT (OUTPATIENT)
Dept: FAMILY MEDICINE CLINIC | Age: 73
End: 2023-09-12
Payer: MEDICARE

## 2023-09-12 VITALS
DIASTOLIC BLOOD PRESSURE: 66 MMHG | WEIGHT: 134.7 LBS | HEIGHT: 60 IN | OXYGEN SATURATION: 97 % | BODY MASS INDEX: 26.45 KG/M2 | SYSTOLIC BLOOD PRESSURE: 116 MMHG | HEART RATE: 88 BPM

## 2023-09-12 DIAGNOSIS — E78.2 MIXED HYPERLIPIDEMIA: ICD-10-CM

## 2023-09-12 DIAGNOSIS — F51.01 PRIMARY INSOMNIA: ICD-10-CM

## 2023-09-12 DIAGNOSIS — E11.9 TYPE 2 DIABETES MELLITUS WITHOUT COMPLICATION, WITHOUT LONG-TERM CURRENT USE OF INSULIN (HCC): ICD-10-CM

## 2023-09-12 DIAGNOSIS — I10 ESSENTIAL HYPERTENSION: ICD-10-CM

## 2023-09-12 DIAGNOSIS — J44.9 COPD, MILD (HCC): Primary | ICD-10-CM

## 2023-09-12 LAB
ALBUMIN SERPL-MCNC: 4.5 G/DL (ref 3.4–5)
ALBUMIN/GLOB SERPL: 1.7 {RATIO} (ref 1.1–2.2)
ALP SERPL-CCNC: 64 U/L (ref 40–129)
ALT SERPL-CCNC: 23 U/L (ref 10–40)
ANION GAP SERPL CALCULATED.3IONS-SCNC: 12 MMOL/L (ref 3–16)
AST SERPL-CCNC: 17 U/L (ref 15–37)
BASOPHILS # BLD: 0.1 K/UL (ref 0–0.2)
BASOPHILS NFR BLD: 0.8 %
BILIRUB SERPL-MCNC: <0.2 MG/DL (ref 0–1)
BUN SERPL-MCNC: 29 MG/DL (ref 7–20)
CALCIUM SERPL-MCNC: 9.9 MG/DL (ref 8.3–10.6)
CHLORIDE SERPL-SCNC: 101 MMOL/L (ref 99–110)
CHOLEST SERPL-MCNC: 139 MG/DL (ref 0–199)
CO2 SERPL-SCNC: 23 MMOL/L (ref 21–32)
CREAT SERPL-MCNC: 1.1 MG/DL (ref 0.6–1.2)
DEPRECATED RDW RBC AUTO: 17.8 % (ref 12.4–15.4)
EOSINOPHIL # BLD: 0.1 K/UL (ref 0–0.6)
EOSINOPHIL NFR BLD: 1.7 %
GFR SERPLBLD CREATININE-BSD FMLA CKD-EPI: 53 ML/MIN/{1.73_M2}
GLUCOSE SERPL-MCNC: 108 MG/DL (ref 70–99)
HCT VFR BLD AUTO: 36.4 % (ref 36–48)
HDLC SERPL-MCNC: 55 MG/DL (ref 40–60)
HGB BLD-MCNC: 11.8 G/DL (ref 12–16)
LDL CHOLESTEROL CALCULATED: 61 MG/DL
LYMPHOCYTES # BLD: 1.2 K/UL (ref 1–5.1)
LYMPHOCYTES NFR BLD: 16.2 %
MCH RBC QN AUTO: 28 PG (ref 26–34)
MCHC RBC AUTO-ENTMCNC: 32.3 G/DL (ref 31–36)
MCV RBC AUTO: 86.4 FL (ref 80–100)
MONOCYTES # BLD: 0.5 K/UL (ref 0–1.3)
MONOCYTES NFR BLD: 7.3 %
NEUTROPHILS # BLD: 5.2 K/UL (ref 1.7–7.7)
NEUTROPHILS NFR BLD: 74 %
PLATELET # BLD AUTO: 328 K/UL (ref 135–450)
PMV BLD AUTO: 8.2 FL (ref 5–10.5)
POTASSIUM SERPL-SCNC: 5.3 MMOL/L (ref 3.5–5.1)
PROT SERPL-MCNC: 7.1 G/DL (ref 6.4–8.2)
RBC # BLD AUTO: 4.21 M/UL (ref 4–5.2)
SODIUM SERPL-SCNC: 136 MMOL/L (ref 136–145)
TRIGL SERPL-MCNC: 117 MG/DL (ref 0–150)
VLDLC SERPL CALC-MCNC: 23 MG/DL
WBC # BLD AUTO: 7.1 K/UL (ref 4–11)

## 2023-09-12 PROCEDURE — 3074F SYST BP LT 130 MM HG: CPT | Performed by: STUDENT IN AN ORGANIZED HEALTH CARE EDUCATION/TRAINING PROGRAM

## 2023-09-12 PROCEDURE — 1090F PRES/ABSN URINE INCON ASSESS: CPT | Performed by: STUDENT IN AN ORGANIZED HEALTH CARE EDUCATION/TRAINING PROGRAM

## 2023-09-12 PROCEDURE — G8399 PT W/DXA RESULTS DOCUMENT: HCPCS | Performed by: STUDENT IN AN ORGANIZED HEALTH CARE EDUCATION/TRAINING PROGRAM

## 2023-09-12 PROCEDURE — 1123F ACP DISCUSS/DSCN MKR DOCD: CPT | Performed by: STUDENT IN AN ORGANIZED HEALTH CARE EDUCATION/TRAINING PROGRAM

## 2023-09-12 PROCEDURE — G8417 CALC BMI ABV UP PARAM F/U: HCPCS | Performed by: STUDENT IN AN ORGANIZED HEALTH CARE EDUCATION/TRAINING PROGRAM

## 2023-09-12 PROCEDURE — 3023F SPIROM DOC REV: CPT | Performed by: STUDENT IN AN ORGANIZED HEALTH CARE EDUCATION/TRAINING PROGRAM

## 2023-09-12 PROCEDURE — 1036F TOBACCO NON-USER: CPT | Performed by: STUDENT IN AN ORGANIZED HEALTH CARE EDUCATION/TRAINING PROGRAM

## 2023-09-12 PROCEDURE — 2022F DILAT RTA XM EVC RTNOPTHY: CPT | Performed by: STUDENT IN AN ORGANIZED HEALTH CARE EDUCATION/TRAINING PROGRAM

## 2023-09-12 PROCEDURE — 3017F COLORECTAL CA SCREEN DOC REV: CPT | Performed by: STUDENT IN AN ORGANIZED HEALTH CARE EDUCATION/TRAINING PROGRAM

## 2023-09-12 PROCEDURE — G8427 DOCREV CUR MEDS BY ELIG CLIN: HCPCS | Performed by: STUDENT IN AN ORGANIZED HEALTH CARE EDUCATION/TRAINING PROGRAM

## 2023-09-12 PROCEDURE — 3078F DIAST BP <80 MM HG: CPT | Performed by: STUDENT IN AN ORGANIZED HEALTH CARE EDUCATION/TRAINING PROGRAM

## 2023-09-12 PROCEDURE — 99214 OFFICE O/P EST MOD 30 MIN: CPT | Performed by: STUDENT IN AN ORGANIZED HEALTH CARE EDUCATION/TRAINING PROGRAM

## 2023-09-12 PROCEDURE — 3044F HG A1C LEVEL LT 7.0%: CPT | Performed by: STUDENT IN AN ORGANIZED HEALTH CARE EDUCATION/TRAINING PROGRAM

## 2023-09-12 RX ORDER — TRAZODONE HYDROCHLORIDE 50 MG/1
50 TABLET ORAL NIGHTLY
Qty: 30 TABLET | Refills: 2 | Status: SHIPPED | OUTPATIENT
Start: 2023-09-12 | End: 2023-12-11

## 2023-09-13 ENCOUNTER — HOSPITAL ENCOUNTER (OUTPATIENT)
Dept: CT IMAGING | Age: 73
Discharge: HOME | End: 2023-09-13
Attending: INTERNAL MEDICINE
Payer: MEDICARE

## 2023-09-13 DIAGNOSIS — Z87.891 PERSONAL HISTORY OF TOBACCO USE, PRESENTING HAZARDS TO HEALTH: ICD-10-CM

## 2023-09-13 LAB
EST. AVERAGE GLUCOSE BLD GHB EST-MCNC: 139.9 MG/DL
HBA1C MFR BLD: 6.5 %

## 2023-09-13 PROCEDURE — 71271 CT THORAX LUNG CANCER SCR C-: CPT

## 2023-09-18 ASSESSMENT — ENCOUNTER SYMPTOMS
NAUSEA: 0
SHORTNESS OF BREATH: 0
SORE THROAT: 0
WHEEZING: 0
ABDOMINAL PAIN: 0

## 2023-10-05 ENCOUNTER — OFFICE VISIT (OUTPATIENT)
Dept: GASTROENTEROLOGY | Age: 73
End: 2023-10-05
Payer: MEDICARE

## 2023-10-05 VITALS
OXYGEN SATURATION: 97 % | HEART RATE: 87 BPM | SYSTOLIC BLOOD PRESSURE: 138 MMHG | TEMPERATURE: 97.3 F | BODY MASS INDEX: 26.86 KG/M2 | HEIGHT: 60 IN | DIASTOLIC BLOOD PRESSURE: 66 MMHG | WEIGHT: 136.8 LBS

## 2023-10-05 DIAGNOSIS — K57.80 PERFORATED DIVERTICULUM: Primary | ICD-10-CM

## 2023-10-05 PROCEDURE — G8427 DOCREV CUR MEDS BY ELIG CLIN: HCPCS | Performed by: SPECIALIST

## 2023-10-05 PROCEDURE — G8417 CALC BMI ABV UP PARAM F/U: HCPCS | Performed by: SPECIALIST

## 2023-10-05 PROCEDURE — 1090F PRES/ABSN URINE INCON ASSESS: CPT | Performed by: SPECIALIST

## 2023-10-05 PROCEDURE — 3075F SYST BP GE 130 - 139MM HG: CPT | Performed by: SPECIALIST

## 2023-10-05 PROCEDURE — G8484 FLU IMMUNIZE NO ADMIN: HCPCS | Performed by: SPECIALIST

## 2023-10-05 PROCEDURE — 99204 OFFICE O/P NEW MOD 45 MIN: CPT | Performed by: SPECIALIST

## 2023-10-05 PROCEDURE — 3017F COLORECTAL CA SCREEN DOC REV: CPT | Performed by: SPECIALIST

## 2023-10-05 PROCEDURE — 1123F ACP DISCUSS/DSCN MKR DOCD: CPT | Performed by: SPECIALIST

## 2023-10-05 PROCEDURE — G8399 PT W/DXA RESULTS DOCUMENT: HCPCS | Performed by: SPECIALIST

## 2023-10-05 PROCEDURE — 1036F TOBACCO NON-USER: CPT | Performed by: SPECIALIST

## 2023-10-05 PROCEDURE — 3078F DIAST BP <80 MM HG: CPT | Performed by: SPECIALIST

## 2023-10-10 ENCOUNTER — APPOINTMENT (OUTPATIENT)
Dept: CT IMAGING | Age: 73
End: 2023-10-10
Payer: MEDICARE

## 2023-10-10 ENCOUNTER — HOSPITAL ENCOUNTER (EMERGENCY)
Age: 73
Discharge: HOME | End: 2023-10-10
Attending: EMERGENCY MEDICINE
Payer: MEDICARE

## 2023-10-10 ENCOUNTER — OFFICE VISIT (OUTPATIENT)
Dept: FAMILY MEDICINE CLINIC | Age: 73
End: 2023-10-10
Payer: MEDICARE

## 2023-10-10 ENCOUNTER — APPOINTMENT (OUTPATIENT)
Dept: GENERAL RADIOLOGY | Age: 73
End: 2023-10-10
Payer: MEDICARE

## 2023-10-10 VITALS
SYSTOLIC BLOOD PRESSURE: 144 MMHG | HEART RATE: 84 BPM | DIASTOLIC BLOOD PRESSURE: 65 MMHG | RESPIRATION RATE: 16 BRPM | OXYGEN SATURATION: 93 % | TEMPERATURE: 97.4 F

## 2023-10-10 VITALS
BODY MASS INDEX: 26.72 KG/M2 | OXYGEN SATURATION: 95 % | SYSTOLIC BLOOD PRESSURE: 124 MMHG | DIASTOLIC BLOOD PRESSURE: 68 MMHG | WEIGHT: 136.8 LBS | HEART RATE: 87 BPM

## 2023-10-10 DIAGNOSIS — R10.9 ABDOMINAL PAIN, UNSPECIFIED ABDOMINAL LOCATION: Primary | ICD-10-CM

## 2023-10-10 DIAGNOSIS — R05.9 COUGH, UNSPECIFIED TYPE: ICD-10-CM

## 2023-10-10 DIAGNOSIS — E78.2 MIXED HYPERLIPIDEMIA: ICD-10-CM

## 2023-10-10 DIAGNOSIS — I10 ESSENTIAL HYPERTENSION: ICD-10-CM

## 2023-10-10 DIAGNOSIS — E11.9 TYPE 2 DIABETES MELLITUS WITHOUT COMPLICATION, WITHOUT LONG-TERM CURRENT USE OF INSULIN (HCC): Primary | ICD-10-CM

## 2023-10-10 DIAGNOSIS — F51.01 PRIMARY INSOMNIA: ICD-10-CM

## 2023-10-10 LAB
ALBUMIN SERPL-MCNC: 4.3 GM/DL (ref 3.4–5)
ALP BLD-CCNC: 74 IU/L (ref 40–129)
ALT SERPL-CCNC: 26 U/L (ref 10–40)
ANION GAP SERPL CALCULATED.3IONS-SCNC: 14 MMOL/L (ref 4–16)
AST SERPL-CCNC: 28 IU/L (ref 15–37)
BACTERIA: ABNORMAL /HPF
BASOPHILS ABSOLUTE: 0.1 K/CU MM
BASOPHILS RELATIVE PERCENT: 0.5 % (ref 0–1)
BILIRUB SERPL-MCNC: 0.3 MG/DL (ref 0–1)
BILIRUBIN URINE: NEGATIVE MG/DL
BLOOD, URINE: NEGATIVE
BUN SERPL-MCNC: 28 MG/DL (ref 6–23)
CALCIUM SERPL-MCNC: 9.5 MG/DL (ref 8.3–10.6)
CAST TYPE: ABNORMAL /HPF
CHLORIDE BLD-SCNC: 103 MMOL/L (ref 99–110)
CLARITY: ABNORMAL
CO2: 22 MMOL/L (ref 21–32)
COLOR: YELLOW
CREAT SERPL-MCNC: 1.5 MG/DL (ref 0.6–1.1)
CRYSTAL TYPE: NEGATIVE /HPF
DIFFERENTIAL TYPE: ABNORMAL
EOSINOPHILS ABSOLUTE: 0.1 K/CU MM
EOSINOPHILS RELATIVE PERCENT: 1.4 % (ref 0–3)
EPITHELIAL CELLS, UA: 5 /HPF
GFR SERPL CREATININE-BSD FRML MDRD: 36 ML/MIN/1.73M2
GLUCOSE SERPL-MCNC: 165 MG/DL (ref 70–99)
GLUCOSE, URINE: NEGATIVE MG/DL
HCT VFR BLD CALC: 37.8 % (ref 37–47)
HEMOGLOBIN: 11.7 GM/DL (ref 12.5–16)
IMMATURE NEUTROPHIL %: 0.5 % (ref 0–0.43)
KETONES, URINE: ABNORMAL MG/DL
LACTATE: 2.2 MMOL/L (ref 0.5–1.9)
LEUKOCYTE ESTERASE, URINE: ABNORMAL
LIPASE: 39 IU/L (ref 13–60)
LYMPHOCYTES ABSOLUTE: 1.2 K/CU MM
LYMPHOCYTES RELATIVE PERCENT: 13.2 % (ref 24–44)
MCH RBC QN AUTO: 28.1 PG (ref 27–31)
MCHC RBC AUTO-ENTMCNC: 31 % (ref 32–36)
MCV RBC AUTO: 90.9 FL (ref 78–100)
MONOCYTES ABSOLUTE: 0.7 K/CU MM
MONOCYTES RELATIVE PERCENT: 7.6 % (ref 0–4)
NITRITE URINE, QUANTITATIVE: NEGATIVE
PDW BLD-RTO: 14.8 % (ref 11.7–14.9)
PH, URINE: 5.5 (ref 5–8)
PLATELET # BLD: 330 K/CU MM (ref 140–440)
PMV BLD AUTO: 10 FL (ref 7.5–11.1)
POTASSIUM SERPL-SCNC: 4.7 MMOL/L (ref 3.5–5.1)
PROTEIN UA: NEGATIVE MG/DL
RBC # BLD: 4.16 M/CU MM (ref 4.2–5.4)
RBC URINE: 5 /HPF (ref 0–6)
SARS-COV-2 RDRP RESP QL NAA+PROBE: NOT DETECTED
SEGMENTED NEUTROPHILS ABSOLUTE COUNT: 7.1 K/CU MM
SEGMENTED NEUTROPHILS RELATIVE PERCENT: 76.8 % (ref 36–66)
SODIUM BLD-SCNC: 139 MMOL/L (ref 135–145)
SOURCE: NORMAL
SPECIFIC GRAVITY UA: 1.01 (ref 1–1.03)
TOTAL IMMATURE NEUTOROPHIL: 0.05 K/CU MM
TOTAL PROTEIN: 7.3 GM/DL (ref 6.4–8.2)
UROBILINOGEN, URINE: 0.2 MG/DL (ref 0.2–1)
WBC # BLD: 9.3 K/CU MM (ref 4–10.5)
WBC UA: 7 /HPF (ref 0–5)

## 2023-10-10 PROCEDURE — 96375 TX/PRO/DX INJ NEW DRUG ADDON: CPT

## 2023-10-10 PROCEDURE — 3044F HG A1C LEVEL LT 7.0%: CPT | Performed by: STUDENT IN AN ORGANIZED HEALTH CARE EDUCATION/TRAINING PROGRAM

## 2023-10-10 PROCEDURE — 2022F DILAT RTA XM EVC RTNOPTHY: CPT | Performed by: STUDENT IN AN ORGANIZED HEALTH CARE EDUCATION/TRAINING PROGRAM

## 2023-10-10 PROCEDURE — 74176 CT ABD & PELVIS W/O CONTRAST: CPT

## 2023-10-10 PROCEDURE — 1090F PRES/ABSN URINE INCON ASSESS: CPT | Performed by: STUDENT IN AN ORGANIZED HEALTH CARE EDUCATION/TRAINING PROGRAM

## 2023-10-10 PROCEDURE — 6370000000 HC RX 637 (ALT 250 FOR IP): Performed by: EMERGENCY MEDICINE

## 2023-10-10 PROCEDURE — 99214 OFFICE O/P EST MOD 30 MIN: CPT | Performed by: STUDENT IN AN ORGANIZED HEALTH CARE EDUCATION/TRAINING PROGRAM

## 2023-10-10 PROCEDURE — 87635 SARS-COV-2 COVID-19 AMP PRB: CPT

## 2023-10-10 PROCEDURE — 99284 EMERGENCY DEPT VISIT MOD MDM: CPT

## 2023-10-10 PROCEDURE — 71045 X-RAY EXAM CHEST 1 VIEW: CPT

## 2023-10-10 PROCEDURE — G8427 DOCREV CUR MEDS BY ELIG CLIN: HCPCS | Performed by: STUDENT IN AN ORGANIZED HEALTH CARE EDUCATION/TRAINING PROGRAM

## 2023-10-10 PROCEDURE — 80053 COMPREHEN METABOLIC PANEL: CPT

## 2023-10-10 PROCEDURE — G8484 FLU IMMUNIZE NO ADMIN: HCPCS | Performed by: STUDENT IN AN ORGANIZED HEALTH CARE EDUCATION/TRAINING PROGRAM

## 2023-10-10 PROCEDURE — 85025 COMPLETE CBC W/AUTO DIFF WBC: CPT

## 2023-10-10 PROCEDURE — 83690 ASSAY OF LIPASE: CPT

## 2023-10-10 PROCEDURE — 83605 ASSAY OF LACTIC ACID: CPT

## 2023-10-10 PROCEDURE — G8417 CALC BMI ABV UP PARAM F/U: HCPCS | Performed by: STUDENT IN AN ORGANIZED HEALTH CARE EDUCATION/TRAINING PROGRAM

## 2023-10-10 PROCEDURE — 96372 THER/PROPH/DIAG INJ SC/IM: CPT

## 2023-10-10 PROCEDURE — 6360000002 HC RX W HCPCS: Performed by: EMERGENCY MEDICINE

## 2023-10-10 PROCEDURE — 2580000003 HC RX 258: Performed by: EMERGENCY MEDICINE

## 2023-10-10 PROCEDURE — G8399 PT W/DXA RESULTS DOCUMENT: HCPCS | Performed by: STUDENT IN AN ORGANIZED HEALTH CARE EDUCATION/TRAINING PROGRAM

## 2023-10-10 PROCEDURE — 1123F ACP DISCUSS/DSCN MKR DOCD: CPT | Performed by: STUDENT IN AN ORGANIZED HEALTH CARE EDUCATION/TRAINING PROGRAM

## 2023-10-10 PROCEDURE — 1036F TOBACCO NON-USER: CPT | Performed by: STUDENT IN AN ORGANIZED HEALTH CARE EDUCATION/TRAINING PROGRAM

## 2023-10-10 PROCEDURE — 3074F SYST BP LT 130 MM HG: CPT | Performed by: STUDENT IN AN ORGANIZED HEALTH CARE EDUCATION/TRAINING PROGRAM

## 2023-10-10 PROCEDURE — 3078F DIAST BP <80 MM HG: CPT | Performed by: STUDENT IN AN ORGANIZED HEALTH CARE EDUCATION/TRAINING PROGRAM

## 2023-10-10 PROCEDURE — 3017F COLORECTAL CA SCREEN DOC REV: CPT | Performed by: STUDENT IN AN ORGANIZED HEALTH CARE EDUCATION/TRAINING PROGRAM

## 2023-10-10 PROCEDURE — 2500000003 HC RX 250 WO HCPCS: Performed by: EMERGENCY MEDICINE

## 2023-10-10 PROCEDURE — 81001 URINALYSIS AUTO W/SCOPE: CPT

## 2023-10-10 PROCEDURE — 96374 THER/PROPH/DIAG INJ IV PUSH: CPT

## 2023-10-10 RX ORDER — ONDANSETRON 2 MG/ML
4 INJECTION INTRAMUSCULAR; INTRAVENOUS EVERY 30 MIN PRN
Status: DISCONTINUED | OUTPATIENT
Start: 2023-10-10 | End: 2023-10-10 | Stop reason: HOSPADM

## 2023-10-10 RX ORDER — CALCIUM CARBONATE 500 MG/1
1 TABLET, CHEWABLE ORAL DAILY
Qty: 30 TABLET | Refills: 0 | Status: SHIPPED | OUTPATIENT
Start: 2023-10-10 | End: 2023-11-09

## 2023-10-10 RX ORDER — ONDANSETRON 4 MG/1
4 TABLET, ORALLY DISINTEGRATING ORAL 3 TIMES DAILY PRN
Qty: 21 TABLET | Refills: 0 | Status: SHIPPED | OUTPATIENT
Start: 2023-10-10

## 2023-10-10 RX ORDER — BENZONATATE 100 MG/1
100 CAPSULE ORAL ONCE
Status: COMPLETED | OUTPATIENT
Start: 2023-10-10 | End: 2023-10-10

## 2023-10-10 RX ORDER — DICYCLOMINE HCL 20 MG
20 TABLET ORAL 4 TIMES DAILY
Qty: 40 TABLET | Refills: 0 | Status: SHIPPED | OUTPATIENT
Start: 2023-10-10

## 2023-10-10 RX ORDER — 0.9 % SODIUM CHLORIDE 0.9 %
1000 INTRAVENOUS SOLUTION INTRAVENOUS ONCE
Status: COMPLETED | OUTPATIENT
Start: 2023-10-10 | End: 2023-10-10

## 2023-10-10 RX ORDER — PREDNISONE 10 MG/1
60 TABLET ORAL DAILY
Qty: 30 TABLET | Refills: 0 | Status: SHIPPED | OUTPATIENT
Start: 2023-10-11 | End: 2023-10-16

## 2023-10-10 RX ORDER — GUAIFENESIN/DEXTROMETHORPHAN 100-10MG/5
5 SYRUP ORAL 4 TIMES DAILY PRN
Qty: 120 ML | Refills: 0 | Status: SHIPPED | OUTPATIENT
Start: 2023-10-10 | End: 2023-10-20

## 2023-10-10 RX ORDER — TRAZODONE HYDROCHLORIDE 100 MG/1
100 TABLET ORAL NIGHTLY
Qty: 90 TABLET | Refills: 1 | Status: SHIPPED | OUTPATIENT
Start: 2023-10-10 | End: 2024-04-07

## 2023-10-10 RX ORDER — MORPHINE SULFATE 4 MG/ML
4 INJECTION, SOLUTION INTRAMUSCULAR; INTRAVENOUS EVERY 30 MIN PRN
Status: DISCONTINUED | OUTPATIENT
Start: 2023-10-10 | End: 2023-10-10 | Stop reason: HOSPADM

## 2023-10-10 RX ORDER — IPRATROPIUM BROMIDE AND ALBUTEROL SULFATE 2.5; .5 MG/3ML; MG/3ML
1 SOLUTION RESPIRATORY (INHALATION) ONCE
Status: COMPLETED | OUTPATIENT
Start: 2023-10-10 | End: 2023-10-10

## 2023-10-10 RX ORDER — FAMOTIDINE 20 MG/1
20 TABLET, FILM COATED ORAL DAILY
Qty: 14 TABLET | Refills: 0 | Status: SHIPPED | OUTPATIENT
Start: 2023-10-10

## 2023-10-10 RX ORDER — ALBUTEROL SULFATE 90 UG/1
2 AEROSOL, METERED RESPIRATORY (INHALATION) EVERY 4 HOURS PRN
Qty: 18 G | Refills: 1 | Status: SHIPPED | OUTPATIENT
Start: 2023-10-10 | End: 2023-11-09

## 2023-10-10 RX ORDER — DICYCLOMINE HYDROCHLORIDE 10 MG/ML
20 INJECTION INTRAMUSCULAR ONCE
Status: COMPLETED | OUTPATIENT
Start: 2023-10-10 | End: 2023-10-10

## 2023-10-10 RX ORDER — FAMOTIDINE 10 MG/ML
20 INJECTION, SOLUTION INTRAVENOUS ONCE
Status: COMPLETED | OUTPATIENT
Start: 2023-10-10 | End: 2023-10-10

## 2023-10-10 RX ORDER — METHYLPREDNISOLONE SODIUM SUCCINATE 125 MG/2ML
125 INJECTION, POWDER, LYOPHILIZED, FOR SOLUTION INTRAMUSCULAR; INTRAVENOUS ONCE
Status: COMPLETED | OUTPATIENT
Start: 2023-10-10 | End: 2023-10-10

## 2023-10-10 RX ORDER — BENZONATATE 100 MG/1
100 CAPSULE ORAL 3 TIMES DAILY PRN
Qty: 10 CAPSULE | Refills: 0 | Status: SHIPPED | OUTPATIENT
Start: 2023-10-10 | End: 2023-10-17

## 2023-10-10 RX ADMIN — ONDANSETRON 4 MG: 2 INJECTION INTRAMUSCULAR; INTRAVENOUS at 17:11

## 2023-10-10 RX ADMIN — METHYLPREDNISOLONE SODIUM SUCCINATE 125 MG: 125 INJECTION, POWDER, FOR SOLUTION INTRAMUSCULAR; INTRAVENOUS at 17:11

## 2023-10-10 RX ADMIN — GUAIFENESIN 200 MG: 200 SOLUTION ORAL at 17:12

## 2023-10-10 RX ADMIN — IPRATROPIUM BROMIDE AND ALBUTEROL SULFATE 1 DOSE: 2.5; .5 SOLUTION RESPIRATORY (INHALATION) at 17:17

## 2023-10-10 RX ADMIN — IPRATROPIUM BROMIDE AND ALBUTEROL SULFATE 1 DOSE: 2.5; .5 SOLUTION RESPIRATORY (INHALATION) at 17:18

## 2023-10-10 RX ADMIN — SODIUM CHLORIDE 1000 ML: 9 INJECTION, SOLUTION INTRAVENOUS at 17:17

## 2023-10-10 RX ADMIN — FAMOTIDINE 20 MG: 10 INJECTION INTRAVENOUS at 17:10

## 2023-10-10 RX ADMIN — MORPHINE SULFATE 4 MG: 4 INJECTION, SOLUTION INTRAMUSCULAR; INTRAVENOUS at 17:11

## 2023-10-10 RX ADMIN — DICYCLOMINE HYDROCHLORIDE 20 MG: 10 INJECTION, SOLUTION INTRAMUSCULAR at 17:11

## 2023-10-10 RX ADMIN — BENZONATATE 100 MG: 100 CAPSULE ORAL at 17:12

## 2023-10-10 ASSESSMENT — ENCOUNTER SYMPTOMS
EYES NEGATIVE: 1
VOMITING: 1
ALLERGIC/IMMUNOLOGIC NEGATIVE: 1
COUGH: 1
SHORTNESS OF BREATH: 1
NAUSEA: 1
ABDOMINAL PAIN: 1

## 2023-10-10 ASSESSMENT — PAIN DESCRIPTION - LOCATION: LOCATION: ABDOMEN

## 2023-10-10 ASSESSMENT — PAIN DESCRIPTION - PAIN TYPE: TYPE: ACUTE PAIN

## 2023-10-10 ASSESSMENT — PAIN - FUNCTIONAL ASSESSMENT
PAIN_FUNCTIONAL_ASSESSMENT: PREVENTS OR INTERFERES SOME ACTIVE ACTIVITIES AND ADLS
PAIN_FUNCTIONAL_ASSESSMENT: 0-10

## 2023-10-10 ASSESSMENT — PAIN SCALES - GENERAL: PAINLEVEL_OUTOF10: 6

## 2023-10-10 ASSESSMENT — PAIN DESCRIPTION - DESCRIPTORS: DESCRIPTORS: ACHING

## 2023-10-10 NOTE — PROGRESS NOTES
ENDOSCOPY N/A 9/15/2021    EGD DIAGNOSTIC ONLY performed by Chris Castillo MD at [facility]  Current Outpatient Medications   Medication Sig Dispense Refill    traZODone (DESYREL) 100 MG tablet Take 1 tablet by mouth nightly 90 tablet 1    albuterol (PROVENTIL) (2.5 MG/3ML) 0.083% nebulizer solution USE 1 VIAL IN NEBULIZER 3 TIMES DAILY 90 each 11    atorvastatin (LIPITOR) 20 MG tablet take 1 tablet by mouth once daily 90 tablet 1    albuterol sulfate HFA (PROVENTIL;VENTOLIN;PROAIR) 108 (90 Base) MCG/ACT inhaler Inhale 2 puffs into the lungs every 4 hours as needed for Wheezing 3 each 1    fluticasone-umeclidin-vilant (TRELEGY ELLIPTA) 100-62.5-25 MCG/ACT AEPB inhaler Inhale 1 puff into the lungs daily 3 each 1    pantoprazole (PROTONIX) 40 MG tablet take 1 tablet by mouth once daily 90 tablet 1    metFORMIN (GLUCOPHAGE) 500 MG tablet take 1 tablet by mouth twice a day with meals 180 tablet 1    polyethylene glycol (MIRALAX) 17 g packet Take 17 g by mouth daily as needed for Constipation 527 g 5    lisinopril (PRINIVIL;ZESTRIL) 40 MG tablet TAKE 1 TABLET BY MOUTH EVERY DAY 90 tablet 1    alendronate (FOSAMAX) 70 MG tablet Take 1 tablet by mouth every 7 days 12 tablet 1    amLODIPine (NORVASC) 10 MG tablet Take 1 tablet by mouth daily 90 tablet 1    Blood Glucose Monitoring Suppl (ONE TOUCH ULTRA 2) w/Device KIT Inject 1 kit into the skin daily Test daily and PRN 1 kit 2    glucose monitoring (FREESTYLE FREEDOM) kit 1 kit by Does not apply route daily 1 kit 0    Lancets MISC 1 each by Does not apply route daily 100 each 3    aspirin 81 MG EC tablet Take 1 tablet by mouth daily      simethicone (MYLICON) 80 MG chewable tablet Take 1 tablet by mouth 4 times daily as needed for Flatulence 60 tablet 5     No current facility-administered medications for this visit.        ALLERGIES  Allergies   Allergen Reactions    Other Hives    Levaquin [Levofloxacin] Rash       PHYSICAL

## 2023-10-10 NOTE — ED NOTES
The IV in the right [facility] was removed. The patient complained of pain at the site after returning from the bathroom. I attempted to flush the line and it has infiltrated. Dr. Lena Naranjo was notified.                       Renee Lynn RN  10/10/23 3702

## 2023-10-10 NOTE — ED PROVIDER NOTES
white count normal lactate 2.2. She was given IV fluids as well. Patient rechecked she is feeling better on repeat examination. So far labs imaging unremarkable no signs of perforation obstruction or other acute abnormality. Again lactate was 2.2 her IV did stop working. Patient otherwise stable she feels better on repeat examination at this time I do think she is good to go home she was discharged with medications for cough URI symptoms as well as abdominal pain discharged with PCP follow-up and advise follow-up with GI she was given very strict return precautions discharge. Okay with plan. CLINICAL IMPRESSION:  Final diagnoses:   Abdominal pain, unspecified abdominal location   Cough, unspecified type       (Please note that portions of this note may have been completed with a voice recognition program. Efforts were made to edit the dictations but occasionally words aremis-transcribed.)    DISPOSITION REFERRAL (if applicable):   Nava Grewal DO  [address]    Schedule an appointment as soon as possible for a visit in 1 day      [facility]  [address]  [phone]    If symptoms worsen    Mari Drummond MD  [address] [place] [address]  [phone]    Schedule an appointment as soon as possible for a visit in 1 day        DISPOSITION MEDICATIONS (if applicable):  Discharge Medication List as of 10/10/2023  7:11 PM        START taking these medications    Details   guaiFENesin-dextromethorphan (ROBITUSSIN DM) 100-10 MG/5ML syrup Take 5 mLs by mouth 4 times daily as needed for Cough, Disp-120 mL, R-0Normal      benzonatate (TESSALON PERLES) 100 MG capsule Take 1 capsule by mouth 3 times daily as needed for Cough, Disp-10 capsule, R-0Normal      dicyclomine (BENTYL) 20 MG tablet Take 1 tablet by mouth 4 times daily, Disp-40 tablet, R-0Normal      !! albuterol sulfate HFA

## 2023-10-10 NOTE — ED NOTES
Discharge instructions given, pt and daughter informed prescriptions were sent to pharmacy, both voiced understanding. Ambulatory to exit without incident.       Edu Sethi RN  10/10/23 1164

## 2023-10-12 DIAGNOSIS — I10 ESSENTIAL HYPERTENSION: ICD-10-CM

## 2023-10-12 RX ORDER — AMLODIPINE BESYLATE 10 MG/1
10 TABLET ORAL DAILY
Qty: 90 TABLET | Refills: 1 | Status: SHIPPED | OUTPATIENT
Start: 2023-10-12

## 2023-10-12 RX ORDER — LISINOPRIL 40 MG/1
TABLET ORAL
Qty: 90 TABLET | Refills: 1 | Status: SHIPPED | OUTPATIENT
Start: 2023-10-12

## 2023-10-23 ASSESSMENT — ENCOUNTER SYMPTOMS
SHORTNESS OF BREATH: 0
ABDOMINAL PAIN: 0
SORE THROAT: 0
NAUSEA: 0
WHEEZING: 0

## 2023-11-02 RX ORDER — SODIUM CHLORIDE, SODIUM LACTATE, POTASSIUM CHLORIDE, CALCIUM CHLORIDE 600; 310; 30; 20 MG/100ML; MG/100ML; MG/100ML; MG/100ML
INJECTION, SOLUTION INTRAVENOUS CONTINUOUS
Status: CANCELLED | OUTPATIENT
Start: 2023-11-08

## 2023-11-07 ENCOUNTER — ANESTHESIA EVENT (OUTPATIENT)
Dept: ENDOSCOPY | Age: 73
End: 2023-11-07
Payer: MEDICARE

## 2023-11-07 NOTE — PROGRESS NOTES
Patient will arrive at [address] [place] [address] at [facility] on 11/8/2023 for her procedure at 1045. Orders are in [facility].

## 2023-11-07 NOTE — ANESTHESIA PRE PROCEDURE
Department of Anesthesiology  Preprocedure Note       Name:  Leo Joseph   Age:  75 y.o.  :  1950                                          MRN:  [id number]         Date:  2023      Surgeon: Shanika Michel):  Joyce Lainez MD    Procedure: Procedure(s):  COLONOSCOPY DIAGNOSTIC  EGD ESOPHAGOGASTRODUODENOSCOPY    Medications prior to admission:   Prior to Admission medications    Medication Sig Start Date End Date Taking? Authorizing Provider   PLENVU 140 g SOLR  10/31/23   ProviderIndio MD   traZODone (DESYREL) 50 MG tablet  10/10/23   Indio Samuel MD   amLODIPine (NORVASC) 10 MG tablet take 1 tablet by mouth once daily 10/12/23   Yinka Diaz DO   lisinopril (PRINIVIL;ZESTRIL) 40 MG tablet take 1 tablet by mouth once daily 10/12/23   Yinka Diaz DO   traZODone (DESYREL) 100 MG tablet Take 1 tablet by mouth nightly 10/10/23 4/7/24  Yinka Diaz DO   dicyclomine (BENTYL) 20 MG tablet Take 1 tablet by mouth 4 times daily 10/10/23   Klaus Andino DO   albuterol sulfate HFA (PROVENTIL HFA) 108 (90 Base) MCG/ACT inhaler Inhale 2 puffs into the lungs every 4 hours as needed for Wheezing or Shortness of Breath With spacer (and mask if indicated). Thanks.  10/10/23 11/9/23  Klaus Andino DO   ondansetron (ZOFRAN-ODT) 4 MG disintegrating tablet Take 1 tablet by mouth 3 times daily as needed for Nausea or Vomiting 10/10/23   Klaus Andino DO   famotidine (PEPCID) 20 MG tablet Take 1 tablet by mouth daily 10/10/23   Klaus Andino DO   calcium carbonate (ANTACID) 500 MG chewable tablet Take 1 tablet by mouth daily  Patient not taking: Reported on 11/3/2023 10/10/23 11/9/23  Klaus Andino DO   albuterol (PROVENTIL) (2.5 MG/3ML) 0.083% nebulizer solution USE 1 VIAL IN NEBULIZER 3 TIMES DAILY 23   Drake Domingo MD   atorvastatin (LIPITOR) 20 MG tablet take 1 tablet by mouth once daily 23   David Gentile PA-C   albuterol sulfate HFA

## 2023-11-08 ENCOUNTER — ANESTHESIA (OUTPATIENT)
Dept: ENDOSCOPY | Age: 73
End: 2023-11-08
Payer: MEDICARE

## 2023-11-08 ENCOUNTER — HOSPITAL ENCOUNTER (OUTPATIENT)
Age: 73
Setting detail: OUTPATIENT SURGERY
Discharge: HOME | End: 2023-11-08
Attending: SPECIALIST | Admitting: SPECIALIST
Payer: MEDICARE

## 2023-11-08 VITALS
WEIGHT: 136 LBS | HEIGHT: 60 IN | RESPIRATION RATE: 16 BRPM | HEART RATE: 73 BPM | SYSTOLIC BLOOD PRESSURE: 126 MMHG | TEMPERATURE: 97.2 F | OXYGEN SATURATION: 97 % | DIASTOLIC BLOOD PRESSURE: 102 MMHG | BODY MASS INDEX: 26.7 KG/M2

## 2023-11-08 DIAGNOSIS — R14.0 BLOATING: ICD-10-CM

## 2023-11-08 DIAGNOSIS — R10.13 ABDOMINAL PAIN, EPIGASTRIC: ICD-10-CM

## 2023-11-08 DIAGNOSIS — Z86.010 HISTORY OF COLON POLYPS: ICD-10-CM

## 2023-11-08 DIAGNOSIS — K57.80 PERFORATED DIVERTICULUM: ICD-10-CM

## 2023-11-08 PROBLEM — D12.2 BENIGN NEOPLASM OF ASCENDING COLON: Status: ACTIVE | Noted: 2023-11-08

## 2023-11-08 PROCEDURE — 7100000011 HC PHASE II RECOVERY - ADDTL 15 MIN: Performed by: SPECIALIST

## 2023-11-08 PROCEDURE — 7100000010 HC PHASE II RECOVERY - FIRST 15 MIN: Performed by: SPECIALIST

## 2023-11-08 PROCEDURE — 2709999900 HC NON-CHARGEABLE SUPPLY: Performed by: SPECIALIST

## 2023-11-08 PROCEDURE — 87077 CULTURE AEROBIC IDENTIFY: CPT

## 2023-11-08 PROCEDURE — 2500000003 HC RX 250 WO HCPCS: Performed by: NURSE ANESTHETIST, CERTIFIED REGISTERED

## 2023-11-08 PROCEDURE — 6360000002 HC RX W HCPCS: Performed by: NURSE ANESTHETIST, CERTIFIED REGISTERED

## 2023-11-08 PROCEDURE — 88305 TISSUE EXAM BY PATHOLOGIST: CPT

## 2023-11-08 PROCEDURE — 3700000001 HC ADD 15 MINUTES (ANESTHESIA): Performed by: SPECIALIST

## 2023-11-08 PROCEDURE — 3609010600 HC COLONOSCOPY POLYPECTOMY SNARE/COLD BIOPSY: Performed by: SPECIALIST

## 2023-11-08 PROCEDURE — 3609012400 HC EGD TRANSORAL BIOPSY SINGLE/MULTIPLE: Performed by: SPECIALIST

## 2023-11-08 PROCEDURE — 3700000000 HC ANESTHESIA ATTENDED CARE: Performed by: SPECIALIST

## 2023-11-08 PROCEDURE — 2580000003 HC RX 258: Performed by: NURSE ANESTHETIST, CERTIFIED REGISTERED

## 2023-11-08 RX ORDER — SODIUM CHLORIDE, SODIUM LACTATE, POTASSIUM CHLORIDE, CALCIUM CHLORIDE 600; 310; 30; 20 MG/100ML; MG/100ML; MG/100ML; MG/100ML
INJECTION, SOLUTION INTRAVENOUS CONTINUOUS PRN
Status: DISCONTINUED | OUTPATIENT
Start: 2023-11-08 | End: 2023-11-08 | Stop reason: SDUPTHER

## 2023-11-08 RX ORDER — LIDOCAINE HYDROCHLORIDE 20 MG/ML
INJECTION, SOLUTION EPIDURAL; INFILTRATION; INTRACAUDAL; PERINEURAL PRN
Status: DISCONTINUED | OUTPATIENT
Start: 2023-11-08 | End: 2023-11-08 | Stop reason: SDUPTHER

## 2023-11-08 RX ORDER — PROPOFOL 10 MG/ML
INJECTION, EMULSION INTRAVENOUS PRN
Status: DISCONTINUED | OUTPATIENT
Start: 2023-11-08 | End: 2023-11-08 | Stop reason: SDUPTHER

## 2023-11-08 RX ADMIN — LIDOCAINE HYDROCHLORIDE 100 MG: 20 INJECTION, SOLUTION EPIDURAL; INFILTRATION; INTRACAUDAL; PERINEURAL at 12:23

## 2023-11-08 RX ADMIN — PHENYLEPHRINE HYDROCHLORIDE 100 MCG: 10 INJECTION INTRAVENOUS at 12:51

## 2023-11-08 RX ADMIN — LIDOCAINE HYDROCHLORIDE 100 MG: 20 INJECTION, SOLUTION EPIDURAL; INFILTRATION; INTRACAUDAL; PERINEURAL at 12:51

## 2023-11-08 RX ADMIN — SODIUM CHLORIDE, POTASSIUM CHLORIDE, SODIUM LACTATE AND CALCIUM CHLORIDE: 600; 310; 30; 20 INJECTION, SOLUTION INTRAVENOUS at 12:17

## 2023-11-08 RX ADMIN — PROPOFOL 40 MG: 10 INJECTION, EMULSION INTRAVENOUS at 12:43

## 2023-11-08 RX ADMIN — PROPOFOL 400 MG: 10 INJECTION, EMULSION INTRAVENOUS at 12:23

## 2023-11-08 ASSESSMENT — PAIN SCALES - GENERAL
PAINLEVEL_OUTOF10: 0
PAINLEVEL_OUTOF10: 0

## 2023-11-08 NOTE — PROGRESS NOTES
[facility] instructions reviewed with pt and family, all parties express understanding. 1443 Pt. To DC home in private vehicle.

## 2023-11-08 NOTE — ANESTHESIA POSTPROCEDURE EVALUATION
Department of Anesthesiology  Postprocedure Note    Patient: Charles Richmond  MRN: [id number]  YOB: 1950  Date of evaluation: 11/8/2023      Procedure Summary     Date: 11/08/23 Room / Location: [address] [place] [address]    Anesthesia Start: 1217 Anesthesia Stop: 1302    Procedures:       COLONOSCOPY POLYPECTOMY SNARE/COLD BIOPSY      EGD BIOPSY Diagnosis:       Perforated diverticulum      History of colon polyps      Abdominal pain, epigastric      Bloating      (Perforated diverticulum [K57.80])      (History of colon polyps [Z86.010])      (Abdominal pain, epigastric [R10.13])      (Bloating [R14.0])    Surgeons: Nguyễn Esquivel MD Responsible Provider: Delores Perry MD    Anesthesia Type: MAC ASA Status: 3          Anesthesia Type: MAC    Florecita Phase I:      Florecita Phase II:        Anesthesia Post Evaluation    Patient location during evaluation: bedside  Patient participation: complete - patient participated  Level of consciousness: sleepy but conscious  Pain score: 0  Airway patency: patent  Nausea & Vomiting: no nausea and no vomiting  Complications: no  Cardiovascular status: blood pressure returned to baseline and hemodynamically stable  Respiratory status: acceptable  Hydration status: stable

## 2023-11-08 NOTE — PROGRESS NOTES
Pt returned to room from ENDO    Report received from [address], call light placed within reach, side rails up x's 2  1330 assisted up to bathroom  Discharge instructions given, voiced understanding    Pt escorted to main entrance via wheelchair for discharge.

## 2023-11-08 NOTE — H&P
I have examined the patient within 24 hours  before the procedure and there is no change in the previous history and physical exam,which has been reviewed. There is a history of sleep apnea,. There has been no  previous adverse experience with sedation/anesthesia. There is no increased risk for aspiration of gastric contents. The patient has been instructed that all resuscitative measures (during the operative and immediate perioperative period) will be instituted in the unlikely event that they will be needed. The patient has no pertinent past surgical or FH other than listed in the original H&P.     ASA Class: 3  AIRWAY Class: 1    Consent form signed, witnessed and in soft chart

## 2023-11-08 NOTE — DISCHARGE INSTRUCTIONS
[facility]  [phone]    Do not drive, work around [address] or use equipment. Do not drink any alcoholic beverages. Do not smoke while alone. Avoid making important decisions. Plan to spend a quiet, relaxed evening @ home. Resume normal activities as you begin to feel better. Eat lightly for your first meal, then gradually increase your diet to what is normal for you. In case of nausea, avoid food and drink only clear liquids. Resume food as nausea ceases. Notify your surgeon if you experience fever, chills, large amount of bleeding, difficulty breathing, persistent nausea and vomiting or any other disturbing problem. Call for a follow-up appointment with your surgeon.

## 2023-11-09 ENCOUNTER — OFFICE VISIT (OUTPATIENT)
Dept: GASTROENTEROLOGY | Age: 73
End: 2023-11-09
Payer: MEDICARE

## 2023-11-09 ENCOUNTER — HOSPITAL ENCOUNTER (OUTPATIENT)
Dept: GENERAL RADIOLOGY | Age: 73
Discharge: HOME | End: 2023-11-09
Payer: MEDICARE

## 2023-11-09 ENCOUNTER — HOSPITAL ENCOUNTER (OUTPATIENT)
Age: 73
Discharge: HOME | End: 2023-11-09
Payer: MEDICARE

## 2023-11-09 VITALS
BODY MASS INDEX: 27.72 KG/M2 | TEMPERATURE: 97.5 F | OXYGEN SATURATION: 96 % | SYSTOLIC BLOOD PRESSURE: 128 MMHG | DIASTOLIC BLOOD PRESSURE: 60 MMHG | HEIGHT: 60 IN | HEART RATE: 76 BPM | WEIGHT: 141.2 LBS

## 2023-11-09 DIAGNOSIS — K58.9 IRRITABLE BOWEL SYNDROME WITHOUT DIARRHEA: Primary | ICD-10-CM

## 2023-11-09 DIAGNOSIS — Z87.891 FORMER SMOKER: ICD-10-CM

## 2023-11-09 DIAGNOSIS — J41.0 SIMPLE CHRONIC BRONCHITIS (HCC): ICD-10-CM

## 2023-11-09 DIAGNOSIS — J44.9 CHRONIC OBSTRUCTIVE PULMONARY DISEASE, UNSPECIFIED COPD TYPE (HCC): ICD-10-CM

## 2023-11-09 LAB — CLOTEST: NEGATIVE

## 2023-11-09 PROCEDURE — G8427 DOCREV CUR MEDS BY ELIG CLIN: HCPCS | Performed by: SPECIALIST

## 2023-11-09 PROCEDURE — 1090F PRES/ABSN URINE INCON ASSESS: CPT | Performed by: SPECIALIST

## 2023-11-09 PROCEDURE — 71046 X-RAY EXAM CHEST 2 VIEWS: CPT

## 2023-11-09 PROCEDURE — 1123F ACP DISCUSS/DSCN MKR DOCD: CPT | Performed by: SPECIALIST

## 2023-11-09 PROCEDURE — G8399 PT W/DXA RESULTS DOCUMENT: HCPCS | Performed by: SPECIALIST

## 2023-11-09 PROCEDURE — 3074F SYST BP LT 130 MM HG: CPT | Performed by: SPECIALIST

## 2023-11-09 PROCEDURE — 99214 OFFICE O/P EST MOD 30 MIN: CPT | Performed by: SPECIALIST

## 2023-11-09 PROCEDURE — 3078F DIAST BP <80 MM HG: CPT | Performed by: SPECIALIST

## 2023-11-09 PROCEDURE — 1036F TOBACCO NON-USER: CPT | Performed by: SPECIALIST

## 2023-11-09 PROCEDURE — 3017F COLORECTAL CA SCREEN DOC REV: CPT | Performed by: SPECIALIST

## 2023-11-09 PROCEDURE — G8417 CALC BMI ABV UP PARAM F/U: HCPCS | Performed by: SPECIALIST

## 2023-11-09 PROCEDURE — G8484 FLU IMMUNIZE NO ADMIN: HCPCS | Performed by: SPECIALIST

## 2023-11-09 RX ORDER — DICYCLOMINE HYDROCHLORIDE 10 MG/1
10 CAPSULE ORAL
Qty: 90 CAPSULE | Refills: 5 | Status: SHIPPED | OUTPATIENT
Start: 2023-11-09

## 2023-11-14 ENCOUNTER — APPOINTMENT (OUTPATIENT)
Dept: GENERAL RADIOLOGY | Age: 73
End: 2023-11-14
Payer: MEDICARE

## 2023-11-14 ENCOUNTER — HOSPITAL ENCOUNTER (EMERGENCY)
Age: 73
Discharge: HOME | End: 2023-11-14
Attending: EMERGENCY MEDICINE
Payer: MEDICARE

## 2023-11-14 VITALS
HEART RATE: 82 BPM | DIASTOLIC BLOOD PRESSURE: 66 MMHG | TEMPERATURE: 97.6 F | SYSTOLIC BLOOD PRESSURE: 155 MMHG | OXYGEN SATURATION: 96 % | RESPIRATION RATE: 14 BRPM

## 2023-11-14 DIAGNOSIS — M25.551 RIGHT HIP PAIN: Primary | ICD-10-CM

## 2023-11-14 PROCEDURE — 73552 X-RAY EXAM OF FEMUR 2/>: CPT

## 2023-11-14 PROCEDURE — 73502 X-RAY EXAM HIP UNI 2-3 VIEWS: CPT

## 2023-11-14 PROCEDURE — 99283 EMERGENCY DEPT VISIT LOW MDM: CPT

## 2023-11-14 PROCEDURE — 6370000000 HC RX 637 (ALT 250 FOR IP): Performed by: EMERGENCY MEDICINE

## 2023-11-14 RX ORDER — IBUPROFEN 400 MG/1
600 TABLET ORAL
Status: COMPLETED | OUTPATIENT
Start: 2023-11-14 | End: 2023-11-14

## 2023-11-14 RX ORDER — OXYCODONE HYDROCHLORIDE AND ACETAMINOPHEN 5; 325 MG/1; MG/1
2 TABLET ORAL
Status: COMPLETED | OUTPATIENT
Start: 2023-11-14 | End: 2023-11-14

## 2023-11-14 RX ORDER — IBUPROFEN 600 MG/1
600 TABLET ORAL EVERY 6 HOURS PRN
Qty: 20 TABLET | Refills: 0 | Status: SHIPPED | OUTPATIENT
Start: 2023-11-14 | End: 2023-12-14

## 2023-11-14 RX ORDER — OXYCODONE HYDROCHLORIDE AND ACETAMINOPHEN 5; 325 MG/1; MG/1
1 TABLET ORAL EVERY 6 HOURS PRN
Qty: 12 TABLET | Refills: 0 | Status: SHIPPED | OUTPATIENT
Start: 2023-11-14 | End: 2023-11-17

## 2023-11-14 RX ORDER — LIDOCAINE 4 G/G
1 PATCH TOPICAL DAILY
Qty: 30 PATCH | Refills: 0 | Status: SHIPPED | OUTPATIENT
Start: 2023-11-14 | End: 2023-12-14

## 2023-11-14 RX ADMIN — OXYCODONE AND ACETAMINOPHEN 2 TABLET: 5; 325 TABLET ORAL at 12:32

## 2023-11-14 RX ADMIN — IBUPROFEN 600 MG: 400 TABLET, FILM COATED ORAL at 12:32

## 2023-11-14 ASSESSMENT — PAIN - FUNCTIONAL ASSESSMENT: PAIN_FUNCTIONAL_ASSESSMENT: 0-10

## 2023-11-14 ASSESSMENT — PAIN DESCRIPTION - DESCRIPTORS: DESCRIPTORS: SHARP;SHOOTING

## 2023-11-14 ASSESSMENT — PAIN DESCRIPTION - ORIENTATION: ORIENTATION: RIGHT

## 2023-11-14 ASSESSMENT — PAIN SCALES - GENERAL: PAINLEVEL_OUTOF10: 10

## 2023-11-14 ASSESSMENT — PAIN DESCRIPTION - LOCATION: LOCATION: LEG;HIP

## 2023-11-14 NOTE — ED NOTES
Patient discharged with 3 escript prescriptions; education of medications reviewed. Pt verbalized understanding of discharge instructions. All questions answered and patient understands follow up instructions.   Jordin Garcia RN  11/14/23 6674

## 2023-11-14 NOTE — ED NOTES
Pt reports increased pain in rt thigh and rt hip x 2 weeks . Fall in 2021 with right hip surgery to place plates and screws. Pt sts she does use a cane at home. Pt has been caring for her adult daughter since her surgery last week and is making increased # of trips up and down stairs.  Pt to room vis wheel chair     Mary Beth Mays RN  11/14/23 9234

## 2023-11-14 NOTE — ED NOTES
Provided saltine crackers for pt to be able to swallow the pills, updated pt and daughter plan of care.  Call light within reach     Ivania Szymanski RN  11/14/23 8777

## 2023-11-14 NOTE — ED PROVIDER NOTES
Triage Chief Complaint:    Leg Pain (RIGHT LEG AND HIP PAIN NKI)    YUSUF Cheng is a 75 y.o. female that presents for evaluation of right hip pain. The patient reports that it always bothers her slightly but she was not sure if she might of exacerbated going up and down the steps. Now she has significant pain even when ambulating. She feels that the pain is in her lateral aspect of the right buttock and radiates down into her thigh. She has not noticed any pain in her calf or her lower extremity beyond that. Has not had any abdominal pain with this. Has not noticed any numbness tingling or weakness. She does have a history of right hip fracture and family is concerned that she might have injured that. She has no other acute concerns at this time. Her orthopedic surgeon is at [facility]. History from : Patient and Family daughter    Limitations to history : None    ROS:  10 systems reviewed and negative except as above.      Past Medical History:   Diagnosis Date    Acute exacerbation of chronic obstructive pulmonary disease (COPD) ([address]) 12/15/2021    Bowel trouble     Cerebral artery occlusion with cerebral infarction ([address])     COPD, mild ([address]) 06/15/2021    Diabetes mellitus ([address])     Excessive daytime sleepiness 06/15/2021    Former smoker 06/15/2021    Hyperlipidemia     Hypertension     Mild obstructive sleep apnea 12/15/2021    Multiple lung nodules on CT 07/27/2021    Shortness of breath 06/15/2021    Snoring 06/15/2021     Past Surgical History:   Procedure Laterality Date    APPENDECTOMY      CHOLECYSTECTOMY      COLONOSCOPY N/A 9/15/2021    COLONOSCOPY POLYPECTOMY SNARE/COLD BIOPSY performed by Ronit Rome MD at [address] [place] [address] N/A 11/8/2023    COLONOSCOPY POLYPECTOMY SNARE/COLD BIOPSY performed by Ronit Rome MD at [facility] Left     Left hand    HYSTERECTOMY (CERVIX STATUS UNKNOWN)      JOINT REPLACEMENT Right

## 2023-11-21 ENCOUNTER — TELEPHONE (OUTPATIENT)
Dept: FAMILY MEDICINE CLINIC | Age: 73
End: 2023-11-21

## 2023-11-21 DIAGNOSIS — M53.86 SCIATICA ASSOCIATED WITH DISORDER OF LUMBAR SPINE: Primary | ICD-10-CM

## 2023-11-21 NOTE — TELEPHONE ENCOUNTER
To Dr. Kory Metcalf-    Patient has nerve pain in her hip---she saw Dr. Jose Ortega on [address] [place] [address] in [place] (Neuro Surgeon). Could you please call in some strong pain medicine for her ---she said the pain is really bad.         [facility] in [place]

## 2023-11-22 RX ORDER — GABAPENTIN 100 MG/1
100 CAPSULE ORAL 2 TIMES DAILY PRN
Qty: 60 CAPSULE | Refills: 0 | Status: SHIPPED | OUTPATIENT
Start: 2023-11-22 | End: 2023-12-22

## 2023-11-26 ENCOUNTER — APPOINTMENT (OUTPATIENT)
Dept: GENERAL RADIOLOGY | Age: 73
End: 2023-11-26
Payer: MEDICARE

## 2023-11-26 ENCOUNTER — HOSPITAL ENCOUNTER (EMERGENCY)
Age: 73
Discharge: HOME | End: 2023-11-26
Attending: EMERGENCY MEDICINE
Payer: MEDICARE

## 2023-11-26 VITALS
BODY MASS INDEX: 26.9 KG/M2 | TEMPERATURE: 98.4 F | OXYGEN SATURATION: 96 % | HEART RATE: 92 BPM | DIASTOLIC BLOOD PRESSURE: 73 MMHG | RESPIRATION RATE: 18 BRPM | SYSTOLIC BLOOD PRESSURE: 147 MMHG | WEIGHT: 137 LBS | HEIGHT: 60 IN

## 2023-11-26 DIAGNOSIS — J44.9 CHRONIC OBSTRUCTIVE PULMONARY DISEASE, UNSPECIFIED COPD TYPE (HCC): ICD-10-CM

## 2023-11-26 DIAGNOSIS — J40 BRONCHITIS: Primary | ICD-10-CM

## 2023-11-26 LAB
INFLUENZA A ANTIGEN: NOT DETECTED
INFLUENZA B ANTIGEN: NOT DETECTED
SARS-COV-2 RDRP RESP QL NAA+PROBE: NOT DETECTED
SOURCE: NORMAL

## 2023-11-26 PROCEDURE — 87502 INFLUENZA DNA AMP PROBE: CPT

## 2023-11-26 PROCEDURE — 71045 X-RAY EXAM CHEST 1 VIEW: CPT

## 2023-11-26 PROCEDURE — 87635 SARS-COV-2 COVID-19 AMP PRB: CPT

## 2023-11-26 PROCEDURE — 6370000000 HC RX 637 (ALT 250 FOR IP): Performed by: EMERGENCY MEDICINE

## 2023-11-26 PROCEDURE — 99284 EMERGENCY DEPT VISIT MOD MDM: CPT

## 2023-11-26 PROCEDURE — 6360000002 HC RX W HCPCS: Performed by: EMERGENCY MEDICINE

## 2023-11-26 RX ORDER — GUAIFENESIN 600 MG/1
1200 TABLET, EXTENDED RELEASE ORAL 2 TIMES DAILY
Qty: 40 TABLET | Refills: 0 | Status: SHIPPED | OUTPATIENT
Start: 2023-11-26 | End: 2023-12-06

## 2023-11-26 RX ORDER — IPRATROPIUM BROMIDE AND ALBUTEROL SULFATE 2.5; .5 MG/3ML; MG/3ML
1 SOLUTION RESPIRATORY (INHALATION)
Status: COMPLETED | OUTPATIENT
Start: 2023-11-26 | End: 2023-11-26

## 2023-11-26 RX ORDER — PREDNISONE 20 MG/1
40 TABLET ORAL DAILY
Qty: 10 TABLET | Refills: 0 | Status: SHIPPED | OUTPATIENT
Start: 2023-11-26 | End: 2023-12-01

## 2023-11-26 RX ORDER — ALBUTEROL SULFATE 90 UG/1
2 AEROSOL, METERED RESPIRATORY (INHALATION) 4 TIMES DAILY PRN
Qty: 18 G | Refills: 0 | Status: SHIPPED | OUTPATIENT
Start: 2023-11-26

## 2023-11-26 RX ORDER — DEXAMETHASONE SODIUM PHOSPHATE 4 MG/ML
10 INJECTION, SOLUTION INTRA-ARTICULAR; INTRALESIONAL; INTRAMUSCULAR; INTRAVENOUS; SOFT TISSUE ONCE
Status: COMPLETED | OUTPATIENT
Start: 2023-11-26 | End: 2023-11-26

## 2023-11-26 RX ORDER — NAPROXEN 500 MG/1
TABLET ORAL
COMMUNITY
Start: 2023-11-21

## 2023-11-26 RX ORDER — DOXYCYCLINE HYCLATE 100 MG
100 TABLET ORAL 2 TIMES DAILY
Qty: 20 TABLET | Refills: 0 | Status: SHIPPED | OUTPATIENT
Start: 2023-11-26 | End: 2023-12-06

## 2023-11-26 RX ADMIN — DEXAMETHASONE SODIUM PHOSPHATE 10 MG: 4 INJECTION, SOLUTION INTRAMUSCULAR; INTRAVENOUS at 11:14

## 2023-11-26 RX ADMIN — IPRATROPIUM BROMIDE AND ALBUTEROL SULFATE 1 DOSE: 2.5; .5 SOLUTION RESPIRATORY (INHALATION) at 10:16

## 2023-11-26 ASSESSMENT — PAIN - FUNCTIONAL ASSESSMENT: PAIN_FUNCTIONAL_ASSESSMENT: NONE - DENIES PAIN

## 2023-11-26 NOTE — ED PROVIDER NOTES
Transportation Needs: Unknown (3/16/2023)    PRAPARE - Transportation     Lack of Transportation (Medical): Not on file     Lack of Transportation (Non-Medical): Patient refused   Physical Activity: Not on file   Stress: Not on file   Social Connections: Not on file   Intimate Partner Violence: Not on file   Housing Stability: Unknown (3/16/2023)    Housing Stability Vital Sign     Unable to Pay for Housing in the Last Year: Not on file     Number of Places Lived in the Last Year: Not on file     Unstable Housing in the Last Year: Patient refused     No current facility-administered medications for this encounter. Current Outpatient Medications   Medication Sig Dispense Refill    predniSONE (DELTASONE) 20 MG tablet Take 2 tablets by mouth daily for 5 days 10 tablet 0    doxycycline hyclate (VIBRA-TABS) 100 MG tablet Take 1 tablet by mouth 2 times daily for 10 days 20 tablet 0    guaiFENesin (MUCINEX) 600 MG extended release tablet Take 2 tablets by mouth 2 times daily for 10 days 40 tablet 0    albuterol sulfate HFA (VENTOLIN HFA) 108 (90 Base) MCG/ACT inhaler Inhale 2 puffs into the lungs 4 times daily as needed for Wheezing 18 g 0    naproxen (NAPROSYN) 500 MG tablet take 1 tablet by mouth every morning and 1 tablet by mouth pain WITH EVENING MEAL for 7 days      gabapentin (NEURONTIN) 100 MG capsule Take 1 capsule by mouth 2 times daily as needed (sciatica) for up to 30 days.  Intended supply: 30 days (Patient not taking: Reported on 11/26/2023) 60 capsule 0    ibuprofen (IBU) 600 MG tablet Take 1 tablet by mouth every 6 hours as needed for Pain 20 tablet 0    lidocaine 4 % external patch Place 1 patch onto the skin daily 30 patch 0    dicyclomine (BENTYL) 10 MG capsule Take 1 capsule by mouth 3 times daily (before meals) 90 capsule 5    traZODone (DESYREL) 50 MG tablet  (Patient not taking: Reported on 11/26/2023)      amLODIPine (NORVASC) 10 MG tablet take 1 tablet by mouth once daily 90 tablet 1

## 2023-11-26 NOTE — DISCHARGE INSTR - COC
of Unplanned Readmission:  0           Discharging to Facility/ Agency   Name:   Address:  Phone:  Fax:    Dialysis Facility (if applicable)   Name:  Address:  Dialysis Schedule:  Phone:  Fax:    / signature: {Esignature:[id number]}    PHYSICIAN SECTION    Prognosis: {Prognosis:2367560093}    Condition at Discharge: [address] Patient Condition:422902652}    Rehab Potential (if transferring to Rehab): {Prognosis:5651575236}    Recommended Labs or Other Treatments After Discharge: ***    Physician Certification: I certify the above information and transfer of Matt Negron  is necessary for the continuing treatment of the diagnosis listed and that she requires {Admit to Appropriate Level of Care:12730} for {GREATER/LESS:878215911} 30 days.      Update Admission H&P: {CHP DME Changes in WFCU[facility]:789759767}    PHYSICIAN SIGNATURE:  {Esignature:341122682}

## 2023-11-26 NOTE — ED NOTES
The patient presents to the er today with complaints of a cough and a cold. The patient is coughing a lot. The daughter is at the bedside and the call light is within reach.                    Addie Beard RN  11/26/23 1254

## 2023-12-29 DIAGNOSIS — E11.9 TYPE 2 DIABETES MELLITUS WITHOUT COMPLICATION, WITHOUT LONG-TERM CURRENT USE OF INSULIN (HCC): ICD-10-CM

## 2024-01-03 ENCOUNTER — HOSPITAL ENCOUNTER (OUTPATIENT)
Dept: GENERAL RADIOLOGY | Age: 74
Discharge: HOME | End: 2024-01-03
Payer: MEDICARE

## 2024-01-03 ENCOUNTER — HOSPITAL ENCOUNTER (OUTPATIENT)
Age: 74
Discharge: HOME | End: 2024-01-03
Payer: MEDICARE

## 2024-01-03 ENCOUNTER — OFFICE VISIT (OUTPATIENT)
Dept: FAMILY MEDICINE CLINIC | Age: 74
End: 2024-01-03
Payer: MEDICARE

## 2024-01-03 VITALS
OXYGEN SATURATION: 95 % | SYSTOLIC BLOOD PRESSURE: 120 MMHG | BODY MASS INDEX: 27.09 KG/M2 | DIASTOLIC BLOOD PRESSURE: 66 MMHG | WEIGHT: 138 LBS | HEIGHT: 60 IN | HEART RATE: 91 BPM

## 2024-01-03 DIAGNOSIS — M54.50 LUMBAR PAIN: ICD-10-CM

## 2024-01-03 DIAGNOSIS — M25.551 RIGHT HIP PAIN: ICD-10-CM

## 2024-01-03 DIAGNOSIS — M79.18 RIGHT BUTTOCK PAIN: ICD-10-CM

## 2024-01-03 DIAGNOSIS — M54.50 LUMBAR PAIN: Primary | ICD-10-CM

## 2024-01-03 PROCEDURE — 3078F DIAST BP <80 MM HG: CPT | Performed by: PHYSICIAN ASSISTANT

## 2024-01-03 PROCEDURE — G8484 FLU IMMUNIZE NO ADMIN: HCPCS | Performed by: PHYSICIAN ASSISTANT

## 2024-01-03 PROCEDURE — 1123F ACP DISCUSS/DSCN MKR DOCD: CPT | Performed by: PHYSICIAN ASSISTANT

## 2024-01-03 PROCEDURE — G8427 DOCREV CUR MEDS BY ELIG CLIN: HCPCS | Performed by: PHYSICIAN ASSISTANT

## 2024-01-03 PROCEDURE — 99213 OFFICE O/P EST LOW 20 MIN: CPT | Performed by: PHYSICIAN ASSISTANT

## 2024-01-03 PROCEDURE — G8417 CALC BMI ABV UP PARAM F/U: HCPCS | Performed by: PHYSICIAN ASSISTANT

## 2024-01-03 PROCEDURE — G8399 PT W/DXA RESULTS DOCUMENT: HCPCS | Performed by: PHYSICIAN ASSISTANT

## 2024-01-03 PROCEDURE — 3074F SYST BP LT 130 MM HG: CPT | Performed by: PHYSICIAN ASSISTANT

## 2024-01-03 PROCEDURE — 72110 X-RAY EXAM L-2 SPINE 4/>VWS: CPT

## 2024-01-03 PROCEDURE — 1090F PRES/ABSN URINE INCON ASSESS: CPT | Performed by: PHYSICIAN ASSISTANT

## 2024-01-03 PROCEDURE — 3017F COLORECTAL CA SCREEN DOC REV: CPT | Performed by: PHYSICIAN ASSISTANT

## 2024-01-03 PROCEDURE — 1036F TOBACCO NON-USER: CPT | Performed by: PHYSICIAN ASSISTANT

## 2024-01-03 RX ORDER — HYDROCODONE BITARTRATE AND ACETAMINOPHEN 5; 325 MG/1; MG/1
1 TABLET ORAL EVERY 8 HOURS PRN
Qty: 15 TABLET | Refills: 0 | Status: SHIPPED | OUTPATIENT
Start: 2024-01-03 | End: 2024-01-08

## 2024-01-03 NOTE — PROGRESS NOTES
Jean Duong MD at [facility] OR    OVARY REMOVAL      age 32    TONSILLECTOMY      UPPER GASTROINTESTINAL ENDOSCOPY N/A 9/15/2021    EGD DIAGNOSTIC ONLY performed by Doron Ramírez MD at [facility] ENDOSCOPY    UPPER GASTROINTESTINAL ENDOSCOPY N/A 11/8/2023    EGD BIOPSY performed by Doron Ramírez MD at [facility] ENDOSCOPY       CURRENT MEDICATIONS  Current Outpatient Medications   Medication Sig Dispense Refill    HYDROcodone-acetaminophen (NORCO) 5-325 MG per tablet Take 1 tablet by mouth every 8 hours as needed for Pain for up to 5 days. Take lowest dose possible to manage pain Max Daily Amount: 3 tablets 15 tablet 0    metFORMIN (GLUCOPHAGE) 500 MG tablet take 1 tablet by mouth twice a day with food 180 tablet 1    albuterol sulfate HFA (VENTOLIN HFA) 108 (90 Base) MCG/ACT inhaler Inhale 2 puffs into the lungs 4 times daily as needed for Wheezing 18 g 0    dicyclomine (BENTYL) 10 MG capsule Take 1 capsule by mouth 3 times daily (before meals) 90 capsule 5    amLODIPine (NORVASC) 10 MG tablet take 1 tablet by mouth once daily 90 tablet 1    lisinopril (PRINIVIL;ZESTRIL) 40 MG tablet take 1 tablet by mouth once daily 90 tablet 1    albuterol (PROVENTIL) (2.5 MG/3ML) 0.083% nebulizer solution USE 1 VIAL IN NEBULIZER 3 TIMES DAILY 90 each 11    atorvastatin (LIPITOR) 20 MG tablet take 1 tablet by mouth once daily 90 tablet 1    fluticasone-umeclidin-vilant (TRELEGY ELLIPTA) 100-62.5-25 MCG/ACT AEPB inhaler Inhale 1 puff into the lungs daily 3 each 1    alendronate (FOSAMAX) 70 MG tablet Take 1 tablet by mouth every 7 days 12 tablet 1    Blood Glucose Monitoring Suppl (ONE TOUCH ULTRA 2) w/Device KIT Inject 1 kit into the skin daily Test daily and PRN 1 kit 2    glucose monitoring (FREESTYLE FREEDOM) kit 1 kit by Does not apply route daily 1 kit 0    Lancets MISC 1 each by Does not apply route daily 100 each 3    aspirin 81 MG EC tablet Take 1 tablet by mouth daily       No current facility-administered medications for this

## 2024-01-08 ENCOUNTER — TELEPHONE (OUTPATIENT)
Dept: FAMILY MEDICINE CLINIC | Age: 74
End: 2024-01-08

## 2024-01-08 DIAGNOSIS — M51.36 DEGENERATIVE DISC DISEASE, LUMBAR: ICD-10-CM

## 2024-01-08 DIAGNOSIS — M54.50 LUMBAR PAIN: Primary | ICD-10-CM

## 2024-01-29 DIAGNOSIS — F51.01 PRIMARY INSOMNIA: ICD-10-CM

## 2024-01-30 RX ORDER — TRAZODONE HYDROCHLORIDE 100 MG/1
100 TABLET ORAL NIGHTLY
Qty: 90 TABLET | Refills: 1 | Status: SHIPPED | OUTPATIENT
Start: 2024-01-30 | End: 2024-07-28

## 2024-02-08 ENCOUNTER — OFFICE VISIT (OUTPATIENT)
Dept: FAMILY MEDICINE CLINIC | Age: 74
End: 2024-02-08

## 2024-02-08 VITALS
WEIGHT: 135 LBS | DIASTOLIC BLOOD PRESSURE: 86 MMHG | HEIGHT: 60 IN | TEMPERATURE: 96.9 F | SYSTOLIC BLOOD PRESSURE: 144 MMHG | BODY MASS INDEX: 26.5 KG/M2 | HEART RATE: 73 BPM | OXYGEN SATURATION: 97 %

## 2024-02-08 VITALS
DIASTOLIC BLOOD PRESSURE: 86 MMHG | HEART RATE: 73 BPM | HEIGHT: 60 IN | SYSTOLIC BLOOD PRESSURE: 144 MMHG | WEIGHT: 135 LBS | BODY MASS INDEX: 26.5 KG/M2 | OXYGEN SATURATION: 97 %

## 2024-02-08 DIAGNOSIS — M81.6 LOCALIZED OSTEOPOROSIS, UNSPECIFIED PATHOLOGICAL FRACTURE PRESENCE: ICD-10-CM

## 2024-02-08 DIAGNOSIS — M47.816 OSTEOARTHRITIS OF LUMBAR SPINE, UNSPECIFIED SPINAL OSTEOARTHRITIS COMPLICATION STATUS: ICD-10-CM

## 2024-02-08 DIAGNOSIS — E27.8 ADRENAL NODULE (HCC): ICD-10-CM

## 2024-02-08 DIAGNOSIS — N18.31 STAGE 3A CHRONIC KIDNEY DISEASE (HCC): ICD-10-CM

## 2024-02-08 DIAGNOSIS — I10 ESSENTIAL HYPERTENSION: ICD-10-CM

## 2024-02-08 DIAGNOSIS — M54.50 LUMBAR PAIN: Primary | ICD-10-CM

## 2024-02-08 DIAGNOSIS — Z00.00 MEDICARE ANNUAL WELLNESS VISIT, SUBSEQUENT: Primary | ICD-10-CM

## 2024-02-08 DIAGNOSIS — J44.9 COPD MIXED TYPE (HCC): ICD-10-CM

## 2024-02-08 DIAGNOSIS — N18.2 TYPE 2 DIABETES MELLITUS WITH STAGE 2 CHRONIC KIDNEY DISEASE, WITHOUT LONG-TERM CURRENT USE OF INSULIN (HCC): ICD-10-CM

## 2024-02-08 DIAGNOSIS — E11.22 TYPE 2 DIABETES MELLITUS WITH STAGE 2 CHRONIC KIDNEY DISEASE, WITHOUT LONG-TERM CURRENT USE OF INSULIN (HCC): ICD-10-CM

## 2024-02-08 DIAGNOSIS — E11.9 TYPE 2 DIABETES MELLITUS WITHOUT COMPLICATION, WITHOUT LONG-TERM CURRENT USE OF INSULIN (HCC): ICD-10-CM

## 2024-02-08 DIAGNOSIS — J41.0 SIMPLE CHRONIC BRONCHITIS (HCC): ICD-10-CM

## 2024-02-08 DIAGNOSIS — M75.122 NONTRAUMATIC COMPLETE TEAR OF LEFT ROTATOR CUFF: ICD-10-CM

## 2024-02-08 RX ORDER — HYDROCODONE BITARTRATE AND ACETAMINOPHEN 5; 325 MG/1; MG/1
1 TABLET ORAL EVERY 12 HOURS PRN
Qty: 60 TABLET | Refills: 0 | Status: SHIPPED | OUTPATIENT
Start: 2024-02-08 | End: 2024-03-09

## 2024-02-08 RX ORDER — ALENDRONATE SODIUM 70 MG/1
70 TABLET ORAL
Qty: 12 TABLET | Refills: 1 | Status: SHIPPED | OUTPATIENT
Start: 2024-02-08

## 2024-02-08 RX ORDER — FLUTICASONE FUROATE, UMECLIDINIUM BROMIDE AND VILANTEROL TRIFENATATE 100; 62.5; 25 UG/1; UG/1; UG/1
1 POWDER RESPIRATORY (INHALATION) DAILY
Qty: 3 EACH | Refills: 1 | Status: SHIPPED | OUTPATIENT
Start: 2024-02-08 | End: 2024-08-06

## 2024-02-08 ASSESSMENT — PATIENT HEALTH QUESTIONNAIRE - PHQ9
SUM OF ALL RESPONSES TO PHQ QUESTIONS 1-9: 0
2. FEELING DOWN, DEPRESSED OR HOPELESS: 0
SUM OF ALL RESPONSES TO PHQ9 QUESTIONS 1 & 2: 0
SUM OF ALL RESPONSES TO PHQ QUESTIONS 1-9: 0
SUM OF ALL RESPONSES TO PHQ QUESTIONS 1-9: 0
1. LITTLE INTEREST OR PLEASURE IN DOING THINGS: 0
SUM OF ALL RESPONSES TO PHQ QUESTIONS 1-9: 0

## 2024-02-08 ASSESSMENT — LIFESTYLE VARIABLES
HOW MANY STANDARD DRINKS CONTAINING ALCOHOL DO YOU HAVE ON A TYPICAL DAY: PATIENT DOES NOT DRINK
HOW OFTEN DO YOU HAVE A DRINK CONTAINING ALCOHOL: NEVER

## 2024-02-08 NOTE — PATIENT INSTRUCTIONS
the bathroom with you.   Where can you learn more?  Go to https://www.[employer].net/patientEd and enter G117 to learn more about \"Preventing Falls: Care Instructions.\"  Current as of: July 18, 2023               Content Version: 13.9  © 7659-1994 [employer].   Care instructions adapted under license by [employer]. If you have questions about a medical condition or this instruction, always ask your healthcare professional. [employer] disclaims any warranty or liability for your use of this information.           Fatigue: Care Instructions  Overview     Fatigue is a feeling of tiredness, exhaustion, or lack of energy. You may feel fatigue because of too much or not enough activity. It can also come from stress, lack of sleep, boredom, and poor diet. Many medical problems, such as viral infections, can cause fatigue. Emotional problems, especially depression, are often the cause of fatigue.  Fatigue is most often a symptom of another problem. Treatment for fatigue depends on the cause. For example, if you have fatigue because you have a certain health problem, treating this problem also treats your fatigue. If depression or anxiety is the cause, treatment may help.  Follow-up care is a key part of your treatment and safety. Be sure to make and go to all appointments, and call your doctor if you are having problems. It's also a good idea to know your test results and keep a list of the medicines you take.  How can you care for yourself at home?  Get regular exercise. But try not to overdo it. It may help to go back and forth between rest and exercise.  Get plenty of rest.  Eat a variety of healthy foods. Try not to skip any meals.  Avoid or try to cut back on your use of caffeine, tobacco, and alcohol. Caffeine is most often found in coffee, tea, cola drinks, and energy drinks.  Limit medicines that can cause fatigue. These include medicines such as cold and allergy medicines.  When should

## 2024-02-08 NOTE — PROGRESS NOTES
2/13/2024    Vanessa Jones    Chief Complaint   Patient presents with    1 Month Follow-Up     - low back greatly improve with physical therapy.  - bilateral shoulder pain. Constant. Limited range of motion. Unable to raise arms. 1 1/2 month. Tried topical - Icy Hot - helps slightly & temporarily.        HPI  History was obtained from patient.  Vanessa is a 73 y.o. female with a PMHx as listed below who presents today for follow up on chronic conditions.     Nette anderson, back pain. This has been improved with physical therapy.     Possible surgery was scheduled with Dr. Hernandez shoulders    1. Lumbar pain    2. Localized osteoporosis, unspecified pathological fracture presence    3. Nontraumatic complete tear of left rotator cuff    4. Simple chronic bronchitis ([facility])    5. Type 2 diabetes mellitus with stage 2 chronic kidney disease, without long-term current use of insulin ([facility])    6. Adrenal nodule ([facility])    7. Stage 3a chronic kidney disease ([facility])    8. Osteoarthritis of lumbar spine, unspecified spinal osteoarthritis complication status    9. COPD mixed type ([facility])             REVIEW OF SYMPTOMS    Review of Systems   Constitutional:  Negative for chills and fatigue.   HENT:  Negative for congestion and sore throat.    Respiratory:  Negative for shortness of breath and wheezing.    Cardiovascular:  Negative for chest pain and palpitations.   Gastrointestinal:  Negative for abdominal pain and nausea.   Genitourinary:  Negative for frequency and urgency.   Neurological:  Negative for light-headedness.       PAST MEDICAL HISTORY  Past Medical History:   Diagnosis Date    Acute exacerbation of chronic obstructive pulmonary disease (COPD) ([facility]) 12/15/2021    Bowel trouble     Cerebral artery occlusion with cerebral infarction ([facility])     COPD, mild (HCC) 06/15/2021    Diabetes mellitus ([facility])     Excessive daytime sleepiness 06/15/2021    Former smoker 06/15/2021    Hyperlipidemia     Hypertension     Mild obstructive

## 2024-02-08 NOTE — PROGRESS NOTES
Medicare Annual Wellness Visit    Vanessa Jones is here for Medicare AWV    Assessment & Plan   Medicare annual wellness visit, subsequent  Recommendations for Preventive Services Due: see orders and patient instructions/AVS.  Recommended screening schedule for the next 5-10 years is provided to the patient in written form: see Patient Instructions/AVS.     No follow-ups on file.     Subjective       Patient's complete Health Risk Assessment and screening values have been reviewed and are found in Flowsheets. The following problems were reviewed today and where indicated follow up appointments were made and/or referrals ordered.    Positive Risk Factor Screenings with Interventions:    Fall Risk:  Do you feel unsteady or are you worried about falling? : (!) yes (cane as needed)  2 or more falls in past year?: no  Fall with injury in past year?: no     Interventions:    Patient comments: states she does use a cane as needed for stability.  Reviewed medications, home hazards, visual acuity, and co-morbidities that can increase risk for falls  See AVS for additional education material            General HRA Questions:  Select all that apply: (!) New or Increased Pain, New or Increased Fatigue (bilateral shoulder pain)    Pain Interventions:  Patient comments: states she has had some bilateral shoulder pain and will discuss with PCP today after AWV.  See AVS for additional education material    Fatigue Interventions:  Patient comments: states she has been tired since she is unable to sleep well with her shoulder pain.  See AVS for additional education material       Dentist Screen:  Have you seen the dentist within the past year?: (!) No (no coverage)    Intervention:  Patient comments: states she has no dental coverage.  See AVS for additional education material    Hearing Screen:  Do you or your family notice any trouble with your hearing that hasn't been managed with hearing aids?: (!) Yes

## 2024-02-09 LAB
ANION GAP SERPL CALCULATED.3IONS-SCNC: 13 MMOL/L (ref 3–16)
BUN SERPL-MCNC: 17 MG/DL (ref 7–20)
CALCIUM SERPL-MCNC: 8.9 MG/DL (ref 8.3–10.6)
CHLORIDE SERPL-SCNC: 102 MMOL/L (ref 99–110)
CO2 SERPL-SCNC: 26 MMOL/L (ref 21–32)
CREAT SERPL-MCNC: 0.9 MG/DL (ref 0.6–1.2)
EST. AVERAGE GLUCOSE BLD GHB EST-MCNC: 134.1 MG/DL
GFR SERPLBLD CREATININE-BSD FMLA CKD-EPI: >60 ML/MIN/{1.73_M2}
GLUCOSE SERPL-MCNC: 99 MG/DL (ref 70–99)
HBA1C MFR BLD: 6.3 %
POTASSIUM SERPL-SCNC: 5.1 MMOL/L (ref 3.5–5.1)
SODIUM SERPL-SCNC: 141 MMOL/L (ref 136–145)
TSH SERPL DL<=0.005 MIU/L-ACNC: 1.23 UIU/ML (ref 0.27–4.2)

## 2024-02-12 ENCOUNTER — CARE COORDINATION (OUTPATIENT)
Dept: CARE COORDINATION | Age: 74
End: 2024-02-12

## 2024-02-21 ENCOUNTER — CARE COORDINATION (OUTPATIENT)
Dept: CARE COORDINATION | Age: 74
End: 2024-02-21

## 2024-02-21 SDOH — ECONOMIC STABILITY: HOUSING INSECURITY: IN THE LAST 12 MONTHS, HOW MANY PLACES HAVE YOU LIVED?: 1

## 2024-02-21 SDOH — ECONOMIC STABILITY: HOUSING INSECURITY
IN THE LAST 12 MONTHS, WAS THERE A TIME WHEN YOU DID NOT HAVE A STEADY PLACE TO SLEEP OR SLEPT IN A SHELTER (INCLUDING NOW)?: NO

## 2024-02-21 SDOH — ECONOMIC STABILITY: INCOME INSECURITY: IN THE LAST 12 MONTHS, WAS THERE A TIME WHEN YOU WERE NOT ABLE TO PAY THE MORTGAGE OR RENT ON TIME?: NO

## 2024-02-21 NOTE — CARE COORDINATION
and already engages in managing health or is willing to undertake better management   How well do you think your patient can engage in healthcare discussions? (Barriers include language, deafness, aphasia, alcohol or drug problems, learning difficulties, concentration): Clear and open communication, no identified barriers   Do other services need to be involved to help this patient?: Other care/services not required at this time   Are current services involved with this patient well-coordinated? (Include coordination with other services you are now recommendation): All required care/services in place and well-coordinated   Suggested Interventions and Community Resources   Zone Management Tools: In Process                  Future Appointments   Date Time Provider Department Center   2/28/2024  1:30 PM Drake Domingo MD [facility]   3/14/2024 11:10 AM Doron Ramírez MD [facility]   5/9/2024  9:45 AM Yinka Diaz DO [facility]   2/20/2025 11:30 AM [facility] LPN [facility]       COPD Assessment    Does the patient understand envrionmental exposure?: Yes  Is the patient able to verbalize Rescue vs. Long Acting medications?: Yes  Does the patient have a nebulizer?: No  Does the patient use a space with inhaled medications?: No     No patient-reported symptoms         Symptoms:          , and   General Assessment    Do you have any symptoms that are causing concern?: No       Call to pt for acm outreach.  Reports she   Cant use cpap machine as she couldn't sleep with it and Started coughing when she used it. Reports she has follow up with Dr. Domigno and plans to discuss at visit. Pt drives. Lives with daughter and grandson.Neb treatment and inhaler . No oxygen. Declines rpm at this time. Pt denies needs symptoms or barriers at this time. Advised to call with any needs. Reminded of same day apptmt availability. Has acm contact info if needs arise.

## 2024-02-27 DIAGNOSIS — E78.2 MIXED HYPERLIPIDEMIA: ICD-10-CM

## 2024-02-27 RX ORDER — ATORVASTATIN CALCIUM 20 MG/1
TABLET, FILM COATED ORAL
Qty: 90 TABLET | Refills: 1 | Status: SHIPPED | OUTPATIENT
Start: 2024-02-27

## 2024-03-04 DIAGNOSIS — K21.9 GASTROESOPHAGEAL REFLUX DISEASE, UNSPECIFIED WHETHER ESOPHAGITIS PRESENT: ICD-10-CM

## 2024-03-04 RX ORDER — PANTOPRAZOLE SODIUM 40 MG/1
40 TABLET, DELAYED RELEASE ORAL DAILY
Qty: 90 TABLET | Refills: 1 | Status: SHIPPED | OUTPATIENT
Start: 2024-03-04

## 2024-03-11 ENCOUNTER — CARE COORDINATION (OUTPATIENT)
Dept: CARE COORDINATION | Age: 74
End: 2024-03-11

## 2024-03-11 DIAGNOSIS — M19.012 PRIMARY OSTEOARTHRITIS OF LEFT SHOULDER: ICD-10-CM

## 2024-03-11 DIAGNOSIS — M47.816 OSTEOARTHRITIS OF LUMBAR SPINE, UNSPECIFIED SPINAL OSTEOARTHRITIS COMPLICATION STATUS: Primary | ICD-10-CM

## 2024-03-11 RX ORDER — HYDROCODONE BITARTRATE AND ACETAMINOPHEN 5; 325 MG/1; MG/1
1 TABLET ORAL EVERY 8 HOURS PRN
Qty: 90 TABLET | Refills: 0 | Status: SHIPPED | OUTPATIENT
Start: 2024-03-11 | End: 2024-04-10

## 2024-03-11 SDOH — ECONOMIC STABILITY: FOOD INSECURITY: WITHIN THE PAST 12 MONTHS, YOU WORRIED THAT YOUR FOOD WOULD RUN OUT BEFORE YOU GOT MONEY TO BUY MORE.: NEVER TRUE

## 2024-03-11 SDOH — ECONOMIC STABILITY: FOOD INSECURITY: WITHIN THE PAST 12 MONTHS, THE FOOD YOU BOUGHT JUST DIDN'T LAST AND YOU DIDN'T HAVE MONEY TO GET MORE.: NEVER TRUE

## 2024-03-11 ASSESSMENT — SOCIAL DETERMINANTS OF HEALTH (SDOH)
HOW OFTEN DO YOU GET TOGETHER WITH FRIENDS OR RELATIVES?: MORE THAN THREE TIMES A WEEK
HOW OFTEN DO YOU ATTENT MEETINGS OF THE CLUB OR ORGANIZATION YOU BELONG TO?: NEVER
ARE YOU MARRIED, WIDOWED, DIVORCED, SEPARATED, NEVER MARRIED, OR LIVING WITH A PARTNER?: PATIENT DECLINED
DO YOU BELONG TO ANY CLUBS OR ORGANIZATIONS SUCH AS CHURCH GROUPS UNIONS, FRATERNAL OR ATHLETIC GROUPS, OR SCHOOL GROUPS?: NO
IN A TYPICAL WEEK, HOW MANY TIMES DO YOU TALK ON THE PHONE WITH FAMILY, FRIENDS, OR NEIGHBORS?: MORE THAN THREE TIMES A WEEK
HOW OFTEN DO YOU ATTEND CHURCH OR RELIGIOUS SERVICES?: NEVER

## 2024-03-11 NOTE — CARE COORDINATION
Ambulatory Care Coordination Note     3/11/2024 4:35 PM     Patient Current Location:  Home: [address] [place] [id number]  This patient was received as a referral from payer report .     ACM contacted the patient by telephone. Verified name and  with patient as identifiers.         ACM: Daphne Borges RN     Challenges to be reviewed by the provider   Additional needs identified to be addressed with provider Yes  medications-                Method of communication with provider: chart routing.    Care Summary Note: Call to [facility] for acm f/u. [facility] Asking about reordering pain medication, had been on hydrocodone (norco)  Pain is in both shoulders. Has to have shoulder replacement and Its going to be while before she can get in to surgery. Unable to sleep due to pain and reports  medication would really help. Will forward to pcp and continue to follow.     Offered patient enrollment in the Remote Patient Monitoring (RPM) program for in-home monitoring: Patient declined.     Assessments Completed:   COPD Assessment    Does the patient understand envrionmental exposure?: Yes  Is the patient able to verbalize Rescue vs. Long Acting medications?: Yes  Does the patient have a nebulizer?: No  Does the patient use a space with inhaled medications?: No     No patient-reported symptoms         Symptoms:            and   General Assessment    Do you have any symptoms that are causing concern?: No          Care Planning:   Education Documentation  No documentation found.  Education Comments  No comments found.         Advance Care Planning:   Reviewed during previous call      Medications Reviewed:   Completed during a previous call     PCP/Specialist follow up:   Future Appointments         Provider Specialty Dept Phone    3/14/2024 11:10 AM Doron Ramírez MD Gastroenterology [phone]    3/18/2024 12:00 PM Drake Domingo MD Pulmonology [phone]

## 2024-03-14 ENCOUNTER — OFFICE VISIT (OUTPATIENT)
Dept: GASTROENTEROLOGY | Age: 74
End: 2024-03-14
Payer: MEDICARE

## 2024-03-14 VITALS
HEART RATE: 70 BPM | BODY MASS INDEX: 27.05 KG/M2 | DIASTOLIC BLOOD PRESSURE: 62 MMHG | WEIGHT: 137.8 LBS | SYSTOLIC BLOOD PRESSURE: 116 MMHG | TEMPERATURE: 97.2 F | OXYGEN SATURATION: 97 % | HEIGHT: 60 IN

## 2024-03-14 DIAGNOSIS — R10.13 DYSPEPSIA: Primary | ICD-10-CM

## 2024-03-14 PROCEDURE — 1036F TOBACCO NON-USER: CPT | Performed by: SPECIALIST

## 2024-03-14 PROCEDURE — G8417 CALC BMI ABV UP PARAM F/U: HCPCS | Performed by: SPECIALIST

## 2024-03-14 PROCEDURE — G8484 FLU IMMUNIZE NO ADMIN: HCPCS | Performed by: SPECIALIST

## 2024-03-14 PROCEDURE — 99214 OFFICE O/P EST MOD 30 MIN: CPT | Performed by: SPECIALIST

## 2024-03-14 PROCEDURE — G8399 PT W/DXA RESULTS DOCUMENT: HCPCS | Performed by: SPECIALIST

## 2024-03-14 PROCEDURE — 3078F DIAST BP <80 MM HG: CPT | Performed by: SPECIALIST

## 2024-03-14 PROCEDURE — 3074F SYST BP LT 130 MM HG: CPT | Performed by: SPECIALIST

## 2024-03-14 PROCEDURE — 1090F PRES/ABSN URINE INCON ASSESS: CPT | Performed by: SPECIALIST

## 2024-03-14 PROCEDURE — G8427 DOCREV CUR MEDS BY ELIG CLIN: HCPCS | Performed by: SPECIALIST

## 2024-03-14 PROCEDURE — 1123F ACP DISCUSS/DSCN MKR DOCD: CPT | Performed by: SPECIALIST

## 2024-03-14 PROCEDURE — 3017F COLORECTAL CA SCREEN DOC REV: CPT | Performed by: SPECIALIST

## 2024-03-14 NOTE — PROGRESS NOTES
Gastroenterology Office Note            Doron Ramírez MD      Subjective:      Patient ID:      Vansesa Jones                 1950    CC: 3 month follow up    HPI:   Doing better on Bentyl  Some constiaption but taking narcotics for shoulder until surgery      Review of Systems:    see HPI for positives and pertinent negatives. All other systems reviewed and are negative     Objective:   PHYSICAL EXAM:    Vitals:  /62 (Site: Left Upper Arm, Position: Sitting, Cuff Size: Medium Adult)   Pulse 70   Temp 97.2 °F (36.2 °C) (Infrared)   Ht 1.524 m (5')   Wt 62.5 kg (137 lb 12.8 oz)   SpO2 97%   BMI 26.91 kg/m²   CONSTITUTIONAL: alert    LUNGS:  clear to auscultation  CARDIOVASCULAR:  regular rate and rhythm and no murmur noted  ABDOMEN:  normal bowel sounds, non-distended, non-tender and no masses palpated, no hepatosplenomegaly  EXTREMITIES: no clubbing, cyanosis, or edema    Assessment:      IBS/functional dyspepsia       Plan:      Continue Bentyl 10 mg ac tid  Stool softeners/ Miralax prn while on narcotic-- advised to wean off narcotic after shoulder surgery  Call if any problems

## 2024-03-18 ENCOUNTER — HOSPITAL ENCOUNTER (OUTPATIENT)
Age: 74
Discharge: HOME | End: 2024-03-18
Payer: MEDICARE

## 2024-03-18 ENCOUNTER — HOSPITAL ENCOUNTER (OUTPATIENT)
Dept: GENERAL RADIOLOGY | Age: 74
Discharge: HOME | End: 2024-03-18
Payer: MEDICARE

## 2024-03-18 DIAGNOSIS — J41.0 SIMPLE CHRONIC BRONCHITIS (HCC): ICD-10-CM

## 2024-03-18 DIAGNOSIS — J44.9 CHRONIC OBSTRUCTIVE PULMONARY DISEASE, UNSPECIFIED COPD TYPE (HCC): ICD-10-CM

## 2024-03-18 PROCEDURE — 71046 X-RAY EXAM CHEST 2 VIEWS: CPT

## 2024-03-26 ENCOUNTER — CARE COORDINATION (OUTPATIENT)
Dept: CARE COORDINATION | Age: 74
End: 2024-03-26

## 2024-03-26 NOTE — CARE COORDINATION
Call to pt for acm f/u, call cut short as pt currently at hospital w daughter. Denies symptoms or concerns at this time. Advised to call with any needs. Reminded of same day apptmt availability.  Has acm contact info if needs arise. Will set goal and ed plan next encounter.

## 2024-04-11 ENCOUNTER — CARE COORDINATION (OUTPATIENT)
Dept: CARE COORDINATION | Age: 74
End: 2024-04-11

## 2024-04-18 ENCOUNTER — CARE COORDINATION (OUTPATIENT)
Dept: CARE COORDINATION | Age: 74
End: 2024-04-18

## 2024-04-18 NOTE — CARE COORDINATION
Ambulatory Care Coordination Note  2024    Patient Current Location:  Home: [address] [place] [id number]     ACM contacted the patient by telephone. Verified name and  with patient as identifiers. Provided introduction to self, and explanation of the ACM role.     Challenges to be reviewed by the provider   Additional needs identified to be addressed with provider: No  none               Method of communication with provider: none.    ACM: Daphne Borges, RN    Call to pt for acm outreach. Pt denies needs symptoms or barriers at this time. Dr christian wanted her to get bone scan. Is to have shoulder replacement and would like dr diaz to look at bone scan at next ov. Pt has ov scheduled for . Denies any symptoms/concerns at this time. WIll resolve at this time as pt denies needs and is [facility] pt. Advised to call with any needs. Reminded of same day apptmt availability. Has acm contact info if needs arise.       Offered patient enrollment in the Remote Patient Monitoring (RPM) program for in-home monitoring: Patient declined.    Lab Results       None            Care Coordination Interventions    Referral from Primary Care Provider: No  Suggested Interventions and Community Resources  Zone Management Tools: In Process          Goals Addressed    None         Future Appointments   Date Time Provider Department Center   2024  7:45 AM Yinka iDaz DO [facility]   2024  9:45 AM Yinka Diaz DO [facility]   2024 10:50 AM Doron Ramírez MD [facility]   2025 11:30 AM [facility] LPN [facility]   ,   COPD Assessment    Does the patient understand envrionmental exposure?: Yes  Is the patient able to verbalize Rescue vs. Long Acting medications?: Yes  Does the patient have a nebulizer?: No  Does the patient use a space with inhaled medications?: No     No patient-reported symptoms         Symptoms:          , and   General Assessment    Do you have any symptoms that

## 2024-04-30 ENCOUNTER — TELEPHONE (OUTPATIENT)
Dept: FAMILY MEDICINE CLINIC | Age: 74
End: 2024-04-30

## 2024-04-30 ENCOUNTER — OFFICE VISIT (OUTPATIENT)
Dept: FAMILY MEDICINE CLINIC | Age: 74
End: 2024-04-30
Payer: MEDICARE

## 2024-04-30 VITALS
WEIGHT: 138 LBS | BODY MASS INDEX: 27.09 KG/M2 | SYSTOLIC BLOOD PRESSURE: 144 MMHG | HEART RATE: 76 BPM | HEIGHT: 60 IN | OXYGEN SATURATION: 97 % | DIASTOLIC BLOOD PRESSURE: 84 MMHG

## 2024-04-30 DIAGNOSIS — J44.9 COPD, MILD (HCC): ICD-10-CM

## 2024-04-30 DIAGNOSIS — E11.22 TYPE 2 DIABETES MELLITUS WITH STAGE 2 CHRONIC KIDNEY DISEASE, WITHOUT LONG-TERM CURRENT USE OF INSULIN (HCC): Primary | ICD-10-CM

## 2024-04-30 DIAGNOSIS — I10 ESSENTIAL HYPERTENSION: ICD-10-CM

## 2024-04-30 DIAGNOSIS — N18.2 TYPE 2 DIABETES MELLITUS WITH STAGE 2 CHRONIC KIDNEY DISEASE, WITHOUT LONG-TERM CURRENT USE OF INSULIN (HCC): Primary | ICD-10-CM

## 2024-04-30 DIAGNOSIS — M19.012 PRIMARY OSTEOARTHRITIS OF LEFT SHOULDER: ICD-10-CM

## 2024-04-30 DIAGNOSIS — M47.816 OSTEOARTHRITIS OF LUMBAR SPINE, UNSPECIFIED SPINAL OSTEOARTHRITIS COMPLICATION STATUS: ICD-10-CM

## 2024-04-30 DIAGNOSIS — N18.31 STAGE 3A CHRONIC KIDNEY DISEASE (HCC): ICD-10-CM

## 2024-04-30 DIAGNOSIS — E11.9 TYPE 2 DIABETES MELLITUS WITHOUT COMPLICATION, WITHOUT LONG-TERM CURRENT USE OF INSULIN (HCC): ICD-10-CM

## 2024-04-30 PROCEDURE — 99214 OFFICE O/P EST MOD 30 MIN: CPT | Performed by: STUDENT IN AN ORGANIZED HEALTH CARE EDUCATION/TRAINING PROGRAM

## 2024-04-30 PROCEDURE — 1123F ACP DISCUSS/DSCN MKR DOCD: CPT | Performed by: STUDENT IN AN ORGANIZED HEALTH CARE EDUCATION/TRAINING PROGRAM

## 2024-04-30 PROCEDURE — G8427 DOCREV CUR MEDS BY ELIG CLIN: HCPCS | Performed by: STUDENT IN AN ORGANIZED HEALTH CARE EDUCATION/TRAINING PROGRAM

## 2024-04-30 PROCEDURE — 3044F HG A1C LEVEL LT 7.0%: CPT | Performed by: STUDENT IN AN ORGANIZED HEALTH CARE EDUCATION/TRAINING PROGRAM

## 2024-04-30 PROCEDURE — G8417 CALC BMI ABV UP PARAM F/U: HCPCS | Performed by: STUDENT IN AN ORGANIZED HEALTH CARE EDUCATION/TRAINING PROGRAM

## 2024-04-30 PROCEDURE — G8399 PT W/DXA RESULTS DOCUMENT: HCPCS | Performed by: STUDENT IN AN ORGANIZED HEALTH CARE EDUCATION/TRAINING PROGRAM

## 2024-04-30 PROCEDURE — 1036F TOBACCO NON-USER: CPT | Performed by: STUDENT IN AN ORGANIZED HEALTH CARE EDUCATION/TRAINING PROGRAM

## 2024-04-30 PROCEDURE — 3079F DIAST BP 80-89 MM HG: CPT | Performed by: STUDENT IN AN ORGANIZED HEALTH CARE EDUCATION/TRAINING PROGRAM

## 2024-04-30 PROCEDURE — 2022F DILAT RTA XM EVC RTNOPTHY: CPT | Performed by: STUDENT IN AN ORGANIZED HEALTH CARE EDUCATION/TRAINING PROGRAM

## 2024-04-30 PROCEDURE — 3023F SPIROM DOC REV: CPT | Performed by: STUDENT IN AN ORGANIZED HEALTH CARE EDUCATION/TRAINING PROGRAM

## 2024-04-30 PROCEDURE — 1090F PRES/ABSN URINE INCON ASSESS: CPT | Performed by: STUDENT IN AN ORGANIZED HEALTH CARE EDUCATION/TRAINING PROGRAM

## 2024-04-30 PROCEDURE — 3077F SYST BP >= 140 MM HG: CPT | Performed by: STUDENT IN AN ORGANIZED HEALTH CARE EDUCATION/TRAINING PROGRAM

## 2024-04-30 PROCEDURE — 3017F COLORECTAL CA SCREEN DOC REV: CPT | Performed by: STUDENT IN AN ORGANIZED HEALTH CARE EDUCATION/TRAINING PROGRAM

## 2024-04-30 RX ORDER — HYDROCODONE BITARTRATE AND ACETAMINOPHEN 5; 325 MG/1; MG/1
1 TABLET ORAL EVERY 8 HOURS PRN
Qty: 90 TABLET | Refills: 0 | Status: SHIPPED | OUTPATIENT
Start: 2024-04-30 | End: 2024-05-30

## 2024-04-30 SDOH — ECONOMIC STABILITY: FOOD INSECURITY: WITHIN THE PAST 12 MONTHS, THE FOOD YOU BOUGHT JUST DIDN'T LAST AND YOU DIDN'T HAVE MONEY TO GET MORE.: NEVER TRUE

## 2024-04-30 SDOH — ECONOMIC STABILITY: FOOD INSECURITY: WITHIN THE PAST 12 MONTHS, YOU WORRIED THAT YOUR FOOD WOULD RUN OUT BEFORE YOU GOT MONEY TO BUY MORE.: NEVER TRUE

## 2024-04-30 SDOH — ECONOMIC STABILITY: INCOME INSECURITY: HOW HARD IS IT FOR YOU TO PAY FOR THE VERY BASICS LIKE FOOD, HOUSING, MEDICAL CARE, AND HEATING?: NOT HARD AT ALL

## 2024-04-30 ASSESSMENT — ENCOUNTER SYMPTOMS
ABDOMINAL PAIN: 0
SHORTNESS OF BREATH: 0
NAUSEA: 0
SORE THROAT: 0
WHEEZING: 0

## 2024-04-30 NOTE — PROGRESS NOTES
4/30/2024    Vanessa Jones    Chief Complaint   Patient presents with    3 Month Follow-Up     - continuing bilateral shoulder pain. Pt requesting refill Norco         HPI  History was obtained from patient.  Vanessa is a 74 y.o. female with a PMHx as listed below who presents today for 3 month follow up. No acute complaints.         She is considering surgery of her left shoulder [facility].     Bp a little high today she did not take medication      1. Type 2 diabetes mellitus with stage 2 chronic kidney disease, without long-term current use of insulin ([facility])    2. Osteoarthritis of lumbar spine, unspecified spinal osteoarthritis complication status    3. Primary osteoarthritis of left shoulder    4. Type 2 diabetes mellitus without complication, without long-term current use of insulin (HCC)    5. Essential hypertension    6. COPD, mild ([facility])    7. Stage 3a chronic kidney disease ([facility])             REVIEW OF SYMPTOMS    Review of Systems   Constitutional:  Negative for chills and fatigue.   HENT:  Negative for congestion and sore throat.    Respiratory:  Negative for shortness of breath and wheezing.    Cardiovascular:  Negative for chest pain and palpitations.   Gastrointestinal:  Negative for abdominal pain and nausea.   Genitourinary:  Negative for frequency and urgency.   Neurological:  Negative for light-headedness.       PAST MEDICAL HISTORY  Past Medical History:   Diagnosis Date    Acute exacerbation of chronic obstructive pulmonary disease (COPD) ([facility]) 12/15/2021    Bowel trouble     Cerebral artery occlusion with cerebral infarction ([facility])     COPD, mild (HCC) 06/15/2021    Diabetes mellitus ([facility])     Excessive daytime sleepiness 06/15/2021    Former smoker 06/15/2021    Hyperlipidemia     Hypertension     Mild obstructive sleep apnea 12/15/2021    Multiple lung nodules on CT 07/27/2021    Shortness of breath 06/15/2021    Snoring 06/15/2021       FAMILY HISTORY  Family History   Problem Relation Age

## 2024-04-30 NOTE — TELEPHONE ENCOUNTER
To Dr. Diaz---    Re:    HYDROcodone-acetaminophen (NORCO) 5-325 MG per tablet       Were you going to call this in for her ---she thought you would at her appointment today.      [facility] in [place]

## 2024-04-30 NOTE — PROGRESS NOTES
Visual inspection:  Deformity/amputation: absent  Skin lesions/pre-ulcerative calluses: absent  Edema: right- negative, left- negative    Sensory exam:  Monofilament sensation: normal  (minimum of 5 random plantar locations tested, avoiding callused areas - > 1 area with absence of sensation is + for neuropathy)    Plus at least one of the following:  Pulses: abnormal - poorly palpable

## 2024-05-04 DIAGNOSIS — I10 ESSENTIAL HYPERTENSION: ICD-10-CM

## 2024-05-06 RX ORDER — LISINOPRIL 40 MG/1
TABLET ORAL
Qty: 90 TABLET | Refills: 1 | Status: SHIPPED | OUTPATIENT
Start: 2024-05-06

## 2024-05-06 RX ORDER — AMLODIPINE BESYLATE 10 MG/1
10 TABLET ORAL DAILY
Qty: 90 TABLET | Refills: 1 | Status: SHIPPED | OUTPATIENT
Start: 2024-05-06

## 2024-06-10 DIAGNOSIS — M47.816 OSTEOARTHRITIS OF LUMBAR SPINE, UNSPECIFIED SPINAL OSTEOARTHRITIS COMPLICATION STATUS: ICD-10-CM

## 2024-06-10 DIAGNOSIS — M19.012 PRIMARY OSTEOARTHRITIS OF LEFT SHOULDER: ICD-10-CM

## 2024-06-10 RX ORDER — HYDROCODONE BITARTRATE AND ACETAMINOPHEN 5; 325 MG/1; MG/1
1 TABLET ORAL EVERY 8 HOURS PRN
Qty: 90 TABLET | Refills: 0 | Status: SHIPPED | OUTPATIENT
Start: 2024-06-10 | End: 2024-07-10

## 2024-07-16 DIAGNOSIS — F51.01 PRIMARY INSOMNIA: ICD-10-CM

## 2024-07-16 RX ORDER — TRAZODONE HYDROCHLORIDE 100 MG/1
100 TABLET ORAL NIGHTLY
Qty: 90 TABLET | Refills: 1 | Status: SHIPPED | OUTPATIENT
Start: 2024-07-16 | End: 2024-07-19 | Stop reason: SDUPTHER

## 2024-07-19 DIAGNOSIS — F51.01 PRIMARY INSOMNIA: ICD-10-CM

## 2024-07-19 DIAGNOSIS — J44.9 CHRONIC OBSTRUCTIVE PULMONARY DISEASE, UNSPECIFIED COPD TYPE (HCC): ICD-10-CM

## 2024-07-19 DIAGNOSIS — I10 ESSENTIAL HYPERTENSION: ICD-10-CM

## 2024-07-19 DIAGNOSIS — E78.2 MIXED HYPERLIPIDEMIA: ICD-10-CM

## 2024-07-19 DIAGNOSIS — K21.9 GASTROESOPHAGEAL REFLUX DISEASE, UNSPECIFIED WHETHER ESOPHAGITIS PRESENT: ICD-10-CM

## 2024-07-19 DIAGNOSIS — E11.9 TYPE 2 DIABETES MELLITUS WITHOUT COMPLICATION, WITHOUT LONG-TERM CURRENT USE OF INSULIN (HCC): ICD-10-CM

## 2024-07-19 DIAGNOSIS — J44.9 COPD MIXED TYPE (HCC): ICD-10-CM

## 2024-07-19 RX ORDER — AMLODIPINE BESYLATE 10 MG/1
10 TABLET ORAL DAILY
Qty: 90 TABLET | Refills: 1 | Status: SHIPPED | OUTPATIENT
Start: 2024-07-19

## 2024-07-19 RX ORDER — PANTOPRAZOLE SODIUM 40 MG/1
40 TABLET, DELAYED RELEASE ORAL DAILY
Qty: 90 TABLET | Refills: 1 | Status: SHIPPED | OUTPATIENT
Start: 2024-07-19

## 2024-07-19 RX ORDER — ATORVASTATIN CALCIUM 20 MG/1
20 TABLET, FILM COATED ORAL DAILY
Qty: 90 TABLET | Refills: 1 | Status: SHIPPED | OUTPATIENT
Start: 2024-07-19 | End: 2025-01-15

## 2024-07-19 RX ORDER — ALBUTEROL SULFATE 90 UG/1
2 AEROSOL, METERED RESPIRATORY (INHALATION) 4 TIMES DAILY PRN
Qty: 18 G | Refills: 0 | Status: SHIPPED | OUTPATIENT
Start: 2024-07-19

## 2024-07-19 RX ORDER — TRAZODONE HYDROCHLORIDE 100 MG/1
100 TABLET ORAL NIGHTLY
Qty: 90 TABLET | Refills: 1 | Status: SHIPPED | OUTPATIENT
Start: 2024-07-19 | End: 2025-01-15

## 2024-07-19 RX ORDER — FLUTICASONE FUROATE, UMECLIDINIUM BROMIDE AND VILANTEROL TRIFENATATE 100; 62.5; 25 UG/1; UG/1; UG/1
1 POWDER RESPIRATORY (INHALATION) DAILY
Qty: 3 EACH | Refills: 1 | Status: SHIPPED | OUTPATIENT
Start: 2024-07-19 | End: 2025-01-15

## 2024-07-19 RX ORDER — DICYCLOMINE HYDROCHLORIDE 10 MG/1
10 CAPSULE ORAL
Qty: 90 CAPSULE | Refills: 5 | Status: SHIPPED | OUTPATIENT
Start: 2024-07-19

## 2024-07-19 RX ORDER — LISINOPRIL 40 MG/1
40 TABLET ORAL DAILY
Qty: 90 TABLET | Refills: 1 | Status: SHIPPED | OUTPATIENT
Start: 2024-07-19

## 2024-08-02 ENCOUNTER — OFFICE VISIT (OUTPATIENT)
Dept: GASTROENTEROLOGY | Age: 74
End: 2024-08-02
Payer: MEDICARE

## 2024-08-02 VITALS
HEIGHT: 60 IN | BODY MASS INDEX: 25.76 KG/M2 | DIASTOLIC BLOOD PRESSURE: 60 MMHG | HEART RATE: 75 BPM | TEMPERATURE: 97.3 F | OXYGEN SATURATION: 95 % | SYSTOLIC BLOOD PRESSURE: 122 MMHG | WEIGHT: 131.2 LBS

## 2024-08-02 DIAGNOSIS — R10.13 DYSPEPSIA: Primary | ICD-10-CM

## 2024-08-02 PROCEDURE — 99213 OFFICE O/P EST LOW 20 MIN: CPT | Performed by: SPECIALIST

## 2024-08-02 PROCEDURE — 3017F COLORECTAL CA SCREEN DOC REV: CPT | Performed by: SPECIALIST

## 2024-08-02 PROCEDURE — G8417 CALC BMI ABV UP PARAM F/U: HCPCS | Performed by: SPECIALIST

## 2024-08-02 PROCEDURE — 3078F DIAST BP <80 MM HG: CPT | Performed by: SPECIALIST

## 2024-08-02 PROCEDURE — G8427 DOCREV CUR MEDS BY ELIG CLIN: HCPCS | Performed by: SPECIALIST

## 2024-08-02 PROCEDURE — 1036F TOBACCO NON-USER: CPT | Performed by: SPECIALIST

## 2024-08-02 PROCEDURE — G8399 PT W/DXA RESULTS DOCUMENT: HCPCS | Performed by: SPECIALIST

## 2024-08-02 PROCEDURE — 1123F ACP DISCUSS/DSCN MKR DOCD: CPT | Performed by: SPECIALIST

## 2024-08-02 PROCEDURE — 3074F SYST BP LT 130 MM HG: CPT | Performed by: SPECIALIST

## 2024-08-02 PROCEDURE — 1090F PRES/ABSN URINE INCON ASSESS: CPT | Performed by: SPECIALIST

## 2024-08-02 RX ORDER — TRAMADOL HYDROCHLORIDE 50 MG/1
50 TABLET ORAL EVERY 6 HOURS PRN
COMMUNITY
Start: 2024-06-27

## 2024-08-02 RX ORDER — ONDANSETRON 4 MG/1
TABLET, ORALLY DISINTEGRATING ORAL
COMMUNITY
Start: 2024-06-26

## 2024-08-02 RX ORDER — OXYCODONE HYDROCHLORIDE 5 MG/1
5 TABLET ORAL EVERY 6 HOURS PRN
COMMUNITY
Start: 2024-07-30 | End: 2024-08-06

## 2024-08-02 NOTE — PROGRESS NOTES
Gastroenterology Office Note            Doron Ramírez MD      Subjective:      Patient ID:      Vanessa Jones                 1950    CC: 5  month follow up for IBS/FD    HPI:     Recently diagnosed with lymphoma with met to shoulder- undergoing workup for the lymphoma. Also found to have mild iron deficiency  Doing well- reports no problems  The patient denies abdominal pain,nausea,vomiting, diarrhea, constipation,melena, hematochezia,hematemesis,weight loss or dysphagia.      Review of Systems:    see HPI for positives and pertinent negatives. All other systems reviewed and are negative     Objective:   PHYSICAL EXAM:    Vitals:  Temp 97.3 °F (36.3 °C) (Infrared)   Ht 1.524 m (5')   Wt 59.5 kg (131 lb 3.2 oz)   BMI 25.62 kg/m²   CONSTITUTIONAL: alert    LUNGS:  clear to auscultation  CARDIOVASCULAR:  regular rate and rhythm and no murmur noted  ABDOMEN:  normal bowel sounds, non-distended, non-tender and no masses palpated, no hepatosplenomegaly  EXTREMITIES: no clubbing, cyanosis, or edema    Assessment:      1) IBS/FD--stable  2) B-cell Lymphoma- being evaluated  3) mild iron deficiency- perhaps related to colonic angiodysplasia      Plan:      Continue current meds  Return 6 months  Call if any problems

## 2024-08-11 DIAGNOSIS — J20.8 ACUTE BACTERIAL BRONCHITIS: ICD-10-CM

## 2024-08-11 DIAGNOSIS — R05.1 ACUTE COUGH: ICD-10-CM

## 2024-08-11 DIAGNOSIS — J44.1 CHRONIC OBSTRUCTIVE PULMONARY DISEASE WITH ACUTE EXACERBATION (HCC): ICD-10-CM

## 2024-08-11 DIAGNOSIS — B96.89 ACUTE BACTERIAL BRONCHITIS: ICD-10-CM

## 2024-08-12 RX ORDER — DEXTROMETHORPHAN HYDROBROMIDE AND PROMETHAZINE HYDROCHLORIDE 15; 6.25 MG/5ML; MG/5ML
SYRUP ORAL
Qty: 28 ML | Refills: 0 | OUTPATIENT
Start: 2024-08-12

## 2024-08-12 RX ORDER — AZITHROMYCIN 250 MG/1
TABLET, FILM COATED ORAL
Qty: 6 TABLET | Refills: 0 | OUTPATIENT
Start: 2024-08-12

## 2024-08-30 DIAGNOSIS — M19.012 PRIMARY OSTEOARTHRITIS OF LEFT SHOULDER: ICD-10-CM

## 2024-08-30 DIAGNOSIS — M47.816 OSTEOARTHRITIS OF LUMBAR SPINE, UNSPECIFIED SPINAL OSTEOARTHRITIS COMPLICATION STATUS: ICD-10-CM

## 2024-08-30 RX ORDER — HYDROCODONE BITARTRATE AND ACETAMINOPHEN 5; 325 MG/1; MG/1
1 TABLET ORAL EVERY 8 HOURS PRN
Qty: 90 TABLET | Refills: 0 | Status: SHIPPED | OUTPATIENT
Start: 2024-08-30 | End: 2024-09-29

## 2024-09-23 DIAGNOSIS — K21.9 GASTROESOPHAGEAL REFLUX DISEASE, UNSPECIFIED WHETHER ESOPHAGITIS PRESENT: ICD-10-CM

## 2024-09-23 RX ORDER — PANTOPRAZOLE SODIUM 40 MG/1
40 TABLET, DELAYED RELEASE ORAL DAILY
Qty: 90 TABLET | Refills: 1 | Status: SHIPPED | OUTPATIENT
Start: 2024-09-23

## 2024-09-27 ENCOUNTER — OFFICE VISIT (OUTPATIENT)
Dept: FAMILY MEDICINE CLINIC | Age: 74
End: 2024-09-27

## 2024-09-27 VITALS
HEART RATE: 95 BPM | DIASTOLIC BLOOD PRESSURE: 70 MMHG | SYSTOLIC BLOOD PRESSURE: 126 MMHG | HEIGHT: 60 IN | WEIGHT: 139.3 LBS | BODY MASS INDEX: 27.35 KG/M2 | OXYGEN SATURATION: 96 %

## 2024-09-27 DIAGNOSIS — E78.2 MIXED HYPERLIPIDEMIA: ICD-10-CM

## 2024-09-27 DIAGNOSIS — F11.20 OPIOID DEPENDENCE WITH CURRENT USE (HCC): ICD-10-CM

## 2024-09-27 DIAGNOSIS — J44.9 COPD, MILD (HCC): ICD-10-CM

## 2024-09-27 DIAGNOSIS — I10 ESSENTIAL HYPERTENSION: Primary | ICD-10-CM

## 2024-09-27 DIAGNOSIS — C85.80 INDOLENT NON-HODGKINS LYMPHOMA (HCC): ICD-10-CM

## 2024-09-27 RX ORDER — ATORVASTATIN CALCIUM 20 MG/1
20 TABLET, FILM COATED ORAL DAILY
Qty: 90 TABLET | Refills: 1 | Status: SHIPPED | OUTPATIENT
Start: 2024-09-27 | End: 2025-03-26

## 2024-09-27 RX ORDER — ALENDRONATE SODIUM 70 MG/1
70 TABLET ORAL
COMMUNITY

## 2024-09-27 RX ORDER — FLUTICASONE FUROATE, UMECLIDINIUM BROMIDE AND VILANTEROL TRIFENATATE 200; 62.5; 25 UG/1; UG/1; UG/1
1 POWDER RESPIRATORY (INHALATION) DAILY
Qty: 1 EACH | Refills: 0 | Status: SHIPPED | COMMUNITY
Start: 2024-09-27

## 2024-09-27 RX ORDER — OXYCODONE AND ACETAMINOPHEN 7.5; 325 MG/1; MG/1
1 TABLET ORAL EVERY 8 HOURS PRN
Qty: 90 TABLET | Refills: 0 | Status: SHIPPED | OUTPATIENT
Start: 2024-09-27 | End: 2024-10-27

## 2024-09-27 RX ORDER — FERROUS GLUCONATE 324(37.5)
324 TABLET ORAL DAILY
COMMUNITY
Start: 2024-09-12

## 2024-09-27 ASSESSMENT — ENCOUNTER SYMPTOMS
SHORTNESS OF BREATH: 0
SORE THROAT: 0
ABDOMINAL PAIN: 0
WHEEZING: 0
NAUSEA: 0

## 2024-10-16 DIAGNOSIS — M81.6 LOCALIZED OSTEOPOROSIS, UNSPECIFIED PATHOLOGICAL FRACTURE PRESENCE: ICD-10-CM

## 2024-10-17 RX ORDER — ALENDRONATE SODIUM 70 MG/1
70 TABLET ORAL
Qty: 4 TABLET | Refills: 3 | Status: SHIPPED | OUTPATIENT
Start: 2024-10-17

## 2024-11-06 DIAGNOSIS — C85.80 INDOLENT NON-HODGKINS LYMPHOMA (HCC): ICD-10-CM

## 2024-11-06 RX ORDER — OXYCODONE AND ACETAMINOPHEN 7.5; 325 MG/1; MG/1
1 TABLET ORAL EVERY 8 HOURS PRN
Qty: 90 TABLET | Refills: 0 | Status: SHIPPED | OUTPATIENT
Start: 2024-11-06 | End: 2024-12-06

## 2024-11-12 ENCOUNTER — OFFICE VISIT (OUTPATIENT)
Dept: FAMILY MEDICINE CLINIC | Age: 74
End: 2024-11-12

## 2024-11-12 VITALS
DIASTOLIC BLOOD PRESSURE: 68 MMHG | BODY MASS INDEX: 27.07 KG/M2 | SYSTOLIC BLOOD PRESSURE: 130 MMHG | HEIGHT: 60 IN | HEART RATE: 73 BPM | OXYGEN SATURATION: 97 % | WEIGHT: 137.9 LBS

## 2024-11-12 DIAGNOSIS — J44.1 COPD WITH ACUTE EXACERBATION (HCC): Primary | ICD-10-CM

## 2024-11-12 RX ORDER — METHYLPREDNISOLONE 4 MG/1
TABLET ORAL
Qty: 1 KIT | Refills: 0 | Status: SHIPPED | OUTPATIENT
Start: 2024-11-12

## 2024-11-12 RX ORDER — AZITHROMYCIN 250 MG/1
TABLET, FILM COATED ORAL
Qty: 6 TABLET | Refills: 0 | Status: SHIPPED | OUTPATIENT
Start: 2024-11-12

## 2024-11-12 NOTE — PROGRESS NOTES
11/12/2024    Vanessa Jones    Chief Complaint   Patient presents with    Bronchitis     Started about 2 days ago, coughing up yellow/ green phlegm,has been using a throat spray.        HPI  History was obtained from patient and her daughter.  Vanessa is a 74 y.o. female who presents today with complaints of 2-day history of chest congestion, productive cough, wheezing.  Many ill family members.  No fever, chills or myalgias, or hemoptysis.  Good p.o. intake and urine output.  She has been using albuterol.  She has COPD.        PAST MEDICAL HISTORY  Past Medical History:   Diagnosis Date    Acute exacerbation of chronic obstructive pulmonary disease (COPD) ([facility]) 12/15/2021    Bowel trouble     Cerebral artery occlusion with cerebral infarction ([facility])     COPD, mild ([facility]) 06/15/2021    Diabetes mellitus ([facility])     Excessive daytime sleepiness 06/15/2021    Former smoker 06/15/2021    Hyperlipidemia     Hypertension     Mild obstructive sleep apnea 12/15/2021    Multiple lung nodules on CT 07/27/2021    Shortness of breath 06/15/2021    Snoring 06/15/2021       FAMILY HISTORY  Family History   Problem Relation Age of Onset    Colon Cancer Mother     Heart Attack Father     Other Father         suicide    Other Sister         suicide    Heart Attack Brother     Pacemaker Brother     No Known Problems Brother     Diabetes Brother     Cancer Brother         unknown    Suicide Brother     Ovarian Cancer Daughter        SOCIAL HISTORY  Social History     Socioeconomic History    Marital status: Single     Spouse name: None    Number of children: None    Years of education: None    Highest education level: None   Tobacco Use    Smoking status: Former     Current packs/day: 0.00     Average packs/day: 1 pack/day for 51.9 years (51.9 ttl pk-yrs)     Types: Cigarettes     Start date: 1969     Quit date: 12/4/2020     Years since quitting: 3.9    Smokeless tobacco: Never   Vaping Use    Vaping status: Former    Substances:

## 2024-12-09 DIAGNOSIS — C85.80 INDOLENT NON-HODGKINS LYMPHOMA (HCC): ICD-10-CM

## 2024-12-09 RX ORDER — OXYCODONE AND ACETAMINOPHEN 7.5; 325 MG/1; MG/1
1 TABLET ORAL EVERY 8 HOURS PRN
Qty: 90 TABLET | Refills: 0 | Status: SHIPPED | OUTPATIENT
Start: 2024-12-09 | End: 2025-01-08

## 2025-01-08 ENCOUNTER — HOSPITAL ENCOUNTER (OUTPATIENT)
Dept: MAMMOGRAPHY | Age: 75
Discharge: HOME | End: 2025-01-08
Payer: MEDICARE

## 2025-01-08 DIAGNOSIS — Z12.31 SCREENING MAMMOGRAM, ENCOUNTER FOR: ICD-10-CM

## 2025-01-08 PROCEDURE — 77063 BREAST TOMOSYNTHESIS BI: CPT

## 2025-01-13 DIAGNOSIS — C85.80 INDOLENT NON-HODGKINS LYMPHOMA (HCC): ICD-10-CM

## 2025-01-13 RX ORDER — OXYCODONE AND ACETAMINOPHEN 7.5; 325 MG/1; MG/1
1 TABLET ORAL EVERY 8 HOURS PRN
Qty: 90 TABLET | Refills: 0 | Status: SHIPPED | OUTPATIENT
Start: 2025-01-13 | End: 2025-02-12

## 2025-01-16 ENCOUNTER — HOSPITAL ENCOUNTER (OUTPATIENT)
Dept: WOMENS IMAGING | Age: 75
Discharge: HOME | End: 2025-01-16
Payer: MEDICARE

## 2025-01-16 ENCOUNTER — HOSPITAL ENCOUNTER (OUTPATIENT)
Dept: ULTRASOUND IMAGING | Age: 75
Discharge: HOME | End: 2025-01-16
Payer: MEDICARE

## 2025-01-16 DIAGNOSIS — R92.8 ABNORMAL FINDING ON BREAST IMAGING: ICD-10-CM

## 2025-01-16 PROCEDURE — G0279 TOMOSYNTHESIS, MAMMO: HCPCS

## 2025-01-16 PROCEDURE — 76642 ULTRASOUND BREAST LIMITED: CPT

## 2025-01-21 DIAGNOSIS — N64.89 BREAST ASYMMETRY: Primary | ICD-10-CM

## 2025-01-21 DIAGNOSIS — R92.8 ABNORMAL FINDING ON BREAST IMAGING: ICD-10-CM

## 2025-02-05 ENCOUNTER — APPOINTMENT (OUTPATIENT)
Dept: GENERAL RADIOLOGY | Age: 75
End: 2025-02-05
Payer: MEDICARE

## 2025-02-05 ENCOUNTER — HOSPITAL ENCOUNTER (EMERGENCY)
Age: 75
Discharge: HOME | End: 2025-02-05
Attending: EMERGENCY MEDICINE
Payer: MEDICARE

## 2025-02-05 VITALS
WEIGHT: 137 LBS | RESPIRATION RATE: 18 BRPM | HEART RATE: 78 BPM | DIASTOLIC BLOOD PRESSURE: 71 MMHG | HEIGHT: 67 IN | BODY MASS INDEX: 21.5 KG/M2 | SYSTOLIC BLOOD PRESSURE: 153 MMHG | TEMPERATURE: 97.9 F | OXYGEN SATURATION: 96 %

## 2025-02-05 DIAGNOSIS — J22 LOWER RESPIRATORY INFECTION: Primary | ICD-10-CM

## 2025-02-05 LAB
INFLUENZA A BY PCR: NOT DETECTED
INFLUENZA B BY PCR: NOT DETECTED
SARS-COV-2 RDRP RESP QL NAA+PROBE: NOT DETECTED
SPECIMEN DESCRIPTION: NORMAL

## 2025-02-05 PROCEDURE — 87635 SARS-COV-2 COVID-19 AMP PRB: CPT

## 2025-02-05 PROCEDURE — 71046 X-RAY EXAM CHEST 2 VIEWS: CPT

## 2025-02-05 PROCEDURE — 99284 EMERGENCY DEPT VISIT MOD MDM: CPT

## 2025-02-05 PROCEDURE — 87502 INFLUENZA DNA AMP PROBE: CPT

## 2025-02-05 RX ORDER — AZITHROMYCIN 250 MG/1
TABLET, FILM COATED ORAL
Qty: 6 TABLET | Refills: 0 | Status: SHIPPED | OUTPATIENT
Start: 2025-02-05 | End: 2025-02-15

## 2025-02-05 RX ORDER — METHYLPREDNISOLONE 4 MG/1
TABLET ORAL
Qty: 1 KIT | Refills: 0 | Status: SHIPPED | OUTPATIENT
Start: 2025-02-05 | End: 2025-02-11

## 2025-02-05 ASSESSMENT — PAIN DESCRIPTION - PAIN TYPE: TYPE: ACUTE PAIN

## 2025-02-05 ASSESSMENT — PAIN SCALES - GENERAL: PAINLEVEL_OUTOF10: 7

## 2025-02-05 ASSESSMENT — PAIN - FUNCTIONAL ASSESSMENT
PAIN_FUNCTIONAL_ASSESSMENT: ACTIVITIES ARE NOT PREVENTED
PAIN_FUNCTIONAL_ASSESSMENT: 0-10

## 2025-02-05 ASSESSMENT — PAIN DESCRIPTION - DESCRIPTORS: DESCRIPTORS: ACHING

## 2025-02-05 NOTE — ED NOTES
Discharge instructions reviewed with patient and family. Medications and follow up were discussed. Patient and family denies further questions and verbalizes understanding

## 2025-02-05 NOTE — ED PROVIDER NOTES
EGD DIAGNOSTIC ONLY performed by Doron Ramírez MD at [facility] ENDOSCOPY    UPPER GASTROINTESTINAL ENDOSCOPY N/A 2023    EGD BIOPSY performed by Doron Ramírez MD at [facility] ENDOSCOPY     Family History   Problem Relation Age of Onset    Colon Cancer Mother     Heart Attack Father     Other Father         suicide    Other Sister         suicide    Heart Attack Brother     Pacemaker Brother     No Known Problems Brother     Diabetes Brother     Cancer Brother         unknown    Suicide Brother     Ovarian Cancer Daughter     Breast Cancer Neg Hx      Social History     Socioeconomic History    Marital status: Single     Spouse name: Not on file    Number of children: Not on file    Years of education: Not on file    Highest education level: Not on file   Occupational History    Not on file   Tobacco Use    Smoking status: Former     Current packs/day: 0.00     Average packs/day: 1 pack/day for 51.9 years (51.9 ttl pk-yrs)     Types: Cigarettes     Start date:      Quit date: 2020     Years since quittin.1    Smokeless tobacco: Never   Vaping Use    Vaping status: Former    Substances: Flavoring    Devices: Disposable, Pre-filled pod   Substance and Sexual Activity    Alcohol use: Not Currently    Drug use: Not Currently    Sexual activity: Not Currently   Other Topics Concern    Not on file   Social History Narrative    Not on file     Social Determinants of Health     Financial Resource Strain: Low Risk  (2024)    Overall Financial Resource Strain (CARDIA)     Difficulty of Paying Living Expenses: Not hard at all   Food Insecurity: No Food Insecurity (2024)    Hunger Vital Sign     Worried About Running Out of Food in the Last Year: Never true     Ran Out of Food in the Last Year: Never true   Transportation Needs: No Transportation Needs (2024)    PRAPARE - Transportation     Lack of Transportation (Medical): No     Lack of Transportation (Non-Medical): No   Physical Activity:  31

## 2025-02-07 ENCOUNTER — CARE COORDINATION (OUTPATIENT)
Dept: CARE COORDINATION | Age: 75
End: 2025-02-07

## 2025-02-17 ENCOUNTER — OFFICE VISIT (OUTPATIENT)
Dept: FAMILY MEDICINE CLINIC | Age: 75
End: 2025-02-17
Payer: MEDICARE

## 2025-02-17 VITALS
SYSTOLIC BLOOD PRESSURE: 146 MMHG | HEIGHT: 60 IN | OXYGEN SATURATION: 99 % | DIASTOLIC BLOOD PRESSURE: 74 MMHG | HEART RATE: 76 BPM | WEIGHT: 138.6 LBS | BODY MASS INDEX: 27.21 KG/M2

## 2025-02-17 DIAGNOSIS — I10 ESSENTIAL HYPERTENSION: ICD-10-CM

## 2025-02-17 DIAGNOSIS — J20.9 ACUTE BRONCHITIS, UNSPECIFIED ORGANISM: Primary | ICD-10-CM

## 2025-02-17 DIAGNOSIS — C85.80 INDOLENT NON-HODGKINS LYMPHOMA (HCC): ICD-10-CM

## 2025-02-17 DIAGNOSIS — M81.6 LOCALIZED OSTEOPOROSIS, UNSPECIFIED PATHOLOGICAL FRACTURE PRESENCE: ICD-10-CM

## 2025-02-17 DIAGNOSIS — M50.30 DEGENERATIVE DISC DISEASE, CERVICAL: ICD-10-CM

## 2025-02-17 DIAGNOSIS — F11.20 OPIOID DEPENDENCE, UNCOMPLICATED (HCC): ICD-10-CM

## 2025-02-17 PROCEDURE — 3017F COLORECTAL CA SCREEN DOC REV: CPT | Performed by: STUDENT IN AN ORGANIZED HEALTH CARE EDUCATION/TRAINING PROGRAM

## 2025-02-17 PROCEDURE — 1123F ACP DISCUSS/DSCN MKR DOCD: CPT | Performed by: STUDENT IN AN ORGANIZED HEALTH CARE EDUCATION/TRAINING PROGRAM

## 2025-02-17 PROCEDURE — 3077F SYST BP >= 140 MM HG: CPT | Performed by: STUDENT IN AN ORGANIZED HEALTH CARE EDUCATION/TRAINING PROGRAM

## 2025-02-17 PROCEDURE — 3078F DIAST BP <80 MM HG: CPT | Performed by: STUDENT IN AN ORGANIZED HEALTH CARE EDUCATION/TRAINING PROGRAM

## 2025-02-17 PROCEDURE — 99213 OFFICE O/P EST LOW 20 MIN: CPT | Performed by: STUDENT IN AN ORGANIZED HEALTH CARE EDUCATION/TRAINING PROGRAM

## 2025-02-17 PROCEDURE — G8427 DOCREV CUR MEDS BY ELIG CLIN: HCPCS | Performed by: STUDENT IN AN ORGANIZED HEALTH CARE EDUCATION/TRAINING PROGRAM

## 2025-02-17 PROCEDURE — 1036F TOBACCO NON-USER: CPT | Performed by: STUDENT IN AN ORGANIZED HEALTH CARE EDUCATION/TRAINING PROGRAM

## 2025-02-17 PROCEDURE — G8399 PT W/DXA RESULTS DOCUMENT: HCPCS | Performed by: STUDENT IN AN ORGANIZED HEALTH CARE EDUCATION/TRAINING PROGRAM

## 2025-02-17 PROCEDURE — G8417 CALC BMI ABV UP PARAM F/U: HCPCS | Performed by: STUDENT IN AN ORGANIZED HEALTH CARE EDUCATION/TRAINING PROGRAM

## 2025-02-17 PROCEDURE — 1090F PRES/ABSN URINE INCON ASSESS: CPT | Performed by: STUDENT IN AN ORGANIZED HEALTH CARE EDUCATION/TRAINING PROGRAM

## 2025-02-17 RX ORDER — DOXYCYCLINE HYCLATE 100 MG
100 TABLET ORAL 2 TIMES DAILY
Qty: 20 TABLET | Refills: 0 | Status: SHIPPED | OUTPATIENT
Start: 2025-02-17 | End: 2025-02-27

## 2025-02-17 RX ORDER — AMOXICILLIN 250 MG
1 CAPSULE ORAL DAILY PRN
COMMUNITY
Start: 2025-01-21

## 2025-02-17 RX ORDER — OXYCODONE AND ACETAMINOPHEN 7.5; 325 MG/1; MG/1
1 TABLET ORAL EVERY 8 HOURS PRN
Qty: 90 TABLET | Refills: 0 | Status: SHIPPED | OUTPATIENT
Start: 2025-02-17 | End: 2025-03-19

## 2025-02-17 RX ORDER — HYDROCHLOROTHIAZIDE 12.5 MG/1
12.5 CAPSULE ORAL EVERY MORNING
Qty: 90 CAPSULE | Refills: 1 | Status: SHIPPED | OUTPATIENT
Start: 2025-02-17

## 2025-02-17 RX ORDER — POLYETHYLENE GLYCOL 3350 17 G/17G
17 POWDER, FOR SOLUTION ORAL DAILY
COMMUNITY
Start: 2025-01-21 | End: 2026-01-21

## 2025-02-17 NOTE — PROGRESS NOTES
2/24/2025    Vanessa Jones    Chief Complaint   Patient presents with    Hypertension    ED Follow-up     Went to the ER on 2/5/25 for a lower respiratory infection and her bp was elevated, daughter says her bp has been elevated for a while, says she is still sick, was given antibiotics and steroids but they haven't helped.         YUSUF Mendez is a 74 y.o. female with a PMHx as listed below who presents today for ED follow up 2/5/25. Still having some acute illness.     History of Present Illness        1. Acute bronchitis, unspecified organism    2. Essential hypertension    3. Indolent non-Hodgkins lymphoma (HCC)    4. Degenerative disc disease, cervical    5. Opioid dependence, uncomplicated (F11.20)             REVIEW OF SYMPTOMS    Review of Systems    PAST MEDICAL HISTORY  Past Medical History:   Diagnosis Date    Acute exacerbation of chronic obstructive pulmonary disease (COPD) ([facility]) 12/15/2021    Bowel trouble     Cerebral artery occlusion with cerebral infarction (HCC)     COPD, mild (HCC) 06/15/2021    Diabetes mellitus (HCC)     Excessive daytime sleepiness 06/15/2021    Former smoker 06/15/2021    Hyperlipidemia     Hypertension     Mild obstructive sleep apnea 12/15/2021    Multiple lung nodules on CT 07/27/2021    Shortness of breath 06/15/2021    Snoring 06/15/2021       FAMILY HISTORY  Family History   Problem Relation Age of Onset    Colon Cancer Mother     Heart Attack Father     Other Father         suicide    Other Sister         suicide    Heart Attack Brother     Pacemaker Brother     No Known Problems Brother     Diabetes Brother     Cancer Brother         unknown    Suicide Brother     Ovarian Cancer Daughter        SOCIAL HISTORY  Social History     Socioeconomic History    Marital status: Single   Tobacco Use    Smoking status: Former     Current packs/day: 0.00     Average packs/day: 1 pack/day for 51.9 years (51.9 ttl pk-yrs)     Types: Cigarettes     Start date: 1969     Quit date:

## 2025-02-18 RX ORDER — ALENDRONATE SODIUM 70 MG/1
70 TABLET ORAL
Qty: 4 TABLET | Refills: 3 | Status: SHIPPED | OUTPATIENT
Start: 2025-02-18

## 2025-02-20 ENCOUNTER — TELEMEDICINE (OUTPATIENT)
Dept: FAMILY MEDICINE CLINIC | Age: 75
End: 2025-02-20

## 2025-02-20 DIAGNOSIS — Z00.00 MEDICARE ANNUAL WELLNESS VISIT, SUBSEQUENT: Primary | ICD-10-CM

## 2025-02-20 SDOH — ECONOMIC STABILITY: FOOD INSECURITY: WITHIN THE PAST 12 MONTHS, YOU WORRIED THAT YOUR FOOD WOULD RUN OUT BEFORE YOU GOT MONEY TO BUY MORE.: NEVER TRUE

## 2025-02-20 SDOH — ECONOMIC STABILITY: FOOD INSECURITY: WITHIN THE PAST 12 MONTHS, THE FOOD YOU BOUGHT JUST DIDN'T LAST AND YOU DIDN'T HAVE MONEY TO GET MORE.: NEVER TRUE

## 2025-02-20 ASSESSMENT — PATIENT HEALTH QUESTIONNAIRE - PHQ9
SUM OF ALL RESPONSES TO PHQ QUESTIONS 1-9: 0
2. FEELING DOWN, DEPRESSED OR HOPELESS: NOT AT ALL
SUM OF ALL RESPONSES TO PHQ QUESTIONS 1-9: 0
SUM OF ALL RESPONSES TO PHQ9 QUESTIONS 1 & 2: 0
1. LITTLE INTEREST OR PLEASURE IN DOING THINGS: NOT AT ALL
SUM OF ALL RESPONSES TO PHQ QUESTIONS 1-9: 0
SUM OF ALL RESPONSES TO PHQ QUESTIONS 1-9: 0

## 2025-02-20 NOTE — PROGRESS NOTES
Medicare Annual Wellness Visit    Vanessa Jones is here for Medicare AWV    Assessment & Plan   Medicare annual wellness visit, subsequent     No follow-ups on file.     Subjective       Patient's complete Health Risk Assessment and screening values have been reviewed and are found in Flowsheets. The following problems were reviewed today and where indicated follow up appointments were made and/or referrals ordered.    Positive Risk Factor Screenings with Interventions:    Fall Risk:  Do you feel unsteady or are you worried about falling? : (!) yes (sometimes when going up and down steps, gets out of breath, has a railing)  2 or more falls in past year?: no  Fall with injury in past year?: no     Interventions:    Patient comments: states she will sometimes feel unsteady when going up and down steps, since she gets out of breath. She does have a railing she uses for safety.  Reviewed medications, home hazards, visual acuity, and co-morbidities that can increase risk for falls  Patient declines any further evaluation or treatment        Controlled Medication Review:      Today's Pain Level: No data recorded     Opioid Risk: (High risk score >=55) Opioid risk score: 64      Last PDMP [place] as Reviewed:  Review User Review Instant Review Result   GERARDO SHAH 11/6/2024 10:31 AM @   Reviewed PDMP [1]        Opioid Risk: (Low risk score <= 55)  Opioid risk score: 64    Any opioid medication usage will be addressed by their licensed provider at a future visit.       General HRA Questions:  Select all that apply: (!) New or Increased Pain, Stress (rt arm pain/stress, sched for shoulder replacement 3/31/25)  Interventions - Pain:  Patient comments: states she has right shoulder pain and is scheduled for a shoulder replacement 3/31/25.  Patient declined any further interventions or treatment  Interventions - Stress:  Patient comments: states stress from the shoulder pain.  Patient declined any further interventions or

## 2025-02-27 ENCOUNTER — HOSPITAL ENCOUNTER (OUTPATIENT)
Age: 75
Discharge: HOME | End: 2025-02-27
Payer: MEDICARE

## 2025-02-27 ENCOUNTER — HOSPITAL ENCOUNTER (OUTPATIENT)
Dept: GENERAL RADIOLOGY | Age: 75
Discharge: HOME | End: 2025-02-27
Payer: MEDICARE

## 2025-02-27 DIAGNOSIS — Z01.818 PREOPERATIVE CLEARANCE: ICD-10-CM

## 2025-02-27 PROCEDURE — 71046 X-RAY EXAM CHEST 2 VIEWS: CPT

## 2025-03-19 DIAGNOSIS — E78.2 MIXED HYPERLIPIDEMIA: ICD-10-CM

## 2025-03-19 DIAGNOSIS — K21.9 GASTROESOPHAGEAL REFLUX DISEASE, UNSPECIFIED WHETHER ESOPHAGITIS PRESENT: ICD-10-CM

## 2025-03-19 RX ORDER — ATORVASTATIN CALCIUM 20 MG/1
20 TABLET, FILM COATED ORAL DAILY
Qty: 90 TABLET | Refills: 1 | Status: SHIPPED | OUTPATIENT
Start: 2025-03-19

## 2025-03-19 RX ORDER — PANTOPRAZOLE SODIUM 40 MG/1
40 TABLET, DELAYED RELEASE ORAL DAILY
Qty: 90 TABLET | Refills: 1 | Status: SHIPPED | OUTPATIENT
Start: 2025-03-19

## 2025-03-31 DIAGNOSIS — I10 ESSENTIAL HYPERTENSION: ICD-10-CM

## 2025-03-31 DIAGNOSIS — E11.9 TYPE 2 DIABETES MELLITUS WITHOUT COMPLICATION, WITHOUT LONG-TERM CURRENT USE OF INSULIN: ICD-10-CM

## 2025-03-31 RX ORDER — AMLODIPINE BESYLATE 10 MG/1
10 TABLET ORAL DAILY
Qty: 90 TABLET | Refills: 1 | Status: SHIPPED | OUTPATIENT
Start: 2025-03-31

## 2025-03-31 RX ORDER — LISINOPRIL 40 MG/1
40 TABLET ORAL DAILY
Qty: 90 TABLET | Refills: 1 | Status: SHIPPED | OUTPATIENT
Start: 2025-03-31

## 2025-04-01 DIAGNOSIS — C85.80 INDOLENT NON-HODGKINS LYMPHOMA (HCC): ICD-10-CM

## 2025-04-02 DIAGNOSIS — C85.80 INDOLENT NON-HODGKINS LYMPHOMA (HCC): ICD-10-CM

## 2025-04-02 DIAGNOSIS — M50.30 DEGENERATIVE DISC DISEASE, CERVICAL: ICD-10-CM

## 2025-04-03 RX ORDER — OXYCODONE AND ACETAMINOPHEN 7.5; 325 MG/1; MG/1
1 TABLET ORAL EVERY 8 HOURS PRN
Qty: 90 TABLET | Refills: 0 | Status: SHIPPED | OUTPATIENT
Start: 2025-04-03 | End: 2025-05-03

## 2025-04-03 RX ORDER — OXYCODONE AND ACETAMINOPHEN 7.5; 325 MG/1; MG/1
1 TABLET ORAL EVERY 8 HOURS PRN
Qty: 90 TABLET | Refills: 0 | OUTPATIENT
Start: 2025-04-03

## 2025-04-29 LAB
A/G RATIO: 1.6 (ref 1–2)
ALBUMIN: 4.5 G/DL (ref 3.5–5.7)
ALP BLD-CCNC: 56 U/L (ref 34–104)
ALT SERPL-CCNC: 26 U/L (ref 7–52)
ANION GAP SERPL CALCULATED.3IONS-SCNC: 8 MMOL/L (ref 7–16)
AST SERPL-CCNC: 19 U/L (ref 13–39)
BASOPHILS ABSOLUTE: 0.1 K/UL (ref 0–0.1)
BASOPHILS RELATIVE PERCENT: 0.8 %
BILIRUB SERPL-MCNC: 0.4 MG/DL (ref 0.3–1)
BUN BLDV-MCNC: 23 MG/DL (ref 7–25)
CALCIUM SERPL-MCNC: 9.5 MG/DL (ref 8.6–10.2)
CHLORIDE BLD-SCNC: 101 MMOL/L (ref 98–107)
CO2: 27 MMOL/L (ref 21–31)
CREAT SERPL-MCNC: 0.85 MG/DL (ref 0.6–1.2)
EGFR FEMALE: 71 ML/MIN/1.73M2
EOSINOPHILS ABSOLUTE: 0.2 K/UL (ref 0–0.4)
EOSINOPHILS RELATIVE PERCENT: 2.1 %
FERRITIN: 171.5 NG/ML (ref 11–306.8)
GAMMAGLOBULIN; IGG: 1006.7 MG/DL (ref 700–1600)
GAMMAGLOBULIN; IGM: 101.9 MG/DL (ref 40–230)
GLOBULIN: 2.9 G/DL (ref 2.6–4.2)
GLUCOSE BLD-MCNC: 135 MG/DL (ref 74–109)
HCT VFR BLD CALC: 39.9 % (ref 35.8–46.5)
HEMOGLOBIN: 13.2 G/DL (ref 12.1–15.8)
IGA: 63.5 MG/DL (ref 70–400)
IRON % SATURATION: 25 % (ref 20–40)
IRON: 72 UG/DL (ref 50–170)
LACTATE DEHYDROGENASE: 178 U/L (ref 110–270)
LYMPHOCYTES ABSOLUTE: 1.2 K/UL (ref 0.8–3.6)
LYMPHOCYTES RELATIVE PERCENT: 16.6 %
MCH RBC QN AUTO: 30.4 PG (ref 28.4–33.4)
MCHC RBC AUTO-ENTMCNC: 33.2 G/DL (ref 31.1–37)
MCV RBC AUTO: 91.5 FL (ref 85–99)
MONOCYTES ABSOLUTE: 0.6 K/UL (ref 0.3–0.9)
MONOCYTES RELATIVE PERCENT: 8.2 %
NEUTROPHILS ABSOLUTE: 5.2 K/UL (ref 2–7.3)
NEUTROPHILS RELATIVE PERCENT: 72.3 %
PDW BLD-RTO: 14.8 % (ref 11.7–15.2)
PLATELET # BLD: 325 K/UL (ref 154–393)
POTASSIUM SERPL-SCNC: 3.7 MMOL/L (ref 3.5–5.1)
RBC # BLD: 4.36 M/UL (ref 3.86–5.17)
SODIUM BLD-SCNC: 136 MMOL/L (ref 136–145)
TOTAL IRON BINDING CAPACITY: 290 UG/DL (ref 250–450)
TOTAL PROTEIN: 7.4 G/DL (ref 6–8.3)
UNSATURATED IRON BINDING CAPACITY: 218 UG/DL (ref 155–355)
WBC # BLD: 7.2 K/UL (ref 4–10.5)

## 2025-05-02 DIAGNOSIS — C85.80 INDOLENT NON-HODGKINS LYMPHOMA (HCC): ICD-10-CM

## 2025-05-02 LAB — BETA-2 MICROGLOBULIN: 2.6 MG/L (ref 0.6–2.4)

## 2025-05-06 RX ORDER — OXYCODONE AND ACETAMINOPHEN 7.5; 325 MG/1; MG/1
1 TABLET ORAL EVERY 8 HOURS PRN
Qty: 90 TABLET | Refills: 0 | Status: SHIPPED | OUTPATIENT
Start: 2025-05-06 | End: 2025-06-05

## 2025-05-30 ENCOUNTER — TELEPHONE (OUTPATIENT)
Dept: FAMILY MEDICINE CLINIC | Age: 75
End: 2025-05-30

## 2025-05-30 DIAGNOSIS — M19.012 PRIMARY OSTEOARTHRITIS OF LEFT SHOULDER: ICD-10-CM

## 2025-05-30 DIAGNOSIS — M47.816 OSTEOARTHRITIS OF LUMBAR SPINE, UNSPECIFIED SPINAL OSTEOARTHRITIS COMPLICATION STATUS: Primary | ICD-10-CM

## 2025-05-30 DIAGNOSIS — C85.80 INDOLENT NON-HODGKINS LYMPHOMA (HCC): ICD-10-CM

## 2025-05-30 NOTE — TELEPHONE ENCOUNTER
OXYCODONE 7.5/325 MG 1 Q 8 HR  COULD YOU CALL IN A ANTI INFLAMMATORY TO HELP WITH THE SWELLING AND PAIN WITH HER SHOULDER. COULD SOMETHING BE SENT TO PHARM TODAY?    [facility]

## 2025-06-02 RX ORDER — OXYCODONE AND ACETAMINOPHEN 7.5; 325 MG/1; MG/1
1 TABLET ORAL EVERY 8 HOURS PRN
Qty: 90 TABLET | Refills: 0 | Status: SHIPPED | OUTPATIENT
Start: 2025-06-02 | End: 2025-07-02

## 2025-06-02 RX ORDER — NAPROXEN 500 MG/1
500 TABLET ORAL 2 TIMES DAILY WITH MEALS
Qty: 60 TABLET | Refills: 0 | Status: SHIPPED | OUTPATIENT
Start: 2025-06-02 | End: 2025-07-02

## 2025-06-09 ENCOUNTER — HOSPITAL ENCOUNTER (OUTPATIENT)
Dept: ULTRASOUND IMAGING | Age: 75
End: 2025-06-09
Payer: MEDICARE

## 2025-06-09 ENCOUNTER — HOSPITAL ENCOUNTER (OUTPATIENT)
Dept: WOMENS IMAGING | Age: 75
Discharge: HOME | End: 2025-06-09
Payer: MEDICARE

## 2025-06-09 DIAGNOSIS — N64.89 BREAST ASYMMETRY: ICD-10-CM

## 2025-06-09 DIAGNOSIS — R92.8 ABNORMAL FINDING ON BREAST IMAGING: ICD-10-CM

## 2025-06-09 PROCEDURE — G0279 TOMOSYNTHESIS, MAMMO: HCPCS

## 2025-06-11 ENCOUNTER — RESULTS FOLLOW-UP (OUTPATIENT)
Dept: FAMILY MEDICINE CLINIC | Age: 75
End: 2025-06-11

## 2025-06-13 DIAGNOSIS — G62.9 NEUROPATHY: Primary | ICD-10-CM

## 2025-06-13 DIAGNOSIS — M81.6 LOCALIZED OSTEOPOROSIS, UNSPECIFIED PATHOLOGICAL FRACTURE PRESENCE: ICD-10-CM

## 2025-06-13 RX ORDER — ALENDRONATE SODIUM 70 MG/1
70 TABLET ORAL WEEKLY
Qty: 4 TABLET | Refills: 3 | Status: SHIPPED | OUTPATIENT
Start: 2025-06-13

## 2025-06-13 RX ORDER — PREGABALIN 50 MG/1
50 CAPSULE ORAL 2 TIMES DAILY
Qty: 60 CAPSULE | Refills: 0 | Status: SHIPPED | OUTPATIENT
Start: 2025-06-13 | End: 2025-07-13

## 2025-06-13 NOTE — TELEPHONE ENCOUNTER
To Dr. Diaz-    Patient has lymphoma and is in constant pain----the Naproxen is not touching the pain.    Pharmacist recommended Lyrica or Cymbalta to help     Please advise.

## 2025-07-07 DIAGNOSIS — M50.30 DEGENERATIVE DISC DISEASE, CERVICAL: ICD-10-CM

## 2025-07-07 DIAGNOSIS — C85.80 INDOLENT NON-HODGKINS LYMPHOMA (HCC): ICD-10-CM

## 2025-07-08 RX ORDER — OXYCODONE AND ACETAMINOPHEN 7.5; 325 MG/1; MG/1
1 TABLET ORAL EVERY 8 HOURS PRN
Qty: 90 TABLET | Refills: 0 | Status: SHIPPED | OUTPATIENT
Start: 2025-07-08 | End: 2025-08-07

## 2025-07-08 NOTE — TELEPHONE ENCOUNTER
Refill percocet    Last PDMP Jacinto as Reviewed:  Review User Review Instant Review Result   MELINA BARRON 7/8/2025 10:01 AM Reviewed PDMP [1]

## 2025-07-10 ENCOUNTER — OFFICE VISIT (OUTPATIENT)
Dept: FAMILY MEDICINE CLINIC | Age: 75
End: 2025-07-10

## 2025-07-10 VITALS
OXYGEN SATURATION: 95 % | WEIGHT: 138.4 LBS | HEART RATE: 78 BPM | SYSTOLIC BLOOD PRESSURE: 134 MMHG | DIASTOLIC BLOOD PRESSURE: 66 MMHG | BODY MASS INDEX: 27.17 KG/M2 | HEIGHT: 60 IN

## 2025-07-10 DIAGNOSIS — E78.2 MIXED HYPERLIPIDEMIA: ICD-10-CM

## 2025-07-10 DIAGNOSIS — E11.9 TYPE 2 DIABETES MELLITUS WITHOUT COMPLICATION, WITHOUT LONG-TERM CURRENT USE OF INSULIN (HCC): Primary | ICD-10-CM

## 2025-07-10 DIAGNOSIS — E11.22 TYPE 2 DIABETES MELLITUS WITH STAGE 2 CHRONIC KIDNEY DISEASE, WITHOUT LONG-TERM CURRENT USE OF INSULIN (HCC): ICD-10-CM

## 2025-07-10 DIAGNOSIS — G47.33 MILD OBSTRUCTIVE SLEEP APNEA: ICD-10-CM

## 2025-07-10 DIAGNOSIS — E11.9 TYPE 2 DIABETES MELLITUS WITHOUT COMPLICATION, WITHOUT LONG-TERM CURRENT USE OF INSULIN (HCC): ICD-10-CM

## 2025-07-10 DIAGNOSIS — L82.1 SEBORRHEIC KERATOSIS: ICD-10-CM

## 2025-07-10 DIAGNOSIS — Z86.73 HISTORY OF CVA (CEREBROVASCULAR ACCIDENT): ICD-10-CM

## 2025-07-10 DIAGNOSIS — N18.31 STAGE 3A CHRONIC KIDNEY DISEASE (HCC): ICD-10-CM

## 2025-07-10 DIAGNOSIS — N18.2 TYPE 2 DIABETES MELLITUS WITH STAGE 2 CHRONIC KIDNEY DISEASE, WITHOUT LONG-TERM CURRENT USE OF INSULIN (HCC): ICD-10-CM

## 2025-07-10 DIAGNOSIS — C85.80 MARGINAL ZONE LYMPHOMA (HCC): ICD-10-CM

## 2025-07-10 DIAGNOSIS — I10 ESSENTIAL HYPERTENSION: ICD-10-CM

## 2025-07-10 LAB
ANION GAP SERPL CALCULATED.3IONS-SCNC: 11 MMOL/L (ref 3–16)
BASOPHILS # BLD: 0 K/UL (ref 0–0.2)
BASOPHILS NFR BLD: 0.8 %
BUN SERPL-MCNC: 20 MG/DL (ref 7–20)
CALCIUM SERPL-MCNC: 9.1 MG/DL (ref 8.3–10.6)
CHLORIDE SERPL-SCNC: 103 MMOL/L (ref 99–110)
CHOLEST SERPL-MCNC: 112 MG/DL (ref 0–199)
CO2 SERPL-SCNC: 24 MMOL/L (ref 21–32)
CREAT SERPL-MCNC: 0.9 MG/DL (ref 0.6–1.2)
DEPRECATED RDW RBC AUTO: 13.9 % (ref 12.4–15.4)
EOSINOPHIL # BLD: 0.1 K/UL (ref 0–0.6)
EOSINOPHIL NFR BLD: 2.3 %
GFR SERPLBLD CREATININE-BSD FMLA CKD-EPI: 66 ML/MIN/{1.73_M2}
GLUCOSE SERPL-MCNC: 106 MG/DL (ref 70–99)
HCT VFR BLD AUTO: 37.5 % (ref 36–48)
HDLC SERPL-MCNC: 40 MG/DL (ref 40–60)
HGB BLD-MCNC: 12.6 G/DL (ref 12–16)
LDLC SERPL CALC-MCNC: 43 MG/DL
LYMPHOCYTES # BLD: 1 K/UL (ref 1–5.1)
LYMPHOCYTES NFR BLD: 19.4 %
MAGNESIUM SERPL-MCNC: 1.48 MG/DL (ref 1.8–2.4)
MCH RBC QN AUTO: 30.8 PG (ref 26–34)
MCHC RBC AUTO-ENTMCNC: 33.5 G/DL (ref 31–36)
MCV RBC AUTO: 91.9 FL (ref 80–100)
MONOCYTES # BLD: 0.5 K/UL (ref 0–1.3)
MONOCYTES NFR BLD: 10 %
NEUTROPHILS # BLD: 3.6 K/UL (ref 1.7–7.7)
NEUTROPHILS NFR BLD: 67.5 %
PLATELET # BLD AUTO: 230 K/UL (ref 135–450)
PMV BLD AUTO: 8.3 FL (ref 5–10.5)
POTASSIUM SERPL-SCNC: 4.2 MMOL/L (ref 3.5–5.1)
RBC # BLD AUTO: 4.08 M/UL (ref 4–5.2)
SODIUM SERPL-SCNC: 138 MMOL/L (ref 136–145)
TRIGL SERPL-MCNC: 143 MG/DL (ref 0–150)
VLDLC SERPL CALC-MCNC: 29 MG/DL
WBC # BLD AUTO: 5.3 K/UL (ref 4–11)

## 2025-07-10 NOTE — PROGRESS NOTES
management of conditions with current medications.  - Discussed the importance of continuing current medication regimen.  - No changes in treatment plan.    Return in about 4 months (around 11/10/2025).         The patient (or guardian, if applicable) and other individuals in attendance with the patient were advised that Artificial Intelligence will be utilized during this visit to record, process the conversation to generate a clinical note, and support improvement of the AI technology. The patient (or guardian, if applicable) and other individuals in attendance at the appointment consented to the use of AI, including the recording.                    Electronically signed by Yinka Diaz DO on 7/14/2025

## 2025-07-11 ENCOUNTER — TELEPHONE (OUTPATIENT)
Dept: FAMILY MEDICINE CLINIC | Age: 75
End: 2025-07-11

## 2025-07-11 LAB
EST. AVERAGE GLUCOSE BLD GHB EST-MCNC: 128.4 MG/DL
HBA1C MFR BLD: 6.1 %

## 2025-07-14 ASSESSMENT — ENCOUNTER SYMPTOMS
SORE THROAT: 0
SHORTNESS OF BREATH: 0
WHEEZING: 0
ABDOMINAL PAIN: 0
NAUSEA: 0

## 2025-07-20 ENCOUNTER — APPOINTMENT (OUTPATIENT)
Dept: GENERAL RADIOLOGY | Age: 75
End: 2025-07-20
Payer: MEDICARE

## 2025-07-20 ENCOUNTER — HOSPITAL ENCOUNTER (EMERGENCY)
Age: 75
Discharge: HOME | End: 2025-07-20
Attending: EMERGENCY MEDICINE
Payer: MEDICARE

## 2025-07-20 VITALS
OXYGEN SATURATION: 96 % | SYSTOLIC BLOOD PRESSURE: 143 MMHG | DIASTOLIC BLOOD PRESSURE: 64 MMHG | TEMPERATURE: 96.9 F | HEIGHT: 60 IN | RESPIRATION RATE: 18 BRPM | HEART RATE: 87 BPM | BODY MASS INDEX: 27.48 KG/M2 | WEIGHT: 140 LBS

## 2025-07-20 DIAGNOSIS — J44.1 COPD EXACERBATION (HCC): Primary | ICD-10-CM

## 2025-07-20 DIAGNOSIS — J06.9 ACUTE UPPER RESPIRATORY INFECTION: ICD-10-CM

## 2025-07-20 LAB
ALBUMIN SERPL-MCNC: 4.1 G/DL (ref 3.4–5)
ALBUMIN/GLOB SERPL: 1.6 {RATIO}
ALP SERPL-CCNC: 68 U/L (ref 40–129)
ALT SERPL-CCNC: 44 U/L (ref 10–40)
ANION GAP SERPL CALCULATED.3IONS-SCNC: 12 MMOL/L (ref 9–17)
AST SERPL-CCNC: 27 U/L (ref 15–37)
BASOPHILS # BLD: 0.05 K/UL
BASOPHILS NFR BLD: 1 % (ref 0–1)
BILIRUB SERPL-MCNC: 0.3 MG/DL (ref 0–1)
BUN SERPL-MCNC: 15 MG/DL (ref 7–20)
CALCIUM SERPL-MCNC: 8.4 MG/DL (ref 8.3–10.6)
CHLORIDE SERPL-SCNC: 102 MMOL/L (ref 99–110)
CO2 SERPL-SCNC: 22 MMOL/L (ref 21–32)
CREAT SERPL-MCNC: 0.8 MG/DL (ref 0.6–1.2)
D DIMER PPP FEU-MCNC: 0.5 UG/ML FEU (ref 0–0.46)
EOSINOPHIL # BLD: 0.14 K/UL
EOSINOPHILS RELATIVE PERCENT: 2 % (ref 0–3)
ERYTHROCYTE [DISTWIDTH] IN BLOOD BY AUTOMATED COUNT: 13.3 % (ref 11.7–14.9)
GFR, ESTIMATED: 72 ML/MIN/1.73M2
GLUCOSE SERPL-MCNC: 154 MG/DL (ref 74–99)
HCT VFR BLD AUTO: 38.5 % (ref 37–47)
HGB BLD-MCNC: 12.4 G/DL (ref 12.5–16)
IMM GRANULOCYTES # BLD AUTO: 0.05 K/UL
IMM GRANULOCYTES NFR BLD: 1 %
INFLUENZA A BY PCR: NOT DETECTED
INFLUENZA B BY PCR: NOT DETECTED
LYMPHOCYTES NFR BLD: 0.94 K/UL
LYMPHOCYTES RELATIVE PERCENT: 10 % (ref 24–44)
MCH RBC QN AUTO: 30.2 PG (ref 27–31)
MCHC RBC AUTO-ENTMCNC: 32.2 G/DL (ref 32–36)
MCV RBC AUTO: 93.9 FL (ref 78–100)
MONOCYTES NFR BLD: 0.84 K/UL
MONOCYTES NFR BLD: 9 % (ref 0–5)
NEUTROPHILS NFR BLD: 78 % (ref 36–66)
NEUTS SEG NFR BLD: 6.98 K/UL
PLATELET # BLD AUTO: 262 K/UL (ref 140–440)
PMV BLD AUTO: 9.6 FL (ref 7.5–11.1)
POTASSIUM SERPL-SCNC: 4.3 MMOL/L (ref 3.5–5.1)
PROT SERPL-MCNC: 6.7 G/DL (ref 6.4–8.2)
RBC # BLD AUTO: 4.1 M/UL (ref 4.2–5.4)
SARS-COV-2 RDRP RESP QL NAA+PROBE: NOT DETECTED
SODIUM SERPL-SCNC: 136 MMOL/L (ref 136–145)
SPECIMEN DESCRIPTION: NORMAL
TROPONIN I SERPL HS-MCNC: 9 NG/L (ref 0–14)
WBC OTHER # BLD: 9 K/UL (ref 4–10.5)

## 2025-07-20 PROCEDURE — 80053 COMPREHEN METABOLIC PANEL: CPT

## 2025-07-20 PROCEDURE — 99285 EMERGENCY DEPT VISIT HI MDM: CPT

## 2025-07-20 PROCEDURE — 87502 INFLUENZA DNA AMP PROBE: CPT

## 2025-07-20 PROCEDURE — 85379 FIBRIN DEGRADATION QUANT: CPT

## 2025-07-20 PROCEDURE — 71046 X-RAY EXAM CHEST 2 VIEWS: CPT

## 2025-07-20 PROCEDURE — 87635 SARS-COV-2 COVID-19 AMP PRB: CPT

## 2025-07-20 PROCEDURE — 84484 ASSAY OF TROPONIN QUANT: CPT

## 2025-07-20 PROCEDURE — 6370000000 HC RX 637 (ALT 250 FOR IP): Performed by: EMERGENCY MEDICINE

## 2025-07-20 PROCEDURE — 85025 COMPLETE CBC W/AUTO DIFF WBC: CPT

## 2025-07-20 PROCEDURE — 93005 ELECTROCARDIOGRAM TRACING: CPT | Performed by: EMERGENCY MEDICINE

## 2025-07-20 RX ORDER — IPRATROPIUM BROMIDE AND ALBUTEROL SULFATE 2.5; .5 MG/3ML; MG/3ML
1 SOLUTION RESPIRATORY (INHALATION) ONCE
Status: COMPLETED | OUTPATIENT
Start: 2025-07-20 | End: 2025-07-20

## 2025-07-20 RX ORDER — AZITHROMYCIN 500 MG/1
500 TABLET, FILM COATED ORAL DAILY
Qty: 3 TABLET | Refills: 0 | Status: SHIPPED | OUTPATIENT
Start: 2025-07-20 | End: 2025-07-23

## 2025-07-20 RX ORDER — BENZONATATE 200 MG/1
200 CAPSULE ORAL 3 TIMES DAILY PRN
Qty: 30 CAPSULE | Refills: 0 | Status: SHIPPED | OUTPATIENT
Start: 2025-07-20 | End: 2025-07-30

## 2025-07-20 RX ORDER — ALBUTEROL SULFATE 90 UG/1
2 INHALANT RESPIRATORY (INHALATION) EVERY 6 HOURS PRN
Qty: 51 G | Refills: 3 | Status: SHIPPED | OUTPATIENT
Start: 2025-07-20

## 2025-07-20 RX ADMIN — IPRATROPIUM BROMIDE AND ALBUTEROL SULFATE 1 DOSE: .5; 2.5 SOLUTION RESPIRATORY (INHALATION) at 07:17

## 2025-07-20 RX ADMIN — IPRATROPIUM BROMIDE AND ALBUTEROL SULFATE 1 DOSE: .5; 2.5 SOLUTION RESPIRATORY (INHALATION) at 07:47

## 2025-07-20 ASSESSMENT — LIFESTYLE VARIABLES
HOW OFTEN DO YOU HAVE A DRINK CONTAINING ALCOHOL: NEVER
HOW MANY STANDARD DRINKS CONTAINING ALCOHOL DO YOU HAVE ON A TYPICAL DAY: PATIENT DOES NOT DRINK

## 2025-07-20 NOTE — DISCHARGE INSTRUCTIONS
You were seen in the emergency department for subjective chills, cough productive of yellow sputum, and shortness of breath.  As discussed, this is likely a viral infection which is causing a COPD exacerbation.  You have been prescribed an antibiotic, an albuterol inhaler, and a medication for cough.  Take as directed.  Return to the emergency department immediately if you develop worsening shortness of breath despite use of your inhaler and nebulizer, chest pain, persistent fevers, or any other concern.  Follow-up with your primary care physician.    You are anemic. Your hemoglobin today is 12.4. Your blood pressure was also elevated during today's visit. Discuss with your physician for continued monitoring.

## 2025-07-20 NOTE — ED PROVIDER NOTES
In order to facilitate the oncoming physician I have seen this patient in triage.  I have placed basic orders and/or imaging for the oncoming physician.  In addition, I have placed medication orders in anticipation of what may be needed for this patient by the oncoming physician.  I have only seen this patient in triage to the oncoming physician will be providing the complete medical record and they will be the physician of record for the patient.  My goal has been to provide triage and any emergent needs that the patient may need prior to arrival of oncoming physician.  The patient was checked out to the oncoming physician they are stable and neurovascularly intact prior to my leaving the department.  In brief, 75 year old female s/p camping trip with swimming etc. Reports with sorethroat, productive wet sounding cough.  She is stable and I have ordered chest xray for oncoming physician;      Doc Ma,   07/20/25 [id number]

## 2025-07-20 NOTE — ED NOTES
Discharge instructions reviewed with patient and daughter. Medications and follow up were discussed. Patient and daughter denies further questions and verbalizes understanding

## 2025-07-20 NOTE — ED PROVIDER NOTES
Emergency Department Encounter    Patient: Vanessa Jones  MRN: [id number]  : 1950  Date of Evaluation: 2025  ED Provider:  Emilie Bush DO    Triage Chief Complaint:   Cough, Shortness of Breath (Pt having symptoms since yesterday ), and Pharyngitis    [place]:  Vanessa Jones is a 75 y.o. female with numerous medical comorbidities to include COPD, diabetes, hypertension, hyperlipidemia, and renal disease who presents to the emergency department for 1 day of cough productive of yellow sputum, shortness of breath, and sore throat.  Patient also endorses chills.  She denies nausea, vomiting, diarrhea, abdominal pain, chest pain.  Patient states that she has been utilizing her home nebulizer for her shortness of breath without relief.  No known sick contacts.  Reports recent travel: Camping in the local area approximately 1 week ago.  Patient denies skin rash.  Patient reports that approximately 6 months ago she was diagnosed with lymphoma.  States chronic anemia secondary to her kidney disease and the lymphoma.  Reports numerous iron infusions for such.  Patient denies dark or bloody stools.    ROS - see HPI, below listed is current ROS at time of my eval:  10 systems reviewed and negative except as above.     Past Medical History:   Diagnosis Date    Acute exacerbation of chronic obstructive pulmonary disease (COPD) (HCC) 12/15/2021    Bowel trouble     Cerebral artery occlusion with cerebral infarction (HCC)     COPD, mild (HCC) 06/15/2021    Diabetes mellitus (HCC)     Excessive daytime sleepiness 06/15/2021    Former smoker 06/15/2021    Hyperlipidemia     Hypertension     Mild obstructive sleep apnea 12/15/2021    Multiple lung nodules on CT 2021    Shortness of breath 06/15/2021    Snoring 06/15/2021     Past Surgical History:   Procedure Laterality Date    APPENDECTOMY      CHOLECYSTECTOMY      COLONOSCOPY N/A 09/15/2021    COLONOSCOPY POLYPECTOMY SNARE/COLD BIOPSY performed by Doron HERNANDEZ

## 2025-07-21 LAB
EKG ATRIAL RATE: 74 BPM
EKG DIAGNOSIS: NORMAL
EKG P AXIS: 78 DEGREES
EKG P-R INTERVAL: 206 MS
EKG Q-T INTERVAL: 380 MS
EKG QRS DURATION: 76 MS
EKG QTC CALCULATION (BAZETT): 421 MS
EKG R AXIS: 33 DEGREES
EKG T AXIS: 63 DEGREES
EKG VENTRICULAR RATE: 74 BPM

## 2025-07-21 PROCEDURE — 93010 ELECTROCARDIOGRAM REPORT: CPT | Performed by: INTERNAL MEDICINE

## 2025-07-23 ENCOUNTER — TELEPHONE (OUTPATIENT)
Dept: FAMILY MEDICINE CLINIC | Age: 75
End: 2025-07-23

## 2025-07-23 DIAGNOSIS — J06.9 VIRAL URI: Primary | ICD-10-CM

## 2025-07-23 NOTE — TELEPHONE ENCOUNTER
To Dr. Diaz---    Patient went to the ER in [place] for acute upper respiratory infection----patient was only given 3 days of antibiotics and it's not enough.    Could you call in some more antibiotics to [facility] for her.

## 2025-07-24 RX ORDER — AZITHROMYCIN 250 MG/1
TABLET, FILM COATED ORAL
Qty: 6 TABLET | Refills: 0 | Status: SHIPPED | OUTPATIENT
Start: 2025-07-24 | End: 2025-08-03

## 2025-07-24 RX ORDER — DICYCLOMINE HYDROCHLORIDE 10 MG/1
10 CAPSULE ORAL
Qty: 90 CAPSULE | Refills: 5 | Status: SHIPPED | OUTPATIENT
Start: 2025-07-24

## 2025-07-29 LAB
FERRITIN: 121.6 NG/ML (ref 11–306.8)
GAMMAGLOBULIN; IGG: 838.4 MG/DL (ref 700–1600)
GAMMAGLOBULIN; IGM: 97.7 MG/DL (ref 40–230)
IGA: 58.7 MG/DL (ref 70–400)

## 2025-08-05 ENCOUNTER — HOSPITAL ENCOUNTER (EMERGENCY)
Age: 75
Discharge: HOME | End: 2025-08-05
Attending: EMERGENCY MEDICINE
Payer: MEDICARE

## 2025-08-05 VITALS
BODY MASS INDEX: 27.17 KG/M2 | WEIGHT: 139.1 LBS | OXYGEN SATURATION: 97 % | SYSTOLIC BLOOD PRESSURE: 170 MMHG | TEMPERATURE: 98.4 F | HEART RATE: 95 BPM | DIASTOLIC BLOOD PRESSURE: 79 MMHG | RESPIRATION RATE: 15 BRPM

## 2025-08-05 DIAGNOSIS — J02.9 ACUTE PHARYNGITIS, UNSPECIFIED ETIOLOGY: Primary | ICD-10-CM

## 2025-08-05 DIAGNOSIS — R59.0 LYMPHADENOPATHY OF RIGHT CERVICAL REGION: ICD-10-CM

## 2025-08-05 LAB
S PYO AG THROAT QL: NEGATIVE
SPECIMEN SOURCE: NORMAL

## 2025-08-05 PROCEDURE — 6370000000 HC RX 637 (ALT 250 FOR IP): Performed by: EMERGENCY MEDICINE

## 2025-08-05 PROCEDURE — 87880 STREP A ASSAY W/OPTIC: CPT

## 2025-08-05 PROCEDURE — 87081 CULTURE SCREEN ONLY: CPT

## 2025-08-05 PROCEDURE — 6360000002 HC RX W HCPCS: Performed by: EMERGENCY MEDICINE

## 2025-08-05 PROCEDURE — 99283 EMERGENCY DEPT VISIT LOW MDM: CPT

## 2025-08-05 RX ORDER — DEXAMETHASONE 4 MG/1
6 TABLET ORAL ONCE
Status: COMPLETED | OUTPATIENT
Start: 2025-08-05 | End: 2025-08-05

## 2025-08-05 RX ORDER — AMOXICILLIN 250 MG/1
500 CAPSULE ORAL ONCE
Status: COMPLETED | OUTPATIENT
Start: 2025-08-05 | End: 2025-08-05

## 2025-08-05 RX ORDER — AMOXICILLIN 500 MG/1
500 CAPSULE ORAL 2 TIMES DAILY
Qty: 20 CAPSULE | Refills: 0 | Status: SHIPPED | OUTPATIENT
Start: 2025-08-05 | End: 2025-08-15

## 2025-08-05 RX ORDER — ACETAMINOPHEN 500 MG
500 TABLET ORAL ONCE
Status: COMPLETED | OUTPATIENT
Start: 2025-08-05 | End: 2025-08-05

## 2025-08-05 RX ORDER — PREDNISONE 20 MG/1
20 TABLET ORAL 2 TIMES DAILY
Qty: 10 TABLET | Refills: 0 | Status: SHIPPED | OUTPATIENT
Start: 2025-08-05 | End: 2025-08-10

## 2025-08-05 RX ADMIN — ACETAMINOPHEN 500 MG: 500 TABLET ORAL at 08:21

## 2025-08-05 RX ADMIN — AMOXICILLIN 500 MG: 250 CAPSULE ORAL at 08:21

## 2025-08-05 RX ADMIN — DEXAMETHASONE 6 MG: 4 TABLET ORAL at 08:21

## 2025-08-05 ASSESSMENT — PAIN SCALES - GENERAL
PAINLEVEL_OUTOF10: 9
PAINLEVEL_OUTOF10: 9

## 2025-08-05 ASSESSMENT — PAIN DESCRIPTION - DESCRIPTORS
DESCRIPTORS: SORE
DESCRIPTORS: ACHING;SORE

## 2025-08-05 ASSESSMENT — PAIN DESCRIPTION - LOCATION
LOCATION: THROAT
LOCATION: THROAT

## 2025-08-05 ASSESSMENT — PAIN - FUNCTIONAL ASSESSMENT
PAIN_FUNCTIONAL_ASSESSMENT: ACTIVITIES ARE NOT PREVENTED
PAIN_FUNCTIONAL_ASSESSMENT: 0-10

## 2025-08-05 ASSESSMENT — PAIN DESCRIPTION - ORIENTATION: ORIENTATION: RIGHT

## 2025-08-07 LAB
MICROORGANISM SPEC CULT: NORMAL
SPECIMEN DESCRIPTION: NORMAL

## 2025-08-18 DIAGNOSIS — E83.42 HYPOMAGNESEMIA: ICD-10-CM

## 2025-08-18 DIAGNOSIS — M50.30 DEGENERATIVE DISC DISEASE, CERVICAL: ICD-10-CM

## 2025-08-18 DIAGNOSIS — C85.80 INDOLENT NON-HODGKINS LYMPHOMA (HCC): ICD-10-CM

## 2025-08-19 RX ORDER — MAGNESIUM OXIDE 400 MG/1
400 TABLET ORAL DAILY
Qty: 30 TABLET | Refills: 2 | Status: SHIPPED | OUTPATIENT
Start: 2025-08-19 | End: 2025-11-17

## 2025-08-19 RX ORDER — OXYCODONE AND ACETAMINOPHEN 7.5; 325 MG/1; MG/1
1 TABLET ORAL EVERY 8 HOURS PRN
Qty: 90 TABLET | Refills: 0 | Status: SHIPPED | OUTPATIENT
Start: 2025-08-19 | End: 2025-09-18

## (undated) DEVICE — STAPLER INT CUT LN 51MM STPL 51MM BLU CRV HD B FRM

## (undated) DEVICE — JELLY,LUBE,STERILE,FLIP TOP,TUBE,2-OZ: Brand: MEDLINE

## (undated) DEVICE — RELOAD STPL 40MM H1.5-3.5MM WIRE 0.2MM REG TISS BLU CRV

## (undated) DEVICE — GOWN,SIRUS,POLYRNF,BRTHSLV,XLN/XL,20/CS: Brand: MEDLINE

## (undated) DEVICE — TUBING, SUCTION, 9/32" X 10', STRAIGHT: Brand: MEDLINE

## (undated) DEVICE — GLOVE ORANGE PI 7   MSG9070

## (undated) DEVICE — SUTURE PERMA-HAND SZ 3-0 L18IN 17 STRND NONABSORBABLE BLK SA64H

## (undated) DEVICE — PREMIUM WET SKIN PREP TRAY: Brand: MEDLINE INDUSTRIES, INC.

## (undated) DEVICE — TOWEL,OR,DSP,ST,BLUE,STD,6/PK,12PK/CS: Brand: MEDLINE

## (undated) DEVICE — SNARE ENDOSCP L240CM SHTH DIA24MM LOOP W10MM POLYP RND REINF

## (undated) DEVICE — SUTURE PDS II SZ 3-0 L18IN ABSRB VLT L26MM SH TAPERPOINT Z774D

## (undated) DEVICE — SUTURE PERMAHAND SZ 2-0 L18IN NONABSORBABLE BLK L26MM SH C012D

## (undated) DEVICE — DRAPE, LAVH, STERILE: Brand: MEDLINE

## (undated) DEVICE — ENDOSCOPY KIT: Brand: MEDLINE INDUSTRIES, INC.

## (undated) DEVICE — RESERVOIR,SUCTION,100CC,SILICONE: Brand: MEDLINE

## (undated) DEVICE — FORCEPS BX L240CM JAW DIA2.8MM L CAP W/ NDL MIC MESH TOOTH

## (undated) DEVICE — TOTAL TRAY, DB, 100% SILI FOLEY, 16FR 10: Brand: MEDLINE

## (undated) DEVICE — GUIDE ENDO STEER

## (undated) DEVICE — GLOVE ORANGE PI 7 1/2   MSG9075

## (undated) DEVICE — SPONGE DRN W4XL4IN RAYON/POLYESTER 6 PLY NONWOVEN PRECUT 2 PER PK

## (undated) DEVICE — SUTURE PERMAHAND SZ 2-0 L17X18IN NONABSORBABLE BLK SILK SA65H

## (undated) DEVICE — SUTURE STRATAFIX SYMMETRIC SZ 1 L18IN ABSRB VLT CT1 L36CM SXPP1A404

## (undated) DEVICE — SEALER ENDOSCP NANO COAT OPN DIV CRV L JAW LIGASURE IMPACT

## (undated) DEVICE — Z DISCONTINUED (USE MFG CAT MVABO)  TUBING GAS SAMPLING STD 6.5 FT FEMALE CONN SMRT CAPNOLINE

## (undated) DEVICE — CSTM HIP POUCH PACK: Brand: MEDLINE INDUSTRIES, INC.

## (undated) DEVICE — PENCIL ES CRD L10FT HND SWCHING ROCK SWCH W/ EDGE COAT BLDE

## (undated) DEVICE — SUTURE PROL SZ 2-0 L30IN NONABSORBABLE BLU L26MM SH 1/2 CIR 8833H

## (undated) DEVICE — GOWN,SIRUS,FABRNF,RAGLAN,L,ST,30/CS: Brand: MEDLINE

## (undated) DEVICE — PACK,BASIC,SIRUS,V: Brand: MEDLINE

## (undated) DEVICE — DRAIN SURG 15FR SIL RND CHN W/ TRCR FULL FLUT DBL WRP TRAD

## (undated) DEVICE — SUTURE PERMAHAND SZ 3-0 L18IN NONABSORBABLE BLK L26MM SH C013D

## (undated) DEVICE — COUNTER NDL 30 COUNT FOAM STRP SGL MAG

## (undated) DEVICE — YANKAUER,FLEXIBLE HANDLE,REGLR CAPACITY: Brand: MEDLINE INDUSTRIES, INC.

## (undated) DEVICE — GLOVE SURG SZ 65 L12IN FNGR THK79MIL GRN LTX FREE

## (undated) DEVICE — Z DISCONTINUED NO SUB IDED TUBING ETCO2 AD L6.5FT NSL ORAL CVD PRNG NONFLARED TIP OVR

## (undated) DEVICE — MARKER SURG SKIN UTIL REGULAR/FINE 2 TIP W/ RUL AND 9 LBL

## (undated) DEVICE — SNARE VASC L240CM LOOP W10MM SHTH DIA2.4MM RND STIFF CLD

## (undated) DEVICE — SUTURE VCRL SZ 3-0 L27IN ABSRB UD L36MM CT-1 1/2 CIR J258H

## (undated) DEVICE — GLOVE SURG SZ 65 CRM LTX FREE POLYISOPRENE POLYMER BEAD ANTI

## (undated) DEVICE — DRAPE,ABDOMINAL,MAJOR,STERILE: Brand: MEDLINE

## (undated) DEVICE — SYRINGE IRRIG 60ML SFT PLIABLE BLB EZ TO GRP 1 HND USE W/

## (undated) DEVICE — PAD GZ BORD ST 4INX10IN 2X8IN

## (undated) DEVICE — GAUZE,SPONGE,4"X4",16PLY,XRAY,STRL,LF: Brand: MEDLINE

## (undated) DEVICE — SPONGE LAP W18XL18IN WHT COT 4 PLY FLD STRUNG RADPQ DISP ST 2 PER PACK

## (undated) DEVICE — STAPLER INT DIA29MM CLS STPL H1.5-2.2MM OPN LEG L5.2MM 26

## (undated) DEVICE — INTENDED FOR TISSUE SEPARATION, AND OTHER PROCEDURES THAT REQUIRE A SHARP SURGICAL BLADE TO PUNCTURE OR CUT.: Brand: BARD-PARKER ® STAINLESS STEEL BLADES

## (undated) DEVICE — SHEET,DRAPE,53X77,STERILE: Brand: MEDLINE

## (undated) DEVICE — BLADE ES L6IN ELASTOMERIC COAT EXT DURABLE BEND UPTO 90DEG

## (undated) DEVICE — GLOVE SURG SZ 65 THK91MIL LTX FREE SYN POLYISOPRENE

## (undated) DEVICE — Device

## (undated) DEVICE — WOUND RETRACTOR AND PROTECTOR: Brand: ALEXIS O WOUND PROTECTOR-RETRACTOR

## (undated) DEVICE — FORCEPS BX 240CM JAW 3.2MM L CAP NDL MIC MESH TTH M00513372

## (undated) DEVICE — APPLICATOR MEDICATED 26 CC SOLUTION HI LT ORNG CHLORAPREP

## (undated) DEVICE — COVER,MAYO STAND,STERILE: Brand: MEDLINE